# Patient Record
Sex: MALE | Race: WHITE | NOT HISPANIC OR LATINO | Employment: OTHER | URBAN - METROPOLITAN AREA
[De-identification: names, ages, dates, MRNs, and addresses within clinical notes are randomized per-mention and may not be internally consistent; named-entity substitution may affect disease eponyms.]

---

## 2017-01-04 ENCOUNTER — OFFICE VISIT (OUTPATIENT)
Dept: ORTHOPEDIC SURGERY | Facility: CLINIC | Age: 77
End: 2017-01-04

## 2017-01-04 VITALS — HEIGHT: 68 IN | WEIGHT: 169 LBS | BODY MASS INDEX: 25.61 KG/M2 | TEMPERATURE: 98.6 F

## 2017-01-04 DIAGNOSIS — M16.11 PRIMARY OSTEOARTHRITIS OF RIGHT HIP: Primary | ICD-10-CM

## 2017-01-04 PROCEDURE — 99213 OFFICE O/P EST LOW 20 MIN: CPT | Performed by: ORTHOPAEDIC SURGERY

## 2017-01-04 NOTE — PROGRESS NOTES
"Patient:  Louie German is a 76 y.o. male    Chief Complaint/ Reason for Visit:    Chief Complaint   Patient presents with   • Right Hip - Pre-op Exam, Follow-up, Pain       HPI:  The patient presents today for preoperative discussion, history and physical examination.  He has clearly and exhaustively pursued a comprehensive course of nonsurgical management of the end-stage osteoarthritis in his right hip.  He says now, he can't even really put on his own right shoe and sock due to severe pain in his right groin caused by flexing his hip.  As documented in previous visits, he has had physical therapy and the full complement of nonsurgical management, including an intra-articular injection, as well as, in the past, multiple nonsteroidals, though he can't take these anymore due to kidney disease, he says..  His pain is severe, and tremendously limits even the simplest day-to-day activities.  He wants to proceed with hip replacement as scheduled.      He says that his pain in the right hip and groin area has actually worsened even just since his last visit.  He says, \"I don't think I can go on like this much longer.\"          PMH:    Past Medical History   Diagnosis Date   • Coronary artery disease    • DDD (degenerative disc disease), cervical    • Hx of cardiac arrest      Happened after Induction of anesthesia prior  to JOEY   • Hyperlipidemia    • Hypertension    • Kidney stones    • Osteoarthritis    • Rheumatoid arthritis        PSH:    Past Surgical History   Procedure Laterality Date   • Coronary angioplasty with stent placement       17 years ago   • Total hip arthroplasty Left      9-10 years ago    • Posterior cervical fusion  2001   • Cystoscopy w/ litholapaxy / ehl       6-7 years ago       Social Hx:    Social History     Social History   • Marital status:      Spouse name: N/A   • Number of children: N/A   • Years of education: N/A     Occupational History   • Not on file.     Social History " Main Topics   • Smoking status: Never Smoker   • Smokeless tobacco: Former User   • Alcohol use Yes      Comment: 1-2 beers nightly, sometimes an occasional bourbon   • Drug use: No   • Sexual activity: Not on file     Other Topics Concern   • Not on file     Social History Narrative       Family Hx:    Family History   Problem Relation Age of Onset   • Hypertension Mother      Lived to 95 years old - Had numerous medical problems & medications   • Hypertension Father      Lived to 98 years old - Numerous medical problems and Medications   • Cancer Brother      History of Agent Orange   • Heart disease Maternal Grandmother    • Osteoarthritis Paternal Grandfather      possible RA       Meds:    Current Outpatient Prescriptions:   •  aspirin 81 MG tablet, Take 81 mg by mouth daily., Disp: , Rfl:   •  felodipine (PLENDIL) 10 MG 24 hr tablet, Take 1 tablet by mouth daily., Disp: 90 tablet, Rfl: 3  •  simvastatin (ZOCOR) 40 MG tablet, TAKE 1 TABLET DAILY, Disp: 90 tablet, Rfl: 2  •  naproxen (NAPROSYN) 500 MG tablet, Take 500 mg by mouth 2 (Two) Times a Day With Meals., Disp: , Rfl:     Allergies:  No Known Allergies    ROS:  Review of Systems     Constitutional: Negative for chills, diaphoresis, fever and unexpected weight change.   HENT: Negative for hearing loss, nosebleeds, sore throat and tinnitus.   Eyes: Negative for pain and visual disturbance.   Respiratory: Negative for cough, shortness of breath and wheezing.   Cardiovascular: Negative for chest pain and palpitations.   Gastrointestinal: Negative for abdominal pain, diarrhea, nausea and vomiting.   Endocrine: Negative for cold intolerance, heat intolerance and polydipsia.   Genitourinary: Negative for difficulty urinating, dysuria and hematuria.   Musculoskeletal: Positive for arthralgias and gait problem. Negative for joint swelling and myalgias.   Skin: Negative for rash and wound.   Allergic/Immunologic: Negative for environmental allergies.  "  Neurological: Negative for dizziness, syncope and numbness.   Hematological: Does not bruise/bleed easily.   Psychiatric/Behavioral: Negative for dysphoric mood and sleep disturbance. The patient is not nervous/anxious.     Vitals:    01/04/17 1456   Temp: 98.6 °F (37 °C)   Weight: 169 lb (76.7 kg)   Height: 68\" (172.7 cm)       Physical Exam   Constitutional: He is oriented to person, place, and time. He appears well-developed and well-nourished.   HENT:   Head: Normocephalic and atraumatic.   Mouth/Throat: Oropharynx is clear and moist.   Eyes: Conjunctivae and EOM are normal. Pupils are equal, round, and reactive to light. No scleral icterus.   Neck: No JVD present. No tracheal deviation present.   Cardiovascular: Normal rate, regular rhythm, normal heart sounds and intact distal pulses.    Pulses:       Dorsalis pedis pulses are 2+ on the right side, and 2+ on the left side.        Posterior tibial pulses are 2+ on the right side, and 2+ on the left side.   Pulmonary/Chest: Effort normal and breath sounds normal. No respiratory distress.   Abdominal: Soft. Bowel sounds are normal. He exhibits no distension. There is no tenderness.   Musculoskeletal:        Left hip: He exhibits decreased range of motion, decreased strength and crepitus. He exhibits no laceration.   Right hip: The patient's right hip is examined. Skin is unremarkable. He has no trochanteric tenderness. He has a moderately positive Stinchfield test. He has pain on flexion and internal rotation, which are limited.  He exhibits decreased range of motion and crepitus.    Left hip the patient has a smooth stable range of motion throughout a functional range of his left total hip. Leg lengths appear grossly equal, though the left lower extremity may be ever so slightly longer than the right.   Neurological: He is alert and oriented to person, place, and time.   Skin: Skin is warm and dry. No rash noted. No cyanosis or erythema. Nails show no " clubbing.   Psychiatric: He has a normal mood and affect. His speech is normal and behavior is normal. Judgment and thought content normal.   Nursing note and vitals reviewed.      Radiology:  I rereviewed previous images.  New films were not obtained.  The patient clearly has end-stage, bone-on-bone osteoarthritis of the right hip.  Incidental note is made of a well positioned left total hip with no sign of loosening.    Labs: Preop labs are pending at this time.  The patient apparently has an appointment tomorrow for preop labs and studies.      Assessment:  1. Primary osteoarthritis of right hip, which, in every respect, has reached end stage            Plan:  I had a long discussion with the patient's regarding his options. He said the last injection he had did not work. He has been doing physical therapy, and continues to do physical therapy for his right hip arthritis. He is using a cane much more frequently now. He cannot take anti-inflammatories on any regular basis anymore due to his kidney disease. He would like to schedule a right total hip replacement. I think this is a reasonable option for this individual, particularly given his excellent recovery from his left hip replacement.     I have advised him that I did not use the same implants that that he had on the other side. He says he understands. I advised him that, though he had an excellent outcome on the left side, there is no guarantee of an excellent outcome with the right hip replacement. He says he understands. I've advised him that surgery has risks, and that surgery has no guarantees. I reminded him that he is now 10 years older than he was when he had the other hip replaced.     The patient and I have discussed the procedure, the alternatives including nonsurgical management, the pluses and minuses, risks and benefits of the nonsurgical versus surgical options. The patient has voiced understanding and wishes to proceed with surgery as  scheduled. We have reviewed that surgery has risks and that surgery has no guarantees. We have discussed that the risks of surgery include but are not limited to bleeding, infection, injury to nerves, blood vessels, tendons, ligaments or other soft tissue structures, possibly causing permanent pain, numbness, swelling or other problems, as well as iatrogenic fracture. I advised the patient additionally that there could be problems with the hip, including leg length discrepancy, dislocation or instability, malalignment, malrotation, loosening of the components, any of which could cause the need for additional surgical procedures. I explained the possibility of skin wound problems, skin breakdown, blood clots, pulmonary embolism, pneumonia, stroke, heart attack, loss of limb, or even death due to these or other complications. The patient voiced understanding.     We additionally discussed that the alignment and rotation, and possibly the length, of the leg would change. We discussed that in spite of what appeared to be properly performed surgical procedure the patient may experience permanent pain permanent stiffness permanent limping and/or permanent swelling. I also reviewed that if the patient did not do his part to rehabilitate the hip including performing aggressive constant daily work on range of motion that the patient would have a potentially poor outcome. The patient voiced understanding.    We discussed the particulars of infection with respect to total hip replacement, and the tremendous problems that this can present.  He understands that it may require multiple surgical procedures, removal of the entire hip joint, months of IV antibiotics, etc.    He says he understands all of these things, understands that there are no guarantees, and wishes to proceed with surgery as scheduled.    He will have surgery and be admitted postop.  He plans on going home after a day or 2 with home health for his physical  therapy and care.  He will need to follow-up here 2 weeks postop.    The patient also underwent preoperative evaluation and education by nurse Sam while in the office today.

## 2017-01-04 NOTE — MR AVS SNAPSHOT
Louie German   2017 2:15 PM   Office Visit    Dept Phone:  [phone]   Encounter #:  [id number]    Provider:  Gerson Cardoza MD   Department:  [facility]                Your Full Care Plan              Your Updated Medication List          This list is accurate as of: 17  3:58 PM.  Always use your most recent med list.                aspirin 81 MG tablet       felodipine 10 MG 24 hr tablet   Commonly known as:  PLENDIL   Take 1 tablet by mouth daily.       naproxen 500 MG tablet   Commonly known as:  NAPROSYN       simvastatin 40 MG tablet   Commonly known as:  ZOCOR   TAKE 1 TABLET DAILY               Instructions     None    Patient Instructions History      Upcoming Appointments     Visit Type Date Time Department    FOLLOW UP 2017  2:15 PM [facility]    PAT TOTAL JOINT       2017 11:00 AM [facility]READMISSION T    POST-OP 2017 10:15 AM [facility]    SUBSEQUENT MEDICARE WELLNESS 3/10/2017 10:40 AM [facility]      Azuki ([employer]eng[employer])hart Signup     [facility] allows you to send messages to your doctor, view your test results, renew your prescriptions, schedule appointments, and more. To sign up, go to [employer] and click on the Sign Up Now link in the New User? box. Enter your [facility] Activation Code exactly as it appears below along with the last four digits of your Social Security Number and your Date of Birth () to complete the sign-up process. If you do not sign up before the expiration date, you must request a new code.    [facility] Activation Code: [email or web address]  Expires: 2017  3:58 PM    If you have questions, you can email [email or web address] or call [phone] to talk to our [facility] staff. Remember, [facility] is NOT to be used for urgent needs. For medical emergencies, dial 911.               Other Info from Your Visit           Your Appointments     Justice  "05, 2017 11:00 AM EST   Pat Total Joint with [facility] PAT 5   [facility] PREADMISSION T ([place])    [address] [address] [phone]            Jan 31, 2017 10:15 AM EST   Post-Op with Gerson Cardoza MD   [facility] (--)    [address]  [place] [id number] [phone]            Mar 10, 2017 10:40 AM EST   Subsequent Medicare Wellness with Dav Stoner Jr., MD   [facility] (--)    [address]  [facility] [address] [phone]              Allergies     No Known Allergies      Reason for Visit     Right Hip - Pre-op Exam           Vital Signs     Temperature Height Weight Body Mass Index Smoking Status       98.6 °F (37 °C) 68\" (172.7 cm) 169 lb (76.7 kg) 25.7 kg/m2 Never Smoker         "

## 2017-01-04 NOTE — LETTER
"January 4, 2017     Dav Stoner Jr., MD  [address]  [place] [id number]    Patient: Louie German   YOB: 1940   Date of Visit: 1/4/2017     Dear Dr. Georgiana MD:    Thank you for referring Louie eGrman to me for evaluation. Below are the relevant portions of my assessment and plan of care.    If you have questions, please do not hesitate to call me. I look forward to following Louie along with you.         Sincerely,        Gerson Cardoza MD        CC: No Recipients    Progress Notes:  Patient:  Louie German is a 76 y.o. male    Chief Complaint/ Reason for Visit:    Chief Complaint   Patient presents with   • Right Hip - Pre-op Exam, Follow-up, Pain       HPI:  The patient presents today for preoperative discussion, history and physical examination.  He has clearly and exhaustively pursued a comprehensive course of nonsurgical management of the end-stage osteoarthritis in his right hip.  He says now, he can't even really put on his own right shoe and sock due to severe pain in his right groin caused by flexing his hip.  As documented in previous visits, he has had physical therapy and the full complement of nonsurgical management, including an intra-articular injection, as well as, in the past, multiple nonsteroidals, though he can't take these anymore due to kidney disease, he says..  His pain is severe, and tremendously limits even the simplest day-to-day activities.  He wants to proceed with hip replacement as scheduled.      He says that his pain in the right hip and groin area has actually worsened even just since his last visit.  He says, \"I don't think I can go on like this much longer.\"          PMH:    Past Medical History   Diagnosis Date   • Coronary artery disease    • DDD (degenerative disc disease), cervical    • Hx of cardiac arrest      Happened after Induction of anesthesia prior  to JOEY   • Hyperlipidemia    • Hypertension    • Kidney stones    • " Osteoarthritis    • Rheumatoid arthritis        PSH:    Past Surgical History   Procedure Laterality Date   • Coronary angioplasty with stent placement       17 years ago   • Total hip arthroplasty Left      9-10 years ago    • Posterior cervical fusion  2001   • Cystoscopy w/ litholapaxy / ehl       6-7 years ago       Social Hx:    Social History     Social History   • Marital status:      Spouse name: N/A   • Number of children: N/A   • Years of education: N/A     Occupational History   • Not on file.     Social History Main Topics   • Smoking status: Never Smoker   • Smokeless tobacco: Former User   • Alcohol use Yes      Comment: 1-2 beers nightly, sometimes an occasional bourbon   • Drug use: No   • Sexual activity: Not on file     Other Topics Concern   • Not on file     Social History Narrative       Family Hx:    Family History   Problem Relation Age of Onset   • Hypertension Mother      Lived to 95 years old - Had numerous medical problems & medications   • Hypertension Father      Lived to 98 years old - Numerous medical problems and Medications   • Cancer Brother      History of Agent Orange   • Heart disease Maternal Grandmother    • Osteoarthritis Paternal Grandfather      possible RA       Meds:    Current Outpatient Prescriptions:   •  aspirin 81 MG tablet, Take 81 mg by mouth daily., Disp: , Rfl:   •  felodipine (PLENDIL) 10 MG 24 hr tablet, Take 1 tablet by mouth daily., Disp: 90 tablet, Rfl: 3  •  simvastatin (ZOCOR) 40 MG tablet, TAKE 1 TABLET DAILY, Disp: 90 tablet, Rfl: 2  •  naproxen (NAPROSYN) 500 MG tablet, Take 500 mg by mouth 2 (Two) Times a Day With Meals., Disp: , Rfl:     Allergies:  No Known Allergies    ROS:  Review of Systems     Constitutional: Negative for chills, diaphoresis, fever and unexpected weight change.   HENT: Negative for hearing loss, nosebleeds, sore throat and tinnitus.   Eyes: Negative for pain and visual disturbance.   Respiratory: Negative for cough,  "shortness of breath and wheezing.   Cardiovascular: Negative for chest pain and palpitations.   Gastrointestinal: Negative for abdominal pain, diarrhea, nausea and vomiting.   Endocrine: Negative for cold intolerance, heat intolerance and polydipsia.   Genitourinary: Negative for difficulty urinating, dysuria and hematuria.   Musculoskeletal: Positive for arthralgias and gait problem. Negative for joint swelling and myalgias.   Skin: Negative for rash and wound.   Allergic/Immunologic: Negative for environmental allergies.   Neurological: Negative for dizziness, syncope and numbness.   Hematological: Does not bruise/bleed easily.   Psychiatric/Behavioral: Negative for dysphoric mood and sleep disturbance. The patient is not nervous/anxious.     Vitals:    01/04/17 1456   Temp: 98.6 °F (37 °C)   Weight: 169 lb (76.7 kg)   Height: 68\" (172.7 cm)       Physical Exam   Constitutional: He is oriented to person, place, and time. He appears well-developed and well-nourished.   HENT:   Head: Normocephalic and atraumatic.   Mouth/Throat: Oropharynx is clear and moist.   Eyes: Conjunctivae and EOM are normal. Pupils are equal, round, and reactive to light. No scleral icterus.   Neck: No JVD present. No tracheal deviation present.   Cardiovascular: Normal rate, regular rhythm, normal heart sounds and intact distal pulses.    Pulses:       Dorsalis pedis pulses are 2+ on the right side, and 2+ on the left side.        Posterior tibial pulses are 2+ on the right side, and 2+ on the left side.   Pulmonary/Chest: Effort normal and breath sounds normal. No respiratory distress.   Abdominal: Soft. Bowel sounds are normal. He exhibits no distension. There is no tenderness.   Musculoskeletal:        Left hip: He exhibits decreased range of motion, decreased strength and crepitus. He exhibits no laceration.   Right hip: The patient's right hip is examined. Skin is unremarkable. He has no trochanteric tenderness. He has a moderately " positive Stinchfield test. He has pain on flexion and internal rotation, which are limited.  He exhibits decreased range of motion and crepitus.    Left hip the patient has a smooth stable range of motion throughout a functional range of his left total hip. Leg lengths appear grossly equal, though the left lower extremity may be ever so slightly longer than the right.   Neurological: He is alert and oriented to person, place, and time.   Skin: Skin is warm and dry. No rash noted. No cyanosis or erythema. Nails show no clubbing.   Psychiatric: He has a normal mood and affect. His speech is normal and behavior is normal. Judgment and thought content normal.   Nursing note and vitals reviewed.      Radiology:  I rereviewed previous images.  New films were not obtained.  The patient clearly has end-stage, bone-on-bone osteoarthritis of the right hip.  Incidental note is made of a well positioned left total hip with no sign of loosening.    Labs: Preop labs are pending at this time.  The patient apparently has an appointment tomorrow for preop labs and studies.      Assessment:  1. Primary osteoarthritis of right hip, which, in every respect, has reached end stage            Plan:  I had a long discussion with the patient's regarding his options. He said the last injection he had did not work. He has been doing physical therapy, and continues to do physical therapy for his right hip arthritis. He is using a cane much more frequently now. He cannot take anti-inflammatories on any regular basis anymore due to his kidney disease. He would like to schedule a right total hip replacement. I think this is a reasonable option for this individual, particularly given his excellent recovery from his left hip replacement.     I have advised him that I did not use the same implants that that he had on the other side. He says he understands. I advised him that, though he had an excellent outcome on the left side, there is no guarantee  of an excellent outcome with the right hip replacement. He says he understands. I've advised him that surgery has risks, and that surgery has no guarantees. I reminded him that he is now 10 years older than he was when he had the other hip replaced.     The patient and I have discussed the procedure, the alternatives including nonsurgical management, the pluses and minuses, risks and benefits of the nonsurgical versus surgical options. The patient has voiced understanding and wishes to proceed with surgery as scheduled. We have reviewed that surgery has risks and that surgery has no guarantees. We have discussed that the risks of surgery include but are not limited to bleeding, infection, injury to nerves, blood vessels, tendons, ligaments or other soft tissue structures, possibly causing permanent pain, numbness, swelling or other problems, as well as iatrogenic fracture. I advised the patient additionally that there could be problems with the hip, including leg length discrepancy, dislocation or instability, malalignment, malrotation, loosening of the components, any of which could cause the need for additional surgical procedures. I explained the possibility of skin wound problems, skin breakdown, blood clots, pulmonary embolism, pneumonia, stroke, heart attack, loss of limb, or even death due to these or other complications. The patient voiced understanding.     We additionally discussed that the alignment and rotation, and possibly the length, of the leg would change. We discussed that in spite of what appeared to be properly performed surgical procedure the patient may experience permanent pain permanent stiffness permanent limping and/or permanent swelling. I also reviewed that if the patient did not do his part to rehabilitate the hip including performing aggressive constant daily work on range of motion that the patient would have a potentially poor outcome. The patient voiced understanding.    We discussed  the particulars of infection with respect to total hip replacement, and the tremendous problems that this can present.  He understands that it may require multiple surgical procedures, removal of the entire hip joint, months of IV antibiotics, etc.    He says he understands all of these things, understands that there are no guarantees, and wishes to proceed with surgery as scheduled.    He will have surgery and be admitted postop.  He plans on going home after a day or 2 with home health for his physical therapy and care.  He will need to follow-up here 2 weeks postop.    The patient also underwent preoperative evaluation and education by nurse Sam while in the office today.

## 2017-01-05 ENCOUNTER — HOSPITAL ENCOUNTER (OUTPATIENT)
Dept: GENERAL RADIOLOGY | Facility: HOSPITAL | Age: 77
Discharge: HOME | End: 2017-01-05

## 2017-01-05 ENCOUNTER — HOSPITAL ENCOUNTER (OUTPATIENT)
Dept: GENERAL RADIOLOGY | Facility: HOSPITAL | Age: 77
Discharge: HOME | End: 2017-01-05
Admitting: ORTHOPAEDIC SURGERY

## 2017-01-05 ENCOUNTER — APPOINTMENT (OUTPATIENT)
Dept: PREADMISSION TESTING | Facility: HOSPITAL | Age: 77
End: 2017-01-05

## 2017-01-05 VITALS
TEMPERATURE: 96.7 F | HEIGHT: 69 IN | BODY MASS INDEX: 24.93 KG/M2 | HEART RATE: 73 BPM | SYSTOLIC BLOOD PRESSURE: 150 MMHG | DIASTOLIC BLOOD PRESSURE: 75 MMHG | WEIGHT: 168.3 LBS | RESPIRATION RATE: 16 BRPM | OXYGEN SATURATION: 99 %

## 2017-01-05 DIAGNOSIS — M25.551 RIGHT HIP PAIN: Primary | ICD-10-CM

## 2017-01-05 LAB
ANION GAP SERPL CALCULATED.3IONS-SCNC: 13.6 MMOL/L
BILIRUB UR QL STRIP: NEGATIVE
BUN BLD-MCNC: 21 MG/DL (ref 8–23)
BUN/CREAT SERPL: 23.9 (ref 7–25)
CALCIUM SPEC-SCNC: 9.4 MG/DL (ref 8.6–10.5)
CHLORIDE SERPL-SCNC: 100 MMOL/L (ref 98–107)
CLARITY UR: ABNORMAL
CO2 SERPL-SCNC: 26.4 MMOL/L (ref 22–29)
COLOR UR: YELLOW
CREAT BLD-MCNC: 0.88 MG/DL (ref 0.76–1.27)
DEPRECATED RDW RBC AUTO: 44.9 FL (ref 37–54)
ERYTHROCYTE [DISTWIDTH] IN BLOOD BY AUTOMATED COUNT: 13.3 % (ref 11.5–14.5)
GFR SERPL CREATININE-BSD FRML MDRD: 84 ML/MIN/1.73
GLUCOSE BLD-MCNC: 101 MG/DL (ref 65–99)
GLUCOSE UR STRIP-MCNC: NEGATIVE MG/DL
HCT VFR BLD AUTO: 41.9 % (ref 40.4–52.2)
HGB BLD-MCNC: 13.9 G/DL (ref 13.7–17.6)
HGB UR QL STRIP.AUTO: NEGATIVE
KETONES UR QL STRIP: NEGATIVE
LEUKOCYTE ESTERASE UR QL STRIP.AUTO: NEGATIVE
MCH RBC QN AUTO: 30.9 PG (ref 27–32.7)
MCHC RBC AUTO-ENTMCNC: 33.2 G/DL (ref 32.6–36.4)
MCV RBC AUTO: 93.1 FL (ref 79.8–96.2)
NITRITE UR QL STRIP: NEGATIVE
PH UR STRIP.AUTO: 7 [PH] (ref 5–8)
PLATELET # BLD AUTO: 227 10*3/MM3 (ref 140–500)
PMV BLD AUTO: 11.3 FL (ref 6–12)
POTASSIUM BLD-SCNC: 3.8 MMOL/L (ref 3.5–5.2)
PROT UR QL STRIP: NEGATIVE
RBC # BLD AUTO: 4.5 10*6/MM3 (ref 4.6–6)
SODIUM BLD-SCNC: 140 MMOL/L (ref 136–145)
SP GR UR STRIP: 1.02 (ref 1–1.03)
UROBILINOGEN UR QL STRIP: ABNORMAL
WBC NRBC COR # BLD: 9.53 10*3/MM3 (ref 4.5–10.7)

## 2017-01-05 PROCEDURE — 73502 X-RAY EXAM HIP UNI 2-3 VIEWS: CPT

## 2017-01-05 PROCEDURE — G8979 MOBILITY GOAL STATUS: HCPCS

## 2017-01-05 PROCEDURE — 93010 ELECTROCARDIOGRAM REPORT: CPT | Performed by: INTERNAL MEDICINE

## 2017-01-05 PROCEDURE — 63710000001 MUPIROCIN 2 % OINTMENT 0.9 G SYRINGE: Performed by: ORTHOPAEDIC SURGERY

## 2017-01-05 PROCEDURE — 81003 URINALYSIS AUTO W/O SCOPE: CPT | Performed by: ORTHOPAEDIC SURGERY

## 2017-01-05 PROCEDURE — 71020 HC CHEST PA AND LATERAL: CPT

## 2017-01-05 PROCEDURE — G8978 MOBILITY CURRENT STATUS: HCPCS

## 2017-01-05 PROCEDURE — 80048 BASIC METABOLIC PNL TOTAL CA: CPT | Performed by: ORTHOPAEDIC SURGERY

## 2017-01-05 PROCEDURE — G8980 MOBILITY D/C STATUS: HCPCS

## 2017-01-05 PROCEDURE — 85027 COMPLETE CBC AUTOMATED: CPT | Performed by: ORTHOPAEDIC SURGERY

## 2017-01-05 PROCEDURE — 97161 PT EVAL LOW COMPLEX 20 MIN: CPT

## 2017-01-05 PROCEDURE — 93005 ELECTROCARDIOGRAM TRACING: CPT

## 2017-01-05 PROCEDURE — A9270 NON-COVERED ITEM OR SERVICE: HCPCS | Performed by: ORTHOPAEDIC SURGERY

## 2017-01-05 PROCEDURE — 36415 COLL VENOUS BLD VENIPUNCTURE: CPT

## 2017-01-05 PROCEDURE — 72052 X-RAY EXAM NECK SPINE 6/>VWS: CPT

## 2017-01-05 RX ORDER — FELODIPINE 10 MG/1
10 TABLET, EXTENDED RELEASE ORAL EVERY EVENING
COMMUNITY
End: 2017-12-05 | Stop reason: SDUPTHER

## 2017-01-05 RX ORDER — CHLORHEXIDINE GLUCONATE 500 MG/1
CLOTH TOPICAL
COMMUNITY
End: 2017-02-16 | Stop reason: HOSPADM

## 2017-01-05 RX ORDER — SIMVASTATIN 40 MG
40 TABLET ORAL NIGHTLY
COMMUNITY
End: 2017-06-08 | Stop reason: SDUPTHER

## 2017-01-05 NOTE — MR AVS SNAPSHOT
Louie German   1/5/2017 11:00 AM   Appointment    Provider:  SIXTO CASTAÑEDA 5   Department:  [facility] PREADMISSION T   Dept Phone:  [phone]                Your Full Care Plan           To Do List     1/5/2017 10:35 AM     Appointment with NORMA PAT XR 1 at [facility] XRAY ([phone])   Bring along any outside films relating to this procedure. Arrive 15 minutes before your scheduled time.   [address] [facility] [address]       1/5/2017 10:40 AM     Appointment with NORMA PAT XR 1 at [facility] XRAY ([phone])   Bring along any outside films relating to this procedure. Arrive 15 minutes before your scheduled time.   [address] [facility] [address]       1/5/2017 11:00 AM     Appointment with SIXTO GREEN PAT 5 at [facility] PREADMISSION T ([phone])   [address] [facility] [address]       1/31/2017 10:15 AM     Appointment with Gerson Cardoza MD at [facility] ([phone])   [address]  [facility] [address]       3/10/2017 10:40 AM     Appointment with Dav Stoner Jr., MD at [facility] INTERNAL MEDICINE ([phone])   [address]  [facility] [address]            Your Updated Medication List          This list is accurate as of: 1/5/17 10:33 AM.  Always use your most recent med list.                aspirin 81 MG tablet       Chlorhexidine Gluconate Cloth 2 % pads       felodipine 10 MG 24 hr tablet   Commonly known as:  PLENDIL       mupirocin 2 % nasal ointment   Commonly known as:  BACTROBAN       naproxen 500 MG tablet   Commonly known as:  NAPROSYN       simvastatin 40 MG tablet   Commonly known as:  ZOCOR               MyChart Signup     [facility] allows you to send messages to your doctor, view your test results, renew your prescriptions, schedule appointments, and more.  "To sign up, go to [employer] and click on the Sign Up Now link in the New User? box. Enter your [email or web address] Activation Code exactly as it appears below along with the last four digits of your Social Security Number and your Date of Birth () to complete the sign-up process. If you do not sign up before the expiration date, you must request a new code.    [email or web address] Activation Code: [email or web address]  Expires: 2017  3:58 PM    If you have questions, you can email [email or web address] or call [phone] to talk to our [email or web address] staff. Remember, [email or web address] is NOT to be used for urgent needs. For medical emergencies, dial 911.               Other Info from Your Visit           Allergies     No Known Allergies      Vital Signs     Blood Pressure Pulse Temperature Respirations Height Weight    150/75 (BP Location: Right arm, Patient Position: Sitting) 73 96.7 °F (35.9 °C) (Oral) 16 68.5\" (174 cm) 168 lb 4.8 oz (76.3 kg)    Oxygen Saturation Body Mass Index Smoking Status             99% 25.22 kg/m2 Never Smoker           Discharge Instructions       Take the following medications the morning of surgery with a small sip of water.    NONE.    ARRIVE 11:00    General Instructions:  • Do not eat or drink after midnight: includes water, mints, or gum. You may brush your teeth.  • Do not smoke, chew tobacco, or drink alcohol.  • Bring medications in original bottles, any inhalers and if applicable your C-PAP/ BI-PAP machine.  • Bring any papers given to you in the doctor’s office.  • Wear clean comfortable clothes and socks.  • Do not wear contact lenses or make-up.  Bring a case for your glasses if applicable.   • Bring crutches or walker if applicable.  • Leave all other valuables and jewelry at home.    If you were given a blood bank ID arm band remember to bring it with you the day of surgery.    Preventing a Surgical Site Infection:  Shower on the morning of surgery using a fresh bar of anti-bacterial " soap (such as Dial) and clean washcloth.  Dry with a clean towel and dress in clean clothing.  For 2 to 3 days before surgery, avoid shaving with a razor near where you will have surgery because the razor can irritate skin and make it easier to develop an infection  Ask your surgeon if you will be receiving antibiotics prior to surgery  Make sure you, your family, and all healthcare providers clean their hands with soap and water or an alcohol based hand  before caring for you or your wound  If at all possible, quit smoking as many days before surgery as you can.    Day of surgery:  Upon arrival, a Pre-op nurse and Anesthesiologist will review your health history, obtain vital signs, and answer questions you may have.  The only belongings needed at this time will be your home medications and if applicable your C-PAP/BI-PAP machine.  If you are staying overnight your family can leave the rest of your belongings in the car and bring them to your room later.  A Pre-op nurse will start an IV and you may receive medication in preparation for surgery, including something to help you relax.  Your family will be able to see you in the Pre-op area.  While you are in surgery your family should notify the waiting room  if they leave the waiting room area and provide a contact phone number.    Please be aware that surgery does come with discomfort.  We want to make every effort to control your discomfort so please discuss any uncontrolled symptoms with your nurse.   Your doctor will most likely have prescribed pain medications.      If you are going home after surgery you will receive individualized written care instructions before being discharged.  A responsible adult must drive you to and from the hospital on the day of your surgery and stay with you for 24 hours.    If you are staying overnight following surgery, you will be transported to your hospital room following the recovery period.  [facility]  [facility] has all private rooms.    If you have any questions please call Pre-Admission Testing at [phone].  Deductibles and co-payments are collected on the day of service. Please be prepared to pay the required co-pay, deductible or deposit on the day of service as defined by your plan.    2% CHLORAHEXIDINE GLUCONATE* CLOTH  Preparing or “prepping” skin before surgery can reduce the risk of infection at the surgical site. To make the process easier, [facility] has chosen disposable cloths moistened with a rinse-free, 2% Chlorhexidine Gluconate (CHG) antiseptic solution. The steps below outline the prepping process and should be carefully followed.        Use the prep cloth on the area that is circled in the diagram             Directions Night before Surgery  1) Shower using a fresh bar of anti-bacterial soap (such as Dial) and clean washcloth.  Use a clean towel to completely dry your skin.  2) Do not use any lotions, oils or creams on your skin.  3) Open the package and remove 1 cloth, wipe your skin for 30 seconds in a circular motion.  Allow to dry for 3 minutes.  4) Repeat #3 with second cloth.  5) Do not touch your eyes, ears, or mouth with the prep cloth.  6) Allow the wet prep solution to air dry.  7) Discard the prep cloth and wash your hands with soap and water.   8) Dress in clean bed clothes and sleep on fresh clean bed sheets.   9) You may experience some temporary itching after the prep.    Directions Day of Surgery  1) Repeat steps 1,2,3,4,5,6,7, and 9.   2) Dress in clean clothes before coming to the hospital.    BACTROBAN NASAL OINTMENT  There are many germs normally in your nose. Bactroban is an ointment that will help reduce these germs. Please follow these instructions for Bactroban use:    ____Two days before surgery in the evening Date________    ____The day before surgery in the morning  Date________    ____The day before surgery in the evening               Date________    ____The day of surgery in the morning    Date________    **Squirt ½ package of Bactroban Ointment onto a cotton applicator and apply to inside of 1st nostril.  Squirt the remaining Bactroban and apply to the inside of the other nostril.    PERIDEX- ORAL:  Use only if your surgeon has ordered  Use the night before and morning of surgery - Swish, gargle, and spit - do not swallow.       SYMPTOMS OF A STROKE    Call 911 or have someone take you to the Emergency Department if you have any of the following:    · Sudden numbness or weakness of your face, arm or leg especially on one side of the body  · Sudden confusion, diffiiculty speaking or trouble understanding   · Changes in your vision or loss of sight in one eye  · Sudden severe headache with no known cause  · sudden dizziness, trouble walking, loss of balance or coordination    It is important to seek emergency care right away if you have further stroke symptoms. If you get emergency help quickly, the powerful clot-dissolving medicines can reduce the disabilities caused by a stroke.     For more information:    American Stroke Association  7-548-4-STROKE  www.strokeassociation.org           IF YOU SMOKE OR USE TOBACCO PLEASE READ THE FOLLOWING:    Why is smoking bad for me?  Smoking increases the risk of heart disease, lung disease, vascular disease, stroke, and cancer.     If you smoke, STOP!    If you would like more information on quitting smoking, please visit the [place] PREVENTIVE MEDICINE website: [email or web address]   This link will provide additional resources including the QUIT line and the Beat the Pack support groups.     For more information:    American Cancer Society  (448) 442-1771    American Heart Association  1-599.845.9560

## 2017-01-05 NOTE — DISCHARGE INSTRUCTIONS
Take the following medications the morning of surgery with a small sip of water.    NONE.    ARRIVE 11:00    General Instructions:  • Do not eat or drink after midnight: includes water, mints, or gum. You may brush your teeth.  • Do not smoke, chew tobacco, or drink alcohol.  • Bring medications in original bottles, any inhalers and if applicable your C-PAP/ BI-PAP machine.  • Bring any papers given to you in the doctor’s office.  • Wear clean comfortable clothes and socks.  • Do not wear contact lenses or make-up.  Bring a case for your glasses if applicable.   • Bring crutches or walker if applicable.  • Leave all other valuables and jewelry at home.    If you were given a blood bank ID arm band remember to bring it with you the day of surgery.    Preventing a Surgical Site Infection:  Shower on the morning of surgery using a fresh bar of anti-bacterial soap (such as Dial) and clean washcloth.  Dry with a clean towel and dress in clean clothing.  For 2 to 3 days before surgery, avoid shaving with a razor near where you will have surgery because the razor can irritate skin and make it easier to develop an infection  Ask your surgeon if you will be receiving antibiotics prior to surgery  Make sure you, your family, and all healthcare providers clean their hands with soap and water or an alcohol based hand  before caring for you or your wound  If at all possible, quit smoking as many days before surgery as you can.    Day of surgery:  Upon arrival, a Pre-op nurse and Anesthesiologist will review your health history, obtain vital signs, and answer questions you may have.  The only belongings needed at this time will be your home medications and if applicable your C-PAP/BI-PAP machine.  If you are staying overnight your family can leave the rest of your belongings in the car and bring them to your room later.  A Pre-op nurse will start an IV and you may receive medication in preparation for surgery, including  something to help you relax.  Your family will be able to see you in the Pre-op area.  While you are in surgery your family should notify the waiting room  if they leave the waiting room area and provide a contact phone number.    Please be aware that surgery does come with discomfort.  We want to make every effort to control your discomfort so please discuss any uncontrolled symptoms with your nurse.   Your doctor will most likely have prescribed pain medications.      If you are going home after surgery you will receive individualized written care instructions before being discharged.  A responsible adult must drive you to and from the hospital on the day of your surgery and stay with you for 24 hours.    If you are staying overnight following surgery, you will be transported to your hospital room following the recovery period.  [facility] has all private rooms.    If you have any questions please call Pre-Admission Testing at [phone].  Deductibles and co-payments are collected on the day of service. Please be prepared to pay the required co-pay, deductible or deposit on the day of service as defined by your plan.    2% CHLORAHEXIDINE GLUCONATE* CLOTH  Preparing or “prepping” skin before surgery can reduce the risk of infection at the surgical site. To make the process easier, [facility] has chosen disposable cloths moistened with a rinse-free, 2% Chlorhexidine Gluconate (CHG) antiseptic solution. The steps below outline the prepping process and should be carefully followed.        Use the prep cloth on the area that is circled in the diagram             Directions Night before Surgery  1) Shower using a fresh bar of anti-bacterial soap (such as Dial) and clean washcloth.  Use a clean towel to completely dry your skin.  2) Do not use any lotions, oils or creams on your skin.  3) Open the package and remove 1 cloth, wipe your skin for 30 seconds in a circular motion.   Allow to dry for 3 minutes.  4) Repeat #3 with second cloth.  5) Do not touch your eyes, ears, or mouth with the prep cloth.  6) Allow the wet prep solution to air dry.  7) Discard the prep cloth and wash your hands with soap and water.   8) Dress in clean bed clothes and sleep on fresh clean bed sheets.   9) You may experience some temporary itching after the prep.    Directions Day of Surgery  1) Repeat steps 1,2,3,4,5,6,7, and 9.   2) Dress in clean clothes before coming to the hospital.    BACTROBAN NASAL OINTMENT  There are many germs normally in your nose. Bactroban is an ointment that will help reduce these germs. Please follow these instructions for Bactroban use:    ____Two days before surgery in the evening Date________    ____The day before surgery in the morning  Date________    ____The day before surgery in the evening              Date________    ____The day of surgery in the morning    Date________    **Squirt ½ package of Bactroban Ointment onto a cotton applicator and apply to inside of 1st nostril.  Squirt the remaining Bactroban and apply to the inside of the other nostril.    PERIDEX- ORAL:  Use only if your surgeon has ordered  Use the night before and morning of surgery - Swish, gargle, and spit - do not swallow.

## 2017-01-05 NOTE — PROGRESS NOTES
Physical Therapy Outpatient Preoperative Total Joint Evaluation     Patient Name: Louie German  : 1940  MRN: [id number]  Today's Date: 2017         Surgery Date: 01/10/17    Patient Active Problem List   Diagnosis   • Benign essential HTN   • Acid reflux   • Elevated cholesterol   • Calculus of kidney   • Cervical spinal stenosis   • Osteoarthritis of right hip   • Status post total hip replacement, left        Past Medical History   Diagnosis Date   • Coronary artery disease    • DDD (degenerative disc disease), cervical    • Hx of cardiac arrest      Happened after Induction of anesthesia prior  to JOEY   • Hyperlipidemia    • Hypertension    • Kidney stones    • Osteoarthritis    • Rheumatoid arthritis         Past Surgical History   Procedure Laterality Date   • Coronary angioplasty with stent placement       17 years ago   • Total hip arthroplasty Left      9-10 years ago    • Posterior cervical fusion     • Cystoscopy w/ litholapaxy / ehl       6-7 years ago              TOTAL JOINT PREOP EVAL (last 72 hours)      Total Joint Preop Evaluation       17 1104                Preoperative Evaluation    Surgery THR: Right  -TV        Surgery Date 01/10/17  -TV        Previous Total Joint Yes  -TV        What type: l thr 10 years ago  -TV        Attended Class no  -TV        Prior Activity Status    All Household independent  -TV        All Community independent  -TV        Gait independent  -TV        Transfers independent  -TV         Spouse  -TV        DC Plans Home  -TV        Assist at home Spouse  -TV        Home Environment 2 story;basement;lives first floor  -TV        Exterior steps --   2  -TV        Interior steps 1 rail   14  -TV        Pain 0-10    Pain Level 5  -TV        Location r hip  -TV        LE Measurements - ROM    Hip Flexion - Right Strength is grossly > or equal to 3/5   hip motion limited and painful  -TV        Hip Abduction - Right Strength is grossly > or  equal to 3/5  -TV        Knee Flexion - Right AROM is WNL;Strength is grossly > or equal to 3/5  -TV        Knee Extension - Right AROM is WNL;Strength is grossly > or equal to 3/5  -TV        Hip Flexion - Left AROM is WNL;Strength is grossly > or equal to 3/5  -TV        Hip Abduction - Left AROM is WNL;Strength is grossly > or equal to 3/5  -TV        Knee Flexion - Left AROM is WNL;Strength is grossly > or equal to 3/5  -TV        Knee Extension - Left AROM is WNL;Strength is grossly > or equal to 3/5  -TV        UE ROM/Strength    UE ROM/Strength adequate for walker use  -TV        Other Measurements    Sensation/Circulation intact  -TV        Gait Observation no device  -TV        Equipment    Equipment Patient Has Walker;Cane;Bedside commode  -TV        ASSESSMENT    Goal Patient demonstrates good understanding of post-op P.T.;Exercises;Surgical Procedure  -TV        Goal Met? Yes  -TV        Anticipated Progress good  -TV        Patient agrees with Plan of Treatment? Yes  -TV        Plan Will see for post op TJR protocol  -TV          User Key  (r) = Recorded By, (t) = Taken By, (c) = Cosigned By    Initials Name Effective Dates    TV Lorir Faith, PT 01/07/16 -              Time Calculation:          Therapy Charges for Today     Code Description Service Date Service Provider Modifiers Qty    14979133201 HC PT MOBILITY CURRENT 1/5/2017 Lorri Faith, PT GP, CI 1    88995695457 HC PT MOBILITY PROJECTED 1/5/2017 Lorri Faith, PT GP, CI 1    63120784838 HC PT MOBILITY DISCHARGE 1/5/2017 Lorri Faith, PT GP, CI 1    88375827153 HC PAT-TOTAL JOINT CLASS 1/5/2017 Lorri Faith, PT  1    17819436252 HC PT EVAL LOW COMPLEXITY 1 1/5/2017 Lorri Faith, PT GP 1            PT G-Codes  PT Professional Judgement Used?: Yes  Functional Limitation: Mobility: Walking and moving around  Mobility: Walking and Moving Around Current Status (): At least 1 percent but less than 20 percent impaired, limited or restricted  Mobility:  Walking and Moving Around Goal Status (): At least 1 percent but less than 20 percent impaired, limited or restricted  Mobility: Walking and Moving Around Discharge Status (): At least 1 percent but less than 20 percent impaired, limited or restricted      Lorri Faith, PT  1/5/2017

## 2017-01-06 ENCOUNTER — TELEPHONE (OUTPATIENT)
Dept: ORTHOPEDIC SURGERY | Facility: CLINIC | Age: 77
End: 2017-01-06

## 2017-01-06 DIAGNOSIS — R91.1 PULMONARY NODULE, LEFT: ICD-10-CM

## 2017-01-06 DIAGNOSIS — R93.89 ABNORMAL CXR (CHEST X-RAY): Primary | ICD-10-CM

## 2017-01-06 NOTE — TELEPHONE ENCOUNTER
In review of his readmission testing patient was found to have an abnormal preoperative chest x-ray.  Did show some chronic lung changes on the right however there also was a 9 mm left pulmonary nodule that is indeterminate.  I'll just is recommending CT of the chest for follow-up.  Patient is been scheduled first CT of the chest at [facility] [place] on Sunday, January 8 and all this information has been discussed with the patient

## 2017-01-08 ENCOUNTER — HOSPITAL ENCOUNTER (OUTPATIENT)
Dept: CT IMAGING | Facility: HOSPITAL | Age: 77
Discharge: HOME | End: 2017-01-08
Attending: ORTHOPAEDIC SURGERY | Admitting: ORTHOPAEDIC SURGERY

## 2017-01-08 DIAGNOSIS — R93.89 ABNORMAL CXR (CHEST X-RAY): ICD-10-CM

## 2017-01-08 DIAGNOSIS — R91.1 PULMONARY NODULE, LEFT: ICD-10-CM

## 2017-01-08 PROCEDURE — 71250 CT THORAX DX C-: CPT

## 2017-01-09 ENCOUNTER — TELEPHONE (OUTPATIENT)
Dept: ORTHOPEDIC SURGERY | Facility: CLINIC | Age: 77
End: 2017-01-09

## 2017-01-09 ENCOUNTER — TELEPHONE (OUTPATIENT)
Dept: INTERNAL MEDICINE | Facility: CLINIC | Age: 77
End: 2017-01-09

## 2017-01-09 DIAGNOSIS — R59.1 LYMPHADENOPATHY: Primary | ICD-10-CM

## 2017-01-09 NOTE — TELEPHONE ENCOUNTER
Received call from Dr.David Cardoza office: patient was scheduled for elective hip replacement. On preop CXR 9 mm nodule was found. Patient had CT scan that confirmed 10 mm paratracheal lymph nodule - reactive vs malignancy.  had cancelled the surgery and asked patient to see  for f/u.

## 2017-01-09 NOTE — TELEPHONE ENCOUNTER
CT scan of the chest shows a left paratracheal lymph node measuring about 1 cm that could be reactive in nature and potentially evidence of neoplasm.  Radiologist is recommending clinical correlation.  I discussed all this with Dr. Cardoza and he has decided to postpone his surgery until we can get this looked into.  I did contact his primary care physician's office and spoke with Dr. Fox is covering for Dr. Stoner regarding the CT scan results.  We'll have the patient contact the office and set up an appointment to see Dr. Stonerfor further follow-up and evaluation.  Was information has been discussed with the patient she is obviously is appointed because of his increasing hip pain he would prefer to proceed with surgery.  I explained to him that we can reschedule his surgery once he's been cleared to proceed

## 2017-01-10 ENCOUNTER — OFFICE VISIT (OUTPATIENT)
Dept: INTERNAL MEDICINE | Facility: CLINIC | Age: 77
End: 2017-01-10

## 2017-01-10 VITALS
HEIGHT: 69 IN | BODY MASS INDEX: 24.44 KG/M2 | WEIGHT: 165 LBS | OXYGEN SATURATION: 98 % | SYSTOLIC BLOOD PRESSURE: 130 MMHG | HEART RATE: 82 BPM | DIASTOLIC BLOOD PRESSURE: 80 MMHG

## 2017-01-10 DIAGNOSIS — M16.11 PRIMARY OSTEOARTHRITIS OF RIGHT HIP: ICD-10-CM

## 2017-01-10 DIAGNOSIS — R91.1 NODULE OF LEFT LUNG: Primary | ICD-10-CM

## 2017-01-10 PROCEDURE — 99212 OFFICE O/P EST SF 10 MIN: CPT | Performed by: NURSE PRACTITIONER

## 2017-01-10 NOTE — MR AVS SNAPSHOT
Louie German   1/10/2017 11:30 AM   Office Visit    Dept Phone:  [phone]   Encounter #:  [id number]    Provider:  NOLAN Tabares   Department:  [facility] INTERNAL MEDICINE                Your Full Care Plan              Your Updated Medication List          This list is accurate as of: 1/10/17 12:27 PM.  Always use your most recent med list.                aspirin 81 MG tablet       Chlorhexidine Gluconate Cloth 2 % pads       felodipine 10 MG 24 hr tablet   Commonly known as:  PLENDIL       mupirocin 2 % nasal ointment   Commonly known as:  BACTROBAN       naproxen 500 MG tablet   Commonly known as:  NAPROSYN       simvastatin 40 MG tablet   Commonly known as:  ZOCOR               We Performed the Following     Ambulatory Referral to Cardiothoracic Surgery       You Were Diagnosed With        Codes Comments    Nodule of left lung    -  Primary ICD-10-CM: R91.1  ICD-9-CM: 793.11     Primary osteoarthritis of right hip     ICD-10-CM: M16.11  ICD-9-CM: 715.15 due to have surgery once cleared      Instructions     None    Patient Instructions History      Upcoming Appointments     Visit Type Date Time Department    OFFICE VISIT 1/10/2017 11:30 AM [facility]    POST-OP 2017 10:15 AM [facility]    SUBSEQUENT MEDICARE WELLNESS 3/10/2017 10:40 AM [facility]      [employer] Signup     [facility] [employer] allows you to send messages to your doctor, view your test results, renew your prescriptions, schedule appointments, and more. To sign up, go to [employer] and click on the Sign Up Now link in the New User? box. Enter your [employer] Activation Code exactly as it appears below along with the last four digits of your Social Security Number and your Date of Birth () to complete the sign-up process. If you do not sign up before the expiration date, you must request a new code.    [employer] Activation Code: [email or web address]  Expires:  "1/18/2017  3:58 PM    If you have questions, you can email [email or web address] or call [phone] to talk to our MyChart staff. Remember, [employer] is NOT to be used for urgent needs. For medical emergencies, dial 911.               Other Info from Your Visit           Your Appointments     Jan 31, 2017 10:15 AM EST   Post-Op with Gerson Cardoza MD   [facility] (--)    [address]  [place] [id number]   [phone]            Mar 10, 2017 10:40 AM EST   Subsequent Medicare Wellness with Dav Stoner Jr., MD   [facility] INTERNAL MEDICINE (--)    [address]  [facility] [address] [phone]              Allergies     No Known Allergies      Reason for Visit     Lung Nodule           Vital Signs     Blood Pressure Pulse Height Weight Oxygen Saturation Body Mass Index    130/80 (BP Location: Right arm, Patient Position: Sitting, Cuff Size: Adult) 82 69\" (175.3 cm) 165 lb (74.8 kg) 98% 24.37 kg/m2    Smoking Status                   Never Smoker           Problems and Diagnoses Noted     Osteoarthritis of right hip    Nodule of left lung    -  Primary        "

## 2017-01-12 ENCOUNTER — TELEPHONE (OUTPATIENT)
Dept: ORTHOPEDIC SURGERY | Facility: CLINIC | Age: 77
End: 2017-01-12

## 2017-01-18 ENCOUNTER — PREP FOR SURGERY (OUTPATIENT)
Dept: CARDIOLOGY | Facility: CLINIC | Age: 77
End: 2017-01-18

## 2017-01-18 ENCOUNTER — OFFICE VISIT (OUTPATIENT)
Dept: OTHER | Facility: HOSPITAL | Age: 77
End: 2017-01-18
Attending: THORACIC SURGERY (CARDIOTHORACIC VASCULAR SURGERY)

## 2017-01-18 ENCOUNTER — APPOINTMENT (OUTPATIENT)
Dept: OTHER | Facility: HOSPITAL | Age: 77
End: 2017-01-18
Attending: INTERNAL MEDICINE

## 2017-01-18 VITALS
DIASTOLIC BLOOD PRESSURE: 83 MMHG | WEIGHT: 169 LBS | SYSTOLIC BLOOD PRESSURE: 151 MMHG | HEART RATE: 76 BPM | BODY MASS INDEX: 25.61 KG/M2 | HEIGHT: 68 IN | OXYGEN SATURATION: 98 % | TEMPERATURE: 97 F | RESPIRATION RATE: 16 BRPM

## 2017-01-18 DIAGNOSIS — J90 PLEURAL EFFUSION: Primary | ICD-10-CM

## 2017-01-18 DIAGNOSIS — K21.9 GASTROESOPHAGEAL REFLUX DISEASE WITHOUT ESOPHAGITIS: ICD-10-CM

## 2017-01-18 DIAGNOSIS — M16.11 PRIMARY OSTEOARTHRITIS OF RIGHT HIP: ICD-10-CM

## 2017-01-18 NOTE — PROGRESS NOTES
Subjective   Patient ID: Louie German is a 76 y.o. male    History of Present Illness  His patient is seen for evaluation of a right pleural opacity possible lymphadenopathy.  The patient has degenerative disease of his right hip which is highly symptomatic.  He was undergoing a preoperative workup where a chest x-ray revealed a questionable left lung nodule in the lower lobe 8 mm.  This led to CT scan imaging.  The CT scan did not confirm the pulmonary nodule however it did identify a right-sided pleural effusion which is suspicious.  I personally examined the CT scan and reviewed the radiologist's report.  I've also examined the chest x-ray and reviewed the radiologist's report.  The patient has no pulmonary symptoms.  He denies any pleuritic chest pain shortness of breath cough sputum production hemoptysis or hoarseness.  He has no symptoms of malignancy.  No weight loss long bone pain headaches spine pain.  The patient has no prior history of cancer and is in nonsmoker.  He's had a prior cardiac stent placed approximately 18 years ago.  He reports no prior myocardial infarction and has no heart disease that he no soft.  On CT scan it was also noted that he had some distal esophageal thickening.  Of note he had an EGD performed a year and a half ago for gastroesophageal reflux symptoms.  He did not have any food sticking.  And reportedly the EGD was unremarkable.    The following portions of the patient's history were reviewed and updated as appropriate: allergies, current medications, past family history, past medical history, past social history, past surgical history and problem list.  Review of Systems   Musculoskeletal: Positive for joint pain and stiffness.   Gastrointestinal: Positive for heartburn.   All other systems reviewed and are negative.    Patient Active Problem List   Diagnosis   • Benign essential HTN   • Acid reflux   • Elevated cholesterol   • Calculus of kidney   • Cervical spinal  stenosis   • Osteoarthritis of right hip   • Status post total hip replacement, left   • Lymphadenopathy   • Pleural effusion     Past Medical History   Diagnosis Date   • Coronary artery disease    • DDD (degenerative disc disease), cervical    • Hx of cardiac arrest      Happened after Induction of anesthesia prior  to JOEY   • Hyperlipidemia    • Hypertension    • Kidney stones    • Osteoarthritis    • Rheumatoid arthritis      Past Surgical History   Procedure Laterality Date   • Coronary angioplasty with stent placement       17 years ago   • Total hip arthroplasty Left      9-10 years ago    • Posterior cervical fusion  2001   • Cystoscopy w/ litholapaxy / ehl       6-7 years ago     Family History   Problem Relation Age of Onset   • Hypertension Mother      Lived to 95 years old - Had numerous medical problems & medications   • Hypertension Father      Lived to 98 years old - Numerous medical problems and Medications   • Cancer Brother      History of Agent Orange   • Heart disease Maternal Grandmother    • Osteoarthritis Paternal Grandfather      possible RA     Social History     Social History   • Marital status:      Spouse name: N/A   • Number of children: N/A   • Years of education: N/A     Occupational History   • Not on file.     Social History Main Topics   • Smoking status: Never Smoker   • Smokeless tobacco: Former User   • Alcohol use Yes      Comment: 1-2 beers nightly, sometimes an occasional bourbon   • Drug use: No   • Sexual activity: Not on file     Other Topics Concern   • Not on file     Social History Narrative       Current Outpatient Prescriptions:   •  aspirin 81 MG tablet, Take 81 mg by mouth As Needed. PT HOLDING FOR SURGERY, Disp: , Rfl:   •  felodipine (PLENDIL) 10 MG 24 hr tablet, Take 10 mg by mouth Every Evening., Disp: , Rfl:   •  mupirocin (BACTROBAN) 2 % nasal ointment, into each nostril Daily., Disp: , Rfl:   •  simvastatin (ZOCOR) 40 MG tablet, Take 40 mg by mouth  Every Night., Disp: , Rfl:   •  Chlorhexidine Gluconate Cloth 2 % pads, Apply  topically. As directed pre op, Disp: , Rfl:   •  naproxen (NAPROSYN) 500 MG tablet, Take 500 mg by mouth 2 (Two) Times a Day With Meals., Disp: , Rfl:   No current facility-administered medications for this visit.     Facility-Administered Medications Ordered in Other Visits:   •  ropivacaine (NAROPIN) 50 mL, Morphine (PF) 5 mg, ketorolac (TORADOL) 30 mg, EPINEPHrine (ADRENALIN) 0.3 mg in sodium chloride 0.9 % 101.8 mL, , Injection, Once, Gerson Cardoza MD  No Known Allergies     Objective   Vitals:    01/18/17 1056   BP: 151/83   Pulse: 76   Resp: 16   Temp: 97 °F (36.1 °C)   SpO2: 98%     Physical Exam   Constitutional: He is oriented to person, place, and time. He appears well-developed and well-nourished.   HENT:   Head: Normocephalic.   Eyes: Conjunctivae and EOM are normal. Pupils are equal, round, and reactive to light.   Neck: Normal range of motion.   Cardiovascular: Normal rate, regular rhythm, normal heart sounds and intact distal pulses.    Pulmonary/Chest: Effort normal and breath sounds normal.   Dullness to percussion at right base consistent with the small pleural effusion and reduced breath sounds at the right  base otherwise is perfectly clear   Abdominal: Soft. Bowel sounds are normal.   Musculoskeletal: Normal range of motion.   Neurological: He is alert and oriented to person, place, and time.   Skin: Skin is warm and dry.   Psychiatric: He has a normal mood and affect. His behavior is normal. Judgment and thought content normal.   Nursing note and vitals reviewed.      Assessment/Plan   Independent Review of Radiographic Studies:    Performed by me as mentioned above.    This patient presents for hip replacement but his been found to have a pleural effusion on the right which is suspicious because there is some induration along the visceral and parietal pleura which may represent malignant foci.  I don't think that  the lymphadenopathy in the chest is significant.  There is no underlying pulmonary lesion.  The esophagus is noted to be thick on CT scan and an esophageal tumor should be ruled out.  His last endoscopy was 1-1/2 years ago and    I plan to perform a right thoracoscopy and pleural biopsy under monitored anesthesia care and at the same time I will perform an EGD.  The patient doesn't have any history of heart failure or other potential etiology for his right pleural effusion.  This effusion appears to be completely asymptomatic.    I've explained the procedure risks rationale benefits and alternatives to the patient and he is in agreement with proceeding.  Diagnoses and all orders for this visit:    Pleural effusion    Gastroesophageal reflux disease without esophagitis    Primary osteoarthritis of right hip

## 2017-01-18 NOTE — MR AVS SNAPSHOT
Louie German   2017 10:30 AM   Office Visit    Dept Phone:  [phone]   Encounter #:  [id number]    Provider:  Omkar Montalvo MD   Department:  [facility]                Your Full Care Plan              Your Updated Medication List          This list is accurate as of: 17 12:48 PM.  Always use your most recent med list.                aspirin 81 MG tablet       Chlorhexidine Gluconate Cloth 2 % pads       felodipine 10 MG 24 hr tablet   Commonly known as:  PLENDIL       mupirocin 2 % nasal ointment   Commonly known as:  BACTROBAN       naproxen 500 MG tablet   Commonly known as:  NAPROSYN       simvastatin 40 MG tablet   Commonly known as:  ZOCOR               You Were Diagnosed With        Codes Comments    Pleural effusion    -  Primary ICD-10-CM: J90  ICD-9-CM: 511.9     Gastroesophageal reflux disease without esophagitis     ICD-10-CM: K21.9  ICD-9-CM: 530.81     Primary osteoarthritis of right hip     ICD-10-CM: M16.11  ICD-9-CM: 715.15       Instructions     None    Patient Instructions History      Upcoming Appointments     Visit Type Date Time Department    LUNG CARE VISIT 2017 10:30 AM  [facility]    POST-OP 2017 10:15 AM [facility]    SUBSEQUENT MEDICARE WELLNESS 3/10/2017 10:40 AM [facility]      MyChart Signup     [facility] allows you to send messages to your doctor, view your test results, renew your prescriptions, schedule appointments, and more. To sign up, go to [employer] and click on the Sign Up Now link in the New User? box. Enter your Training Advisor Activation Code exactly as it appears below along with the last four digits of your Social Security Number and your Date of Birth () to complete the sign-up process. If you do not sign up before the expiration date, you must request a new code.    Training Advisor Activation Code: [email or web address]  Expires: 2017  3:58  "PM    If you have questions, you can email [email or web address] or call [phone] to talk to our MyChart staff. Remember, [employer] is NOT to be used for urgent needs. For medical emergencies, dial 911.               Other Info from Your Visit           Your Appointments     Jan 31, 2017 10:15 AM EST   Post-Op with Gerson Cardoza MD   [facility] (--)    [address] [facility] [address]  [place] [address]   [phone]            Mar 10, 2017 10:40 AM EST   Subsequent Medicare Wellness with Dav Stoner Jr., MD   [facility] (--)    [address] [facility]  [facility] [address] [phone]              Allergies     No Known Allergies      Reason for Visit     Adenopathy Paratracheal Node      Vital Signs     Blood Pressure Pulse Temperature Respirations Height Weight    151/83 76 97 °F (36.1 °C) (Oral) 16 68\" (172.7 cm) 169 lb (76.7 kg)    Oxygen Saturation Body Mass Index Smoking Status             98% 25.7 kg/m2 Never Smoker         Problems and Diagnoses Noted     Acid reflux disease    Osteoarthritis of right hip    Fluid in pleural cavity        "

## 2017-01-18 NOTE — PROGRESS NOTES
Pt seen by Dr. Montalvo and Dr. Diggs for a right thoracoscopy and pleural bx under MAC and EGD. Pt given contact cards for Dr. Montalvo, Dr. Diggs and nurse navigator. Pt instructed to call [facility] for any scheduling questions.

## 2017-01-30 ENCOUNTER — HOSPITAL ENCOUNTER (OUTPATIENT)
Dept: PREOP | Facility: HOSPITAL | Age: 77
Setting detail: HOSPITAL OUTPATIENT SURGERY
Discharge: HOME | End: 2017-01-30
Attending: THORACIC SURGERY (CARDIOTHORACIC VASCULAR SURGERY) | Admitting: THORACIC SURGERY (CARDIOTHORACIC VASCULAR SURGERY)

## 2017-01-30 LAB
ABO + RH BLD: NORMAL
ARMBAND: NORMAL
BLD COMPONENT TYPE: NORMAL
BLD GP AB SCN SERPL QL: NEGATIVE
CROSSMATCH EXPIRATION: NORMAL

## 2017-02-07 RX ORDER — FELODIPINE 10 MG/1
TABLET, EXTENDED RELEASE ORAL
Qty: 90 TABLET | Refills: 2 | Status: SHIPPED | OUTPATIENT
Start: 2017-02-07 | End: 2017-02-09

## 2017-02-08 ENCOUNTER — OFFICE VISIT (OUTPATIENT)
Dept: ORTHOPEDIC SURGERY | Facility: CLINIC | Age: 77
End: 2017-02-08

## 2017-02-08 VITALS — WEIGHT: 168 LBS | BODY MASS INDEX: 25.46 KG/M2 | HEIGHT: 68 IN | TEMPERATURE: 98 F

## 2017-02-08 DIAGNOSIS — M16.11 PRIMARY OSTEOARTHRITIS OF RIGHT HIP: Primary | ICD-10-CM

## 2017-02-08 PROCEDURE — S0260 H&P FOR SURGERY: HCPCS | Performed by: ORTHOPAEDIC SURGERY

## 2017-02-08 NOTE — PROGRESS NOTES
Patient:  Louie German is a 77 y.o. male    Chief Complaint/ Reason for Visit:    Chief Complaint   Patient presents with   • Right Hip - Pain, Pre-op Exam       HPI:        PMH:    Past Medical History   Diagnosis Date   • Coronary artery disease    • DDD (degenerative disc disease), cervical    • Hx of cardiac arrest      Happened after Induction of anesthesia prior  to JOEY   • Hyperlipidemia    • Hypertension    • Kidney stones    • Osteoarthritis    • Rheumatoid arthritis        PSH:    Past Surgical History   Procedure Laterality Date   • Coronary angioplasty with stent placement       17 years ago   • Total hip arthroplasty Left      9-10 years ago    • Posterior cervical fusion  2001   • Cystoscopy w/ litholapaxy / ehl       6-7 years ago   • Pleural biopsy         Social Hx:    Social History     Social History   • Marital status:      Spouse name: N/A   • Number of children: N/A   • Years of education: N/A     Occupational History   • Not on file.     Social History Main Topics   • Smoking status: Never Smoker   • Smokeless tobacco: Former User   • Alcohol use Yes      Comment: 1-2 beers nightly, sometimes an occasional bourbon   • Drug use: No   • Sexual activity: Not on file     Other Topics Concern   • Not on file     Social History Narrative       Family Hx:    Family History   Problem Relation Age of Onset   • Hypertension Mother      Lived to 95 years old - Had numerous medical problems & medications   • Hypertension Father      Lived to 98 years old - Numerous medical problems and Medications   • Cancer Brother      History of Agent Orange   • Heart disease Maternal Grandmother    • Osteoarthritis Paternal Grandfather      possible RA       Meds:    Current Outpatient Prescriptions:   •  aspirin 81 MG tablet, Take 81 mg by mouth As Needed. PT HOLDING FOR SURGERY, Disp: , Rfl:   •  Chlorhexidine Gluconate Cloth 2 % pads, Apply  topically. As directed pre op, Disp: , Rfl:   •  felodipine  "(PLENDIL) 10 MG 24 hr tablet, Take 10 mg by mouth Every Evening., Disp: , Rfl:   •  mupirocin (BACTROBAN) 2 % nasal ointment, into each nostril Daily., Disp: , Rfl:   •  naproxen (NAPROSYN) 500 MG tablet, Take 500 mg by mouth 2 (Two) Times a Day With Meals., Disp: , Rfl:   •  simvastatin (ZOCOR) 40 MG tablet, Take 40 mg by mouth Every Night., Disp: , Rfl:   •  felodipine (PLENDIL) 10 MG 24 hr tablet, TAKE 1 TABLET DAILY, Disp: 90 tablet, Rfl: 2  No current facility-administered medications for this visit.     Facility-Administered Medications Ordered in Other Visits:   •  ropivacaine (NAROPIN) 50 mL, Morphine (PF) 5 mg, ketorolac (TORADOL) 30 mg, EPINEPHrine (ADRENALIN) 0.3 mg in sodium chloride 0.9 % 101.8 mL, , Injection, Once, Gerson Cardoza MD    Allergies:  No Known Allergies    ROS:  Review of Systems    Vitals:    02/08/17 0912   Temp: 98 °F (36.7 °C)   Weight: 168 lb (76.2 kg)   Height: 68\" (172.7 cm)       Physical Exam    The patient is awake, alert, and oriented ×3.  The patient is in no acute distress.  Breathing is regular and unlabored with a respiratory rate of 12/m.  Extraocular movements and pupillary responses are symmetrically intact. Sclerae are anicteric.   Hearing is within normal limits.  Speech is within normal limits.  There is no jugular venous distention.        Radiology:    Labs:      Assessment:  1. Primary osteoarthritis of right hip  ***          Plan:  No orders of the defined types were placed in this encounter.    "

## 2017-02-08 NOTE — PROGRESS NOTES
"Patient:  Louie German is a 77 y.o. male    Chief Complaint/ Reason for Visit:    Chief Complaint   Patient presents with   • Right Hip - Pain, Pre-op Exam       HPI:  The patient presents today for preoperative discussion, history and physical examination.  He has clearly and exhaustively pursued a comprehensive course of nonsurgical management of the end-stage osteoarthritis in his right hip.  He says now, he can't even really put on his own right shoe and sock due to severe pain in his right groin caused by flexing his hip.  As documented in previous visits, he has had physical therapy and the full complement of nonsurgical management, including an intra-articular injection, as well as, in the past, multiple nonsteroidals, though he can't take these anymore due to kidney disease, he says..  His pain is severe, and tremendously limits even the simplest day-to-day activities.  He wants to proceed with hip replacement as scheduled.      He says that his pain in the right hip and groin area has actually worsened even just since his last visit.  He says, \"I don't think I can go on like this much longer.\"          PMH:    Past Medical History   Diagnosis Date   • Coronary artery disease    • DDD (degenerative disc disease), cervical    • Hx of cardiac arrest      Happened after Induction of anesthesia prior  to JOEY   • Hyperlipidemia    • Hypertension    • Kidney stones    • Osteoarthritis    • Rheumatoid arthritis        PSH:    Past Surgical History   Procedure Laterality Date   • Coronary angioplasty with stent placement       17 years ago   • Total hip arthroplasty Left      9-10 years ago    • Posterior cervical fusion  2001   • Cystoscopy w/ litholapaxy / ehl       6-7 years ago   • Pleural biopsy         Social Hx:    Social History     Social History   • Marital status:      Spouse name: N/A   • Number of children: N/A   • Years of education: N/A     Occupational History   • Not on file.     Social " History Main Topics   • Smoking status: Never Smoker   • Smokeless tobacco: Former User   • Alcohol use Yes      Comment: 1-2 beers nightly, sometimes an occasional bourbon   • Drug use: No   • Sexual activity: Not on file     Other Topics Concern   • Not on file     Social History Narrative       Family Hx:    Family History   Problem Relation Age of Onset   • Hypertension Mother      Lived to 95 years old - Had numerous medical problems & medications   • Hypertension Father      Lived to 98 years old - Numerous medical problems and Medications   • Cancer Brother      History of Agent Orange   • Heart disease Maternal Grandmother    • Osteoarthritis Paternal Grandfather      possible RA       Meds:    Current Outpatient Prescriptions:   •  aspirin 81 MG tablet, Take 81 mg by mouth As Needed. PT HOLDING FOR SURGERY, Disp: , Rfl:   •  Chlorhexidine Gluconate Cloth 2 % pads, Apply  topically. As directed pre op, Disp: , Rfl:   •  felodipine (PLENDIL) 10 MG 24 hr tablet, Take 10 mg by mouth Every Evening., Disp: , Rfl:   •  mupirocin (BACTROBAN) 2 % nasal ointment, into each nostril Daily., Disp: , Rfl:   •  naproxen (NAPROSYN) 500 MG tablet, Take 500 mg by mouth 2 (Two) Times a Day With Meals., Disp: , Rfl:   •  simvastatin (ZOCOR) 40 MG tablet, Take 40 mg by mouth Every Night., Disp: , Rfl:   •  felodipine (PLENDIL) 10 MG 24 hr tablet, TAKE 1 TABLET DAILY, Disp: 90 tablet, Rfl: 2  No current facility-administered medications for this visit.     Facility-Administered Medications Ordered in Other Visits:   •  ropivacaine (NAROPIN) 50 mL, Morphine (PF) 5 mg, ketorolac (TORADOL) 30 mg, EPINEPHrine (ADRENALIN) 0.3 mg in sodium chloride 0.9 % 101.8 mL, , Injection, Once, Gerson Cardoza MD    Allergies:  No Known Allergies    ROS:  Review of Systems   Constitutional: Negative for chills, diaphoresis, fever and unexpected weight change.   HENT: Negative for hearing loss, nosebleeds, sore throat and tinnitus.    Eyes:  "Negative for pain and visual disturbance.   Respiratory: Negative for cough, shortness of breath and wheezing.    Cardiovascular: Negative for chest pain and palpitations.   Gastrointestinal: Negative for abdominal pain, diarrhea, nausea and vomiting.   Endocrine: Negative for cold intolerance, heat intolerance and polydipsia.   Genitourinary: Negative for difficulty urinating, dysuria and hematuria.   Musculoskeletal: Positive for arthralgias, gait problem and myalgias.   Skin: Negative for rash and wound.   Allergic/Immunologic: Negative for environmental allergies.   Neurological: Negative for dizziness, syncope and numbness.   Hematological: Does not bruise/bleed easily.   Psychiatric/Behavioral: Negative for dysphoric mood and sleep disturbance. The patient is not nervous/anxious.         Vitals:    02/08/17 0912   Temp: 98 °F (36.7 °C)   Weight: 168 lb (76.2 kg)   Height: 68\" (172.7 cm)       Physical Exam   Constitutional: He is oriented to person, place, and time. He appears well-developed and well-nourished.   HENT:   Head: Normocephalic and atraumatic.   Mouth/Throat: Oropharynx is clear and moist.   Eyes: Conjunctivae and EOM are normal. Pupils are equal, round, and reactive to light. No scleral icterus.   Neck: No JVD present. No tracheal deviation present.   Cardiovascular: Normal rate, regular rhythm, normal heart sounds and intact distal pulses.    Pulses:       Dorsalis pedis pulses are 2+ on the right side, and 2+ on the left side.        Posterior tibial pulses are 2+ on the right side, and 2+ on the left side.   Pulmonary/Chest: Effort normal and breath sounds normal. No respiratory distress.   Abdominal: Soft. Bowel sounds are normal. He exhibits no distension. There is no tenderness.   Musculoskeletal:        Left hip: He exhibits decreased range of motion, decreased strength and crepitus. He exhibits no laceration.   Right hip: The patient's right hip is examined. Skin is unremarkable. He has " no trochanteric tenderness. He has a moderately positive Stinchfield test. He has pain on flexion and internal rotation, which are limited.  He exhibits decreased range of motion and crepitus.    Left hip the patient has a smooth stable range of motion throughout a functional range of his left total hip. Leg lengths appear grossly equal, though the left lower extremity may be ever so slightly longer than the right.   Neurological: He is alert and oriented to person, place, and time.   Skin: Skin is warm and dry. No rash noted. No cyanosis or erythema. Nails show no clubbing.   Psychiatric: He has a normal mood and affect. His speech is normal and behavior is normal. Judgment and thought content normal.   Nursing note and vitals reviewed.      Radiology:  I rereviewed previous images.  New films were not obtained.  The patient clearly has end-stage, bone-on-bone osteoarthritis of the right hip.  Incidental note is made of a well positioned left total hip with no sign of loosening.    Labs: Preop labs are pending at this time.  The patient apparently has an appointment tomorrow for preop labs and studies.      Assessment:  1. Primary osteoarthritis of right hip, which, in every respect, has reached end stage            Plan:  I had a long discussion with the patient's regarding his options. He said the last injection he had did not work. He has been doing physical therapy, and continues to do physical therapy for his right hip arthritis. He is using a cane much more frequently now. He cannot take anti-inflammatories on any regular basis anymore due to his kidney disease. He would like to proceed with a right total hip replacement. I think this is a reasonable option for this individual, particularly given his excellent recovery from his left hip replacement.     I have advised him that I did not use the same implants that that he had on the other side. He says he understands. I advised him that, though he had an  excellent outcome on the left side, there is no guarantee of an excellent outcome with the right hip replacement. He says he understands. I've advised him that surgery has risks, and that surgery has no guarantees. I reminded him that he is now 10 years older than he was when he had the other hip replaced.     The patient and I have discussed the procedure, the alternatives including nonsurgical management, the pluses and minuses, risks and benefits of the nonsurgical versus surgical options. The patient has voiced understanding and wishes to proceed with surgery as scheduled. We have reviewed that surgery has risks and that surgery has no guarantees. We have discussed that the risks of surgery include but are not limited to bleeding, infection, injury to nerves, blood vessels, tendons, ligaments or other soft tissue structures, possibly causing permanent pain, numbness, swelling or other problems, as well as iatrogenic fracture. I advised the patient additionally that there could be problems with the hip, including leg length discrepancy, dislocation or instability, malalignment, malrotation, loosening of the components, any of which could cause the need for additional surgical procedures. I explained the possibility of skin wound problems, skin breakdown, blood clots, pulmonary embolism, pneumonia, stroke, heart attack, loss of limb, or even death due to these or other complications. The patient voiced understanding.     We additionally discussed that the alignment and rotation, and possibly the length, of the leg would change. We discussed that in spite of what appeared to be properly performed surgical procedure the patient may experience permanent pain permanent stiffness permanent limping and/or permanent swelling. I also reviewed that if the patient did not do his part to rehabilitate the hip including performing aggressive constant daily work on range of motion that the patient would have a potentially poor  outcome. The patient voiced understanding.    We discussed the particulars of infection with respect to total hip replacement, and the tremendous problems that this can present.  He understands that it may require multiple surgical procedures, removal of the entire hip joint, months of IV antibiotics, etc.    He says he understands all of these things, understands that there are no guarantees, and wishes to proceed with surgery as scheduled.    He will have surgery and be admitted postop.  He plans on going home after a day or 2 with home health for his physical therapy and care.  He will need to follow-up here 2 weeks postop.    He has been cleared for surgery by his primary care physician, and also by the thoracic surgeon, who evaluated the concerns that were noted on his recent CT scans.  He did bring me a hand-signed letter of clearance for surgery from Dr. Montalvo.

## 2017-02-09 ENCOUNTER — HOSPITAL ENCOUNTER (OUTPATIENT)
Dept: GENERAL RADIOLOGY | Facility: HOSPITAL | Age: 77
Discharge: HOME | End: 2017-02-09
Admitting: ORTHOPAEDIC SURGERY

## 2017-02-09 ENCOUNTER — APPOINTMENT (OUTPATIENT)
Dept: PREADMISSION TESTING | Facility: HOSPITAL | Age: 77
End: 2017-02-09

## 2017-02-09 VITALS
WEIGHT: 167 LBS | SYSTOLIC BLOOD PRESSURE: 131 MMHG | RESPIRATION RATE: 18 BRPM | TEMPERATURE: 97.7 F | BODY MASS INDEX: 25.31 KG/M2 | HEIGHT: 68 IN | OXYGEN SATURATION: 97 % | HEART RATE: 77 BPM | DIASTOLIC BLOOD PRESSURE: 84 MMHG

## 2017-02-09 LAB
ANION GAP SERPL CALCULATED.3IONS-SCNC: 10.4 MMOL/L
BILIRUB UR QL STRIP: NEGATIVE
BUN BLD-MCNC: 16 MG/DL (ref 8–23)
BUN/CREAT SERPL: 18 (ref 7–25)
CALCIUM SPEC-SCNC: 9.3 MG/DL (ref 8.6–10.5)
CHLORIDE SERPL-SCNC: 101 MMOL/L (ref 98–107)
CLARITY UR: CLEAR
CO2 SERPL-SCNC: 26.6 MMOL/L (ref 22–29)
COLOR UR: YELLOW
CREAT BLD-MCNC: 0.89 MG/DL (ref 0.76–1.27)
DEPRECATED RDW RBC AUTO: 44.1 FL (ref 37–54)
ERYTHROCYTE [DISTWIDTH] IN BLOOD BY AUTOMATED COUNT: 13.2 % (ref 11.5–14.5)
GFR SERPL CREATININE-BSD FRML MDRD: 83 ML/MIN/1.73
GLUCOSE BLD-MCNC: 92 MG/DL (ref 65–99)
GLUCOSE UR STRIP-MCNC: NEGATIVE MG/DL
HCT VFR BLD AUTO: 41.4 % (ref 40.4–52.2)
HGB BLD-MCNC: 13.9 G/DL (ref 13.7–17.6)
HGB UR QL STRIP.AUTO: NEGATIVE
KETONES UR QL STRIP: NEGATIVE
LEUKOCYTE ESTERASE UR QL STRIP.AUTO: NEGATIVE
MCH RBC QN AUTO: 30.7 PG (ref 27–32.7)
MCHC RBC AUTO-ENTMCNC: 33.6 G/DL (ref 32.6–36.4)
MCV RBC AUTO: 91.4 FL (ref 79.8–96.2)
NITRITE UR QL STRIP: NEGATIVE
PH UR STRIP.AUTO: 5.5 [PH] (ref 5–8)
PLATELET # BLD AUTO: 320 10*3/MM3 (ref 140–500)
PMV BLD AUTO: 10.7 FL (ref 6–12)
POTASSIUM BLD-SCNC: 4.4 MMOL/L (ref 3.5–5.2)
PROT UR QL STRIP: NEGATIVE
RBC # BLD AUTO: 4.53 10*6/MM3 (ref 4.6–6)
SODIUM BLD-SCNC: 138 MMOL/L (ref 136–145)
SP GR UR STRIP: 1.02 (ref 1–1.03)
UROBILINOGEN UR QL STRIP: NORMAL
WBC NRBC COR # BLD: 6.63 10*3/MM3 (ref 4.5–10.7)

## 2017-02-09 PROCEDURE — 36415 COLL VENOUS BLD VENIPUNCTURE: CPT

## 2017-02-09 PROCEDURE — 81003 URINALYSIS AUTO W/O SCOPE: CPT | Performed by: ORTHOPAEDIC SURGERY

## 2017-02-09 PROCEDURE — 80048 BASIC METABOLIC PNL TOTAL CA: CPT | Performed by: ORTHOPAEDIC SURGERY

## 2017-02-09 PROCEDURE — 63710000001 MUPIROCIN 2 % OINTMENT 0.9 G SYRINGE: Performed by: ORTHOPAEDIC SURGERY

## 2017-02-09 PROCEDURE — 71020 HC CHEST PA AND LATERAL: CPT

## 2017-02-09 PROCEDURE — 85027 COMPLETE CBC AUTOMATED: CPT | Performed by: ORTHOPAEDIC SURGERY

## 2017-02-09 PROCEDURE — A9270 NON-COVERED ITEM OR SERVICE: HCPCS | Performed by: ORTHOPAEDIC SURGERY

## 2017-02-09 RX ORDER — FELODIPINE 10 MG/1
10 TABLET, EXTENDED RELEASE ORAL EVERY EVENING
COMMUNITY
End: 2017-02-09 | Stop reason: SDUPTHER

## 2017-02-09 RX ORDER — ACETAMINOPHEN 500 MG
500 TABLET ORAL EVERY 6 HOURS PRN
COMMUNITY
End: 2017-02-16 | Stop reason: HOSPADM

## 2017-02-09 NOTE — DISCHARGE INSTRUCTIONS
SURGERY 2-14-17   ARRIVAL TIME  11:00    Take the following medications the morning of surgery with a small sip of water.    NONE      General Instructions:  • Do not eat or drink after midnight: includes water, mints, or gum. You may brush your teeth.  • Do not smoke, chew tobacco, or drink alcohol.  • Bring medications in original bottles, any inhalers and if applicable your C-PAP/ BI-PAP machine.  • Bring any papers given to you in the doctor’s office.  • Wear clean comfortable clothes and socks.  • Do not wear contact lenses or make-up.  Bring a case for your glasses if applicable.   • Bring crutches or walker if applicable.  • Leave all other valuables and jewelry at home.    If you were given a blood bank ID arm band remember to bring it with you the day of surgery.    Preventing a Surgical Site Infection:  Shower on the morning of surgery using a fresh bar of anti-bacterial soap (such as Dial) and clean washcloth.  Dry with a clean towel and dress in clean clothing.  For 2 to 3 days before surgery, avoid shaving with a razor near where you will have surgery because the razor can irritate skin and make it easier to develop an infection  Ask your surgeon if you will be receiving antibiotics prior to surgery  Make sure you, your family, and all healthcare providers clean their hands with soap and water or an alcohol based hand  before caring for you or your wound  If at all possible, quit smoking as many days before surgery as you can.    Day of surgery:  Upon arrival, a Pre-op nurse and Anesthesiologist will review your health history, obtain vital signs, and answer questions you may have.  The only belongings needed at this time will be your home medications and if applicable your C-PAP/BI-PAP machine.  If you are staying overnight your family can leave the rest of your belongings in the car and bring them to your room later.  A Pre-op nurse will start an IV and you may receive medication in  preparation for surgery, including something to help you relax.  Your family will be able to see you in the Pre-op area.  While you are in surgery your family should notify the waiting room  if they leave the waiting room area and provide a contact phone number.    Please be aware that surgery does come with discomfort.  We want to make every effort to control your discomfort so please discuss any uncontrolled symptoms with your nurse.   Your doctor will most likely have prescribed pain medications.      If you are going home after surgery you will receive individualized written care instructions before being discharged.  A responsible adult must drive you to and from the hospital on the day of your surgery and stay with you for 24 hours.    If you are staying overnight following surgery, you will be transported to your hospital room following the recovery period.  [facility] has all private rooms.    If you have any questions please call Pre-Admission Testing at [phone].  Deductibles and co-payments are collected on the day of service. Please be prepared to pay the required co-pay, deductible or deposit on the day of service as defined by your plan.USE AS DIRECTED    2% CHLORAHEXIDINE GLUCONATE* CLOTH  Preparing or “prepping” skin before surgery can reduce the risk of infection at the surgical site. To make the process easier, [facility] has chosen disposable cloths moistened with a rinse-free, 2% Chlorhexidine Gluconate (CHG) antiseptic solution. The steps below outline the prepping process and should be carefully followed.        Use the prep cloth on the area that is circled in the diagram             Directions Night before Surgery  1) Shower using a fresh bar of anti-bacterial soap (such as Dial) and clean washcloth.  Use a clean towel to completely dry your skin.  2) Do not use any lotions, oils or creams on your skin.  3) Open the package and remove 1 cloth, wipe  your skin for 30 seconds in a circular motion.  Allow to dry for 3 minutes.  4) Repeat #3 with second cloth.  5) Do not touch your eyes, ears, or mouth with the prep cloth.  6) Allow the wet prep solution to air dry.  7) Discard the prep cloth and wash your hands with soap and water.   8) Dress in clean bed clothes and sleep on fresh clean bed sheets.   9) You may experience some temporary itching after the prep.    Directions Day of Surgery  1) Repeat steps 1,2,3,4,5,6,7, and 9.   2) Dress in clean clothes before coming to the hospital.    BACTROBAN NASAL OINTMENT  There are many germs normally in your nose. Bactroban is an ointment that will help reduce these germs. Please follow these instructions for Bactroban use:    ____Two days before surgery in the evening Date________    ____The day before surgery in the morning  Date________    ____The day before surgery in the evening              Date________    ____The day of surgery in the morning    Date________    **Squirt ½ package of Bactroban Ointment onto a cotton applicator and apply to inside of 1st nostril.  Squirt the remaining Bactroban and apply to the inside of the other nostril.

## 2017-02-14 ENCOUNTER — ANESTHESIA (OUTPATIENT)
Dept: PERIOP | Facility: HOSPITAL | Age: 77
End: 2017-02-14

## 2017-02-14 ENCOUNTER — APPOINTMENT (OUTPATIENT)
Dept: GENERAL RADIOLOGY | Facility: HOSPITAL | Age: 77
End: 2017-02-14

## 2017-02-14 ENCOUNTER — ANESTHESIA EVENT (OUTPATIENT)
Dept: PERIOP | Facility: HOSPITAL | Age: 77
End: 2017-02-14

## 2017-02-14 PROBLEM — M16.11 PRIMARY OSTEOARTHRITIS OF RIGHT HIP: Status: ACTIVE | Noted: 2017-02-14

## 2017-02-14 PROCEDURE — 25010000002 HYDROMORPHONE PER 4 MG: Performed by: NURSE ANESTHETIST, CERTIFIED REGISTERED

## 2017-02-14 PROCEDURE — 25010000002 PROPOFOL 10 MG/ML EMULSION: Performed by: NURSE ANESTHETIST, CERTIFIED REGISTERED

## 2017-02-14 PROCEDURE — 25010000002 DEXAMETHASONE PER 1 MG: Performed by: NURSE ANESTHETIST, CERTIFIED REGISTERED

## 2017-02-14 PROCEDURE — 73501 X-RAY EXAM HIP UNI 1 VIEW: CPT

## 2017-02-14 PROCEDURE — 25010000003 CEFAZOLIN IN DEXTROSE 2-4 GM/100ML-% SOLUTION: Performed by: ORTHOPAEDIC SURGERY

## 2017-02-14 PROCEDURE — 25010000002 FENTANYL CITRATE (PF) 100 MCG/2ML SOLUTION: Performed by: NURSE ANESTHETIST, CERTIFIED REGISTERED

## 2017-02-14 RX ORDER — DEXAMETHASONE SODIUM PHOSPHATE 10 MG/ML
INJECTION INTRAMUSCULAR; INTRAVENOUS AS NEEDED
Status: DISCONTINUED | OUTPATIENT
Start: 2017-02-14 | End: 2017-02-14 | Stop reason: SURG

## 2017-02-14 RX ORDER — HYDROMORPHONE HCL 110MG/55ML
PATIENT CONTROLLED ANALGESIA SYRINGE INTRAVENOUS AS NEEDED
Status: DISCONTINUED | OUTPATIENT
Start: 2017-02-14 | End: 2017-02-14 | Stop reason: SURG

## 2017-02-14 RX ORDER — FENTANYL CITRATE 50 UG/ML
INJECTION, SOLUTION INTRAMUSCULAR; INTRAVENOUS AS NEEDED
Status: DISCONTINUED | OUTPATIENT
Start: 2017-02-14 | End: 2017-02-14 | Stop reason: SURG

## 2017-02-14 RX ORDER — PROPOFOL 10 MG/ML
VIAL (ML) INTRAVENOUS AS NEEDED
Status: DISCONTINUED | OUTPATIENT
Start: 2017-02-14 | End: 2017-02-14 | Stop reason: SURG

## 2017-02-14 RX ORDER — ROCURONIUM BROMIDE 10 MG/ML
INJECTION, SOLUTION INTRAVENOUS AS NEEDED
Status: DISCONTINUED | OUTPATIENT
Start: 2017-02-14 | End: 2017-02-14 | Stop reason: SURG

## 2017-02-14 RX ORDER — LIDOCAINE HYDROCHLORIDE 40 MG/ML
SOLUTION TOPICAL AS NEEDED
Status: DISCONTINUED | OUTPATIENT
Start: 2017-02-14 | End: 2017-02-14 | Stop reason: SURG

## 2017-02-14 RX ORDER — LIDOCAINE HYDROCHLORIDE 20 MG/ML
INJECTION, SOLUTION INFILTRATION; PERINEURAL AS NEEDED
Status: DISCONTINUED | OUTPATIENT
Start: 2017-02-14 | End: 2017-02-14 | Stop reason: SURG

## 2017-02-14 RX ADMIN — HYDROMORPHONE HYDROCHLORIDE 0.5 MG: 2 INJECTION, SOLUTION INTRAMUSCULAR; INTRAVENOUS; SUBCUTANEOUS at 15:50

## 2017-02-14 RX ADMIN — PROPOFOL 150 MG: 10 INJECTION, EMULSION INTRAVENOUS at 13:06

## 2017-02-14 RX ADMIN — FENTANYL CITRATE 100 MCG: 50 INJECTION, SOLUTION INTRAMUSCULAR; INTRAVENOUS at 13:46

## 2017-02-14 RX ADMIN — FENTANYL CITRATE 50 MCG: 50 INJECTION, SOLUTION INTRAMUSCULAR; INTRAVENOUS at 13:15

## 2017-02-14 RX ADMIN — FENTANYL CITRATE 50 MCG: 50 INJECTION, SOLUTION INTRAMUSCULAR; INTRAVENOUS at 13:06

## 2017-02-14 RX ADMIN — HYDROMORPHONE HYDROCHLORIDE 0.5 MG: 2 INJECTION, SOLUTION INTRAMUSCULAR; INTRAVENOUS; SUBCUTANEOUS at 14:41

## 2017-02-14 RX ADMIN — CEFAZOLIN SODIUM 2 G: 2 INJECTION, SOLUTION INTRAVENOUS at 13:14

## 2017-02-14 RX ADMIN — SODIUM CHLORIDE, POTASSIUM CHLORIDE, SODIUM LACTATE AND CALCIUM CHLORIDE: 600; 310; 30; 20 INJECTION, SOLUTION INTRAVENOUS at 15:11

## 2017-02-14 RX ADMIN — LIDOCAINE HYDROCHLORIDE 1 EACH: 40 SPRAY LARYNGEAL; TRANSTRACHEAL at 13:10

## 2017-02-14 RX ADMIN — SODIUM CHLORIDE, POTASSIUM CHLORIDE, SODIUM LACTATE AND CALCIUM CHLORIDE: 600; 310; 30; 20 INJECTION, SOLUTION INTRAVENOUS at 12:00

## 2017-02-14 RX ADMIN — LIDOCAINE HYDROCHLORIDE 60 MG: 20 INJECTION, SOLUTION INFILTRATION; PERINEURAL at 13:06

## 2017-02-14 RX ADMIN — ROCURONIUM BROMIDE 50 MG: 10 INJECTION INTRAVENOUS at 13:06

## 2017-02-14 RX ADMIN — FENTANYL CITRATE 50 MCG: 50 INJECTION, SOLUTION INTRAMUSCULAR; INTRAVENOUS at 14:39

## 2017-02-14 RX ADMIN — DEXAMETHASONE SODIUM PHOSPHATE 6 MG: 10 INJECTION INTRAMUSCULAR; INTRAVENOUS at 13:30

## 2017-02-14 RX ADMIN — EPHEDRINE SULFATE 10 MG: 50 INJECTION INTRAMUSCULAR; INTRAVENOUS; SUBCUTANEOUS at 15:30

## 2017-02-14 RX ADMIN — EPHEDRINE SULFATE 10 MG: 50 INJECTION INTRAMUSCULAR; INTRAVENOUS; SUBCUTANEOUS at 13:50

## 2017-02-14 RX ADMIN — HYDROMORPHONE HYDROCHLORIDE 0.5 MG: 2 INJECTION, SOLUTION INTRAMUSCULAR; INTRAVENOUS; SUBCUTANEOUS at 14:45

## 2017-02-14 NOTE — ANESTHESIA PROCEDURE NOTES
Airway  Urgency: elective    Date/Time: 2/14/2017 1:10 PM  Airway not difficult    General Information and Staff    Patient location during procedure: OR  Anesthesiologist: MILLI BLAND  CRNA: GEORGE LARA    Indications and Patient Condition  Indications for airway management: airway protection    Preoxygenated: yes  MILS maintained throughout  Mask difficulty assessment: 2 - vent by mask + OA or adjuvant +/- NMBA    Final Airway Details  Final airway type: endotracheal airway      Successful airway: ETT  Cuffed: yes   Successful intubation technique: direct laryngoscopy  Endotracheal tube insertion site: oral  Blade: Joana  Blade size: #4  ETT size: 7.5 mm  Cormack-Lehane Classification: grade IIa - partial view of glottis  Placement verified by: chest auscultation and capnometry   Measured from: teeth  ETT to teeth (cm): 23  Number of attempts at approach: 1

## 2017-02-14 NOTE — ANESTHESIA POSTPROCEDURE EVALUATION
Patient: Louie German    Procedure Summary     Date Anesthesia Start Anesthesia Stop Room / Location    02/14/17 1301 1614  [facility] / [facility]       Procedure Diagnosis Surgeon Provider    TOTAL HIP ARTHROPLASTY (Right Hip) Primary osteoarthritis of right hip  (Primary osteoarthritis of right hip [M16.11]) MD Robert Waters MD          Anesthesia Type: general  Last vitals  /80 (02/14/17 1700)    Temp 36.3 °C (97.4 °F) (02/14/17 1610)    Pulse 85 (02/14/17 1700)   Resp 16 (02/14/17 1700)    SpO2 96 % (02/14/17 1700)      Post Anesthesia Care and Evaluation    Patient location during evaluation: PACU  Patient participation: complete - patient participated  Level of consciousness: awake and alert  Pain management: adequate  Airway patency: patent  Anesthetic complications: No anesthetic complications    Cardiovascular status: acceptable  Respiratory status: acceptable  Hydration status: acceptable

## 2017-02-14 NOTE — ANESTHESIA PREPROCEDURE EVALUATION
Anesthesia Evaluation     Patient summary reviewed   NPO Status: > 8 hours   Airway   Mallampati: II  Neck ROM: full  no difficulty expected  Dental - normal exam     Pulmonary    Cardiovascular     Rhythm: regular    (+) hypertension, CAD (good exercise tolerance),       Neuro/Psych  GI/Hepatic/Renal/Endo      Musculoskeletal     Abdominal    Substance History      OB/GYN          Other                                    Anesthesia Plan    ASA 3     general     intravenous induction   Anesthetic plan and risks discussed with patient.  Use of blood products discussed with patient .

## 2017-02-15 PROBLEM — M16.11 PRIMARY OSTEOARTHRITIS OF RIGHT HIP: Status: RESOLVED | Noted: 2017-02-14 | Resolved: 2017-02-15

## 2017-02-22 ENCOUNTER — TELEPHONE (OUTPATIENT)
Dept: ORTHOPEDIC SURGERY | Facility: CLINIC | Age: 77
End: 2017-02-22

## 2017-02-22 NOTE — TELEPHONE ENCOUNTER
Perhaps he overdid it a little bit.  Probably within normal limits at this early stage.  If he is otherwise doing well, he should just follow-up as scheduled.  Thank you.

## 2017-02-24 RX ORDER — HYDROCODONE BITARTRATE AND ACETAMINOPHEN 7.5; 325 MG/1; MG/1
TABLET ORAL
Qty: 80 TABLET | Refills: 0 | Status: SHIPPED | OUTPATIENT
Start: 2017-02-24 | End: 2017-03-10

## 2017-03-02 ENCOUNTER — OFFICE VISIT (OUTPATIENT)
Dept: ORTHOPEDIC SURGERY | Facility: CLINIC | Age: 77
End: 2017-03-02

## 2017-03-02 VITALS — BODY MASS INDEX: 25.04 KG/M2 | TEMPERATURE: 97.6 F | HEIGHT: 68 IN | WEIGHT: 165.2 LBS

## 2017-03-02 DIAGNOSIS — Z96.641 STATUS POST TOTAL REPLACEMENT OF RIGHT HIP: Primary | ICD-10-CM

## 2017-03-02 PROCEDURE — 73502 X-RAY EXAM HIP UNI 2-3 VIEWS: CPT | Performed by: ORTHOPAEDIC SURGERY

## 2017-03-02 PROCEDURE — 99024 POSTOP FOLLOW-UP VISIT: CPT | Performed by: ORTHOPAEDIC SURGERY

## 2017-03-02 NOTE — PROGRESS NOTES
"Patient:  Louie German is a 77 y.o. male    Chief Complaint/ Reason for Visit:    Chief Complaint   Patient presents with   • Right Hip - Follow-up, Pain       HPI:  The patient is here, accompanied by his wife, for a 2 week postop check on his right total hip.  He says he has been doing fine.  He says that at home, he has already transitioned to using a cane.  He still is using a walker at this point out in the community, though he says, \"I'm not really sure I need it.\"    His pain is within well-controlled, and seems to be improving steadily, on a daily basis.  He has completed his home physical therapy and says that the therapist told him that he was doing very well and that he certainly didn't need any more in-home therapy.  He already feels better now with recurrent car to his gait, posture, and general comfort level than he did before surgery.    He has had no fever, chills, sweats, or shakes.  He has had no calf pain, chest pain, or shortness of breath.      PMH:    Past Medical History   Diagnosis Date   • Coronary artery disease    • DDD (degenerative disc disease), cervical    • Hx of cardiac arrest      Happened after Induction of anesthesia prior  to SURG 10 YRS AGO  \" I FLATLINE BUT W/IN SECONDS HEART WAS OK AFTER TURNED ON BACK...I THINK THEY GAVE ME TO MUCH ANESTHESIA\"   • Hyperlipidemia    • Hypertension    • Kidney stones    • Osteoarthritis    • Pleural effusion      DRAINAGE RT PLEURAL FLUID FOR TESTING WITH EGD ON 1-30-17   NO MALIGNANCY WITH CLEARANCE FROM DR SIMMONS FOR SURGERY   • Rheumatoid arthritis        PSH:    Past Surgical History   Procedure Laterality Date   • Coronary angioplasty with stent placement       17 years ago   • Total hip arthroplasty Left      9-10 years ago    • Posterior cervical fusion  2001   • Cystoscopy w/ litholapaxy / ehl       6-7 years ago   • Pleural biopsy     • Endoscopy       ON 1/30/17 WITH DRAINAGE OF RT PLEURAL FLUID FOR BIOPSY    NO MALIGNANCY  " "CLEARANCE FOR SURG PER DR SIMMONS   • Total hip arthroplasty Right 2/14/2017     Procedure: TOTAL HIP ARTHROPLASTY;  Surgeon: Gerson Cardoza MD;  Location: [facility] MAIN OR;  Service:        Social Hx:    Social History     Social History   • Marital status:      Spouse name: N/A   • Number of children: N/A   • Years of education: N/A     Occupational History   • Not on file.     Social History Main Topics   • Smoking status: Never Smoker   • Smokeless tobacco: Former User   • Alcohol use Yes      Comment: 1-2 beers nightly, sometimes an occasional bourbon   • Drug use: No   • Sexual activity: Not on file     Other Topics Concern   • Not on file     Social History Narrative       Family Hx:    Family History   Problem Relation Age of Onset   • Hypertension Mother      Lived to 95 years old - Had numerous medical problems & medications   • Hypertension Father      Lived to 98 years old - Numerous medical problems and Medications   • Cancer Brother      History of Agent Orange   • Heart disease Maternal Grandmother    • Osteoarthritis Paternal Grandfather      possible RA       Meds:    Current Outpatient Prescriptions:   •  apixaban (ELIQUIS) 2.5 MG tablet tablet, Take 1 tablet by mouth Every 12 (Twelve) Hours., Disp: 30 tablet, Rfl: 0  •  felodipine (PLENDIL) 10 MG 24 hr tablet, Take 10 mg by mouth Every Evening., Disp: , Rfl:   •  HYDROcodone-acetaminophen (NORCO) 7.5-325 MG per tablet, Take 1-2 tabs po q 4 hours prn pain, Disp: 80 tablet, Rfl: 0  •  ondansetron (ZOFRAN) 4 MG tablet, Take 1 tablet by mouth Every 6 (Six) Hours As Needed for nausea or vomiting., Disp: 15 tablet, Rfl: 0  •  simvastatin (ZOCOR) 40 MG tablet, Take 40 mg by mouth Every Night., Disp: , Rfl:     Allergies:  No Known Allergies    ROS:  Review of Systems    Vitals:    03/02/17 1023   Temp: 97.6 °F (36.4 °C)   Weight: 165 lb 3.2 oz (74.9 kg)   Height: 68\" (172.7 cm)       Physical Exam    The patient is awake, alert, and oriented ×3.  " The patient is in no acute distress.  Breathing is regular and unlabored with a respiratory rate of 12/m.  Extraocular movements and pupillary responses are symmetrically intact. Sclerae are anicteric.   Hearing is within normal limits.  Speech is within normal limits.  There is no jugular venous distention.    His right total hip incision is beautifully healed.  There is no erythema no swelling no bruising.  In short, there aren't no concerning findings at all.  The patient has excellent flexion of his right hip and is actually walking very well using his walker.  His leg lengths appear to be grossly equal.  Alignment rotation of the lower extremities appear symmetrical.  His calves are both soft and nontender with no venous cord.  Pulses and sensory exam are intact symmetrically and within normal limits distally.  He has no concerning swelling anywhere in the right lower extremity.      Radiology: AP pelvis, AP and lateral the right total hip were ordered and reviewed today to assess patient's postop status and alignment.  I did compare these to the postop films at [facility], [place], operated by [facility], and also to his preop films.  There has been no change since the postop film, and of course, compare to the preoperative films, there has been interval placement of the right total hip implant.  No concerning findings are noted.  Incidental note is made of a left total hip that was done by another surgeon that has no obvious abnormalities either.        Assessment:  1. Status post total replacement of right hip, currently doing tremendously well and with no problems or complaints    - XR Hip With or Without Pelvis 2 - 3 View Right  - Ambulatory Referral to Physical Therapy Evaluate and treat, Ortho          Plan: I answered all the patient's questions, as he was kind enough to bring a list.  I answered all of his wife's questions as well.  He seems to be doing wonderfully well and we are all very pleased, as such.    We're going to get him  started in outpatient therapy.  I will see him back in about a month.  We will need standard 6 weeks postop x-rays of the right total hip at that time.        Orders Placed This Encounter   Procedures   • XR Hip With or Without Pelvis 2 - 3 View Right     Order Specific Question:   Reason for Exam:     Answer:   po right siri   • Ambulatory Referral to Physical Therapy Evaluate and treat, Ortho     Referral Priority:   Routine     Referral Type:   Therapy     Referral Reason:   Specialty Services Required     Requested Specialty:   Physical Therapy     Number of Visits Requested:   1

## 2017-03-09 ENCOUNTER — HOSPITAL ENCOUNTER (OUTPATIENT)
Dept: PHYSICAL THERAPY | Facility: HOSPITAL | Age: 77
Setting detail: THERAPIES SERIES
Discharge: HOME | End: 2017-03-09

## 2017-03-09 DIAGNOSIS — Z96.641 HX OF TOTAL HIP ARTHROPLASTY, RIGHT: Primary | ICD-10-CM

## 2017-03-09 PROCEDURE — 97161 PT EVAL LOW COMPLEX 20 MIN: CPT

## 2017-03-09 PROCEDURE — G8979 MOBILITY GOAL STATUS: HCPCS

## 2017-03-09 PROCEDURE — 97110 THERAPEUTIC EXERCISES: CPT

## 2017-03-09 PROCEDURE — G8978 MOBILITY CURRENT STATUS: HCPCS

## 2017-03-09 NOTE — PROGRESS NOTES
"    Outpatient Physical Therapy Ortho Initial Evaluation   Kaylen Mcclendon     Patient Name: Louie German  : 1940  MRN: [id number]  Today's Date: 3/9/2017      Visit Date: 2017    Patient Active Problem List   Diagnosis   • Benign essential HTN   • Acid reflux   • Elevated cholesterol   • Calculus of kidney   • Cervical spinal stenosis   • Status post total hip replacement, left   • Lymphadenopathy   • Pleural effusion        Past Medical History   Diagnosis Date   • Coronary artery disease    • DDD (degenerative disc disease), cervical    • Hx of cardiac arrest      Happened after Induction of anesthesia prior  to SURG 10 YRS AGO  \" I FLATLINE BUT W/IN SECONDS HEART WAS OK AFTER TURNED ON BACK...I THINK THEY GAVE ME TO MUCH ANESTHESIA\"   • Hyperlipidemia    • Hypertension    • Kidney stones    • Osteoarthritis    • Pleural effusion      DRAINAGE RT PLEURAL FLUID FOR TESTING WITH EGD ON 17   NO MALIGNANCY WITH CLEARANCE FROM DR SIMMONS FOR SURGERY   • Rheumatoid arthritis         Past Surgical History   Procedure Laterality Date   • Coronary angioplasty with stent placement       17 years ago   • Total hip arthroplasty Left      9-10 years ago    • Posterior cervical fusion     • Cystoscopy w/ litholapaxy / ehl       6-7 years ago   • Pleural biopsy     • Endoscopy       ON 17 WITH DRAINAGE OF RT PLEURAL FLUID FOR BIOPSY    NO MALIGNANCY  CLEARANCE FOR SURG PER DR SIMMONS   • Total hip arthroplasty Right 2017     Procedure: TOTAL HIP ARTHROPLASTY;  Surgeon: Gerson Cardoza MD;  Location: [facility];  Service:        Visit Dx:     ICD-10-CM ICD-9-CM   1. Hx of total hip arthroplasty, right Z96.641 V43.64             Patient History       17 1600          History    Chief Complaint Difficulty Walking;Difficulty with daily activities;Joint stiffness;Joint swelling;Muscle tenderness;Muscle weakness;Pain  -CC      Type of Pain Hip pain  -CC      Date Current Problem(s) Began " 02/14/17  -CC      Brief Description of Current Complaint Pt with end stage OA R hip that did not respond to conservative care. Pt underwent R uncemented R JOEY  2-14-17 . Pt had HH/PT for 1 1/2 weeks and now referred to outpt rehab. 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 --   Dr Gerson Cardoza  -      Pain     Pain Location Hip  -CC      Pain at Present 1  -CC      Pain at Best 0  -CC      Pain at Worst 4  -CC      Pain Frequency Constant/continuous;Intermittent  -CC      Pain Description Aching  -CC      What Performance Factors Make the Current Problem(s) WORSE? trying to sleep supine- walking -standing- sit to stand  -CC      What Performance Factors Make the Current Problem(s) BETTER? elevating RLE- use of ice daily x2  -CC      Tolerance Time- Standing 10-15 min  -CC      Tolerance Time- Sitting 30 min  -CC      Tolerance Time- Walking 15 -20 min  -CC      Is your sleep disturbed? Yes   pt wakes up 3-4x/night and hard to get back to sleep too  -CC      What position do you sleep in? Supine  -CC      Difficulties at work? NA   prefers sidelying R but can not tolerate now  -CC      Difficulties with ADL's? dressing due to hip precautions-not driving yet  -CC      Difficulties with recreational activities? not able to resume at this time in recovery  -CC      Daily Activities    Primary Language English  -CC      How does patient learn best?  Listening  -CC      Teaching needs identified Home Exercise Program;Management of Condition  -CC      Does patient have problems with the following? None  -CC      Barriers to learning None  -CC      Pt Participated in POC and Goals Yes  -CC      Safety    Are you being hurt, hit, or frightened by anyone at home or in your life? No  -CC      Are you being neglected by a caregiver No  -CC        User Key  (r) = Recorded By, (t) = Taken By, (c) = Cosigned By    Initials Name Provider Type    CC Nica Shah, PT Physical Therapist                PT Ortho       03/09/17 1600    Subjective Comments    Subjective Comments Pt states he is doing much better this week. At times will go without cane in home.-at night uses walker for safety  -CC    Precautions and Contraindications    Precautions Post JOEY   -CC    Sensation    Sensation WNL? WNL  -CC    Special Tests/Palpation    Special Tests/Palpation --   R mid calf- c/o mild soreness only  -CC    Hip/Thigh Palpation    Greater Trochanter Right:;Tender;Swollen  -CC    Gluteus Medius Right:;Tender;Swollen  -CC    Quadriceps Right:;Tender;Atrophied  -CC    Hip/Thigh Palpation? Yes  -CC    Knee Palpation    Knee Palpation? Yes  -CC    Foot/Ankle Palpation    Foot/Ankle Palpation? Yes  -CC    ROM (Range of Motion)    General ROM no range of motion deficits identified  -CC    General ROM Detail with exception of R hip due to post sugery precautions.  -CC    Right Hip    Hip Flexion Gross Movement (3/5) fair;(3-/5) fair minus   Pt can only do SLR ~ 20-30 degrees due to pain/weakness  -CC    Hip ABduction Gross Movement (3+/5) fair plus  -CC    Right Knee    Knee Extension Gross Movement (4-/5) good minus  -CC    Knee Flexion Hamstrings (4/5) good  -CC    Right Ankle/Foot    Ankle PF Gross Movement (5/5) normal  -CC    Soleus Ankle Plantarflexion (5/5) normal  -CC    Lower Extremity Flexibility    Hamstrings Right:;Mildly limited  -CC    Gait Assessment/Treatment     Gait, Russell Level independent  -CC    Gait, Assistive Device straight cane   uses a walker if gets up at night for safety  -CC    Gait, Gait Deviations right:;antalgic;aj decreased;decreased heel strike;step length decreased;toe-to-floor clearance decreased;weight-shifting ability decreased  -CC    Gait, Safety Issues weight-shifting ability decreased  -CC    Gait, Impairments muscle tone abnormal;decreased flexibility;ROM decreased;strength decreased;pain  -CC    Gait, Comment Pt can ambulate without cane but increased weight shifting and step length deficits increased  -CC    Stairs Assessment/Treatment    Number of Stairs Lives in 2 strory home but all living is on main level - 3 shallow steps to get in the house with railing both sides  -CC      User Key  (r) = Recorded By, (t) = Taken By, (c) = Cosigned By    Initials Name Provider Type    CC Nica Shah, PT Physical Therapist                            Therapy Education       03/09/17 1652          Therapy Education    Given --   Issued handout for addtional TE not doing HEP from HH/PT- SLR- supine Abd- SAQ- Hamstring stretch. Pt's spouse to help A/A lift with SLR until pt can tolerate and do Independently-also continued education for warning sign for DVT in R calf and  precautions for posterior JOEY -CC      Program Progressed  -CC      How Provided Verbal;Written  -CC      Provided to Patient;Caregiver  -CC      Level of Understanding Demonstrated  -CC        User Key  (r) = Recorded By, (t) = Taken By, (c) = Cosigned By    Initials Name Provider Type    NASEEM Shah, PT Physical Therapist                PT OP Goals       03/09/17 1600       PT Short Term Goals    STG Date to Achieve 04/10/17  -CC     STG 1 Pt follows R posterior JOEY precautions as well compliant with HEP  -CC     STG 2 Pt reports decreased disturbed sleep by 50% plus  -CC     STG 3 Pt able to do R SLR x 20-30 reps with nomimal c/o of discomfort  -CC      STG 4 Pt improves muscle strength R hip and knee muscles to 4/5-4+/5  -CC     STG 5 Pt improved gait mechanics with % weigtht shift R LE without use of cane  -CC       User Key  (r) = Recorded By, (t) = Taken By, (c) = Cosigned By    Initials Name Provider Type    CC Nica Shah, PT Physical Therapist                PT Assessment/Plan       03/09/17 7960       PT Assessment    Functional Limitations Impaired gait;Limitation in home management;Limitations in community activities;Limitations in functional capacity and performance;Performance in self-care ADL;Performance in sport activities;Performance in work activities  -     Impairments Range of motion;Locomotion;Edema;Impaired flexibility;Muscle strength;Gait;Impaired muscle endurance;Pain  -CC     Assessment Comments Pt referred to PT 3 weeks s/p R JOEY. Pt presents with decrease Rom - muscle strength most evident in R  rectus femoris with much difficutly /pain to do SLR > 3-4 reps- residual pain and swelling and impaired gait with pt ambulating with a cane. Pt needed reinforcement for posterior approach hip precautions sitting and bending < 90 degrees.                                                                                                                                                            -CC     Please refer to paper survey for additional self-reported information Yes   LEFS- score 26  -CC     Rehab Potential Good  -CC     Patient/caregiver participated in establishment of treatment plan and goals Yes  -CC     Patient would benefit from skilled therapy intervention Yes  -CC     PT Plan    PT Frequency 2x/week  -CC     Predicted Duration of Therapy Intervention (days/wks) 4 weeks  -CC     Planned CPT's? PT EVAL LOW COMPLEXITY: 78230;PT HOT OR COLD PACK TREAT MCARE;PT GAIT TRAINING EA 15 MIN: 21007;PT THER PROC EA 15 MIN: 19407;PT ELECTRICAL STIM UNATTEND:   -CC     Physical Therapy Interventions (Optional Details)  strengthening;stretching;home exercise program;modalities;ROM (Range of Motion)  -CC     PT Plan Comments See per POC for R JOEY  -CC       User Key  (r) = Recorded By, (t) = Taken By, (c) = Cosigned By    Initials Name Provider Type    NASEEM Shah, PT Physical Therapist                Modalities       03/09/17 1600          Ice    Ice Applied Yes  -CC      Location R posterior hip and R anterior thigh  -CC      Rx Minutes 10 mins  -CC      Ice S/P Rx Yes  -CC        User Key  (r) = Recorded By, (t) = Taken By, (c) = Cosigned By    Initials Name Provider Type    NASEEM Shah, PT Physical Therapist              Exercises       03/09/17 1600          Subjective Comments    Subjective Comments Pt states he is doing much better this week. At times will go without cane in home.-at night uses walker for safety  -CC      Exercise 1    Exercise Name 1 Hams stretch with sheet  -CC      Cueing 1 Verbal;Tactile  -CC      Equipment 1 --   sheet  -CC      Reps 1 4   < 90 degrees hip  -CC      Time (Seconds) 1 15 sec  -CC      Exercise 2    Exercise Name 2 Heelslides  -CC      Cueing 2 Verbal  -CC      Sets 2 2  -CC      Reps 2 10  -CC      Exercise 3    Exercise Name 3 Quad sets  -CC      Cueing 3 Verbal  -CC      Sets 3 2  -CC      Reps 3 10  -CC      Time (Seconds) 3 5 sec  -CC      Exercise 4    Exercise Name 4 SAQ   -CC      Cueing 4 Verbal  -CC      Equipment 4 --   bolster  -CC      Sets 4 2  -CC      Reps 4 10  -CC      Time (Seconds) 4 5 sec  -CC      Exercise 5    Exercise Name 5 Supine abduction  -CC      Cueing 5 Verbal  -CC      Reps 5 10  -CC      Exercise 6    Exercise Name 6 SLR  -CC      Cueing 6 Tactile  -CC      Reps 6 --   pt performed 3-4 reps but increase c/o sx so A/A x 5-6 reps  -CC      Exercise 7    Exercise Name 7 Standing Abduction  -CC      Cueing 7 Verbal  -CC      Sets 7 2  -CC      Reps 7 10  -CC      Exercise 8    Exercise Name 8 Heel raises  -CC      Cueing 8 Verbal   -CC      Sets 8 2  -CC      Reps 8 10  -CC      Exercise 9    Exercise Name 9 Standing marches  -CC      Cueing 9 Verbal  -CC      Sets 9 2  -CC      Reps 9 10  -CC      Exercise 10    Exercise Name 10 Standing hamstring curls  -CC      Cueing 10 Verbal  -CC      Reps 10 10  -CC        User Key  (r) = Recorded By, (t) = Taken By, (c) = Cosigned By    Initials Name Provider Type    CC Nica Shah, PT Physical Therapist                              Time Calculation:   Start Time: 1510  Stop Time: 1610  Time Calculation (min): 60 min     Therapy Charges for Today     Code Description Service Date Service Provider Modifiers Qty    23106418337 HC PT MOBILITY CURRENT 3/9/2017 Nica Shah, PT GP, CK 1    76241505943 HC PT MOBILITY PROJECTED 3/9/2017 Nica Shah, PT GP, CI 1    26822302844 HC PT HOT OR COLD PACK TREAT MCARE 3/9/2017 Nica Shah, PT GP 2    75179554402 HC PT THER PROC EA 15 MIN 3/9/2017 Nica Shah, PT GP 1    05499053754 HC PT EVAL LOW COMPLEXITY 1 3/9/2017 Nica Shah, PT GP 1          PT G-Codes  Functional Limitation: Mobility: Walking and moving around  Mobility: Walking and Moving Around Current Status (): At least 40 percent but less than 60 percent impaired, limited or restricted  Mobility: Walking and Moving Around Goal Status (): At least 1 percent but less than 20 percent impaired, limited or restricted         Nica Shah, PT  3/9/2017

## 2017-03-10 ENCOUNTER — OFFICE VISIT (OUTPATIENT)
Dept: INTERNAL MEDICINE | Facility: CLINIC | Age: 77
End: 2017-03-10

## 2017-03-10 VITALS
HEIGHT: 68 IN | DIASTOLIC BLOOD PRESSURE: 70 MMHG | SYSTOLIC BLOOD PRESSURE: 138 MMHG | WEIGHT: 167 LBS | BODY MASS INDEX: 25.31 KG/M2

## 2017-03-10 DIAGNOSIS — Z23 NEED FOR ZOSTAVAX ADMINISTRATION: ICD-10-CM

## 2017-03-10 DIAGNOSIS — E78.00 ELEVATED CHOLESTEROL: ICD-10-CM

## 2017-03-10 DIAGNOSIS — I10 BENIGN ESSENTIAL HTN: Primary | ICD-10-CM

## 2017-03-10 DIAGNOSIS — Z96.642 STATUS POST TOTAL HIP REPLACEMENT, LEFT: ICD-10-CM

## 2017-03-10 PROBLEM — J90 PLEURAL EFFUSION: Status: RESOLVED | Noted: 2017-01-18 | Resolved: 2017-03-10

## 2017-03-10 PROBLEM — R59.1 LYMPHADENOPATHY: Status: RESOLVED | Noted: 2017-01-09 | Resolved: 2017-03-10

## 2017-03-10 PROCEDURE — 90736 HZV VACCINE LIVE SUBQ: CPT | Performed by: INTERNAL MEDICINE

## 2017-03-10 PROCEDURE — 99214 OFFICE O/P EST MOD 30 MIN: CPT | Performed by: INTERNAL MEDICINE

## 2017-03-10 PROCEDURE — 90471 IMMUNIZATION ADMIN: CPT | Performed by: INTERNAL MEDICINE

## 2017-03-10 NOTE — PROGRESS NOTES
Subjective   Louie German is a 77 y.o. male.     History of Present Illness he is here today for his annual visit with follow-up of hypertension and hypercholesterolemia.  He recently underwent right hip surgery.  He has done well with that procedure having been done a little over 3 weeks ago.  He is using a cane and he is undergoing physical therapy as an outpatient.  He has some edema of his right lower extremity but that has been diminishing.  He was treated with anticoagulative therapy until one week ago.  He denies any dyspnea or chest pain.  Preoperatively he was found to have a right pleural effusion as well as some thickening of the distal esophagus on chest x-ray and then CT scan.  He was seen by chest surgery and underwent thoracoscopy as well as EGD.  In essence the findings were benign.  At present he denies any abdominal pain, dysphagia, change in bowel habit, melanotic stool, or rectal bleeding.  He has not had any cough, chest pain, or dyspnea.  He has never been a smoker.    His last colonoscopy was approximately 8 or 9 years ago and it was normal.  He has received Prevnar 13 but not Zostavax.  His review systems is essentially negative.        Review of Systems   Constitutional: Positive for activity change. Negative for appetite change, fatigue and fever.   HENT: Negative for trouble swallowing.    Respiratory: Negative for cough, chest tightness, shortness of breath and wheezing.    Cardiovascular: Positive for leg swelling. Negative for chest pain and palpitations.   Gastrointestinal: Negative for abdominal pain, anal bleeding, blood in stool, constipation, diarrhea, nausea and vomiting.   Genitourinary: Negative for difficulty urinating, dysuria, flank pain, frequency and hematuria.   Musculoskeletal: Negative for arthralgias, back pain, gait problem and joint swelling.   Neurological: Negative for dizziness, syncope, facial asymmetry, speech difficulty, weakness, numbness and headaches.    Psychiatric/Behavioral: Negative for confusion and dysphoric mood. The patient is not nervous/anxious.        Objective   Physical Exam   Constitutional: He is oriented to person, place, and time. Vital signs are normal. He appears well-developed and well-nourished. He is active. He does not appear ill.   HENT:   Right Ear: Tympanic membrane, external ear and ear canal normal.   Left Ear: Tympanic membrane, external ear and ear canal normal.   Mouth/Throat: No oral lesions.   Eyes: Conjunctivae are normal.   Fundoscopic exam:       The right eye shows no AV nicking, no exudate and no hemorrhage.        The left eye shows no AV nicking, no exudate and no hemorrhage.   Neck: Carotid bruit is not present. No thyroid mass and no thyromegaly present.   Cardiovascular: Normal rate, regular rhythm, S1 normal and S2 normal.  Exam reveals no S3 and no S4.    No murmur heard.  Pulses:       Posterior tibial pulses are 2+ on the right side, and 2+ on the left side.   Pulmonary/Chest: No tachypnea. No respiratory distress. He has no decreased breath sounds. He has no wheezes. He has no rhonchi. He has no rales.   Abdominal: Soft. Normal appearance and bowel sounds are normal. He exhibits no abdominal bruit and no mass. There is no hepatosplenomegaly. There is no tenderness.       Vascular Status -  His exam exhibits right foot edema (1+ malleolar edema). His exam exhibits no left foot edema.  Lymphadenopathy:     He has no cervical adenopathy.     He has no axillary adenopathy.   Neurological: He is alert and oriented to person, place, and time. He has normal strength. He displays no tremor. He displays a negative Romberg sign. Gait abnormal.   Reflex Scores:       Bicep reflexes are 2+ on the right side.  There is a limp favoring the right leg   Psychiatric: He has a normal mood and affect. His speech is normal and behavior is normal. Judgment and thought content normal. Cognition and memory are normal.       Assessment/Plan  " plural effusion cytology was negative.  EGD showed mild distal esophageal stricture.  There were pulled early changes of Guerra's\".    Assessment #1 hypertension-good control #2 hypercholesterolemia-no sign of target organ injury #3 right pleural effusion-benign #4 esophageal narrowing-relatively benign.  His chest surgeon did not feel he needed follow-up at this time.    Plan #1 Zostavax #2 no change in medication.  Routine follow-up with me in 6 months.  Consider repeat EGD in the future.             "

## 2017-03-14 ENCOUNTER — HOSPITAL ENCOUNTER (OUTPATIENT)
Dept: PHYSICAL THERAPY | Facility: HOSPITAL | Age: 77
Setting detail: THERAPIES SERIES
Discharge: HOME | End: 2017-03-14

## 2017-03-14 PROCEDURE — 97110 THERAPEUTIC EXERCISES: CPT

## 2017-03-14 NOTE — PROGRESS NOTES
"    Outpatient Physical Therapy Ortho Treatment Note   Kaylen Mcclendon     Patient Name: Louie German  : 1940  MRN: [id number]  Today's Date: 3/14/2017      Visit Date: 2017    Visit Dx:  No diagnosis found.    Patient Active Problem List   Diagnosis   • Benign essential HTN   • Acid reflux   • Elevated cholesterol   • Calculus of kidney   • Cervical spinal stenosis   • Status post total hip replacement, left        Past Medical History   Diagnosis Date   • Coronary artery disease    • DDD (degenerative disc disease), cervical    • Hx of cardiac arrest      Happened after Induction of anesthesia prior  to SURG 10 YRS AGO  \" I FLATLINE BUT W/IN SECONDS HEART WAS OK AFTER TURNED ON BACK...I THINK THEY GAVE ME TO MUCH ANESTHESIA\"   • Hyperlipidemia    • Hypertension    • Kidney stones    • Osteoarthritis    • Pleural effusion      DRAINAGE RT PLEURAL FLUID FOR TESTING WITH EGD ON 17   NO MALIGNANCY WITH CLEARANCE FROM DR SIMMONS FOR SURGERY   • Rheumatoid arthritis         Past Surgical History   Procedure Laterality Date   • Coronary angioplasty with stent placement       17 years ago   • Total hip arthroplasty Left      9-10 years ago    • Posterior cervical fusion     • Cystoscopy w/ litholapaxy / ehl       6-7 years ago   • Pleural biopsy     • Endoscopy       ON 17 WITH DRAINAGE OF RT PLEURAL FLUID FOR BIOPSY    NO MALIGNANCY  CLEARANCE FOR SURG PER DR SIMMONS   • Total hip arthroplasty Right 2017     Procedure: TOTAL HIP ARTHROPLASTY;  Surgeon: Gerson Cardoza MD;  Location: [facility];  Service:                              PT Assessment/Plan       17 1020       PT Assessment    Assessment Comments Pt making gains with TE -able to increase reps and do step matrix with CGx1 but no LOB . Pt still challenged doing SLR due to weakness and pain-improving able to do 8 indep but trequired A/A last 2 to complete 10 reps. Added LAQ to program for continuing strenghtening of R " quad muscles. Increase WB noted RLE in ambulation with cane.      Continue Rehab -CC       User Key  (r) = Recorded By, (t) = Taken By, (c) = Cosigned By    Initials Name Provider Type    NASEEM Shah PT Physical Therapist                Modalities       03/14/17 0900          Subjective Comments    Subjective Comments Pt states exercises going well  -CC      Subjective Pain    Able to rate subjective pain? yes  -CC      Pre-Treatment Pain Level 2  -CC      Moist Heat    MH Applied Yes  -CC      Location R hip/buttock-supine with knee roll  -CC      Rx Minutes 10 mins  -CC      MH Prior to Rx Yes  -CC      Ice    Location R posterior hip and R anterior thigh-use of knee roll  -CC      Rx Minutes 10 mins  -CC      Ice S/P Rx Yes  -CC        User Key  (r) = Recorded By, (t) = Taken By, (c) = Cosigned By    Initials Name Provider Type    NASEEM Shah PT Physical Therapist                Exercises       03/14/17 0900          Subjective Comments    Subjective Comments Pt states exercises going well  -CC      Subjective Pain    Able to rate subjective pain? yes  -CC      Pre-Treatment Pain Level 2  -CC      Exercise 1    Exercise Name 1 Hams stretch with sheet  -CC      Cueing 1 Verbal;Tactile  -CC      Equipment 1 --   sheet  -CC      Reps 1 4   < 90 degrees hip  -CC      Time (Seconds) 1 15 sec  -CC      Exercise 2    Exercise Name 2 Heelslides  -CC      Cueing 2 Verbal  -CC      Sets 2 3  -CC      Reps 2 10  -CC      Exercise 3    Exercise Name 3 Quad sets  -CC      Cueing 3 Verbal  -CC      Sets 3 2  -CC      Reps 3 10  -CC      Time (Seconds) 3 5 sec  -CC      Exercise 4    Exercise Name 4 SAQ   -CC      Cueing 4 Verbal  -CC      Equipment 4 Cuff Weight   bolster  -CC      Weights/Plates 4 2  -CC      Sets 4 2  -CC      Reps 4 10  -CC      Time (Seconds) 4 5 sec  -CC      Exercise 5    Exercise Name 5 Supine abduction  -CC      Cueing 5 Verbal  -CC      Reps 5 10  -CC      Exercise 6     Exercise Name 6 SLR  -CC      Cueing 6 Tactile  -CC      Reps 6 --   pt performed 3-4 reps but increase c/o sx so A/A x 5-6 reps  -CC      Exercise 7    Exercise Name 7 Standing Abduction  -CC      Cueing 7 Verbal  -CC      Sets 7 2  -CC      Reps 7 10  -CC      Exercise 8    Exercise Name 8 Heel raises  -CC      Cueing 8 Verbal  -CC      Sets 8 2  -CC      Reps 8 10  -CC      Exercise 9    Exercise Name 9 Standing marches  -CC      Cueing 9 Verbal  -CC      Sets 9 2  -CC      Reps 9 10  -CC      Exercise 10    Exercise Name 10 Standing hamstring curls  -CC      Cueing 10 Verbal  -CC      Reps 10 10  -CC      Exercise 11    Exercise Name 11 LAQ  -CC      Cueing 11 Verbal  -CC      Sets 11 2  -CC      Reps 11 10  -CC      Time (Seconds) 11 5 sec  -CC      Exercise 12    Exercise Name 12 Step Matrix  -CC      Cueing 12 Demo  -CC      Reps 12 --   3x each LE- CG x1  -CC        User Key  (r) = Recorded By, (t) = Taken By, (c) = Cosigned By    Initials Name Provider Type    NASEEM Shah PT Physical Therapist                                   Therapy Education       03/14/17 0992          Therapy Education    Given --   Continue HEP and progress to do SAQ with home weights 1-2 #- add LAQ handout issued  -CC        User Key  (r) = Recorded By, (t) = Taken By, (c) = Cosigned By    Initials Name Provider Type    NASEEM Shah PT Physical Therapist                Time Calculation:   Start Time: 0900  Stop Time: 1010  Time Calculation (min): 70 min    Therapy Charges for Today     Code Description Service Date Service Provider Modifiers Qty    00080263811  PT HOT OR COLD PACK TREAT MCARE 3/14/2017 Nica Shah, PT GP 2    [id number] HC PT THER PROC EA 15 MIN 3/14/2017 Nica Shah, PT GP 2                    Nica Shah, PT  3/14/2017

## 2017-03-17 ENCOUNTER — APPOINTMENT (OUTPATIENT)
Dept: PHYSICAL THERAPY | Facility: HOSPITAL | Age: 77
End: 2017-03-17

## 2017-03-21 ENCOUNTER — HOSPITAL ENCOUNTER (OUTPATIENT)
Dept: PHYSICAL THERAPY | Facility: HOSPITAL | Age: 77
Setting detail: THERAPIES SERIES
Discharge: HOME | End: 2017-03-21

## 2017-03-21 PROCEDURE — 97110 THERAPEUTIC EXERCISES: CPT

## 2017-03-21 NOTE — PROGRESS NOTES
"    Outpatient Physical Therapy Ortho Treatment Note   Kaylen Mcclendon     Patient Name: Louie German  : 1940  MRN: [id number]  Today's Date: 3/21/2017      Visit Date: 2017    Visit Dx:  No diagnosis found.    Patient Active Problem List   Diagnosis   • Benign essential HTN   • Acid reflux   • Elevated cholesterol   • Calculus of kidney   • Cervical spinal stenosis   • Status post total hip replacement, left        Past Medical History   Diagnosis Date   • Coronary artery disease    • DDD (degenerative disc disease), cervical    • Hx of cardiac arrest      Happened after Induction of anesthesia prior  to SURG 10 YRS AGO  \" I FLATLINE BUT W/IN SECONDS HEART WAS OK AFTER TURNED ON BACK...I THINK THEY GAVE ME TO MUCH ANESTHESIA\"   • Hyperlipidemia    • Hypertension    • Kidney stones    • Osteoarthritis    • Pleural effusion      DRAINAGE RT PLEURAL FLUID FOR TESTING WITH EGD ON 17   NO MALIGNANCY WITH CLEARANCE FROM DR SIMMONS FOR SURGERY   • Rheumatoid arthritis         Past Surgical History   Procedure Laterality Date   • Coronary angioplasty with stent placement       17 years ago   • Total hip arthroplasty Left      9-10 years ago    • Posterior cervical fusion     • Cystoscopy w/ litholapaxy / ehl       6-7 years ago   • Pleural biopsy     • Endoscopy       ON 17 WITH DRAINAGE OF RT PLEURAL FLUID FOR BIOPSY    NO MALIGNANCY  CLEARANCE FOR SURG PER DR SIMMONS   • Total hip arthroplasty Right 2017     Procedure: TOTAL HIP ARTHROPLASTY;  Surgeon: Gerson Cardoza MD;  Location: [facility];  Service:                              PT Assessment/Plan       17 1705       PT Assessment    Assessment Comments Pt making gains with ambulation with improved weight shifting RLE and not using cane for short distances out of home. Pt stilll challenged with performing active SLR x 10 reps -needs to rest after 5 reps.   -CC     PT Plan    PT Plan Comments Continue per POC R JOEY  -CC     "   User Key  (r) = Recorded By, (t) = Taken By, (c) = Cosigned By    Initials Name Provider Type    CC Nica Shah, PT Physical Therapist                Modalities       03/21/17 1500          Subjective Comments    Subjective Comments Pt reports decreasing sx in R hip .  -CC      Subjective Pain    Able to rate subjective pain? yes  -CC      Pre-Treatment Pain Level 1  -CC      Subjective Pain Comment Pt not using cane much now with ambulation out of home  -CC      Moist Heat    Location R hip/buttock-supine with knee roll  -CC      Rx Minutes 10 mins  -CC      MH Prior to Rx Yes  -CC      Ice    Location R posterior hip and R anterior thigh-use of knee roll  -CC      Rx Minutes 10 mins  -CC      Ice S/P Rx Yes  -CC        User Key  (r) = Recorded By, (t) = Taken By, (c) = Cosigned By    Initials Name Provider Type    NASEEM Shah, PT Physical Therapist                Exercises       03/21/17 1500          Subjective Comments    Subjective Comments Pt reports decreasing sx in R hip .  -CC      Subjective Pain    Able to rate subjective pain? yes  -CC      Pre-Treatment Pain Level 1  -CC      Subjective Pain Comment Pt not using cane much now with ambulation out of home  -CC      Exercise 1    Exercise Name 1 Hams stretch with sheet  -CC      Cueing 1 Verbal;Tactile  -CC      Equipment 1 --   sheet  -CC      Reps 1 4   < 90 degrees hip  -CC      Time (Seconds) 1 15 sec  -CC      Exercise 2    Exercise Name 2 Heelslides  -CC      Cueing 2 Verbal  -CC      Sets 2 3  -CC      Reps 2 10  -CC      Exercise 3    Exercise Name 3 Quad sets  -CC      Cueing 3 Verbal  -CC      Reps 3 25  -CC      Time (Seconds) 3 5 sec  -CC      Exercise 4    Exercise Name 4 SAQ   -CC      Cueing 4 Verbal  -CC      Equipment 4 Cuff Weight   bolster  -CC      Weights/Plates 4 --   2.5#  -CC      Sets 4 2  -CC      Reps 4 10  -CC      Time (Seconds) 4 5 sec  -CC      Exercise 5    Exercise Name 5 Supine abduction  -CC       Cueing 5 Verbal  -CC      Sets 5 2  -CC      Reps 5 10  -CC      Exercise 6    Exercise Name 6 SLR  -CC      Cueing 6 Tactile  -CC      Sets 6 2  -CC      Reps 6 5   active SLR   -CC      Exercise 7    Exercise Name 7 Standing Abduction  -CC      Cueing 7 Verbal  -CC      Equipment 7 Cuff Weight  -CC      Weights/Plates 7 --   1.5#  -CC      Sets 7 2  -CC      Reps 7 10  -CC      Exercise 8    Exercise Name 8 Heel raises  -CC      Cueing 8 Verbal  -CC      Sets 8 2  -CC      Reps 8 10  -CC      Exercise 9    Exercise Name 9 Standing marches  -CC      Cueing 9 Verbal  -CC      Sets 9 2  -CC      Reps 9 10  -CC      Exercise 10    Exercise Name 10 Standing hamstring curls  -CC      Cueing 10 Verbal  -CC      Equipment 10 Cuff Weight  -CC      Weights/Plates 10 --   1.5#  -CC      Sets 10 2  -CC      Reps 10 10  -CC      Exercise 11    Exercise Name 11 LAQ  -CC      Cueing 11 Verbal  -CC      Sets 11 2  -CC      Reps 11 10  -CC      Time (Seconds) 11 5 sec  -CC      Exercise 12    Exercise Name 12 Step Matrix  -CC      Cueing 12 Demo  -CC      Reps 12 --   5x each LE- CG -SBA  -CC      Exercise 13    Exercise Name 13 Airdyne  -CC      Time (Minutes) 13 5 min   seat at 5  -CC        User Key  (r) = Recorded By, (t) = Taken By, (c) = Cosigned By    Initials Name Provider Type    NASEEM Shah PT Physical Therapist                                   Therapy Education       03/21/17 1705          Therapy Education    Given --   Continue HEP -keep working on SLR  -CC        User Key  (r) = Recorded By, (t) = Taken By, (c) = Cosigned By    Initials Name Provider Type     Nica Shah PT Physical Therapist                Time Calculation:   Start Time: 0945  Stop Time: 1040  Time Calculation (min): 55 min    Therapy Charges for Today     Code Description Service Date Service Provider Modifiers Qty    76339684117  PT THER PROC EA 15 MIN 3/21/2017 Nica Shah PT GP 2    [id number]  HC PT HOT OR COLD PACK TREAT MCARE 3/21/2017 Nica Shah, PT GP 2                    Nica Shah, PT  3/21/2017

## 2017-03-23 ENCOUNTER — HOSPITAL ENCOUNTER (OUTPATIENT)
Dept: PHYSICAL THERAPY | Facility: HOSPITAL | Age: 77
Setting detail: THERAPIES SERIES
Discharge: HOME | End: 2017-03-23

## 2017-03-23 PROCEDURE — 97110 THERAPEUTIC EXERCISES: CPT

## 2017-03-28 ENCOUNTER — HOSPITAL ENCOUNTER (OUTPATIENT)
Dept: PHYSICAL THERAPY | Facility: HOSPITAL | Age: 77
Setting detail: THERAPIES SERIES
Discharge: HOME | End: 2017-03-28

## 2017-03-28 DIAGNOSIS — M25.551 RIGHT HIP PAIN: ICD-10-CM

## 2017-03-28 DIAGNOSIS — Z96.641 HX OF TOTAL HIP ARTHROPLASTY, RIGHT: Primary | ICD-10-CM

## 2017-03-28 PROCEDURE — 97110 THERAPEUTIC EXERCISES: CPT

## 2017-03-28 NOTE — PROGRESS NOTES
"    Outpatient Physical Therapy Ortho Treatment Note   Kaylen Mcclendon     Patient Name: Louie German  : 1940  MRN: [id number]  Today's Date: 3/28/2017      Visit Date: 2017    Visit Dx:    ICD-10-CM ICD-9-CM   1. Hx of total hip arthroplasty, right Z96.641 V43.64   2. Right hip pain M25.551 719.45       Patient Active Problem List   Diagnosis   • Benign essential HTN   • Acid reflux   • Elevated cholesterol   • Calculus of kidney   • Cervical spinal stenosis   • Status post total hip replacement, left        Past Medical History:   Diagnosis Date   • Coronary artery disease    • DDD (degenerative disc disease), cervical    • Hx of cardiac arrest     Happened after Induction of anesthesia prior  to SURG 10 YRS AGO  \" I FLATLINE BUT W/IN SECONDS HEART WAS OK AFTER TURNED ON BACK...I THINK THEY GAVE ME TO MUCH ANESTHESIA\"   • Hyperlipidemia    • Hypertension    • Kidney stones    • Osteoarthritis    • Pleural effusion     DRAINAGE RT PLEURAL FLUID FOR TESTING WITH EGD ON 17   NO MALIGNANCY WITH CLEARANCE FROM DR SIMMONS FOR SURGERY   • Rheumatoid arthritis         Past Surgical History:   Procedure Laterality Date   • CORONARY ANGIOPLASTY WITH STENT PLACEMENT      17 years ago   • CYSTOSCOPY W/ LITHOLAPAXY / EHL      6-7 years ago   • ENDOSCOPY      ON 17 WITH DRAINAGE OF RT PLEURAL FLUID FOR BIOPSY    NO MALIGNANCY  CLEARANCE FOR SURG PER DR SIMMONS   • PLEURAL BIOPSY     • POSTERIOR CERVICAL FUSION     • TOTAL HIP ARTHROPLASTY Left     9-10 years ago    • TOTAL HIP ARTHROPLASTY Right 2017    Procedure: TOTAL HIP ARTHROPLASTY;  Surgeon: Gerson Cardoza MD;  Location: [facility];  Service:              PT Ortho       17 0900    Subjective Comments    Subjective Comments Pt states \"I feel great\"; pt went on to say he had put his socks on (I) for the first time; pt says he did not abide by hip precautions stating the MD told him the only way he could really dislocate the hip " "is if \"I do this\" - pt demonstrates IR his (R) hip  -    Subjective Pain    Able to rate subjective pain? yes  -    Pre-Treatment Pain Level 0  -      User Key  (r) = Recorded By, (t) = Taken By, (c) = Cosigned By    Initials Name Provider Type     Mannie Derick Peterson PTA Physical Therapy Assistant                            PT Assessment/Plan       03/28/17 1311       PT Assessment    Assessment Comments Pt progressed well through ex's with increased reps as noted and increased height of step with no complaints; Again cautioned pt of hip precautions and to follow until MD says otherwise  -     PT Plan    PT Plan Comments Pt scheduled for MD follow up 3-30-17; will cont as MD advises  -       User Key  (r) = Recorded By, (t) = Taken By, (c) = Cosigned By    Initials Name Provider Type     Mannie Derick Peterson PTA Physical Therapy Assistant                Modalities       03/28/17 0900          Moist Heat    Location R hip/buttock-supine with knee roll  -      Rx Minutes 10 mins  -      MH Prior to Rx Yes  -      Ice    Location R posterior hip and R anterior thigh-use of knee roll  -      Rx Minutes 10 mins  -      Ice S/P Rx Yes  -        User Key  (r) = Recorded By, (t) = Taken By, (c) = Cosigned By    Initials Name Provider Type     Mannie Derick Peterson PTA Physical Therapy Assistant                Exercises       03/28/17 0900          Subjective Comments    Subjective Comments Pt states \"I feel great\"; pt went on to say he had put his socks on (I) for the first time; pt says he did not abide by hip precautions stating the MD told him the only way he could really dislocate the hip is if \"I do this\" - pt demonstrates IR his (R) hip  -      Subjective Pain    Able to rate subjective pain? yes  -      Pre-Treatment Pain Level 0  -      Exercise 1    Exercise Name 1 Hams stretch with sheet  -      Cueing 1 Verbal;Tactile  -      Equipment 1 --   sheet  -      Reps 1 4   < 90 degrees hip  " "-MH      Time (Seconds) 1 15 sec  -MH      Exercise 2    Exercise Name 2 Heelslides  -MH      Cueing 2 Verbal  -MH      Sets 2 3  -MH      Reps 2 10  -MH      Exercise 3    Exercise Name 3 Quad sets  -MH      Cueing 3 Verbal  -MH      Reps 3 30  -MH      Time (Seconds) 3 5 sec  -MH      Exercise 4    Exercise Name 4 SAQ   -MH      Cueing 4 Verbal  -MH      Equipment 4 Cuff Weight   bolster  -MH      Weights/Plates 4 --   2.5#  -MH      Reps 4 30  -MH      Time (Seconds) 4 5 sec  -MH      Exercise 5    Exercise Name 5 Supine abduction  -MH      Cueing 5 Verbal  -MH      Sets 5 2  -MH      Reps 5 10  -MH      Exercise 6    Exercise Name 6 SLR  -MH      Cueing 6 Tactile  -MH      Sets 6 2  -MH      Reps 6 5  -MH      Exercise 7    Exercise Name 7 Standing Abduction  -MH      Cueing 7 Verbal  -MH      Equipment 7 Cuff Weight  -MH      Weights/Plates 7 2  -MH      Sets 7 3  -MH      Reps 7 10  -MH      Exercise 8    Exercise Name 8 Heel raises  -MH      Cueing 8 Verbal  -MH      Reps 8 30  -MH      Exercise 9    Exercise Name 9 Standing marches  -MH      Cueing 9 Verbal  -MH      Equipment 9 Cuff Weight  -MH      Weights/Plates 9 2  -MH      Sets 9 2  -MH      Reps 9 10  -MH      Exercise 10    Exercise Name 10 Standing hamstring curls  -MH      Cueing 10 Verbal  -MH      Equipment 10 Cuff Weight  -MH      Weights/Plates 10 2   1.5#  -MH      Reps 10 30  -MH      Exercise 12    Exercise Name 12 Step Matrix  -MH      Cueing 12 Verbal  -MH      Reps 12 5   (B) - SBA  -MH      Exercise 13    Exercise Name 13 Airdyne  -MH      Time (Minutes) 13 6   seat at 5  -MH      Exercise 14    Exercise Name 14 6\" Foward step up  -MH      Cueing 14 Verbal  -MH      Reps 14 10   each  -MH      Exercise 15    Exercise Name 15 6\" Lateral step up R  -MH      Cueing 15 Verbal  -MH      Reps 15 10  -MH        User Key  (r) = Recorded By, (t) = Taken By, (c) = Cosigned By    Initials Name Provider Type     Mannie Peterson, GLENIS Physical " Therapy Assistant                                   Therapy Education       03/28/17 1311 03/28/17 1310       Therapy Education    Given  Other (comment)   Review of hip precautions  -MH     Program --   Blue theraband given for home  -MH Reinforced  -MH     How Provided  Verbal  -MH     Provided to  Patient  -MH     Level of Understanding  Verbalized  -MH       User Key  (r) = Recorded By, (t) = Taken By, (c) = Cosigned By    Initials Name Provider Type    MH Mannie Peterson PTA Physical Therapy Assistant                Time Calculation:   Start Time: 0935  Stop Time: 1108  Time Calculation (min): 93 min    Therapy Charges for Today     Code Description Service Date Service Provider Modifiers Qty    08148030432 HC PT THER PROC EA 15 MIN 3/28/2017 Mannie Peterson PTA GP 2    [id number] HC PT HOT OR COLD PACK TREAT MCARE 3/28/2017 Mannie Peterson PTA GP 2                    Mannie Peterson PTA  3/28/2017

## 2017-03-30 ENCOUNTER — OFFICE VISIT (OUTPATIENT)
Dept: ORTHOPEDIC SURGERY | Facility: CLINIC | Age: 77
End: 2017-03-30

## 2017-03-30 VITALS — TEMPERATURE: 97.6 F | WEIGHT: 168.8 LBS | BODY MASS INDEX: 25.58 KG/M2 | HEIGHT: 68 IN

## 2017-03-30 DIAGNOSIS — Z96.641 STATUS POST TOTAL REPLACEMENT OF RIGHT HIP: Primary | ICD-10-CM

## 2017-03-30 PROCEDURE — 73502 X-RAY EXAM HIP UNI 2-3 VIEWS: CPT | Performed by: ORTHOPAEDIC SURGERY

## 2017-03-30 PROCEDURE — 99024 POSTOP FOLLOW-UP VISIT: CPT | Performed by: ORTHOPAEDIC SURGERY

## 2017-03-30 RX ORDER — ASPIRIN 325 MG
325 TABLET ORAL AS NEEDED
COMMUNITY
End: 2018-06-29

## 2017-03-30 NOTE — PROGRESS NOTES
"Patient:  Louie German is a 77 y.o. male    Chief Complaint/ Reason for Visit:    Chief Complaint   Patient presents with   • Right Hip - Follow-up       HPI:  The patient is here for scheduled follow-up, now a little more than 6 weeks status post right total hip.  He says he has, \"no pain and no cane!\"  He says that he's been able to put his own shoes and socks on both feet with no pain or difficulty.  He says that he couldn't even do that before surgery.  He says the physical therapist told him that he was flexing his hip to much to do that, obviously flexing beyond 90°, and I advised him that this was fine.  I explained the reasoning behind total hip precautions, and the posture involved and placing a sock on his foot himself was certainly not going to put him at any significant increased risk for dislocation, especially at this point.    He has had no fever, chills, sweats, or shakes.  He has had no calf pain, chest pain, or shortness of breath.  He has had no difficulty with his incision.  He says he is \"thrilled\" with his outcome from his total hip replacement at this point.      PMH:    Past Medical History:   Diagnosis Date   • Coronary artery disease    • DDD (degenerative disc disease), cervical    • Hx of cardiac arrest     Happened after Induction of anesthesia prior  to SURG 10 YRS AGO  \" I FLATLINE BUT W/IN SECONDS HEART WAS OK AFTER TURNED ON BACK...I THINK THEY GAVE ME TO MUCH ANESTHESIA\"   • Hyperlipidemia    • Hypertension    • Kidney stones    • Osteoarthritis    • Pleural effusion     DRAINAGE RT PLEURAL FLUID FOR TESTING WITH EGD ON 1-30-17   NO MALIGNANCY WITH CLEARANCE FROM DR SIMMONS FOR SURGERY   • Rheumatoid arthritis        PSH:    Past Surgical History:   Procedure Laterality Date   • CORONARY ANGIOPLASTY WITH STENT PLACEMENT      17 years ago   • CYSTOSCOPY W/ LITHOLAPAXY / EHL      6-7 years ago   • ENDOSCOPY      ON 1/30/17 WITH DRAINAGE OF RT PLEURAL FLUID FOR BIOPSY    NO " "MALIGNANCY  CLEARANCE FOR SURG PER DR SIMMONS   • PLEURAL BIOPSY     • POSTERIOR CERVICAL FUSION  2001   • TOTAL HIP ARTHROPLASTY Left     9-10 years ago    • TOTAL HIP ARTHROPLASTY Right 2/14/2017    Procedure: TOTAL HIP ARTHROPLASTY;  Surgeon: Gerson Cardoza MD;  Location: [facility] OR;  Service:        Social Hx:    Social History     Social History   • Marital status:      Spouse name: N/A   • Number of children: N/A   • Years of education: N/A     Occupational History   • Not on file.     Social History Main Topics   • Smoking status: Never Smoker   • Smokeless tobacco: Former User   • Alcohol use Yes      Comment: 1-2 beers nightly, sometimes an occasional bourbon   • Drug use: No   • Sexual activity: Defer     Other Topics Concern   • Not on file     Social History Narrative       Family Hx:    Family History   Problem Relation Age of Onset   • Hypertension Mother      Lived to 95 years old - Had numerous medical problems & medications   • Hypertension Father      Lived to 98 years old - Numerous medical problems and Medications   • Cancer Brother      History of Agent Orange   • Heart disease Maternal Grandmother    • Osteoarthritis Paternal Grandfather      possible RA       Meds:    Current Outpatient Prescriptions:   •  aspirin 325 MG tablet, Take 325 mg by mouth Daily., Disp: , Rfl:   •  felodipine (PLENDIL) 10 MG 24 hr tablet, Take 10 mg by mouth Every Evening., Disp: , Rfl:   •  simvastatin (ZOCOR) 40 MG tablet, Take 40 mg by mouth Every Night., Disp: , Rfl:     Allergies:  No Known Allergies    ROS:  Review of Systems    Vitals:    03/30/17 1043   Temp: 97.6 °F (36.4 °C)   TempSrc: Temporal Artery    Weight: 168 lb 12.8 oz (76.6 kg)   Height: 68\" (172.7 cm)       Physical Exam    The patient is awake, alert, and oriented ×3.  The patient is in no acute distress.  Breathing is regular and unlabored with a respiratory rate of 12/m.  Extraocular movements and pupillary responses are " symmetrically intact. Sclerae are anicteric.   Hearing is within normal limits.  Speech is within normal limits.  There is no jugular venous distention.    He is walking very well with a brisk, smooth, heel-to-toe gait.  He has no limp.  He has no assistive devices.  He has excellent full range of motion of his right total hip.  His leg lengths are equal.  He still has expected mild muscular atrophy of the right lower extremity, primarily stemming from his reduced activities preoperatively due to the severity of his pain and osteoarthritis.  However, he is deathly making progress.    His calves are both soft and nontender with no venous cord.    Homans sign is negative bilaterally.    Pulses and sensory exam are intact symmetrically in both lower extremities and stable.        Radiology: X-rays: AP pelvis, AP and lateral right hip were ordered and reviewed today to assess the patient's post-total hip status at about 6 weeks.  I did review these and compare them to previous series of images available in the office.  Since his preop films, there is obviously been interval placement of the hip replacement.  Compared to his previous images from the last visit, there is no subsidence, no evidence of loosening.  I do think there is bone density changes in the juxta prosthetic regions that suggest normal bony ingrowth is occurring.  dating features are seen.  Incidental note is made of his left total hip that was done by Dr. Shelton and it looks fine.          Assessment:  1. Status post total replacement of right hip, progressing exceptionally well.    - XR hip w or wo pelvis 2-3 view right          Plan:  I discussed everything with the patient and his wife.  I answered all their questions.  We discussed ongoing activity recommendations and precautions.  We discussed dislocation precautions in detail.  We discussed gradual [place] of activities, and discussed specific activities about which the patient had  questions.    I will see him back in about 6 weeks for 3 months postop check.  We will need AP pelvis, AP lateral the right total hip at that time.

## 2017-03-31 ENCOUNTER — TELEPHONE (OUTPATIENT)
Dept: ORTHOPEDIC SURGERY | Facility: CLINIC | Age: 77
End: 2017-03-31

## 2017-05-03 ENCOUNTER — DOCUMENTATION (OUTPATIENT)
Dept: PHYSICAL THERAPY | Facility: HOSPITAL | Age: 77
End: 2017-05-03

## 2017-05-11 ENCOUNTER — OFFICE VISIT (OUTPATIENT)
Dept: ORTHOPEDIC SURGERY | Facility: CLINIC | Age: 77
End: 2017-05-11

## 2017-05-11 VITALS — BODY MASS INDEX: 25.19 KG/M2 | HEIGHT: 68 IN | TEMPERATURE: 97 F | WEIGHT: 166.2 LBS

## 2017-05-11 DIAGNOSIS — Z96.641 STATUS POST TOTAL REPLACEMENT OF RIGHT HIP: Primary | ICD-10-CM

## 2017-05-11 PROCEDURE — 99024 POSTOP FOLLOW-UP VISIT: CPT | Performed by: ORTHOPAEDIC SURGERY

## 2017-05-11 PROCEDURE — 73502 X-RAY EXAM HIP UNI 2-3 VIEWS: CPT | Performed by: ORTHOPAEDIC SURGERY

## 2017-05-18 ENCOUNTER — OFFICE VISIT (OUTPATIENT)
Dept: INTERNAL MEDICINE | Facility: CLINIC | Age: 77
End: 2017-05-18

## 2017-05-18 VITALS
HEIGHT: 68 IN | WEIGHT: 163 LBS | BODY MASS INDEX: 24.71 KG/M2 | DIASTOLIC BLOOD PRESSURE: 74 MMHG | SYSTOLIC BLOOD PRESSURE: 128 MMHG

## 2017-05-18 DIAGNOSIS — H00.012 HORDEOLUM EXTERNUM OF RIGHT LOWER EYELID: Primary | ICD-10-CM

## 2017-05-18 PROCEDURE — 99213 OFFICE O/P EST LOW 20 MIN: CPT | Performed by: NURSE PRACTITIONER

## 2017-05-18 RX ORDER — CIPROFLOXACIN HYDROCHLORIDE 3.5 MG/ML
1 SOLUTION/ DROPS TOPICAL EVERY 4 HOURS
Qty: 2.5 ML | Refills: 0 | Status: SHIPPED | OUTPATIENT
Start: 2017-05-18 | End: 2017-05-25

## 2017-06-08 RX ORDER — SIMVASTATIN 40 MG
TABLET ORAL
Qty: 90 TABLET | Refills: 1 | Status: SHIPPED | OUTPATIENT
Start: 2017-06-08 | End: 2017-10-25 | Stop reason: SDUPTHER

## 2017-06-21 ENCOUNTER — TELEPHONE (OUTPATIENT)
Dept: INTERNAL MEDICINE | Facility: CLINIC | Age: 77
End: 2017-06-21

## 2017-06-21 NOTE — TELEPHONE ENCOUNTER
Mr. German has a letter from Dr. Grier here for surgery to remove the skin cancer.    I called him to schedule an appointment.  He stated he saw you in the [place] three weeks ago and that you knew what was going on.  He wanted to know if you would sign the paper without an appointment.    Please advise

## 2017-07-19 ENCOUNTER — OFFICE VISIT (OUTPATIENT)
Dept: ORTHOPEDIC SURGERY | Facility: CLINIC | Age: 77
End: 2017-07-19

## 2017-07-19 VITALS — TEMPERATURE: 99.3 F | HEIGHT: 68 IN | WEIGHT: 168 LBS | BODY MASS INDEX: 25.46 KG/M2

## 2017-07-19 DIAGNOSIS — M54.16 RIGHT LUMBAR RADICULOPATHY: Primary | ICD-10-CM

## 2017-07-19 DIAGNOSIS — M51.36 DEGENERATIVE DISC DISEASE, LUMBAR: ICD-10-CM

## 2017-07-19 DIAGNOSIS — M41.9 SCOLIOSIS OF LUMBAR SPINE, UNSPECIFIED SCOLIOSIS TYPE: ICD-10-CM

## 2017-07-19 DIAGNOSIS — Z96.641 STATUS POST TOTAL REPLACEMENT OF RIGHT HIP: ICD-10-CM

## 2017-07-19 PROBLEM — M51.369 DEGENERATIVE DISC DISEASE, LUMBAR: Status: ACTIVE | Noted: 2017-07-19

## 2017-07-19 PROCEDURE — 99213 OFFICE O/P EST LOW 20 MIN: CPT | Performed by: ORTHOPAEDIC SURGERY

## 2017-07-19 PROCEDURE — 73502 X-RAY EXAM HIP UNI 2-3 VIEWS: CPT | Performed by: ORTHOPAEDIC SURGERY

## 2017-07-19 NOTE — PROGRESS NOTES
Patient:  Louie German is a 77 y.o. male    Chief Complaint/ Reason for Visit:    Chief Complaint   Patient presents with   • Right Hip - Follow-up, Pain, Post-op       HPI:  The patient presents today for what is actually a new problem involving his right hip.  He is now about 5 months status post right total hip.  He had been doing very well up until about 6 weeks ago.  He says that at that time, he had been on a drive lasting about 45 minutes, and when he got to his destination, he went to get out of his car and felt pain and a sensation of stiffness in the posterior aspect of his right hip and buttock.  As he stood up he felt a sharp pain in this area, but then notes after he walked a little while the pain in the stiffness resolved.  This happened episodically after prolonged sitting and driving.  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  On the drive home, he began to feel some pain in the posterior aspect of his right hip and buttock.  When he got home and got out of his car he had a severe pain in the posterior aspect of his right buttock and said that he could hardly walk into the house.  It causes him to limp.  However, after while standing, the pain eased up.    At no time has he had any trouble with his incision.  He has had no fever, chills, sweats, or shakes.  He has had no groin or anterior right thigh pain.  He is not had a fall or other injury.  He does say his activity level has increased tremendously over the past few months due to the improvement in his comfort and quality life that he is enjoying after his hip replacement.  He did, of course, have severe end-stage osteoarthritis of his right hip.  Initially, he recovered rapidly and beautifully from his right total hip replacement, which was performed in February.      PMH:    Past Medical History:   Diagnosis Date   • Coronary artery disease    • DDD (degenerative disc disease), cervical    •  "Hx of cardiac arrest     Happened after Induction of anesthesia prior  to SURG 10 YRS AGO  \" I FLATLINE BUT W/IN SECONDS HEART WAS OK AFTER TURNED ON BACK...I THINK THEY GAVE ME TO MUCH ANESTHESIA\"   • Hyperlipidemia    • Hypertension    • Kidney stones    • Osteoarthritis    • Pleural effusion     DRAINAGE RT PLEURAL FLUID FOR TESTING WITH EGD ON 1-30-17   NO MALIGNANCY WITH CLEARANCE FROM DR SIMMONS FOR SURGERY   • Rheumatoid arthritis        PSH:    Past Surgical History:   Procedure Laterality Date   • CORONARY ANGIOPLASTY WITH STENT PLACEMENT      17 years ago   • CYSTOSCOPY W/ LITHOLAPAXY / EHL      6-7 years ago   • ENDOSCOPY      ON 1/30/17 WITH DRAINAGE OF RT PLEURAL FLUID FOR BIOPSY    NO MALIGNANCY  CLEARANCE FOR SURG PER DR SIMMONS   • PLEURAL BIOPSY     • POSTERIOR CERVICAL FUSION  2001   • TOTAL HIP ARTHROPLASTY Left     9-10 years ago    • TOTAL HIP ARTHROPLASTY Right 2/14/2017    Procedure: TOTAL HIP ARTHROPLASTY;  Surgeon: Gerson Cardoza MD;  Location: [facility];  Service:        Social Hx:    Social History     Social History   • Marital status:      Spouse name: N/A   • Number of children: N/A   • Years of education: N/A     Occupational History   • Not on file.     Social History Main Topics   • Smoking status: Never Smoker   • Smokeless tobacco: Former User   • Alcohol use Yes      Comment: 1-2 beers nightly, sometimes an occasional bourbon   • Drug use: No   • Sexual activity: Defer     Other Topics Concern   • Not on file     Social History Narrative       Family Hx:    Family History   Problem Relation Age of Onset   • Hypertension Mother      Lived to 95 years old - Had numerous medical problems & medications   • Hypertension Father      Lived to 98 years old - Numerous medical problems and Medications   • Cancer Brother      History of Agent Orange   • Heart disease Maternal Grandmother    • Osteoarthritis Paternal Grandfather      possible RA       Meds:    Current " "Outpatient Prescriptions:   •  felodipine (PLENDIL) 10 MG 24 hr tablet, Take 10 mg by mouth Every Evening., Disp: , Rfl:   •  simvastatin (ZOCOR) 40 MG tablet, TAKE 1 TABLET DAILY, Disp: 90 tablet, Rfl: 1  •  aspirin 325 MG tablet, Take 325 mg by mouth Daily., Disp: , Rfl:     Allergies:  No Known Allergies    ROS:  Review of Systems    Vitals:    07/19/17 1355   Temp: 99.3 °F (37.4 °C)   TempSrc: Temporal Artery    Weight: 168 lb (76.2 kg)   Height: 68\" (172.7 cm)       Physical Exam    The patient is awake, alert, and oriented ×3.  The patient is in no acute distress.  Breathing is regular and unlabored with a respiratory rate of 12/m.  Extraocular movements and pupillary responses are symmetrically intact. Sclerae are anicteric.   Hearing is within normal limits.  Speech is within normal limits.  There is no jugular venous distention.    He arises fairly readily from a seated position and a standard height chair.  He does say he has a little posterior upper right hip and buttock pain when he first stands up.  However, thereafter, he is able walk smoothly about the exam room in the office with no limp.  His leg lengths are equal.  I do not see any obvious atrophy or asymmetry of the musculature of the lower extremities.  His calves are both soft and nontender and motor strength is normal in both lower extremities.  Pedal pulses are intact and regular both feet with a current heart rate of about 66 bpm.  Skin and nails are unremarkable.    The patient has no spasticity, cogwheeling, or clonus in either lower extremity.  Straight leg raising is mildly positive on the right for reproduction of the posterior hip and buttock pain, negative on the left.  The patient has full, smooth, symmetrical ranges of motion of both total hips without any pain on passive or active movement thereof.      Radiology: X-rays: AP pelvis, AP and lateral the right total hip were ordered and reviewed today to assess patient's complaints of " right hip pain.  I did review these images, and I compared them to multiple sets of previous images we had going all way back to his preop films.  I carefully compare today's images to the previous images postop, and there has been no appreciable change.  If anything, it appears as though the patient has enjoyed excellent bony ingrowth into both the femoral and acetabular components of his right total hip.  There is no evidence of stress fracture, loosening, or any other obvious bony problems on today's images.    X-rays: AP and lateral the patient's lumbosacral spine were ordered and reviewed today as I suspect that his pain may be radicular in nature.  These images reveal multilevel degenerative disc disease of a mild to moderate degree.  The patient has a very mild degenerative lumbar scoliosis.  I don't see any obvious acute changes otherwise.      Assessment:  1. Right lumbar radiculopathy    - Ambulatory Referral to Physical Therapy Evaluate and treat, Ortho  - XR Spine Lumbar AP & Lateral    2. Status post total replacement of right hip    - XR Hip With or Without Pelvis 2 - 3 View Right  - Ambulatory Referral to Physical Therapy Evaluate and treat, Ortho    3. Scoliosis of lumbar spine, unspecified scoliosis type    - Ambulatory Referral to Physical Therapy Evaluate and treat, Ortho  - XR Spine Lumbar AP & Lateral    4. Degenerative disc disease, lumbar    - Ambulatory Referral to Physical Therapy Evaluate and treat, Ortho  - XR Spine Lumbar AP & Lateral          Plan:  I discussed everything with the patient at length.  I reviewed my concerns, and also reviewed the x-ray findings with him.  I explained that his total hip looks fine, and that his symptoms were more consistent with a radicular phenomenon as opposed to a problem with the right hip itself.  I recommended physical therapy and he has voiced understanding and agreement.  I've explained that it may take many months to bring this under control, and  he says he understands.      Orders Placed This Encounter   Procedures   • XR Hip With or Without Pelvis 2 - 3 View Right     Order Specific Question:   Reason for Exam:     Answer:   RIGHT HIP   • XR Spine Lumbar AP & Lateral     Order Specific Question:   Reason for Exam:     Answer:   lumbar spine   • Ambulatory Referral to Physical Therapy Evaluate and treat, Ortho     Referral Priority:   Routine     Referral Type:   Therapy     Referral Reason:   Specialty Services Required     Requested Specialty:   Physical Therapy     Number of Visits Requested:   1

## 2017-07-25 ENCOUNTER — HOSPITAL ENCOUNTER (OUTPATIENT)
Dept: PHYSICAL THERAPY | Facility: HOSPITAL | Age: 77
Setting detail: THERAPIES SERIES
Discharge: HOME | End: 2017-07-25

## 2017-07-25 DIAGNOSIS — M54.16 RIGHT LUMBAR RADICULOPATHY: Primary | ICD-10-CM

## 2017-07-25 PROCEDURE — G8981 BODY POS CURRENT STATUS: HCPCS

## 2017-07-25 PROCEDURE — G8978 MOBILITY CURRENT STATUS: HCPCS

## 2017-07-25 PROCEDURE — G8982 BODY POS GOAL STATUS: HCPCS

## 2017-07-25 PROCEDURE — G8979 MOBILITY GOAL STATUS: HCPCS

## 2017-07-25 NOTE — THERAPY EVALUATION
"    Outpatient Physical Therapy Ortho Initial Evaluation   Kaylen [facility]     Patient Name: Louie German  : 1940  MRN: [id number]  Today's Date: 2017      Visit Date: 2017    Patient Active Problem List   Diagnosis   • Benign essential HTN   • Acid reflux   • Elevated cholesterol   • Calculus of kidney   • Cervical spinal stenosis   • Status post total hip replacement, left   • Right lumbar radiculopathy   • Status post total replacement of right hip   • Scoliosis of lumbar spine   • Degenerative disc disease, lumbar        Past Medical History:   Diagnosis Date   • Coronary artery disease    • DDD (degenerative disc disease), cervical    • Hx of cardiac arrest     Happened after Induction of anesthesia prior  to SURG 10 YRS AGO  \" I FLATLINE BUT W/IN SECONDS HEART WAS OK AFTER TURNED ON BACK...I THINK THEY GAVE ME TO MUCH ANESTHESIA\"   • Hyperlipidemia    • Hypertension    • Kidney stones    • Osteoarthritis    • Pleural effusion     DRAINAGE RT PLEURAL FLUID FOR TESTING WITH EGD ON 17   NO MALIGNANCY WITH CLEARANCE FROM DR SIMMONS FOR SURGERY   • Rheumatoid arthritis         Past Surgical History:   Procedure Laterality Date   • CORONARY ANGIOPLASTY WITH STENT PLACEMENT      17 years ago   • CYSTOSCOPY W/ LITHOLAPAXY / EHL      6-7 years ago   • ENDOSCOPY      ON 17 WITH DRAINAGE OF RT PLEURAL FLUID FOR BIOPSY    NO MALIGNANCY  CLEARANCE FOR SURG PER DR SIMMONS   • PLEURAL BIOPSY     • POSTERIOR CERVICAL FUSION     • TOTAL HIP ARTHROPLASTY Left     9-10 years ago    • TOTAL HIP ARTHROPLASTY Right 2017    Procedure: TOTAL HIP ARTHROPLASTY;  Surgeon: Gerson Cardoza MD;  Location: [facility];  Service:        Visit Dx:     ICD-10-CM ICD-9-CM   1. Right lumbar radiculopathy M54.16 724.4             Patient History       17 1300          History    Chief Complaint Pain  -CC      Type of Pain Back pain   R buttock/ side of hip  -CC      Date Current Problem(s) Began -- "   1st episode 6 weeks ago  -CC      Brief Description of Current Complaint Pt know to this facility for  OP rehab R JOEY Feb 2107 and received PT March 2107. Pt making satisfactory progress but 6 weeks ago after driving 45 min when he go out of car had great difficulty walking due to pain R buttock and hip. Pt had another episode so went to MD. 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 --   Dr Cardoza  -      How has patient tried to help current problem? pt started using pillow in car seat and sx decreased  -CC      What clinical tests have you had for this problem? X-ray  -CC      Results of Clinical Tests Negative findings in the postion or healing of R JOEY  -CC      Pain     Pain Location --   R buttock/lateral hip  -CC      Pain at Present 0  -CC      Pain at Best 0  -CC      Pain at Worst 5  -CC      Pain Frequency Intermittent  -CC      What Performance Factors Make the Current Problem(s) WORSE? driving-if sits constantly >2- 3 hours  -CC      What Performance Factors Make the Current Problem(s) BETTER?  use of pillow  -CC      Tolerance Time- Standing 60 min  -CC      Tolerance Time- Sitting 1-2 hours  -CC      Tolerance Time- Walking 45-60 min  -CC      Difficulties at work? NA  -CC      Difficulties with ADL's? driving . 45 min  -CC      Difficulties with recreational activities? has not resumed all yet s/p R JOEY- pt went to [facility] today without difficulty  -CC      Daily Activities    Primary Language English  -CC      How does patient learn best? Listening   -CC      Teaching needs identified Home Exercise Program;Management of Condition  -CC      Patient is concerned about/has problems with Transfers (getting out of a chair, bed);Walking  -CC      Does patient have problems with the following? None  -CC      Barriers to learning None  -CC      Pt Participated in POC and Goals Yes  -CC      Safety    Are you being hurt, hit, or frightened by anyone at home or in your life? No  -CC      Are you being neglected by a caregiver No  -CC        User Key  (r) = Recorded By, (t) = Taken By, (c) = Cosigned By    Initials Name Provider Type    CC Nica Shah, PT Physical Therapist                PT Ortho       07/25/17 1300    Subjective Comments    Subjective Comments Pt relates that since he had MD referral he noticed his knee are higher than his hips so decided to try sitting on a pillow when he drives and sx in R buttock and LE have reduced- has not had x 4 days  -CC    Precautions and Contraindications    Precautions/Limitations --   s/p 5 mo R JOEY  -CC    Posture/Observations    Forward Head Mild;Moderate  -CC    Thoracic Kyphosis Moderate;Increased  -CC    Rounded Shoulders Bilateral:;Moderate;Increased  -CC    Lumbar lordosis Moderate;Decreased  -CC    Iliac crests Right:;Mild;Elevated  -CC    Lumbosacral Palpation    SI Bilateral:   negative  -CC    Lumbosacral Segment Bilateral:   negative  -CC    Spinous Process Bilateral:   negative  -CC    Piriformis Right:   negative  -CC    Erector Spinae (Paraspinals) Bilateral:   negative  -CC    Greater Tuberosity Right:;Tender   slight to mild s/p R JOEY  x5 mos  -CC    Lumbosacral Palpation? Yes  -CC    Lumbar/SI Special Tests    Standing Flexion Test (SI Dysfunction) Left:;Positive  -CC    Slump Test (Neural Tension) Bilateral:;Negative  -CC    SLR (Neural Tension) Right:;Positive  -CC    ROM (Range of Motion)    General ROM Detail Spinal ROM 25% decrease all- hx of DJD  -CC    MMT (Manual Muscle Testing)     General MMT Assessment no strength deficits identified  -CC    Lower Extremity Flexibility    Hamstrings Right:   50 degrees postive for lumbar sx-L negative @ 50 degrees  -CC      User Key  (r) = Recorded By, (t) = Taken By, (c) = Cosigned By    Initials Name Provider Type    CC Nica Shah PT Physical Therapist                            Therapy Education       07/25/17 140          Therapy Education    Education Details Pt to continue daily morning stretches/exercise in supine - arm stretch [id number] and add hamstretch and DILAN. Also recommended pt to continue to sit on pillow in car and to try a lumbar support/towel roll as well  -CC      Given HEP  -CC      Program Reinforced  -CC      How Provided Verbal;Demonstration  -CC      Provided to Patient  -CC      Level of Understanding Demonstrated  -CC        User Key  (r) = Recorded By, (t) = Taken By, (c) = Cosigned By    Initials Name Provider Type    CC Nica Shah PT Physical Therapist                    PT Assessment/Plan       07/25/17 1410       PT Assessment    Functional Limitations Limitations in functional capacity and performance  -CC     Impairments Impaired postural alignment;Impaired flexibility;Pain  -CC     Assessment Comments Pt 5 months s/p R JOEY with recent sx of R hip pain after driving > 45 min. Pt's MD assessed R JOEY and negative findings and DX with lumbar radiculopathy. Most signifcant findings sx with R hamstring stretch at 50 degrees along with asymetry in pelvis and sacrum. Negative strength deficits or tenderness to palpation.. Pt tolerated  MET performed and had improved alignment. Pt may have already solved c/c sx with driving by decreasing flexion in lumbar spine and improving posture by use of sitting on pillow.  -CC     Please refer to paper survey for additional self-reported information Yes   Back Index - score 8  -CC     Rehab Potential Good  -CC     Patient/caregiver participated in  establishment of treatment plan and goals Yes  -CC     Patient would benefit from skilled therapy intervention Yes  -CC     PT Plan    PT Frequency 1x/week;2x/week  -CC     Predicted Duration of Therapy Intervention (days/wks) 2-4 wks  -CC     Planned CPT's? PT EVAL MOD COMPLELITY: 92434;PT MANUAL THERAPY EA 15 MIN: 96005;PT THER PROC EA 15 MIN: 10557  -CC     Physical Therapy Interventions (Optional Details) strengthening;stretching;modalities;taping;home exercise program;patient/family education;manual therapy techniques  -CC     PT Plan Comments To follow per POC. Check response to MET RX today.  -CC       User Key  (r) = Recorded By, (t) = Taken By, (c) = Cosigned By    Initials Name Provider Type    NASEEM Shah, PT Physical Therapist                  Exercises       07/25/17 1300          Subjective Comments    Subjective Comments Pt relates that since he had MD referral he noticed his knee are higher than his hips so decided to try sitting on a pillow when he drives and sx in R buttock and LE have reduced- has not had x 4 days  -CC      Subjective Pain    Able to rate subjective pain? yes  -CC      Pre-Treatment Pain Level 0  -CC      Exercise 1    Exercise Name 1 Hamstretch to L with sheet  -CC      Cueing 1 Verbal;Tactile  -CC      Reps 1 4   use of sheet  -CC      Time (Seconds) 1 --   15 sec  -CC      Exercise 2    Exercise Name 2 DF with osscillations  -CC      Cueing 2 Verbal;Tactile  -CC      Sets 2 --   Pt unable to tolerate hamstretch-c/o lumbar sx  -CC      Reps 2 20  -CC      Exercise 3    Exercise Name 3 DILAN  -CC      Cueing 3 Verbal;Tactile  -CC      Reps 3 10  -CC        User Key  (r) = Recorded By, (t) = Taken By, (c) = Cosigned By    Initials Name Provider Type    NASEEM Shah, PT Physical Therapist           Manual Rx (last 36 hours)      Manual Treatments       07/25/17 1300          Manual Rx 1    Manual Rx 1 Location pelvis- sacrum  -CC      Manual Rx 1 Type MET   -CC      Manual Rx 1 Grade shotgun- post rot L- ant rot R- uni-extended L - ROL  -CC        User Key  (r) = Recorded By, (t) = Taken By, (c) = Cosigned By    Initials Name Provider Type    CC Nica Shah, PT Physical Therapist                            Time Calculation:   Start Time: 1310  Stop Time: 1400  Time Calculation (min): 50 min     Therapy Charges for Today     Code Description Service Date Service Provider Modifiers Qty    64738817915 HC PT MOBILITY CURRENT 7/25/2017 Nica Shah, PT GP, CI 1    63132219921 HC PT CHNG MAIN POS CURRENT 7/25/2017 Nica Shah, PT GP, CI 1    [id number] HC PT NG MAIN POS PROJECTED 7/25/2017 Nica Shah, PT GP, CH 1          PT G-Codes  Mobility: Walking and Moving Around Current Status (): At least 1 percent but less than 20 percent impaired, limited or restricted  Changing and Maintaining Body Position Current Status (): At least 1 percent but less than 20 percent impaired, limited or restricted  Changing and Maintaining Body Position Goal Status (): 0 percent impaired, limited or restricted         Nica Shah, PT  7/25/2017

## 2017-07-27 ENCOUNTER — HOSPITAL ENCOUNTER (OUTPATIENT)
Dept: PHYSICAL THERAPY | Facility: HOSPITAL | Age: 77
Setting detail: THERAPIES SERIES
Discharge: HOME | End: 2017-07-27

## 2017-07-27 PROCEDURE — 97140 MANUAL THERAPY 1/> REGIONS: CPT

## 2017-07-27 PROCEDURE — 97110 THERAPEUTIC EXERCISES: CPT

## 2017-08-02 ENCOUNTER — HOSPITAL ENCOUNTER (OUTPATIENT)
Dept: PHYSICAL THERAPY | Facility: HOSPITAL | Age: 77
Setting detail: THERAPIES SERIES
Discharge: HOME | End: 2017-08-02

## 2017-08-02 PROCEDURE — 97110 THERAPEUTIC EXERCISES: CPT

## 2017-08-02 PROCEDURE — G8982 BODY POS GOAL STATUS: HCPCS

## 2017-08-02 PROCEDURE — G8981 BODY POS CURRENT STATUS: HCPCS

## 2017-08-02 PROCEDURE — G8978 MOBILITY CURRENT STATUS: HCPCS

## 2017-08-02 NOTE — THERAPY DISCHARGE NOTE
"     Outpatient Physical Therapy Ortho Initial Evaluation/Discharge   [place]     Patient Name: Louie German  : 1940  MRN: [id number]  Today's Date: 2017      Visit Date: 2017    Patient Active Problem List   Diagnosis   • Benign essential HTN   • Acid reflux   • Elevated cholesterol   • Calculus of kidney   • Cervical spinal stenosis   • Status post total hip replacement, left   • Right lumbar radiculopathy   • Status post total replacement of right hip   • Scoliosis of lumbar spine   • Degenerative disc disease, lumbar        Past Medical History:   Diagnosis Date   • Coronary artery disease    • DDD (degenerative disc disease), cervical    • Hx of cardiac arrest     Happened after Induction of anesthesia prior  to SURG 10 YRS AGO  \" I FLATLINE BUT W/IN SECONDS HEART WAS OK AFTER TURNED ON BACK...I THINK THEY GAVE ME TO MUCH ANESTHESIA\"   • Hyperlipidemia    • Hypertension    • Kidney stones    • Osteoarthritis    • Pleural effusion     DRAINAGE RT PLEURAL FLUID FOR TESTING WITH EGD ON 17   NO MALIGNANCY WITH CLEARANCE FROM DR SIMMONS FOR SURGERY   • Rheumatoid arthritis         Past Surgical History:   Procedure Laterality Date   • CORONARY ANGIOPLASTY WITH STENT PLACEMENT      17 years ago   • CYSTOSCOPY W/ LITHOLAPAXY / EHL      6-7 years ago   • ENDOSCOPY      ON 17 WITH DRAINAGE OF RT PLEURAL FLUID FOR BIOPSY    NO MALIGNANCY  CLEARANCE FOR SURG PER DR SIMMONS   • PLEURAL BIOPSY     • POSTERIOR CERVICAL FUSION     • TOTAL HIP ARTHROPLASTY Left     9-10 years ago    • TOTAL HIP ARTHROPLASTY Right 2017    Procedure: TOTAL HIP ARTHROPLASTY;  Surgeon: Gerson Cardoza MD;  Location: [facility];  Service:          Visit Dx:   No diagnosis found.                               Therapy Education       17 9209          Therapy Education    Education Details Reinforced pt continue with HEP for stretching and lumbar stabilization on regular basis -at least 5x /wk " from this point forward. Advised pt to pace his physical yard work and projects to avoid back pain flare ups. Pt agreeable to recommendation  -        User Key  (r) = Recorded By, (t) = Taken By, (c) = Cosigned By    Initials Name Provider Type    NASEEM Shah PT Physical Therapist                    PT Assessment/Plan       08/02/17 1312       PT Assessment    Impairments Impaired flexibility;Pain;Posture;Impaired postural alignment;Muscle strength  -CC     Assessment Comments Pt has responded well to PT program for stretching and lumbar stabilization HEP. Pt also with improved symetry for pelvic/sacral alignment Pt also with negative SLR RLE. Chief complaint of sx with sitting when driving and resulting R LE radicular pain has been resovled with use of a cushion and lumbar support in car seat. Pt pleased with progress and has good understanding of HEP.                                                                                                                                                                             -CC     Please refer to paper survey for additional self-reported information Yes   Back Index -score 0  -CC     PT Plan    PT Plan Comments Ready for discharge -reinforced HEP on regular basis from this point forward.   -       User Key  (r) = Recorded By, (t) = Taken By, (c) = Cosigned By    Initials Name Provider Type    NASEEM Shah, PT Physical Therapist                Exercises       08/02/17 1100          Subjective Comments    Subjective Comments Pt relates he is doing very well -no longer has sx with driving car  -      Subjective Pain    Able to rate subjective pain? yes  -      Pre-Treatment Pain Level 0  -      Subjective Pain Comment Pt did report he did some painting of fence and flexed over and forgot to wear lumbar support and had mild backache but resolved. Denies R buttock/radicular sx to hip  -CC      Exercise 1    Exercise Name 1  Hamstretch to bilateral with sheet  -CC      Cueing 1 Verbal;Tactile  -CC      Reps 1 4   use of sheet  -CC      Time (Seconds) 1 --   15 sec  -CC      Exercise 2    Exercise Name 2  -CC      Cueing 2       Sets 2       Reps 2        Exercise 3    Exercise Name 3 DILAN  -CC      Cueing 3 Verbal;Tactile  -CC      Reps 3 5   reduced reps due to cervical strain  -CC      Exercise 4    Exercise Name 4 Alternating PLR  -CC      Reps 4 10   with abd stabilization  -CC      Exercise 5    Exercise Name 5 Lower abs march with arm raise  -CC      Cueing 5 Verbal  -CC      Reps 5 10  -CC        User Key  (r) = Recorded By, (t) = Taken By, (c) = Cosigned By    Initials Name Provider Type    CC Nica Shah, PT Physical Therapist                             Time Calculation:   Start Time: 1115  Stop Time: 1145  Time Calculation (min): 30 min    Therapy Charges for Today     Code Description Service Date Service Provider Modifiers Qty    56368200147 HC PT THER PROC EA 15 MIN 8/2/2017 Nica Shah, PT GP 2    88379700556 HC PT MOBILITY CURRENT 8/2/2017 Nica Shah, PT GP, CH 1    [id number] HC PT CHNG MAIN POS PROJECTED 8/2/2017 Nica Shah, PT GP, CH 1          PT G-Codes  Mobility: Walking and Moving Around Current Status (): 0 percent impaired, limited or restricted  Changing and Maintaining Body Position Goal Status (): 0 percent impaired, limited or restricted     OP PT Discharge Summary  Date of Discharge: 08/02/17  Reason for Discharge: All goals achieved  Outcomes Achieved: Able to achieve all goals within established timeline  Discharge Destination: Home with home program        Nica Shah, PT  8/2/2017

## 2017-09-25 ENCOUNTER — OFFICE VISIT (OUTPATIENT)
Dept: INTERNAL MEDICINE | Facility: CLINIC | Age: 77
End: 2017-09-25

## 2017-09-25 ENCOUNTER — TELEPHONE (OUTPATIENT)
Dept: INTERNAL MEDICINE | Facility: CLINIC | Age: 77
End: 2017-09-25

## 2017-09-25 VITALS
TEMPERATURE: 98.2 F | WEIGHT: 165 LBS | DIASTOLIC BLOOD PRESSURE: 84 MMHG | HEART RATE: 66 BPM | BODY MASS INDEX: 25.09 KG/M2 | SYSTOLIC BLOOD PRESSURE: 124 MMHG | OXYGEN SATURATION: 98 %

## 2017-09-25 DIAGNOSIS — K21.00 GASTROESOPHAGEAL REFLUX DISEASE WITH ESOPHAGITIS: ICD-10-CM

## 2017-09-25 DIAGNOSIS — R10.9 ABDOMINAL CRAMPING: Primary | ICD-10-CM

## 2017-09-25 LAB
ALBUMIN SERPL-MCNC: 4.5 G/DL (ref 3.5–5.2)
ALBUMIN/GLOB SERPL: 2.4 G/DL
ALP SERPL-CCNC: 60 U/L (ref 39–117)
ALT SERPL-CCNC: 9 U/L (ref 1–41)
AST SERPL-CCNC: 17 U/L (ref 1–40)
BASOPHILS # BLD AUTO: 0.03 10*3/MM3 (ref 0–0.2)
BASOPHILS NFR BLD AUTO: 0.5 % (ref 0–1.5)
BILIRUB SERPL-MCNC: 0.3 MG/DL (ref 0.1–1.2)
BUN SERPL-MCNC: 25 MG/DL (ref 8–23)
BUN/CREAT SERPL: 17.9 (ref 7–25)
CALCIUM SERPL-MCNC: 9.7 MG/DL (ref 8.6–10.5)
CHLORIDE SERPL-SCNC: 105 MMOL/L (ref 98–107)
CO2 SERPL-SCNC: 28.7 MMOL/L (ref 22–29)
CREAT SERPL-MCNC: 1.4 MG/DL (ref 0.76–1.27)
EOSINOPHIL # BLD AUTO: 0.11 10*3/MM3 (ref 0–0.7)
EOSINOPHIL # BLD AUTO: 1.7 % (ref 0.3–6.2)
ERYTHROCYTE [DISTWIDTH] IN BLOOD BY AUTOMATED COUNT: 13.4 % (ref 11.5–14.5)
GLOBULIN SER CALC-MCNC: 1.9 GM/DL
GLUCOSE SERPL-MCNC: 99 MG/DL (ref 65–99)
HCT VFR BLD AUTO: 38.4 % (ref 40.4–52.2)
HGB BLD-MCNC: 12.5 G/DL (ref 13.7–17.6)
IMM GRANULOCYTES # BLD: 0 10*3/MM3 (ref 0–0.03)
IMM GRANULOCYTES NFR BLD: 0 % (ref 0–0.5)
LYMPHOCYTES # BLD AUTO: 1.36 10*3/MM3 (ref 0.9–4.8)
LYMPHOCYTES NFR BLD AUTO: 21 % (ref 19.6–45.3)
MCH RBC QN AUTO: 30.8 PG (ref 27–32.7)
MCHC RBC AUTO-ENTMCNC: 32.6 G/DL (ref 32.6–36.4)
MCV RBC AUTO: 94.6 FL (ref 79.8–96.2)
MONOCYTES # BLD AUTO: 0.64 10*3/MM3 (ref 0.2–1.2)
MONOCYTES NFR BLD AUTO: 9.9 % (ref 5–12)
NEUTROPHILS # BLD AUTO: 4.34 10*3/MM3 (ref 1.9–8.1)
NEUTROPHILS NFR BLD AUTO: 66.9 % (ref 42.7–76)
PLATELET # BLD AUTO: 227 10*3/MM3 (ref 140–500)
POTASSIUM SERPL-SCNC: 5.2 MMOL/L (ref 3.5–5.2)
PROT SERPL-MCNC: 6.4 G/DL (ref 6–8.5)
RBC # BLD AUTO: 4.06 10*6/MM3 (ref 4.6–6)
SODIUM SERPL-SCNC: 143 MMOL/L (ref 136–145)
WBC # BLD AUTO: 6.48 10*3/MM3 (ref 4.5–10.7)

## 2017-09-25 PROCEDURE — 99213 OFFICE O/P EST LOW 20 MIN: CPT | Performed by: NURSE PRACTITIONER

## 2017-09-25 RX ORDER — OMEPRAZOLE 20 MG/1
20 CAPSULE, DELAYED RELEASE ORAL DAILY
Qty: 14 CAPSULE | Refills: 0 | Status: SHIPPED | OUTPATIENT
Start: 2017-09-25 | End: 2017-10-09

## 2017-09-25 RX ORDER — AMOXICILLIN 500 MG/1
1 CAPSULE ORAL
COMMUNITY
Start: 2017-09-18 | End: 2017-10-03

## 2017-09-25 NOTE — PATIENT INSTRUCTIONS
Food Choices for Gastroesophageal Reflux Disease, Adult  When you have gastroesophageal reflux disease (GERD), the foods you eat and your eating habits are very important. Choosing the right foods can help ease the discomfort of GERD.  WHAT GENERAL GUIDELINES DO I NEED TO FOLLOW?  · Choose fruits, vegetables, whole grains, low-fat dairy products, and low-fat meat, fish, and poultry.  · Limit fats such as oils, salad dressings, butter, nuts, and avocado.  · Keep a food diary to identify foods that cause symptoms.  · Avoid foods that cause reflux. These may be different for different people.  · Eat frequent small meals instead of three large meals each day.  · Eat your meals slowly, in a relaxed setting.  · Limit fried foods.  · Cook foods using methods other than frying.  · Avoid drinking alcohol.  · Avoid drinking large amounts of liquids with your meals.  · Avoid bending over or lying down until 2-3 hours after eating.  WHAT FOODS ARE NOT RECOMMENDED?  The following are some foods and drinks that may worsen your symptoms:  Vegetables  Tomatoes. Tomato juice. Tomato and spaghetti sauce. Chili peppers. Onion and garlic. Horseradish.  Fruits  Oranges, grapefruit, and lemon (fruit and juice).  Meats  High-fat meats, fish, and poultry. This includes hot dogs, ribs, ham, sausage, salami, and goel.  Dairy  Whole milk and chocolate milk. Sour cream. Cream. Butter. Ice cream. Cream cheese.   Beverages  Coffee and tea, with or without caffeine. Carbonated beverages or energy drinks.  Condiments  Hot sauce. Barbecue sauce.   Sweets/Desserts  Chocolate and cocoa. Donuts. Peppermint and spearmint.  Fats and Oils  High-fat foods, including French fries and potato chips.  Other  Vinegar. Strong spices, such as black pepper, white pepper, red pepper, cayenne, moreno powder, cloves, ginger, and chili powder.  The items listed above may not be a complete list of foods and beverages to avoid. Contact your dietitian for more  information.     This information is not intended to replace advice given to you by your health care provider. Make sure you discuss any questions you have with your health care provider.     Document Released: 12/18/2006 Document Revised: 01/08/2016 Document Reviewed: 10/22/2014  [employer] Interactive Patient Education ©2017 [employer]

## 2017-09-25 NOTE — TELEPHONE ENCOUNTER
Mr. German is here and wanted to be seen in the acute clinic.  He is having stomach cramps with pain.    Please advise

## 2017-09-25 NOTE — PROGRESS NOTES
Subjective   Louie German is a 77 y.o. male.     HPI Comments: He is unsure when his last colonoscopy was performed, however recent colo guard  (6/2017) normal. He had EGD performed 1/30/2017.     Abdominal Pain   This is a new problem. The current episode started more than 1 month ago. The problem occurs intermittently. The most recent episode lasted 3 hours. The problem has been unchanged. The pain is located in the generalized abdominal region. The pain is at a severity of 0/10 (worst 3/10). The patient is experiencing no pain. The quality of the pain is cramping. The abdominal pain does not radiate. Associated symptoms include belching and flatus. Pertinent negatives include no constipation, diarrhea, dysuria, fever, hematochezia, hematuria, melena, myalgias, nausea or vomiting. Associated symptoms comments: Reflux, bowel movement . Nothing aggravates the pain. The pain is relieved by belching and bowel movements. He has tried antacids (asprin ) for the symptoms. The treatment provided moderate relief.        The following portions of the patient's history were reviewed and updated as appropriate: allergies, current medications and problem list.    Review of Systems   Constitutional: Negative for activity change, appetite change, fatigue and fever.   Respiratory: Negative for cough, shortness of breath and wheezing.    Cardiovascular: Negative for chest pain, palpitations and leg swelling.   Gastrointestinal: Positive for abdominal distention, abdominal pain (intermittent, cramping ) and flatus. Negative for anal bleeding, blood in stool, constipation, diarrhea, hematochezia, melena, nausea, rectal pain and vomiting.        Reflux   Genitourinary: Negative for dysuria and hematuria.   Musculoskeletal: Negative for myalgias.       Objective   Physical Exam   Constitutional: He is oriented to person, place, and time. He appears well-developed and well-nourished.   HENT:   Head: Normocephalic.   Nose: Nose  normal.   Cardiovascular: Regular rhythm and normal heart sounds.  Exam reveals no S3 and no S4.    No murmur heard.  Pulmonary/Chest: Effort normal and breath sounds normal. He has no decreased breath sounds. He has no wheezes. He has no rhonchi. He has no rales.   Abdominal: Normal appearance and bowel sounds are normal. There is no hepatosplenomegaly. There is tenderness (mild ). There is no CVA tenderness.   Musculoskeletal: He exhibits no edema.   Neurological: He is alert and oriented to person, place, and time. Gait normal.   Skin: Skin is warm and dry.   Psychiatric: He has a normal mood and affect.       Assessment/Plan   Louie was seen today for abdominal pain.    Diagnoses and all orders for this visit:    Abdominal cramping  Comments:  intermittent; ? r/t gas; if symptoms persist will need to consider imaging.   Orders:  -     Comprehensive Metabolic Panel; Future  -     CBC Auto Differential; Future  -     Comprehensive Metabolic Panel  -     CBC Auto Differential    Gastroesophageal reflux disease with esophagitis  Comments:  small frequent meals; avoid acidic/spicy foods; will trial prilosec for 2 weeks  Orders:  -     omeprazole (PRILOSEC) 20 MG capsule; Take 1 capsule by mouth Daily for 14 days.

## 2017-10-03 ENCOUNTER — OFFICE VISIT (OUTPATIENT)
Dept: INTERNAL MEDICINE | Facility: CLINIC | Age: 77
End: 2017-10-03

## 2017-10-03 VITALS
HEART RATE: 68 BPM | HEIGHT: 68 IN | SYSTOLIC BLOOD PRESSURE: 142 MMHG | BODY MASS INDEX: 26.37 KG/M2 | WEIGHT: 174 LBS | DIASTOLIC BLOOD PRESSURE: 74 MMHG

## 2017-10-03 DIAGNOSIS — E78.00 HIGH CHOLESTEROL: ICD-10-CM

## 2017-10-03 DIAGNOSIS — R10.30 LOWER ABDOMINAL PAIN: Primary | ICD-10-CM

## 2017-10-03 DIAGNOSIS — Z23 FLU VACCINE NEED: ICD-10-CM

## 2017-10-03 PROBLEM — M54.16 RIGHT LUMBAR RADICULOPATHY: Status: RESOLVED | Noted: 2017-07-19 | Resolved: 2017-10-03

## 2017-10-03 PROBLEM — M41.9 SCOLIOSIS OF LUMBAR SPINE: Status: RESOLVED | Noted: 2017-07-19 | Resolved: 2017-10-03

## 2017-10-03 LAB
ALBUMIN SERPL-MCNC: 4.2 G/DL (ref 3.5–5.2)
ALBUMIN/GLOB SERPL: 1.4 G/DL
ALP SERPL-CCNC: 61 U/L (ref 39–117)
ALT SERPL W P-5'-P-CCNC: 8 U/L (ref 1–41)
ANION GAP SERPL CALCULATED.3IONS-SCNC: 12.5 MMOL/L
AST SERPL-CCNC: 17 U/L (ref 1–40)
BASOPHILS # BLD AUTO: 0.02 10*3/MM3 (ref 0–0.2)
BASOPHILS NFR BLD AUTO: 0.3 % (ref 0–2)
BILIRUB SERPL-MCNC: 0.3 MG/DL (ref 0.1–1.2)
BILIRUB UR QL STRIP: NEGATIVE
BUN BLD-MCNC: 23 MG/DL (ref 8–23)
BUN/CREAT SERPL: 16.8 (ref 7–25)
CALCIUM SPEC-SCNC: 9.5 MG/DL (ref 8.6–10.5)
CHLORIDE SERPL-SCNC: 102 MMOL/L (ref 98–107)
CLARITY UR: CLEAR
CO2 SERPL-SCNC: 27.5 MMOL/L (ref 22–29)
COLOR UR: YELLOW
CREAT BLD-MCNC: 1.37 MG/DL (ref 0.76–1.27)
DEPRECATED RDW RBC AUTO: 41.5 FL (ref 37–54)
EOSINOPHIL # BLD AUTO: 0.11 10*3/MM3 (ref 0–0.7)
EOSINOPHIL NFR BLD AUTO: 1.7 % (ref 0–5)
ERYTHROCYTE [DISTWIDTH] IN BLOOD BY AUTOMATED COUNT: 12.8 % (ref 11.5–15)
GFR SERPL CREATININE-BSD FRML MDRD: 50 ML/MIN/1.73
GLOBULIN UR ELPH-MCNC: 2.9 GM/DL
GLUCOSE BLD-MCNC: 84 MG/DL (ref 65–99)
GLUCOSE UR STRIP-MCNC: NEGATIVE MG/DL
HCT VFR BLD AUTO: 34.8 % (ref 40.1–51)
HGB BLD-MCNC: 11.7 G/DL (ref 13.7–17.5)
HGB UR QL STRIP.AUTO: NEGATIVE
KETONES UR QL STRIP: NEGATIVE
LEUKOCYTE ESTERASE UR QL STRIP.AUTO: NEGATIVE
LYMPHOCYTES # BLD AUTO: 0.98 10*3/MM3 (ref 0.8–7)
LYMPHOCYTES NFR BLD AUTO: 15.3 % (ref 10–60)
MCH RBC QN AUTO: 31.1 PG (ref 26–34)
MCHC RBC AUTO-ENTMCNC: 33.6 G/DL (ref 31–37)
MCV RBC AUTO: 92.6 FL (ref 80–100)
MONOCYTES # BLD AUTO: 0.69 10*3/MM3 (ref 0–1)
MONOCYTES NFR BLD AUTO: 10.8 % (ref 0–13)
NEUTROPHILS # BLD AUTO: 4.61 10*3/MM3 (ref 1–11)
NEUTROPHILS NFR BLD AUTO: 71.9 % (ref 30–85)
NITRITE UR QL STRIP: NEGATIVE
PH UR STRIP.AUTO: <=5 [PH] (ref 5–8)
PLATELET # BLD AUTO: 218 10*3/MM3 (ref 150–450)
PMV BLD AUTO: 12.2 FL (ref 6–12)
POTASSIUM BLD-SCNC: 4.1 MMOL/L (ref 3.5–5.2)
PROT SERPL-MCNC: 7.1 G/DL (ref 6–8.5)
PROT UR QL STRIP: NEGATIVE
RBC # BLD AUTO: 3.76 10*6/MM3 (ref 4.63–6.08)
SODIUM BLD-SCNC: 142 MMOL/L (ref 136–145)
SP GR UR STRIP: 1.02 (ref 1–1.03)
UROBILINOGEN UR QL STRIP: NORMAL
WBC NRBC COR # BLD: 6.41 10*3/MM3 (ref 5–10)

## 2017-10-03 PROCEDURE — G0008 ADMIN INFLUENZA VIRUS VAC: HCPCS | Performed by: INTERNAL MEDICINE

## 2017-10-03 PROCEDURE — 99213 OFFICE O/P EST LOW 20 MIN: CPT | Performed by: INTERNAL MEDICINE

## 2017-10-03 PROCEDURE — 85025 COMPLETE CBC W/AUTO DIFF WBC: CPT | Performed by: INTERNAL MEDICINE

## 2017-10-03 PROCEDURE — 36415 COLL VENOUS BLD VENIPUNCTURE: CPT | Performed by: INTERNAL MEDICINE

## 2017-10-03 PROCEDURE — 80053 COMPREHEN METABOLIC PANEL: CPT | Performed by: INTERNAL MEDICINE

## 2017-10-03 PROCEDURE — 90662 IIV NO PRSV INCREASED AG IM: CPT | Performed by: INTERNAL MEDICINE

## 2017-10-03 PROCEDURE — 81003 URINALYSIS AUTO W/O SCOPE: CPT | Performed by: INTERNAL MEDICINE

## 2017-10-03 NOTE — PROGRESS NOTES
Subjective   Louie German is a 77 y.o. male.     History of Present Illness he is here today for evaluation of lower abdominal pain present for about 6 weeks.  It is described as a aching which often occurs after meals but may occur at other times of the day.  It may keep him from getting to sleep initially but it does not awaken him from sleep.  He denies any change in bowel habit, melanotic stool, or rectal bleeding.  He has not had any dysphagia or nausea or vomiting.  He denies any overt hematuria or dysuria.  His appetite has been fair and he denies any weight loss.  The pain is present in the suprapubic and lower quadrants and may radiate upward to the right upper quadrant and left upper quadrant as well as occasionally to the low back.  His last colonoscopy was in 2005.        Review of Systems   Constitutional: Negative for activity change, appetite change, fatigue, fever and unexpected weight change.   HENT: Negative for trouble swallowing.    Respiratory: Negative for cough, chest tightness, shortness of breath and wheezing.    Cardiovascular: Negative for chest pain and leg swelling.   Gastrointestinal: Positive for abdominal pain. Negative for anal bleeding, blood in stool, constipation, diarrhea and vomiting.   Genitourinary: Negative for difficulty urinating, dysuria, flank pain, frequency and hematuria.   Musculoskeletal: Positive for back pain.   Neurological: Negative for dizziness and weakness.       Objective   Physical Exam   Constitutional: He is oriented to person, place, and time. He appears well-developed and well-nourished. He is active. He does not appear ill.   Eyes: Conjunctivae are normal.   Cardiovascular: Normal rate, regular rhythm, S1 normal and S2 normal.  Exam reveals no S3 and no S4.    No murmur heard.  Pulmonary/Chest: No tachypnea. No respiratory distress. He has no decreased breath sounds. He has no wheezes. He has no rhonchi. He has no rales.   Abdominal: Soft. Normal  appearance and bowel sounds are normal. He exhibits no abdominal bruit and no mass. There is no hepatosplenomegaly. There is tenderness in the left upper quadrant and left lower quadrant.       Vascular Status -  His exam exhibits no right foot edema. His exam exhibits no left foot edema.  Neurological: He is alert and oriented to person, place, and time.   Psychiatric: He has a normal mood and affect. His speech is normal and behavior is normal. Judgment and thought content normal. Cognition and memory are normal.       Assessment/Plan September lab showed hematocrit 38.4 hemoglobin 12.5 MCV 94.6.  BUN 25 chronic him 1.4    Assessment #1 lower abdominal pain-persistence is of some concern and this was discussed with the patient.    Plan #1 CBC, CMP, urinalysis #2 CT scan of the abdomen and pelvis without IV contrast.  #3 flu vaccination today.  Routine follow-up with me in 3 weeks.  Consider colonoscopy.

## 2017-10-11 ENCOUNTER — OFFICE VISIT (OUTPATIENT)
Dept: INTERNAL MEDICINE | Facility: CLINIC | Age: 77
End: 2017-10-11

## 2017-10-11 ENCOUNTER — HOSPITAL ENCOUNTER (OUTPATIENT)
Dept: CT IMAGING | Facility: HOSPITAL | Age: 77
Discharge: HOME | End: 2017-10-11
Admitting: INTERNAL MEDICINE

## 2017-10-11 VITALS
BODY MASS INDEX: 26.07 KG/M2 | WEIGHT: 172 LBS | SYSTOLIC BLOOD PRESSURE: 130 MMHG | HEIGHT: 68 IN | DIASTOLIC BLOOD PRESSURE: 70 MMHG

## 2017-10-11 DIAGNOSIS — C79.9 METASTATIC CANCER (HCC): Primary | ICD-10-CM

## 2017-10-11 PROBLEM — N13.30 HYDRONEPHROSIS OF LEFT KIDNEY: Status: ACTIVE | Noted: 2017-10-11

## 2017-10-11 PROBLEM — R10.30 LOWER ABDOMINAL PAIN: Status: RESOLVED | Noted: 2017-10-03 | Resolved: 2017-10-11

## 2017-10-11 PROBLEM — N28.9 ACUTE RENAL INSUFFICIENCY: Status: ACTIVE | Noted: 2017-10-11

## 2017-10-11 PROBLEM — D64.9 ANEMIA: Status: ACTIVE | Noted: 2017-10-11

## 2017-10-11 PROCEDURE — 74176 CT ABD & PELVIS W/O CONTRAST: CPT

## 2017-10-11 PROCEDURE — 99213 OFFICE O/P EST LOW 20 MIN: CPT | Performed by: INTERNAL MEDICINE

## 2017-10-11 PROCEDURE — 0 DIATRIZOATE MEGLUMINE & SODIUM PER 1 ML: Performed by: INTERNAL MEDICINE

## 2017-10-11 RX ADMIN — DIATRIZOATE MEGLUMINE AND DIATRIZOATE SODIUM 30 ML: 600; 100 SOLUTION ORAL; RECTAL at 09:30

## 2017-10-11 NOTE — PROGRESS NOTES
Subjective   Louie German is a 77 y.o. male.     History of Present Illness he is here today for follow-up of left low quadrant pain.  Actually his pain is not as severe as previously and it is no longer awakening for him from sleep.  He denies any anorexia or weight loss.  He has not had any nausea or vomiting.  There has been no dysphagia.  He denies any change in bowel habit, melanotic stool, or rectal bleeding.  There has been no dysuria or hematuria.        Review of Systems   Constitutional: Negative for activity change, appetite change, fatigue and unexpected weight change.   Respiratory: Negative for shortness of breath.    Cardiovascular: Negative for chest pain and leg swelling.   Gastrointestinal: Positive for abdominal pain. Negative for anal bleeding, blood in stool, constipation, diarrhea, nausea and vomiting.   Genitourinary: Negative for dysuria, flank pain and hematuria.   Musculoskeletal: Negative for arthralgias and back pain.   Neurological: Negative for weakness.       Objective   Physical Exam   Constitutional: He is oriented to person, place, and time. Vital signs are normal. He appears well-developed and well-nourished. He is active. He does not appear ill.   Eyes: Conjunctivae are normal.   Cardiovascular: Normal rate, regular rhythm, S1 normal and S2 normal.  Exam reveals no S3 and no S4.    No murmur heard.  Pulmonary/Chest: No tachypnea. No respiratory distress. He has no decreased breath sounds. He has no wheezes. He has no rhonchi. He has no rales.   Abdominal: Soft. Normal appearance and bowel sounds are normal. He exhibits no abdominal bruit and no mass. There is no hepatosplenomegaly. There is no tenderness.       Vascular Status -  His exam exhibits no right foot edema. His exam exhibits no left foot edema.  Neurological: He is alert and oriented to person, place, and time. Gait normal.   Psychiatric: He has a normal mood and affect. His speech is normal and behavior is  normal. Judgment and thought content normal. Cognition and memory are normal.       Assessment/Plan CT scan of the abdomen and pelvis done today shows a 4.8 x 3.3 cm retroperitoneal infiltrative tumor involving the third part of the duodenum, transverse colon wall 3.1 cm tumor near the splenic flexure, 1.7 cm tumor nodule near the descending colon, a right mid abdominal metastatic tumor nodule measuring 2.6 cm, a left lower quadrant anterior peritoneal tumor nodule measuring 2 cm, a left pelvic mid abdominal wall 5.3 x 1.7 cm tumor mass using moderate hydronephrosis, and left peritoneal wall surface nodular tumor implants.    Assessment #1 anemia #2 acute renal insufficiency-both related to extensive left-sided abdominal tumor with hydronephrosis.  Of note he had a normal EGD in January of this year.  All of this was discussed with the patient and his wife today.    Plan #1 CT-guided biopsy of tumor mass within the next few days #2 general surgery consult and oncology consult within the next week.

## 2017-10-13 ENCOUNTER — OFFICE VISIT (OUTPATIENT)
Dept: SURGERY | Facility: CLINIC | Age: 77
End: 2017-10-13

## 2017-10-13 VITALS — HEART RATE: 84 BPM | OXYGEN SATURATION: 99 % | WEIGHT: 171.8 LBS | BODY MASS INDEX: 26.04 KG/M2 | HEIGHT: 68 IN

## 2017-10-13 DIAGNOSIS — R93.5 ABNORMAL CT OF THE ABDOMEN: Primary | ICD-10-CM

## 2017-10-13 PROCEDURE — 99213 OFFICE O/P EST LOW 20 MIN: CPT | Performed by: SURGERY

## 2017-10-13 NOTE — PROGRESS NOTES
"CHIEF COMPLAINT:    Intra-abdominal metastatic tumor    HISTORY OF PRESENT ILLNESS:    Louie German is a 77 y.o. male who underwent recent CT imaging ordered by Dr. Stoner for evaluation of left lower quadrant abdominal pain.  It shows multiple intra-abdominal mass lesions that are concerning for a malignant process.  There is some thickening of sigmoid colon which is concerning, and there is a large mass at the ligament of Treitz that may be the primary tumor arising from small bowel.  He is scheduled to undergo and biopsy this week.    Past Medical History:   Diagnosis Date   • Cancer 2017    Basal Cell on Nose and Under Right Eye   • Coronary artery disease    • DDD (degenerative disc disease), cervical    • Hx of cardiac arrest     Happened after Induction of anesthesia prior  to SURG 10 YRS AGO  \" I FLATLINE BUT W/IN SECONDS HEART WAS OK AFTER TURNED ON BACK...I THINK THEY GAVE ME TO MUCH ANESTHESIA\"   • Hyperlipidemia    • Hypertension    • Kidney stones    • Osteoarthritis    • Pleural effusion     DRAINAGE RT PLEURAL FLUID FOR TESTING WITH EGD ON 1-30-17   NO MALIGNANCY WITH CLEARANCE FROM DR SIMMONS FOR SURGERY   • Rheumatoid arthritis        Past Surgical History:   Procedure Laterality Date   • CORONARY ANGIOPLASTY WITH STENT PLACEMENT      17 years ago   • CYSTOSCOPY W/ LITHOLAPAXY / EHL      6-7 years ago   • ENDOSCOPY      ON 1/30/17 WITH DRAINAGE OF RT PLEURAL FLUID FOR BIOPSY    NO MALIGNANCY  CLEARANCE FOR SURG PER DR SIMMONS   • PLEURAL BIOPSY     • POSTERIOR CERVICAL FUSION  2001   • SKIN CANCER EXCISION N/A 2017    Basal Cell Nose & under right eye   • TOTAL HIP ARTHROPLASTY Left     9-10 years ago    • TOTAL HIP ARTHROPLASTY Right 2/14/2017    Procedure: TOTAL HIP ARTHROPLASTY;  Surgeon: Greson Cardoza MD;  Location: [facility];  Service:          Current Outpatient Prescriptions:   •  felodipine (PLENDIL) 10 MG 24 hr tablet, Take 10 mg by mouth Every Evening., Disp: , Rfl:   •  " "simvastatin (ZOCOR) 40 MG tablet, TAKE 1 TABLET DAILY, Disp: 90 tablet, Rfl: 1  •  aspirin 325 MG tablet, Take 325 mg by mouth Daily. Takes once or twice a week, Disp: , Rfl:     No Known Allergies    Family History   Problem Relation Age of Onset   • Hypertension Mother      Lived to 95 years old - Had numerous medical problems & medications   • Hypertension Father      Lived to 98 years old - Numerous medical problems and Medications   • Cancer Brother      History of Agent Orange   • Heart disease Maternal Grandmother    • Osteoarthritis Paternal Grandfather      possible RA       Social History     Social History   • Marital status:      Spouse name: N/A   • Number of children: N/A   • Years of education: N/A     Occupational History   • Not on file.     Social History Main Topics   • Smoking status: Never Smoker   • Smokeless tobacco: Former User   • Alcohol use Yes      Comment: 1-2 beers nightly, sometimes an occasional bourbon   • Drug use: No   • Sexual activity: Defer     Other Topics Concern   • Not on file     Social History Narrative       Review of Systems   Gastrointestinal: Positive for abdominal pain.   Genitourinary: Positive for frequency.   All other systems reviewed and are negative.      Objective     Pulse 84  Ht 68\" (172.7 cm)  Wt 171 lb 12.8 oz (77.9 kg)  SpO2 99%  BMI 26.12 kg/m2    Physical Exam   Constitutional: He is oriented to person, place, and time. He appears well-developed and well-nourished.   HENT:   Head: Normocephalic and atraumatic.   Eyes: Conjunctivae are normal. No scleral icterus.   Neck: No JVD present.   Cardiovascular: Normal rate, regular rhythm, normal heart sounds and intact distal pulses.    No murmur heard.  Pulmonary/Chest: Effort normal and breath sounds normal. No respiratory distress. He has no wheezes. He has no rales.   Abdominal: Soft. Bowel sounds are normal. He exhibits no mass. There is no tenderness. There is no rebound and no guarding. "   Musculoskeletal: He exhibits no edema or deformity.   Neurological: He is alert and oriented to person, place, and time.   Skin: Skin is warm and dry.   Psychiatric: He has a normal mood and affect.   Vitals reviewed.      DIAGNOSTIC DATA:    I reviewed the images and the report of CT scan of the abdomen and pelvis performed on 10/11/2017.  It shows an infiltrative tumor involving the distal duodenum/proximal jejunum.  It measures about 5 cm across.  There are additional ill-defined lesions involving the anterior abdominal wall on the left.    ASSESSMENT:    Abnormal CT imaging which is concerning for a metastatic malignant process    PLAN:    He is going to undergo biopsy.    Once we have the results, he may need EGD and colonoscopy.  He may require placement of PowerPort.  He has an appointment to see Dr. Roberto in the [facility] office toward the end of this month.

## 2017-10-18 ENCOUNTER — HOSPITAL ENCOUNTER (OUTPATIENT)
Dept: CT IMAGING | Facility: HOSPITAL | Age: 77
Discharge: HOME | End: 2017-10-18
Attending: INTERNAL MEDICINE | Admitting: INTERNAL MEDICINE

## 2017-10-18 VITALS
HEART RATE: 72 BPM | HEIGHT: 68 IN | TEMPERATURE: 97 F | OXYGEN SATURATION: 98 % | BODY MASS INDEX: 25.16 KG/M2 | DIASTOLIC BLOOD PRESSURE: 74 MMHG | WEIGHT: 166 LBS | SYSTOLIC BLOOD PRESSURE: 124 MMHG | RESPIRATION RATE: 18 BRPM

## 2017-10-18 DIAGNOSIS — C79.9 METASTATIC CANCER (HCC): ICD-10-CM

## 2017-10-18 LAB
INR PPP: 1.2 (ref 0.8–1.2)
PROTHROMBIN TIME: 13.7 SECONDS (ref 12.8–15.2)

## 2017-10-18 PROCEDURE — 77012 CT SCAN FOR NEEDLE BIOPSY: CPT

## 2017-10-18 PROCEDURE — 63710000001 ALPRAZOLAM 0.5 MG TABLET: Performed by: RADIOLOGY

## 2017-10-18 PROCEDURE — 88305 TISSUE EXAM BY PATHOLOGIST: CPT | Performed by: INTERNAL MEDICINE

## 2017-10-18 PROCEDURE — A9270 NON-COVERED ITEM OR SERVICE: HCPCS | Performed by: RADIOLOGY

## 2017-10-18 PROCEDURE — 25010000002 GLUCAGON (HUMAN RECOMBINANT) 1 MG RECONSTITUTED SOLUTION: Performed by: INTERNAL MEDICINE

## 2017-10-18 RX ORDER — LIDOCAINE HYDROCHLORIDE 10 MG/ML
20 INJECTION, SOLUTION INFILTRATION; PERINEURAL ONCE
Status: COMPLETED | OUTPATIENT
Start: 2017-10-18 | End: 2017-10-18

## 2017-10-18 RX ORDER — SODIUM CHLORIDE 0.9 % (FLUSH) 0.9 %
1-10 SYRINGE (ML) INJECTION AS NEEDED
Status: DISCONTINUED | OUTPATIENT
Start: 2017-10-18 | End: 2017-10-19 | Stop reason: HOSPADM

## 2017-10-18 RX ORDER — ALPRAZOLAM 0.5 MG/1
0.5 TABLET ORAL ONCE
Status: COMPLETED | OUTPATIENT
Start: 2017-10-18 | End: 2017-10-18

## 2017-10-18 RX ADMIN — LIDOCAINE HYDROCHLORIDE 15 ML: 10 INJECTION, SOLUTION INFILTRATION; PERINEURAL at 10:00

## 2017-10-18 RX ADMIN — GLUCAGON HYDROCHLORIDE 1 MG: KIT at 09:05

## 2017-10-18 RX ADMIN — ALPRAZOLAM 0.5 MG: 0.5 TABLET ORAL at 07:05

## 2017-10-18 NOTE — DISCHARGE INSTRUCTIONS
EDUCATION /DISCHARGE INSTRUCTIONS  CT/US guided biopsy:  A biopsy is a procedure done to remove tissue for further analysis.  Before images are taken to locate the target area.  Images can be obtained using ultrasound, CT or MRI.  A physician will clean your skin with antiseptic soap, place a sterile towel around the site and administer a local anesthetic to numb the area.  The physician will then insert a special needle.  Sometimes images are taken of the needle after it is inserted to ensure the needle is in the correct area to be biopsied.   A sample is obtained and sent to the laboratory for study.  Occasionally the laboratory is unable to make a diagnosis from the sample and the procedure may need to be repeated.  Within a week the radiologist will send a report to your physician.  A pathologist will also examine the tissue and send a report.      Risks of the procedure include but are not limited to:   *  Bleeding    *  Infection   *  Puncture of surrounding organs *  Death     *  Lung collapse if the biopsy is near the chest which may require insertion of a       tube to re-inflate the lung if severe.      Benefits of the procedure:  Using x-ray helps to locate the area that requires a biopsy. The procedure is less invasive than a surgical procedure, there are no large incisions and it does not require anesthesia.      Alternatives to the procedure:  A biopsy can be performed surgically.  Risks of a surgical biopsy include exposure to anesthesia, infection, excessive bleeding and injury to abdominal organs.  A benefit of surgical biopsy is the ability to see the area to be biopsied and remove of a larger piece of tissue.    THIS EDUCATION INFORMATION WAS REVIEWED PRIOR TO PROCEDURE AND CONSENT. Patient initials__________________Time___________________    Post Procedure:    *  Expect the biopsy site may be tender up to one week.    *  Rest today (no pushing pulling or straining).   *  Slowly increase activity  tomorrow.    *  If you received sedation do not drive for 24 hours.   *  Keep dressing clean and dry.   *  Leave dressing on puncture site for 24 hours.    *  You may shower when dressing removed.    Call your doctor if experiencing:   *  Signs of infection such as redness, swelling, excessive pain and / or foul        smelling drainage from the puncture site.   *  Chills or fever over 101 degrees (by mouth).   *  Unrelieved pain.   *  Any new or severe symptoms.   *  If experiencing sudden / severe shortness of breath or chest pain go to the       nearest emergency room.     Following the procedure:     Follow-up with the ordering physician as directed.    Continue to take other medications as directed by your physician unless    otherwise instructed.   If applicable, resume taking your blood thinners or Aspirin on _Resume 10/19/17 after 12 noon.__________.    If you have any concerns please call the Radiology Nurses Desk at [phone].  You are the most important factor in your recovery.  Follow the above instructions carefully.

## 2017-10-18 NOTE — NURSING NOTE
Patient returned from biopsy. Patient has gauze and bandaid to left side of abdomen that is dry and intact. Patient denies pain. Given food and beverage.

## 2017-10-18 NOTE — NURSING NOTE
Patient is experiencing some diarrhea after his oral contrast.  He says he thinks it is getting less at this time.

## 2017-10-18 NOTE — NURSING NOTE
Verbal and written D/C instructions given and patient and wife voice understanding and are able to teach back D/C instructions.

## 2017-10-19 ENCOUNTER — TELEPHONE (OUTPATIENT)
Dept: INTERVENTIONAL RADIOLOGY/VASCULAR | Facility: HOSPITAL | Age: 77
End: 2017-10-19

## 2017-10-19 LAB
CYTO UR: NORMAL
LAB AP CASE REPORT: NORMAL
LAB AP CLINICAL INFORMATION: NORMAL
LAB AP INTRADEPARTMENTAL CONSULT: NORMAL
Lab: NORMAL
PATH REPORT.FINAL DX SPEC: NORMAL
PATH REPORT.GROSS SPEC: NORMAL

## 2017-10-20 ENCOUNTER — PREP FOR SURGERY (OUTPATIENT)
Dept: OTHER | Facility: HOSPITAL | Age: 77
End: 2017-10-20

## 2017-10-20 ENCOUNTER — CONSULT (OUTPATIENT)
Dept: ONCOLOGY | Facility: CLINIC | Age: 77
End: 2017-10-20

## 2017-10-20 ENCOUNTER — LAB (OUTPATIENT)
Dept: OTHER | Facility: HOSPITAL | Age: 77
End: 2017-10-20

## 2017-10-20 VITALS
OXYGEN SATURATION: 99 % | WEIGHT: 169.9 LBS | TEMPERATURE: 97.7 F | DIASTOLIC BLOOD PRESSURE: 80 MMHG | HEART RATE: 74 BPM | BODY MASS INDEX: 25.17 KG/M2 | SYSTOLIC BLOOD PRESSURE: 142 MMHG | RESPIRATION RATE: 16 BRPM | HEIGHT: 69 IN

## 2017-10-20 DIAGNOSIS — R93.5 ABNORMAL CT OF THE ABDOMEN: ICD-10-CM

## 2017-10-20 DIAGNOSIS — C17.9 MALIGNANT NEOPLASM OF SMALL INTESTINE (HCC): ICD-10-CM

## 2017-10-20 DIAGNOSIS — C79.9 METASTATIC CANCER (HCC): Primary | ICD-10-CM

## 2017-10-20 DIAGNOSIS — R93.5 ABNORMAL CT OF THE ABDOMEN: Primary | ICD-10-CM

## 2017-10-20 DIAGNOSIS — N20.0 CALCULUS OF KIDNEY: ICD-10-CM

## 2017-10-20 DIAGNOSIS — C78.80 SECONDARY MALIGNANT NEOPLASM OF DIGESTIVE ORGAN (HCC): ICD-10-CM

## 2017-10-20 LAB
BASOPHILS # BLD AUTO: 0.05 10*3/MM3 (ref 0–0.2)
BASOPHILS NFR BLD AUTO: 0.6 % (ref 0–1.5)
DEPRECATED RDW RBC AUTO: 42.2 FL (ref 37–54)
EOSINOPHIL # BLD AUTO: 0.11 10*3/MM3 (ref 0–0.7)
EOSINOPHIL NFR BLD AUTO: 1.4 % (ref 0.3–6.2)
ERYTHROCYTE [DISTWIDTH] IN BLOOD BY AUTOMATED COUNT: 13 % (ref 11.5–14.5)
HCT VFR BLD AUTO: 31.4 % (ref 40.4–52.2)
HGB BLD-MCNC: 10.8 G/DL (ref 13.7–17.6)
IMM GRANULOCYTES # BLD: 0.05 10*3/MM3 (ref 0–0.03)
IMM GRANULOCYTES NFR BLD: 0.6 % (ref 0–0.5)
LYMPHOCYTES # BLD AUTO: 1.19 10*3/MM3 (ref 0.9–4.8)
LYMPHOCYTES NFR BLD AUTO: 15.3 % (ref 19.6–45.3)
MCH RBC QN AUTO: 30.8 PG (ref 27–32.7)
MCHC RBC AUTO-ENTMCNC: 34.4 G/DL (ref 32.6–36.4)
MCV RBC AUTO: 89.5 FL (ref 79.8–96.2)
MONOCYTES # BLD AUTO: 0.71 10*3/MM3 (ref 0.2–1.2)
MONOCYTES NFR BLD AUTO: 9.1 % (ref 5–12)
NEUTROPHILS # BLD AUTO: 5.67 10*3/MM3 (ref 1.9–8.1)
NEUTROPHILS NFR BLD AUTO: 73 % (ref 42.7–76)
NRBC BLD MANUAL-RTO: 0 /100 WBC (ref 0–0)
PLATELET # BLD AUTO: 240 10*3/MM3 (ref 140–500)
PMV BLD AUTO: 11.3 FL (ref 6–12)
RBC # BLD AUTO: 3.51 10*6/MM3 (ref 4.6–6)
WBC NRBC COR # BLD: 7.78 10*3/MM3 (ref 4.5–10.7)

## 2017-10-20 PROCEDURE — 85025 COMPLETE CBC W/AUTO DIFF WBC: CPT | Performed by: INTERNAL MEDICINE

## 2017-10-20 PROCEDURE — 36415 COLL VENOUS BLD VENIPUNCTURE: CPT

## 2017-10-20 PROCEDURE — 99204 OFFICE O/P NEW MOD 45 MIN: CPT | Performed by: INTERNAL MEDICINE

## 2017-10-20 RX ORDER — CEFAZOLIN SODIUM 2 G/100ML
2 INJECTION, SOLUTION INTRAVENOUS ONCE
Status: CANCELLED | OUTPATIENT
Start: 2017-10-26 | End: 2017-10-20

## 2017-10-20 NOTE — PROGRESS NOTES
Subjective     REASON FOR CONSULTATION:  Probable GI malignancy  Provide an opinion on any further workup or treatment                             REQUESTING PHYSICIAN:  Dav Stoner MD    RECORDS OBTAINED:  Records of the patients history including those obtained from the referring provider were reviewed and summarized in detail.    HISTORY OF PRESENT ILLNESS:  The patient is a 77 y.o. year old male who is here for an opinion about the above issue.  In recently undergone CT scan of the abdomen on 10/11/2017 to evaluate some left lower quadrant abdominal pain.  CT scan findings were worrisome for metastatic malignancy with multiple tumor implants noted on the scan.  The largest mass appeared to be possibly arising from the duodenum or jejunum in the upper abdomen.  There was no evidence of metastases to liver.    The patient was referred for a CT-guided needle biopsy of the most accessible mass in the left abdomen.  CT-guided biopsy was performed on 10/18/2017 but unfortunately pathology was nondiagnostic showing only benign skeletal muscle and fibroadipose tissue.    The patient is accompanied to the office today by his wife.  They report that he has had some intermittent discomfort in the left abdomen but he actually looks quite comfortable in the office today.  He has not been losing weight unexpectedly or having any fevers night sweats or chills.  He has had no nausea or vomiting no diarrhea and no melena or hematochezia.    We discussed the situation today by phone with his surgeon Dr. Castañeda and felt that rather than proceeding to another CT-guided needle biopsy that pursuing a laparoscopic evaluation may be preferable.    History of Present Illness     Past Medical History:   Diagnosis Date   • Cancer 2017    Basal Cell on Nose and Under Right Eye   • Coronary artery disease    • DDD (degenerative disc disease), cervical    • GERD (gastroesophageal reflux disease)    • Hx of cardiac arrest     Happened  "after Induction of anesthesia prior  to SURG 10 YRS AGO  \" I FLATLINE BUT W/IN SECONDS HEART WAS OK AFTER TURNED ON BACK...I THINK THEY GAVE ME TO MUCH ANESTHESIA\"   • Hyperlipidemia    • Hypertension    • Kidney stones    • Osteoarthritis    • Pleural effusion     DRAINAGE RT PLEURAL FLUID FOR TESTING WITH EGD ON 1-30-17   NO MALIGNANCY WITH CLEARANCE FROM DR SIMMONS FOR SURGERY   • Rheumatoid arthritis         Past Surgical History:   Procedure Laterality Date   • CORONARY ANGIOPLASTY WITH STENT PLACEMENT      17 years ago   • CYSTOSCOPY W/ LITHOLAPAXY / EHL      6-7 years ago   • ENDOSCOPY      ON 1/30/17 WITH DRAINAGE OF RT PLEURAL FLUID FOR BIOPSY    NO MALIGNANCY  CLEARANCE FOR SURG PER DR SIMMONS   • EYE SURGERY Right 2017    Moh's; reconstruction right lower lid   • PLEURAL BIOPSY     • POSTERIOR CERVICAL FUSION  2001   • SKIN CANCER EXCISION N/A 2017    Basal Cell Nose & under right eye   • TOTAL HIP ARTHROPLASTY Left     9-10 years ago    • TOTAL HIP ARTHROPLASTY Right 2/14/2017    Procedure: TOTAL HIP ARTHROPLASTY;  Surgeon: Gerson Cardoza MD;  Location: [facility];  Service:         Current Outpatient Prescriptions on File Prior to Visit   Medication Sig Dispense Refill   • aspirin 325 MG tablet Take 325 mg by mouth As Needed. Takes once or twice a week     • felodipine (PLENDIL) 10 MG 24 hr tablet Take 10 mg by mouth Every Evening.     • Multiple Vitamin (MULTI VITAMIN PO) Take 1 tablet by mouth Daily.     • simvastatin (ZOCOR) 40 MG tablet TAKE 1 TABLET DAILY (Patient taking differently: TAKE 1 TABLET EVENING) 90 tablet 1     No current facility-administered medications on file prior to visit.         ALLERGIES:  No Known Allergies     Social History     Social History   • Marital status:      Spouse name: Farrah   • Number of children: N/A   • Years of education: N/A     Occupational History   •  Retired     Social History Main Topics   • Smoking status: Never Smoker   • Smokeless " "tobacco: Former User   • Alcohol use Yes      Comment: 1-2 beers nightly, sometimes an occasional bourbon   • Drug use: No   • Sexual activity: Defer     Other Topics Concern   • None     Social History Narrative        Family History   Problem Relation Age of Onset   • Hypertension Mother      Lived to 95 years old - Had numerous medical problems & medications   • Hypertension Father      Lived to 98 years old - Numerous medical problems and Medications   • Cancer Brother      History of Agent Orange   • Heart disease Maternal Grandmother    • Osteoarthritis Paternal Grandfather      possible RA        Review of Systems   Constitutional: Negative for activity change, appetite change, fatigue, fever and unexpected weight change.   HENT: Negative for hearing loss, nosebleeds, trouble swallowing and voice change.    Eyes: Negative for visual disturbance.   Respiratory: Negative for cough, shortness of breath and wheezing.    Cardiovascular: Negative for chest pain and palpitations.   Gastrointestinal: Positive for abdominal pain. Negative for diarrhea, nausea and vomiting.   Genitourinary: Negative for difficulty urinating, frequency, hematuria and urgency.   Musculoskeletal: Negative for back pain and neck pain.   Skin: Negative for rash.   Neurological: Negative for dizziness, seizures, syncope and headaches.   Hematological: Negative for adenopathy. Does not bruise/bleed easily.   Psychiatric/Behavioral: Negative for behavioral problems. The patient is not nervous/anxious.         Objective     Vitals:    10/20/17 1314   BP: 142/80   Pulse: 74   Resp: 16   Temp: 97.7 °F (36.5 °C)   TempSrc: Oral   SpO2: 99%   Weight: 169 lb 14.4 oz (77.1 kg)   Height: 68.5\" (174 cm)  Comment: new    PainSc: 0-No pain     Current Status 10/20/2017   ECOG score 0       Physical Exam   Constitutional: He is oriented to person, place, and time. He appears well-developed and well-nourished. No distress.   HENT:   Head: Normocephalic. "   Eyes: Conjunctivae and EOM are normal. Pupils are equal, round, and reactive to light. No scleral icterus.   Neck: Normal range of motion. Neck supple. No JVD present. No thyromegaly present.   Cardiovascular: Normal rate and regular rhythm.  Exam reveals no gallop and no friction rub.    No murmur heard.  Pulmonary/Chest: Effort normal and breath sounds normal. He has no wheezes. He has no rales.   Abdominal: Soft. He exhibits no distension and no mass. There is no tenderness.   Musculoskeletal: Normal range of motion. He exhibits no edema or deformity.   Lymphadenopathy:     He has no cervical adenopathy.   Neurological: He is alert and oriented to person, place, and time. He has normal reflexes. No cranial nerve deficit.   Skin: Skin is warm and dry. No rash noted. No erythema.   Psychiatric: He has a normal mood and affect. His behavior is normal. Judgment normal.         RECENT LABS:  Hematology WBC   Date Value Ref Range Status   10/20/2017 7.78 4.50 - 10.70 10*3/mm3 Final     RBC   Date Value Ref Range Status   10/20/2017 3.51 (L) 4.60 - 6.00 10*6/mm3 Final     Hemoglobin   Date Value Ref Range Status   10/20/2017 10.8 (L) 13.7 - 17.6 g/dL Final     Hematocrit   Date Value Ref Range Status   10/20/2017 31.4 (L) 40.4 - 52.2 % Final     Platelets   Date Value Ref Range Status   10/20/2017 240 140 - 500 10*3/mm3 Final      CT ABDOMEN AND PELVIS, NONCONTRAST, 10/11/2017:  IMPRESSION:  1. CT findings compatible with extensive multifocal malignant tumor  within the abdomen and pelvis as detailed above. Largest mass involves  the third portion of the duodenum, but there are additional tumor  implants elsewhere within the abdomen, including a smaller lesion  involving the splenic flexure of colon. Left pelvic sidewall mass  obstructs the left distal ureter causing moderate left hydronephrosis.  2. High-grade metastatic malignancy, likely of GI tract origin, is  suspected. Endoscopic ultrasound-guided biopsy of the  "duodenal mass may  be considered.  3. No significant malignant ascites. Trace free fluid in the pelvis.  Tiny right pleural effusion.  4. Stat final copy of this report was sent to the ordering physician's  office immediately following this dictation with telephone notification  and documentation.    Final Diagnosis   \"PERITONEAL BIOPSY\", NEEDLE BIOPSY:          BENIGN SKELETAL MUSCLE AND FIBROADIPOSE TISSUE - NO NEOPLASM.            Assessment/Plan     1.CT scan findings worrisome for metastatic malignancy possibly from GI origin.  As noted above, he had undergone a CT-guided needle biopsy on 10/18/2017 but unfortunately the pathology was nondiagnostic.  We have discussed the case with his surgeon and feel that at this point it may be wise to pursue a laparoscopic procedure for diagnosis and to determine if there is widespread intra-abdominal malignancy (carcinomatosis).  2.  Patient seems to be remarkably fit for his age and once we have a diagnosis we can discuss treatment options but certainly he would appear to be in good shape to pursue any type of chemotherapy treatment that may be necessary.    Plan  1.  The patient will be scheduled for laparoscopic procedure along with EGD and colonoscopy by Dr. Castañeda's office as soon as possible.  2.  We will schedule follow-up in our office in 2 weeks.  Hopefully by that time pathology will be available from the laparoscopic biopsy.  At that point we can discuss the diagnosis, stage, prognosis, and treatment options.    Thanks for allowing us to see this very nice gentleman in consultation.             "

## 2017-10-23 ENCOUNTER — DOCUMENTATION (OUTPATIENT)
Dept: ONCOLOGY | Facility: CLINIC | Age: 77
End: 2017-10-23

## 2017-10-23 NOTE — PROGRESS NOTES
"The pt came to [facility] on 10/20/17 for an initial medical oncology appointment with Dr. Roberto for metastatic cancer. He completed the Distress Questionnaire and scored 6/10, marking practical concerns, dealing with partner, fears and two physical concerns. OSW completed a brief assessment via telephone.    The pt presented as alert and friendly. He reported that he hopes that the cancer diagnosis is a \"mistake\"; however, he stated, \"If it in cancer, I'll deal with it.\" The pt will be having a biopsy on 10/26/17 and will return to see Dr. Roberto at the [facility] location on 11/3/17. The pt and his wife reside in [place]. 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 The pt reported that his wife may need to speak with me because \"if something happens to me, she will loose her health insurance.\"    The pt stated that he and his wife have no children. They used to raise horses and now have two dogs. The pt and his wife, along with local friends, plan to leave for a trip to [facility] on 11/15/17. OSW provided active listening and support. OSW provided her contact information and remains available as needs arise.    Anna Merritt, [facility]  Oncology Social Worker   "

## 2017-10-25 ENCOUNTER — APPOINTMENT (OUTPATIENT)
Dept: PREADMISSION TESTING | Facility: HOSPITAL | Age: 77
End: 2017-10-25

## 2017-10-25 VITALS
OXYGEN SATURATION: 99 % | RESPIRATION RATE: 16 BRPM | DIASTOLIC BLOOD PRESSURE: 76 MMHG | WEIGHT: 171.1 LBS | BODY MASS INDEX: 25.34 KG/M2 | TEMPERATURE: 97 F | SYSTOLIC BLOOD PRESSURE: 145 MMHG | HEIGHT: 69 IN | HEART RATE: 77 BPM

## 2017-10-25 LAB
ANION GAP SERPL CALCULATED.3IONS-SCNC: 11.4 MMOL/L
BUN BLD-MCNC: 23 MG/DL (ref 8–23)
BUN/CREAT SERPL: 16.8 (ref 7–25)
CALCIUM SPEC-SCNC: 9 MG/DL (ref 8.6–10.5)
CHLORIDE SERPL-SCNC: 104 MMOL/L (ref 98–107)
CO2 SERPL-SCNC: 25.6 MMOL/L (ref 22–29)
CREAT BLD-MCNC: 1.37 MG/DL (ref 0.76–1.27)
DEPRECATED RDW RBC AUTO: 45.4 FL (ref 37–54)
ERYTHROCYTE [DISTWIDTH] IN BLOOD BY AUTOMATED COUNT: 13.3 % (ref 11.5–14.5)
GFR SERPL CREATININE-BSD FRML MDRD: 50 ML/MIN/1.73
GLUCOSE BLD-MCNC: 99 MG/DL (ref 65–99)
HCT VFR BLD AUTO: 32.8 % (ref 40.4–52.2)
HGB BLD-MCNC: 10.6 G/DL (ref 13.7–17.6)
MCH RBC QN AUTO: 30.5 PG (ref 27–32.7)
MCHC RBC AUTO-ENTMCNC: 32.3 G/DL (ref 32.6–36.4)
MCV RBC AUTO: 94.3 FL (ref 79.8–96.2)
PLATELET # BLD AUTO: 277 10*3/MM3 (ref 140–500)
PMV BLD AUTO: 11.4 FL (ref 6–12)
POTASSIUM BLD-SCNC: 4.8 MMOL/L (ref 3.5–5.2)
RBC # BLD AUTO: 3.48 10*6/MM3 (ref 4.6–6)
SODIUM BLD-SCNC: 141 MMOL/L (ref 136–145)
WBC NRBC COR # BLD: 7.27 10*3/MM3 (ref 4.5–10.7)

## 2017-10-25 PROCEDURE — 85027 COMPLETE CBC AUTOMATED: CPT | Performed by: SURGERY

## 2017-10-25 PROCEDURE — 93005 ELECTROCARDIOGRAM TRACING: CPT

## 2017-10-25 PROCEDURE — 80048 BASIC METABOLIC PNL TOTAL CA: CPT | Performed by: SURGERY

## 2017-10-25 PROCEDURE — 36415 COLL VENOUS BLD VENIPUNCTURE: CPT

## 2017-10-25 PROCEDURE — 93010 ELECTROCARDIOGRAM REPORT: CPT | Performed by: INTERNAL MEDICINE

## 2017-10-25 RX ORDER — SIMVASTATIN 40 MG
40 TABLET ORAL NIGHTLY
COMMUNITY
End: 2017-12-05 | Stop reason: SDUPTHER

## 2017-10-25 NOTE — DISCHARGE INSTRUCTIONS
Take the following medications the morning of surgery with a small sip of water    General Instructions:  • Do not eat or drink anything after midnight the night before surgery.  • Infants may have breast milk up to four hours before surgery.  • Infants drinking formula may drink formula up to six hours before surgery.   • Patients who avoid smoking, chewing tobacco and alcohol for 4 weeks prior to surgery have a reduced risk of post-operative complications.  Quit smoking as many days before surgery as you can.  • Do not smoke, use chewing tobacco or drink alcohol the day of surgery.   • If applicable bring your C-PAP/ BI-PAP machine.  • Bring any papers given to you in the doctor’s office.  • Wear clean comfortable clothes and socks.  • Do not wear contact lenses or make-up.  Bring a case for your glasses.   • Bring crutches or walker if applicable.  • Remove all piercings.  Leave jewelry and any other valuables at home.  • The Pre-Admission Testing nurse will instruct you to bring medications if unable to obtain an accurate list in Pre-Admission Testing.        If you were given a blood bank ID arm band remember to bring it with you the day of surgery.    Preventing a Surgical Site Infection:  • For 2 to 3 days before surgery, avoid shaving with a razor because the razor can irritate skin and make it easier to develop an infection.  • The night prior to surgery sleep in a clean bed with clean clothing.  Do not allow pets to sleep with you.  • Shower on the morning of surgery using a fresh bar of anti-bacterial soap (such as Dial) and clean washcloth.  Dry with a clean towel and dress in clean clothing.  • Ask your surgeon if you will be receiving antibiotics prior to surgery.  • Make sure you, your family, and all healthcare providers clean their hands with soap and water or an alcohol based hand  before caring for you or your wound.    Day of surgery:  Upon arrival, a Pre-op nurse and Anesthesiologist  will review your health history, obtain vital signs, and answer questions you may have.  The only belongings needed at this time will be your home medications and if applicable your C-PAP/BI-PAP machine.  If you are staying overnight your family can leave the rest of your belongings in the car and bring them to your room later.  A Pre-op nurse will start an IV and you may receive medication in preparation for surgery, including something to help you relax.  Your family will be able to see you in the Pre-op area.  While you are in surgery your family should notify the waiting room  if they leave the waiting room area and provide a contact phone number.    Please be aware that surgery does come with discomfort.  We want to make every effort to control your discomfort so please discuss any uncontrolled symptoms with your nurse.   Your doctor will most likely have prescribed pain medications.      If you are going home after surgery you will receive individualized written care instructions before being discharged.  A responsible adult must drive you to and from the hospital on the day of your surgery and stay with you for 24 hours.    If you are staying overnight following surgery, you will be transported to your hospital room following the recovery period.  [facility] has all private rooms.    If you have any questions please call Pre-Admission Testing at [phone].  Deductibles and co-payments are collected on the day of service. Please be prepared to pay the required co-pay, deductible or deposit on the day of service as defined by your plan.

## 2017-10-26 ENCOUNTER — ANESTHESIA (OUTPATIENT)
Dept: PERIOP | Facility: HOSPITAL | Age: 77
End: 2017-10-26

## 2017-10-26 ENCOUNTER — HOSPITAL ENCOUNTER (OUTPATIENT)
Facility: HOSPITAL | Age: 77
Setting detail: HOSPITAL OUTPATIENT SURGERY
Discharge: HOME | End: 2017-10-26
Attending: SURGERY | Admitting: SURGERY

## 2017-10-26 ENCOUNTER — ANESTHESIA EVENT (OUTPATIENT)
Dept: PERIOP | Facility: HOSPITAL | Age: 77
End: 2017-10-26

## 2017-10-26 VITALS
TEMPERATURE: 97.6 F | HEART RATE: 71 BPM | WEIGHT: 168.25 LBS | OXYGEN SATURATION: 96 % | HEIGHT: 69 IN | RESPIRATION RATE: 20 BRPM | DIASTOLIC BLOOD PRESSURE: 85 MMHG | SYSTOLIC BLOOD PRESSURE: 151 MMHG | BODY MASS INDEX: 24.92 KG/M2

## 2017-10-26 DIAGNOSIS — R93.5 ABNORMAL CT OF THE ABDOMEN: ICD-10-CM

## 2017-10-26 PROCEDURE — 49321 LAPAROSCOPY BIOPSY: CPT | Performed by: PHYSICIAN ASSISTANT

## 2017-10-26 PROCEDURE — 45378 DIAGNOSTIC COLONOSCOPY: CPT | Performed by: SURGERY

## 2017-10-26 PROCEDURE — 88305 TISSUE EXAM BY PATHOLOGIST: CPT | Performed by: SURGERY

## 2017-10-26 PROCEDURE — 25010000002 PROPOFOL 10 MG/ML EMULSION: Performed by: NURSE ANESTHETIST, CERTIFIED REGISTERED

## 2017-10-26 PROCEDURE — 25010000002 ONDANSETRON PER 1 MG: Performed by: NURSE ANESTHETIST, CERTIFIED REGISTERED

## 2017-10-26 PROCEDURE — 25010000002 NEOSTIGMINE PER 0.5 MG: Performed by: NURSE ANESTHETIST, CERTIFIED REGISTERED

## 2017-10-26 PROCEDURE — 25010000002 MIDAZOLAM PER 1 MG: Performed by: ANESTHESIOLOGY

## 2017-10-26 PROCEDURE — 88342 IMHCHEM/IMCYTCHM 1ST ANTB: CPT | Performed by: SURGERY

## 2017-10-26 PROCEDURE — 49321 LAPAROSCOPY BIOPSY: CPT | Performed by: SURGERY

## 2017-10-26 PROCEDURE — 25010000002 FENTANYL CITRATE (PF) 100 MCG/2ML SOLUTION: Performed by: NURSE ANESTHETIST, CERTIFIED REGISTERED

## 2017-10-26 PROCEDURE — 43235 EGD DIAGNOSTIC BRUSH WASH: CPT | Performed by: SURGERY

## 2017-10-26 PROCEDURE — 88331 PATH CONSLTJ SURG 1 BLK 1SPC: CPT | Performed by: SURGERY

## 2017-10-26 PROCEDURE — 25010000002 FENTANYL CITRATE (PF) 100 MCG/2ML SOLUTION: Performed by: ANESTHESIOLOGY

## 2017-10-26 PROCEDURE — 25010000003 CEFAZOLIN IN DEXTROSE 2-4 GM/100ML-% SOLUTION: Performed by: SURGERY

## 2017-10-26 PROCEDURE — 88341 IMHCHEM/IMCYTCHM EA ADD ANTB: CPT | Performed by: SURGERY

## 2017-10-26 PROCEDURE — 25010000002 DEXAMETHASONE PER 1 MG: Performed by: NURSE ANESTHETIST, CERTIFIED REGISTERED

## 2017-10-26 RX ORDER — LABETALOL HYDROCHLORIDE 5 MG/ML
5 INJECTION, SOLUTION INTRAVENOUS
Status: DISCONTINUED | OUTPATIENT
Start: 2017-10-26 | End: 2017-10-26 | Stop reason: HOSPADM

## 2017-10-26 RX ORDER — FENTANYL CITRATE 50 UG/ML
50 INJECTION, SOLUTION INTRAMUSCULAR; INTRAVENOUS
Status: DISCONTINUED | OUTPATIENT
Start: 2017-10-26 | End: 2017-10-26 | Stop reason: HOSPADM

## 2017-10-26 RX ORDER — LIDOCAINE HYDROCHLORIDE 20 MG/ML
INJECTION, SOLUTION INFILTRATION; PERINEURAL AS NEEDED
Status: DISCONTINUED | OUTPATIENT
Start: 2017-10-26 | End: 2017-10-26 | Stop reason: SURG

## 2017-10-26 RX ORDER — PROMETHAZINE HYDROCHLORIDE 25 MG/ML
12.5 INJECTION, SOLUTION INTRAMUSCULAR; INTRAVENOUS ONCE AS NEEDED
Status: DISCONTINUED | OUTPATIENT
Start: 2017-10-26 | End: 2017-10-26 | Stop reason: HOSPADM

## 2017-10-26 RX ORDER — HYDROCODONE BITARTRATE AND ACETAMINOPHEN 5; 325 MG/1; MG/1
1 TABLET ORAL EVERY 6 HOURS PRN
Qty: 15 TABLET | Refills: 0 | Status: SHIPPED | OUTPATIENT
Start: 2017-10-26 | End: 2017-11-08

## 2017-10-26 RX ORDER — SODIUM CHLORIDE 0.9 % (FLUSH) 0.9 %
1-10 SYRINGE (ML) INJECTION AS NEEDED
Status: DISCONTINUED | OUTPATIENT
Start: 2017-10-26 | End: 2017-10-26 | Stop reason: HOSPADM

## 2017-10-26 RX ORDER — ONDANSETRON 2 MG/ML
4 INJECTION INTRAMUSCULAR; INTRAVENOUS ONCE AS NEEDED
Status: COMPLETED | OUTPATIENT
Start: 2017-10-26 | End: 2017-10-26

## 2017-10-26 RX ORDER — HYDROCODONE BITARTRATE AND ACETAMINOPHEN 7.5; 325 MG/1; MG/1
1 TABLET ORAL ONCE AS NEEDED
Status: COMPLETED | OUTPATIENT
Start: 2017-10-26 | End: 2017-10-26

## 2017-10-26 RX ORDER — MAGNESIUM HYDROXIDE 1200 MG/15ML
LIQUID ORAL AS NEEDED
Status: DISCONTINUED | OUTPATIENT
Start: 2017-10-26 | End: 2017-10-26 | Stop reason: HOSPADM

## 2017-10-26 RX ORDER — OXYCODONE AND ACETAMINOPHEN 7.5; 325 MG/1; MG/1
1 TABLET ORAL ONCE AS NEEDED
Status: DISCONTINUED | OUTPATIENT
Start: 2017-10-26 | End: 2017-10-26 | Stop reason: HOSPADM

## 2017-10-26 RX ORDER — NALOXONE HCL 0.4 MG/ML
0.2 VIAL (ML) INJECTION AS NEEDED
Status: DISCONTINUED | OUTPATIENT
Start: 2017-10-26 | End: 2017-10-26 | Stop reason: HOSPADM

## 2017-10-26 RX ORDER — MIDAZOLAM HYDROCHLORIDE 1 MG/ML
2 INJECTION INTRAMUSCULAR; INTRAVENOUS
Status: DISCONTINUED | OUTPATIENT
Start: 2017-10-26 | End: 2017-10-26 | Stop reason: HOSPADM

## 2017-10-26 RX ORDER — PROMETHAZINE HYDROCHLORIDE 25 MG/1
12.5 TABLET ORAL ONCE AS NEEDED
Status: DISCONTINUED | OUTPATIENT
Start: 2017-10-26 | End: 2017-10-26 | Stop reason: HOSPADM

## 2017-10-26 RX ORDER — SODIUM CHLORIDE 9 MG/ML
INJECTION, SOLUTION INTRAVENOUS AS NEEDED
Status: DISCONTINUED | OUTPATIENT
Start: 2017-10-26 | End: 2017-10-26 | Stop reason: HOSPADM

## 2017-10-26 RX ORDER — ROCURONIUM BROMIDE 10 MG/ML
INJECTION, SOLUTION INTRAVENOUS AS NEEDED
Status: DISCONTINUED | OUTPATIENT
Start: 2017-10-26 | End: 2017-10-26 | Stop reason: SURG

## 2017-10-26 RX ORDER — HYDRALAZINE HYDROCHLORIDE 20 MG/ML
5 INJECTION INTRAMUSCULAR; INTRAVENOUS
Status: DISCONTINUED | OUTPATIENT
Start: 2017-10-26 | End: 2017-10-26 | Stop reason: HOSPADM

## 2017-10-26 RX ORDER — SODIUM CHLORIDE, SODIUM LACTATE, POTASSIUM CHLORIDE, CALCIUM CHLORIDE 600; 310; 30; 20 MG/100ML; MG/100ML; MG/100ML; MG/100ML
9 INJECTION, SOLUTION INTRAVENOUS CONTINUOUS
Status: DISCONTINUED | OUTPATIENT
Start: 2017-10-26 | End: 2017-10-26 | Stop reason: HOSPADM

## 2017-10-26 RX ORDER — MIDAZOLAM HYDROCHLORIDE 1 MG/ML
1 INJECTION INTRAMUSCULAR; INTRAVENOUS
Status: DISCONTINUED | OUTPATIENT
Start: 2017-10-26 | End: 2017-10-26 | Stop reason: HOSPADM

## 2017-10-26 RX ORDER — FLUMAZENIL 0.1 MG/ML
0.2 INJECTION INTRAVENOUS AS NEEDED
Status: DISCONTINUED | OUTPATIENT
Start: 2017-10-26 | End: 2017-10-26 | Stop reason: HOSPADM

## 2017-10-26 RX ORDER — FAMOTIDINE 10 MG/ML
20 INJECTION, SOLUTION INTRAVENOUS ONCE
Status: COMPLETED | OUTPATIENT
Start: 2017-10-26 | End: 2017-10-26

## 2017-10-26 RX ORDER — PROPOFOL 10 MG/ML
VIAL (ML) INTRAVENOUS AS NEEDED
Status: DISCONTINUED | OUTPATIENT
Start: 2017-10-26 | End: 2017-10-26 | Stop reason: SURG

## 2017-10-26 RX ORDER — PROMETHAZINE HYDROCHLORIDE 25 MG/1
25 SUPPOSITORY RECTAL ONCE AS NEEDED
Status: DISCONTINUED | OUTPATIENT
Start: 2017-10-26 | End: 2017-10-26 | Stop reason: HOSPADM

## 2017-10-26 RX ORDER — NAPROXEN SODIUM 220 MG
220 TABLET ORAL AS NEEDED
COMMUNITY
End: 2018-06-29

## 2017-10-26 RX ORDER — PROMETHAZINE HYDROCHLORIDE 25 MG/1
25 TABLET ORAL ONCE AS NEEDED
Status: DISCONTINUED | OUTPATIENT
Start: 2017-10-26 | End: 2017-10-26 | Stop reason: HOSPADM

## 2017-10-26 RX ORDER — HYDROMORPHONE HYDROCHLORIDE 1 MG/ML
0.5 INJECTION, SOLUTION INTRAMUSCULAR; INTRAVENOUS; SUBCUTANEOUS
Status: DISCONTINUED | OUTPATIENT
Start: 2017-10-26 | End: 2017-10-26 | Stop reason: HOSPADM

## 2017-10-26 RX ORDER — EPHEDRINE SULFATE 50 MG/ML
5 INJECTION, SOLUTION INTRAVENOUS ONCE AS NEEDED
Status: DISCONTINUED | OUTPATIENT
Start: 2017-10-26 | End: 2017-10-26 | Stop reason: HOSPADM

## 2017-10-26 RX ORDER — CEFAZOLIN SODIUM 2 G/100ML
2 INJECTION, SOLUTION INTRAVENOUS ONCE
Status: COMPLETED | OUTPATIENT
Start: 2017-10-26 | End: 2017-10-26

## 2017-10-26 RX ORDER — DEXAMETHASONE SODIUM PHOSPHATE 10 MG/ML
INJECTION INTRAMUSCULAR; INTRAVENOUS AS NEEDED
Status: DISCONTINUED | OUTPATIENT
Start: 2017-10-26 | End: 2017-10-26 | Stop reason: SURG

## 2017-10-26 RX ORDER — BUPIVACAINE HYDROCHLORIDE AND EPINEPHRINE 5; 5 MG/ML; UG/ML
INJECTION, SOLUTION PERINEURAL AS NEEDED
Status: DISCONTINUED | OUTPATIENT
Start: 2017-10-26 | End: 2017-10-26 | Stop reason: HOSPADM

## 2017-10-26 RX ORDER — WOUND DRESSING ADHESIVE - LIQUID
LIQUID MISCELLANEOUS AS NEEDED
Status: DISCONTINUED | OUTPATIENT
Start: 2017-10-26 | End: 2017-10-26 | Stop reason: HOSPADM

## 2017-10-26 RX ORDER — DIPHENHYDRAMINE HYDROCHLORIDE 50 MG/ML
12.5 INJECTION INTRAMUSCULAR; INTRAVENOUS
Status: DISCONTINUED | OUTPATIENT
Start: 2017-10-26 | End: 2017-10-26 | Stop reason: HOSPADM

## 2017-10-26 RX ORDER — GLYCOPYRROLATE 0.2 MG/ML
INJECTION INTRAMUSCULAR; INTRAVENOUS AS NEEDED
Status: DISCONTINUED | OUTPATIENT
Start: 2017-10-26 | End: 2017-10-26 | Stop reason: SURG

## 2017-10-26 RX ADMIN — MIDAZOLAM 1 MG: 1 INJECTION INTRAMUSCULAR; INTRAVENOUS at 10:06

## 2017-10-26 RX ADMIN — LIDOCAINE HYDROCHLORIDE 50 MG: 20 INJECTION, SOLUTION INFILTRATION; PERINEURAL at 11:39

## 2017-10-26 RX ADMIN — SODIUM CHLORIDE, POTASSIUM CHLORIDE, SODIUM LACTATE AND CALCIUM CHLORIDE 9 ML/HR: 600; 310; 30; 20 INJECTION, SOLUTION INTRAVENOUS at 09:11

## 2017-10-26 RX ADMIN — FENTANYL CITRATE 50 MCG: 50 INJECTION INTRAMUSCULAR; INTRAVENOUS at 11:59

## 2017-10-26 RX ADMIN — GLYCOPYRROLATE 0.6 MG: 0.2 INJECTION INTRAMUSCULAR; INTRAVENOUS at 12:35

## 2017-10-26 RX ADMIN — DEXAMETHASONE SODIUM PHOSPHATE 8 MG: 10 INJECTION INTRAMUSCULAR; INTRAVENOUS at 11:54

## 2017-10-26 RX ADMIN — SODIUM CHLORIDE, POTASSIUM CHLORIDE, SODIUM LACTATE AND CALCIUM CHLORIDE: 600; 310; 30; 20 INJECTION, SOLUTION INTRAVENOUS at 12:38

## 2017-10-26 RX ADMIN — HYDROCODONE BITARTRATE AND ACETAMINOPHEN 1 TABLET: 7.5; 325 TABLET ORAL at 13:48

## 2017-10-26 RX ADMIN — CEFAZOLIN SODIUM 2 G: 2 INJECTION, SOLUTION INTRAVENOUS at 11:44

## 2017-10-26 RX ADMIN — FENTANYL CITRATE 50 MCG: 50 INJECTION INTRAMUSCULAR; INTRAVENOUS at 13:47

## 2017-10-26 RX ADMIN — PROPOFOL 150 MG: 10 INJECTION, EMULSION INTRAVENOUS at 11:39

## 2017-10-26 RX ADMIN — ONDANSETRON HYDROCHLORIDE 4 MG: 2 SOLUTION INTRAMUSCULAR; INTRAVENOUS at 12:37

## 2017-10-26 RX ADMIN — NEOSTIGMINE METHYLSULFATE 3 MG: 1 INJECTION INTRAMUSCULAR; INTRAVENOUS; SUBCUTANEOUS at 12:35

## 2017-10-26 RX ADMIN — FAMOTIDINE 20 MG: 10 INJECTION, SOLUTION INTRAVENOUS at 10:06

## 2017-10-26 RX ADMIN — FENTANYL CITRATE 50 MCG: 50 INJECTION INTRAMUSCULAR; INTRAVENOUS at 11:39

## 2017-10-26 RX ADMIN — ROCURONIUM BROMIDE 35 MG: 10 INJECTION INTRAVENOUS at 11:39

## 2017-10-26 NOTE — ANESTHESIA PROCEDURE NOTES
Airway  Urgency: elective    Date/Time: 10/26/2017 11:41 AM  Airway not difficult    General Information and Staff    Patient location during procedure: OR  Anesthesiologist: BRADLEY LLAMAS  CRNA: SUE ARTEAGA    Indications and Patient Condition  Indications for airway management: airway protection    Preoxygenated: yes  Mask difficulty assessment: 1 - vent by mask    Final Airway Details  Final airway type: endotracheal airway      Successful airway: ETT  Cuffed: yes   Successful intubation technique: direct laryngoscopy  Facilitating devices/methods: intubating stylet  Endotracheal tube insertion site: oral  Blade: Pelaez  Blade size: #2  ETT size: 8.0 mm  Cormack-Lehane Classification: grade I - full view of glottis  Placement verified by: chest auscultation and capnometry   Cuff volume (mL): 7  Measured from: teeth  ETT to teeth (cm): 21  Number of attempts at approach: 1

## 2017-10-26 NOTE — H&P (VIEW-ONLY)
"CHIEF COMPLAINT:    Intra-abdominal metastatic tumor    HISTORY OF PRESENT ILLNESS:    Louie German is a 77 y.o. male who underwent recent CT imaging ordered by Dr. Stoner for evaluation of left lower quadrant abdominal pain.  It shows multiple intra-abdominal mass lesions that are concerning for a malignant process.  There is some thickening of sigmoid colon which is concerning, and there is a large mass at the ligament of Treitz that may be the primary tumor arising from small bowel.  He is scheduled to undergo and biopsy this week.    Past Medical History:   Diagnosis Date   • Cancer 2017    Basal Cell on Nose and Under Right Eye   • Coronary artery disease    • DDD (degenerative disc disease), cervical    • Hx of cardiac arrest     Happened after Induction of anesthesia prior  to SURG 10 YRS AGO  \" I FLATLINE BUT W/IN SECONDS HEART WAS OK AFTER TURNED ON BACK...I THINK THEY GAVE ME TO MUCH ANESTHESIA\"   • Hyperlipidemia    • Hypertension    • Kidney stones    • Osteoarthritis    • Pleural effusion     DRAINAGE RT PLEURAL FLUID FOR TESTING WITH EGD ON 1-30-17   NO MALIGNANCY WITH CLEARANCE FROM DR SIMMONS FOR SURGERY   • Rheumatoid arthritis        Past Surgical History:   Procedure Laterality Date   • CORONARY ANGIOPLASTY WITH STENT PLACEMENT      17 years ago   • CYSTOSCOPY W/ LITHOLAPAXY / EHL      6-7 years ago   • ENDOSCOPY      ON 1/30/17 WITH DRAINAGE OF RT PLEURAL FLUID FOR BIOPSY    NO MALIGNANCY  CLEARANCE FOR SURG PER DR SIMMONS   • PLEURAL BIOPSY     • POSTERIOR CERVICAL FUSION  2001   • SKIN CANCER EXCISION N/A 2017    Basal Cell Nose & under right eye   • TOTAL HIP ARTHROPLASTY Left     9-10 years ago    • TOTAL HIP ARTHROPLASTY Right 2/14/2017    Procedure: TOTAL HIP ARTHROPLASTY;  Surgeon: Gerson Cardoza MD;  [facility];  Service:          Current Outpatient Prescriptions:   •  felodipine (PLENDIL) 10 MG 24 hr tablet, Take 10 mg by mouth Every Evening., Disp: , Rfl:   •  " "simvastatin (ZOCOR) 40 MG tablet, TAKE 1 TABLET DAILY, Disp: 90 tablet, Rfl: 1  •  aspirin 325 MG tablet, Take 325 mg by mouth Daily. Takes once or twice a week, Disp: , Rfl:     No Known Allergies    Family History   Problem Relation Age of Onset   • Hypertension Mother      Lived to 95 years old - Had numerous medical problems & medications   • Hypertension Father      Lived to 98 years old - Numerous medical problems and Medications   • Cancer Brother      History of Agent Orange   • Heart disease Maternal Grandmother    • Osteoarthritis Paternal Grandfather      possible RA       Social History     Social History   • Marital status:      Spouse name: N/A   • Number of children: N/A   • Years of education: N/A     Occupational History   • Not on file.     Social History Main Topics   • Smoking status: Never Smoker   • Smokeless tobacco: Former User   • Alcohol use Yes      Comment: 1-2 beers nightly, sometimes an occasional bourbon   • Drug use: No   • Sexual activity: Defer     Other Topics Concern   • Not on file     Social History Narrative       Review of Systems   Gastrointestinal: Positive for abdominal pain.   Genitourinary: Positive for frequency.   All other systems reviewed and are negative.      Objective     Pulse 84  Ht 68\" (172.7 cm)  Wt 171 lb 12.8 oz (77.9 kg)  SpO2 99%  BMI 26.12 kg/m2    Physical Exam   Constitutional: He is oriented to person, place, and time. He appears well-developed and well-nourished.   HENT:   Head: Normocephalic and atraumatic.   Eyes: Conjunctivae are normal. No scleral icterus.   Neck: No JVD present.   Cardiovascular: Normal rate, regular rhythm, normal heart sounds and intact distal pulses.    No murmur heard.  Pulmonary/Chest: Effort normal and breath sounds normal. No respiratory distress. He has no wheezes. He has no rales.   Abdominal: Soft. Bowel sounds are normal. He exhibits no mass. There is no tenderness. There is no rebound and no guarding. "   Musculoskeletal: He exhibits no edema or deformity.   Neurological: He is alert and oriented to person, place, and time.   Skin: Skin is warm and dry.   Psychiatric: He has a normal mood and affect.   Vitals reviewed.      DIAGNOSTIC DATA:    I reviewed the images and the report of CT scan of the abdomen and pelvis performed on 10/11/2017.  It shows an infiltrative tumor involving the distal duodenum/proximal jejunum.  It measures about 5 cm across.  There are additional ill-defined lesions involving the anterior abdominal wall on the left.    ASSESSMENT:    Abnormal CT imaging which is concerning for a metastatic malignant process    PLAN:    He is going to undergo biopsy.    Once we have the results, he may need EGD and colonoscopy.  He may require placement of PowerPort.  He has an appointment to see Dr. Roberto in the [facility] office toward the end of this month.

## 2017-10-26 NOTE — INTERVAL H&P NOTE
H&P updated. The patient was examined and the following changes are noted:        Biopsy was performed, and only benign tissue was identified.  Discussed by phone, and have arranged laparoscopy for biopsy, EGD, and colonoscopy.

## 2017-10-26 NOTE — PLAN OF CARE
Problem: Patient Care Overview (Adult)  Goal: Plan of Care Review  Outcome: Ongoing (interventions implemented as appropriate)    10/26/17 0921   Coping/Psychosocial Response Interventions   Plan Of Care Reviewed With patient   Patient Care Overview   Progress no change       Goal: Adult Individualization and Mutuality  Outcome: Ongoing (interventions implemented as appropriate)    10/26/17 0921   Individualization   Patient Specific Preferences GOES BY APOLLO       Goal: Discharge Needs Assessment  Outcome: Ongoing (interventions implemented as appropriate)    10/26/17 0921   Discharge Needs Assessment   Concerns To Be Addressed denies needs/concerns at this time         Problem: Perioperative Period (Adult)  Goal: Signs and Symptoms of Listed Potential Problems Will be Absent or Manageable (Perioperative Period)  Outcome: Ongoing (interventions implemented as appropriate)    10/26/17 0921   Perioperative Period   Problems Assessed (Perioperative Period) pain;hypoxia/hypoxemia   Problems Present (Perioperative Period) pain

## 2017-10-26 NOTE — OP NOTE
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  ASSISTANT:  Tyler Nowak PA-C  ANESTHESIA:  General.   BLOOD LOSS: 25 mL  COUNTS:  Needle and sponge count correct.  COLONOSCOPY WITHDRAWAL TIME: 6:20.  SPECIMENS:      Order Name Source Comment Collection Info Order Time   TISSUE PATHOLOGY EXAM Peritoneum  Collected By: Louie Castañeda MD 10/26/2017 11:57 AM   FLOW CYTOMETRY Peritoneum   Fresh for flow cytometry Collected By: Louie Castañeda MD 10/26/2017 12:00 PM       INDICATIONS FOR OPERATION:  Louie German is a 77 y.o. man who presented to Dr. Stoner's office with left lower quadrant abdominal pain.  CT scan of the abdomen and pelvis was obtained.  It was abnormal.  There are multiple intra-abdominal mass lesions that are concerning for a malignant process.  An image guided percutaneous biopsy was performed.  Pathologist reports benign skeletal muscle and fibroadipose tissue.  The. He is taken to the operating room today for diagnostic laparoscopy biopsies.  I have also arranged for upper GI endoscopy and colonoscopy.       OPERATION:  The patient was brought to the operating room in stable condition.  Perioperative antibiotics were administered. Sequential compression devices were in place. The patient was positioned supine on the operating room table.  General anesthesia was induced without difficulty. The patient's abdomen was prepped and draped in the usual sterile fashion.      The initial laparoscopic trocar was a 5 millimeter Optiview port placed in the right subcostal position under laparoscopic vision.  A 30° laparoscope was inserted to verify correct positioning of the initial trocar.  No  injury from its insertion was identified.  Additional trocars were then placed under laparoscopic vision. These are located in the central lower abdomen and right lower quadrant.  They were both 5 mm trochars.    Anterior surface of the liver and spleen appeared normal.  Visualized the anterior surface of the stomach was normal.  The omentum was normal.  The transverse colon was normal.  It was elevated over the anterior surface of the stomach exposing the small bowel. I used noncrushing laparoscopic graspers to run the bowel from ligament of Treitz to ileocecal valve.  There was a large retroperitoneal mass in the upper abdomen.  There were multiple tumor implants on the small bowel mesentery at several locations.  There were 2 implants involving the colonic mesentery.  These are located in the mesentery of the proximal descending colon and in the mesentery of the sigmoid colon.  None of the lesions appeared to arise from bowel.  They all appeared to be based in the mesentery. None of the lesions appeared to be encroaching on the lumen of the bowel.      The retroperitoneal mass in the left upper abdomen had a kind of exophytic morphology, and seemed to be easiest to biopsy.  Several pieces were removed using laparoscopic scissors.  Hemostasis was achieved with pinpoint application of electrocautery.  Order to remove the biopsy specimens, one of the 5 mm trochars in the right lower quadrant was replaced with a 12 mm port.  Portions of tissue were submitted for frozen sectioning, flow cytometry, and permanent routine histology.  Frozen section analysis was consistent with a lymphoma or perhaps neuroendocrine malignancy.     I was pleased with hemostasis throughout the operative field.  The trocars  were removed under direct vision as the carbon dioxide insufflation was being evacuated.  The 12 mm port site was closed fascial level with 0 Vicryl interrupted stitches.  The skin was closed with 4-0 Vicryl and  streri-strips.     The flexible endoscope was inserted into the mouth through a bite-block. The esophagus was examined upon advancement of the scope. It was normal. There was no hiatal hernia. The GE junction occurred at 40 cm from the incisors. It was easily traversed.     The stomach was entered. A retroflexed view confirmed that there was no hiatal hernia. The stomach was normal.   The pylorus was traversed. The bulb, second and third portions were normal. The endoscope was withdrawn into the stomach.  The insufflated air was evacuated. The scope was completely withdrawn.    A digital rectal exam was performed. It was normal. The flexible colonoscope was inserted into the anus and advanced to the level of the cecum without difficulty. The ileocecal valve was identified. The appendiceal orifice was identified and photographed.  It appeared normal.  The intraluminal surface of the colon was examined upon withdrawal scope.    The bowel prep was good. There was clear liquid within all segments of the colon that was easily evacuated with the suction channel the scope.  There were some small chunks of solid stool that were easy to navigate beyond.     There were no multiple small and large mouth diverticuli confined to the the sigmoid region. There were no polyps. There were no vascular malformations. There were no hemorrhoids.    He tolerated the procedure very well, and is returned to the recovery area in stable condition.

## 2017-10-26 NOTE — ANESTHESIA PREPROCEDURE EVALUATION
Anesthesia Evaluation     no history of anesthetic complications:  NPO Solid Status: > 8 hours       Airway   Mallampati: II  no difficulty expected  Dental      Pulmonary - normal exam   (-) COPD, sleep apnea, not a smoker  Cardiovascular - normal exam  Exercise tolerance: good (4-7 METS)    (+) hypertension, CAD, cardiac stents hyperlipidemia  (-) angina, GARCIA, CABG      Neuro/Psych  GI/Hepatic/Renal/Endo    (+)  GERD,     Musculoskeletal     Abdominal    Substance History      OB/GYN          Other                                        Anesthesia Plan    ASA 3     general     Anesthetic plan and risks discussed with patient.

## 2017-10-26 NOTE — ANESTHESIA POSTPROCEDURE EVALUATION
Patient: Louie German    Procedure Summary     Date Anesthesia Start Anesthesia Stop Room / Location    10/26/17 1126 1253  [facility] /  [facility]       Procedure Diagnosis Surgeon Provider    DIAGNOSTIC LAPAROSCOPY WITH RETROPERITONEAL BIOPSIES (N/A Abdomen); ESOPHAGOGASTRODUODENOSCOPY (N/A Esophagus); COLONOSCOPY (N/A ) Abnormal CT of the abdomen  (Abnormal CT of the abdomen [R93.5]) MD Shiv Alfonso MD          Anesthesia Type: general  Last vitals  BP   152/87 (10/26/17 1430)   Temp   36.4 °C (97.6 °F) (10/26/17 1252)   Pulse   70 (10/26/17 1430)   Resp   18 (10/26/17 1430)     SpO2   100 % (10/26/17 1430)     Post Anesthesia Care and Evaluation    Patient location during evaluation: PACU  Patient participation: complete - patient participated  Level of consciousness: awake and alert  Pain management: adequate  Airway patency: patent  Anesthetic complications: No anesthetic complications    Cardiovascular status: acceptable  Respiratory status: acceptable  Hydration status: acceptable    Comments: --------------------            10/26/17               1430     --------------------   BP:       152/87     Pulse:      70       Resp:       18       Temp:                SpO2:      100%     --------------------

## 2017-10-26 NOTE — PLAN OF CARE
Problem: Patient Care Overview (Adult)  Goal: Plan of Care Review  Outcome: Outcome(s) achieved Date Met:  10/26/17    10/26/17 1524   Coping/Psychosocial Response Interventions   Plan Of Care Reviewed With patient;spouse   Patient Care Overview   Progress improving   Outcome Evaluation   Outcome Summary/Follow up Plan ready for discharge       Goal: Adult Individualization and Mutuality  Outcome: Outcome(s) achieved Date Met:  10/26/17  Goal: Discharge Needs Assessment  Outcome: Outcome(s) achieved Date Met:  10/26/17    Problem: Perioperative Period (Adult)  Goal: Signs and Symptoms of Listed Potential Problems Will be Absent or Manageable (Perioperative Period)  Outcome: Outcome(s) achieved Date Met:  10/26/17

## 2017-10-27 LAB — REF LAB TEST METHOD: NORMAL

## 2017-10-31 ENCOUNTER — TELEPHONE (OUTPATIENT)
Dept: ONCOLOGY | Facility: CLINIC | Age: 77
End: 2017-10-31

## 2017-10-31 DIAGNOSIS — C85.13 B-CELL LYMPHOMA OF INTRA-ABDOMINAL LYMPH NODES, UNSPECIFIED B-CELL LYMPHOMA TYPE (HCC): Primary | ICD-10-CM

## 2017-10-31 NOTE — TELEPHONE ENCOUNTER
----- Message from Danny Roberto MD sent at 10/31/2017 11:16 AM EDT -----  Regarding: PET scan ASAP  Please schedule a PET scan for Mr German prior to his appt on Friday 11/3 if possible.    Thanks!!    I have spoken with him by phone so he should not be surprised by your call

## 2017-11-01 ENCOUNTER — HOSPITAL ENCOUNTER (OUTPATIENT)
Dept: PET IMAGING | Facility: HOSPITAL | Age: 77
Discharge: HOME | End: 2017-11-01
Attending: INTERNAL MEDICINE | Admitting: INTERNAL MEDICINE

## 2017-11-01 ENCOUNTER — OFFICE VISIT (OUTPATIENT)
Dept: INTERNAL MEDICINE | Facility: CLINIC | Age: 77
End: 2017-11-01

## 2017-11-01 ENCOUNTER — HOSPITAL ENCOUNTER (OUTPATIENT)
Dept: PET IMAGING | Facility: HOSPITAL | Age: 77
Discharge: HOME | End: 2017-11-01
Attending: INTERNAL MEDICINE

## 2017-11-01 VITALS
SYSTOLIC BLOOD PRESSURE: 130 MMHG | WEIGHT: 168.8 LBS | BODY MASS INDEX: 25 KG/M2 | HEART RATE: 78 BPM | DIASTOLIC BLOOD PRESSURE: 60 MMHG | HEIGHT: 69 IN

## 2017-11-01 DIAGNOSIS — I10 BENIGN ESSENTIAL HTN: ICD-10-CM

## 2017-11-01 DIAGNOSIS — N13.30 HYDRONEPHROSIS OF LEFT KIDNEY: ICD-10-CM

## 2017-11-01 DIAGNOSIS — D64.89 ANEMIA DUE TO OTHER CAUSE, NOT CLASSIFIED: ICD-10-CM

## 2017-11-01 DIAGNOSIS — N28.9 ACUTE RENAL INSUFFICIENCY: Primary | ICD-10-CM

## 2017-11-01 DIAGNOSIS — C85.13 B-CELL LYMPHOMA OF INTRA-ABDOMINAL LYMPH NODES, UNSPECIFIED B-CELL LYMPHOMA TYPE (HCC): ICD-10-CM

## 2017-11-01 PROBLEM — C79.9 METASTATIC CANCER: Status: RESOLVED | Noted: 2017-10-11 | Resolved: 2017-11-01

## 2017-11-01 PROBLEM — R93.5 ABNORMAL CT OF THE ABDOMEN: Status: RESOLVED | Noted: 2017-10-13 | Resolved: 2017-11-01

## 2017-11-01 PROCEDURE — 99213 OFFICE O/P EST LOW 20 MIN: CPT | Performed by: INTERNAL MEDICINE

## 2017-11-01 PROCEDURE — A9552 F18 FDG: HCPCS | Performed by: INTERNAL MEDICINE

## 2017-11-01 PROCEDURE — 82962 GLUCOSE BLOOD TEST: CPT

## 2017-11-01 PROCEDURE — 78815 PET IMAGE W/CT SKULL-THIGH: CPT

## 2017-11-01 PROCEDURE — 0 FLUDEOXYGLUCOSE F18 SOLUTION: Performed by: INTERNAL MEDICINE

## 2017-11-01 RX ADMIN — FLUDEOXYGLUCOSE F18 1 DOSE: 300 INJECTION INTRAVENOUS at 07:23

## 2017-11-01 NOTE — PROGRESS NOTES
Subjective   Louie German is a 77 y.o. male.     History of Present Illness he is here today for follow-up of recently diagnosed lymphoma as well as hypertension and acute renal insufficiency.  He underwent laparoscopic biopsy last week.  He has follow-up with oncology as well as surgery within the next week.  He continues to feel well with only occasional mild left mid and left lower quadrant discomfort.  There is no nausea or vomiting and his appetite and energy level remains good.  HEENT his wife had several questions about the future as far as potential therapies are concerned.        Review of Systems   Constitutional: Negative for activity change, appetite change, fatigue and unexpected weight change.   Respiratory: Negative for cough, shortness of breath and wheezing.    Cardiovascular: Negative for chest pain and leg swelling.   Gastrointestinal: Positive for abdominal pain. Negative for diarrhea, nausea and vomiting.   Genitourinary: Negative for flank pain and hematuria.   Neurological: Negative for dizziness, weakness and headaches.   Psychiatric/Behavioral: Negative for dysphoric mood. The patient is not nervous/anxious.        Objective   Physical Exam   Constitutional: He is oriented to person, place, and time. Vital signs are normal. He appears well-developed and well-nourished. He is active. He does not appear ill.   Eyes: Conjunctivae are normal.   Cardiovascular: Normal rate, regular rhythm, S1 normal and S2 normal.  Exam reveals no S3 and no S4.    No murmur heard.  Pulmonary/Chest: No tachypnea. No respiratory distress. He has no decreased breath sounds. He has no wheezes. He has no rhonchi. He has no rales.   Abdominal: Soft. Normal appearance and bowel sounds are normal. He exhibits no abdominal bruit and no mass. There is no hepatosplenomegaly. There is tenderness.           Vascular Status -  His exam exhibits no right foot edema. His exam exhibits no left foot edema.  Neurological: He  is alert and oriented to person, place, and time. Gait normal.   Psychiatric: He has a normal mood and affect. His speech is normal and behavior is normal. Judgment and thought content normal. Cognition and memory are normal.       Assessment/Plan EGD and colonoscopy were essentially normal.  Biopsy showed lymphoma.  Hematocrit 32.8 hemoglobin 10.6 MCV 94.3.  He went 23 creatinine 1.37    Assessment #1 hypertension-good control #2 anemia-secondary to lymphoma #3 acute renal insufficiency-likely related to lymphoma also.  We had a nice discussion about potential therapies and side effects as well as port placement.  All of this of course will be discussed in great detail by his oncologist.  The patient and his wife will be traveling to [place] for 10 days and returning in late November.    Plan #1 no change medication.  Routine follow-up with me in 4 months.

## 2017-11-02 LAB — GLUCOSE BLDC GLUCOMTR-MCNC: 85 MG/DL (ref 70–130)

## 2017-11-03 ENCOUNTER — TELEPHONE (OUTPATIENT)
Dept: ONCOLOGY | Facility: HOSPITAL | Age: 77
End: 2017-11-03

## 2017-11-03 ENCOUNTER — OFFICE VISIT (OUTPATIENT)
Dept: ONCOLOGY | Facility: CLINIC | Age: 77
End: 2017-11-03

## 2017-11-03 ENCOUNTER — LAB (OUTPATIENT)
Dept: LAB | Facility: HOSPITAL | Age: 77
End: 2017-11-03

## 2017-11-03 VITALS
WEIGHT: 169.6 LBS | SYSTOLIC BLOOD PRESSURE: 122 MMHG | TEMPERATURE: 98.2 F | DIASTOLIC BLOOD PRESSURE: 70 MMHG | RESPIRATION RATE: 16 BRPM | HEIGHT: 69 IN | HEART RATE: 74 BPM | BODY MASS INDEX: 25.12 KG/M2

## 2017-11-03 DIAGNOSIS — C79.9 METASTATIC CANCER (HCC): ICD-10-CM

## 2017-11-03 DIAGNOSIS — C85.13 B-CELL LYMPHOMA OF INTRA-ABDOMINAL LYMPH NODES, UNSPECIFIED B-CELL LYMPHOMA TYPE (HCC): Primary | ICD-10-CM

## 2017-11-03 DIAGNOSIS — N20.0 CALCULUS OF KIDNEY: ICD-10-CM

## 2017-11-03 DIAGNOSIS — C17.9 MALIGNANT NEOPLASM OF SMALL INTESTINE (HCC): ICD-10-CM

## 2017-11-03 DIAGNOSIS — N13.30 HYDRONEPHROSIS OF LEFT KIDNEY: ICD-10-CM

## 2017-11-03 DIAGNOSIS — C78.80 SECONDARY MALIGNANT NEOPLASM OF DIGESTIVE ORGAN (HCC): ICD-10-CM

## 2017-11-03 DIAGNOSIS — D64.89 ANEMIA DUE TO OTHER CAUSE, NOT CLASSIFIED: ICD-10-CM

## 2017-11-03 DIAGNOSIS — R93.5 ABNORMAL CT OF THE ABDOMEN: ICD-10-CM

## 2017-11-03 LAB
ALBUMIN SERPL-MCNC: 3.9 G/DL (ref 3.5–5.2)
ALBUMIN/GLOB SERPL: 1.3 G/DL (ref 1.1–2.4)
ALP SERPL-CCNC: 70 U/L (ref 38–116)
ALT SERPL W P-5'-P-CCNC: 8 U/L (ref 0–41)
ANION GAP SERPL CALCULATED.3IONS-SCNC: 12.2 MMOL/L
AST SERPL-CCNC: 18 U/L (ref 0–40)
BASOPHILS # BLD AUTO: 0.03 10*3/MM3 (ref 0–0.1)
BASOPHILS NFR BLD AUTO: 0.4 % (ref 0–1.1)
BILIRUB SERPL-MCNC: 0.2 MG/DL (ref 0.1–1.2)
BUN BLD-MCNC: 26 MG/DL (ref 6–20)
BUN/CREAT SERPL: 18.1 (ref 7.3–30)
CALCIUM SPEC-SCNC: 9.4 MG/DL (ref 8.5–10.2)
CHLORIDE SERPL-SCNC: 105 MMOL/L (ref 98–107)
CO2 SERPL-SCNC: 25.8 MMOL/L (ref 22–29)
CREAT BLD-MCNC: 1.44 MG/DL (ref 0.7–1.3)
DEPRECATED RDW RBC AUTO: 44.1 FL (ref 37–49)
EOSINOPHIL # BLD AUTO: 0.27 10*3/MM3 (ref 0–0.36)
EOSINOPHIL NFR BLD AUTO: 3.7 % (ref 1–5)
ERYTHROCYTE [DISTWIDTH] IN BLOOD BY AUTOMATED COUNT: 12.8 % (ref 11.7–14.5)
FERRITIN SERPL-MCNC: 20.8 NG/ML (ref 30–400)
FOLATE SERPL-MCNC: >20 NG/ML (ref 4.78–24.2)
GFR SERPL CREATININE-BSD FRML MDRD: 47 ML/MIN/1.73
GLOBULIN UR ELPH-MCNC: 2.9 GM/DL (ref 1.8–3.5)
GLUCOSE BLD-MCNC: 82 MG/DL (ref 74–124)
HCT VFR BLD AUTO: 31.3 % (ref 40–49)
HGB BLD-MCNC: 10.2 G/DL (ref 13.5–16.5)
IMM GRANULOCYTES # BLD: 0.02 10*3/MM3 (ref 0–0.03)
IMM GRANULOCYTES NFR BLD: 0.3 % (ref 0–0.5)
IRON 24H UR-MRATE: 34 MCG/DL (ref 59–158)
IRON SATN MFR SERPL: 9 % (ref 14–48)
LDH SERPL-CCNC: 168 U/L (ref 99–259)
LYMPHOCYTES # BLD AUTO: 0.94 10*3/MM3 (ref 1–3.5)
LYMPHOCYTES NFR BLD AUTO: 13 % (ref 20–49)
MCH RBC QN AUTO: 30.6 PG (ref 27–33)
MCHC RBC AUTO-ENTMCNC: 32.6 G/DL (ref 32–35)
MCV RBC AUTO: 94 FL (ref 83–97)
MONOCYTES # BLD AUTO: 0.81 10*3/MM3 (ref 0.25–0.8)
MONOCYTES NFR BLD AUTO: 11.2 % (ref 4–12)
NEUTROPHILS # BLD AUTO: 5.16 10*3/MM3 (ref 1.5–7)
NEUTROPHILS NFR BLD AUTO: 71.4 % (ref 39–75)
NRBC BLD MANUAL-RTO: 0 /100 WBC (ref 0–0)
PLATELET # BLD AUTO: 263 10*3/MM3 (ref 150–375)
PMV BLD AUTO: 10.3 FL (ref 8.9–12.1)
POTASSIUM BLD-SCNC: 4.3 MMOL/L (ref 3.5–4.7)
PROT SERPL-MCNC: 6.8 G/DL (ref 6.3–8)
RBC # BLD AUTO: 3.33 10*6/MM3 (ref 4.3–5.5)
SODIUM BLD-SCNC: 143 MMOL/L (ref 134–145)
TIBC SERPL-MCNC: 360 MCG/DL (ref 249–505)
TRANSFERRIN SERPL-MCNC: 257 MG/DL (ref 200–360)
URATE SERPL-MCNC: 6.1 MG/DL (ref 2.8–7.4)
VIT B12 BLD-MCNC: 337 PG/ML (ref 211–946)
WBC NRBC COR # BLD: 7.23 10*3/MM3 (ref 4–10)

## 2017-11-03 PROCEDURE — 85025 COMPLETE CBC W/AUTO DIFF WBC: CPT

## 2017-11-03 PROCEDURE — 80053 COMPREHEN METABOLIC PANEL: CPT | Performed by: INTERNAL MEDICINE

## 2017-11-03 PROCEDURE — 36415 COLL VENOUS BLD VENIPUNCTURE: CPT | Performed by: INTERNAL MEDICINE

## 2017-11-03 PROCEDURE — 84550 ASSAY OF BLOOD/URIC ACID: CPT | Performed by: INTERNAL MEDICINE

## 2017-11-03 PROCEDURE — 83540 ASSAY OF IRON: CPT | Performed by: INTERNAL MEDICINE

## 2017-11-03 PROCEDURE — 99214 OFFICE O/P EST MOD 30 MIN: CPT | Performed by: INTERNAL MEDICINE

## 2017-11-03 PROCEDURE — 82607 VITAMIN B-12: CPT | Performed by: INTERNAL MEDICINE

## 2017-11-03 PROCEDURE — 83615 LACTATE (LD) (LDH) ENZYME: CPT | Performed by: INTERNAL MEDICINE

## 2017-11-03 PROCEDURE — 82746 ASSAY OF FOLIC ACID SERUM: CPT | Performed by: INTERNAL MEDICINE

## 2017-11-03 PROCEDURE — 84466 ASSAY OF TRANSFERRIN: CPT | Performed by: INTERNAL MEDICINE

## 2017-11-03 PROCEDURE — 82728 ASSAY OF FERRITIN: CPT | Performed by: INTERNAL MEDICINE

## 2017-11-03 RX ORDER — FERROUS SULFATE 325(65) MG
325 TABLET ORAL
Qty: 60 TABLET | Refills: 5 | Status: SHIPPED | OUTPATIENT
Start: 2017-11-03 | End: 2018-03-06

## 2017-11-03 NOTE — TELEPHONE ENCOUNTER
Patient calling back. She said he missed a call about lab results. Reviewed Dr Roberto's note. Patient's labs showed iron deficiency and Dr Roberto escribed Ferrous sulfate to patient's pharmacy. Pt v/u

## 2017-11-03 NOTE — TELEPHONE ENCOUNTER
Notified patient and he v/u     ----- Message from Danny Roberto MD sent at 11/3/2017 10:48 AM EDT -----  Regarding: iron supplement  Please contact the patient and give him instructions to start on iron replacement therapy with ferrous sulfate 325 mg twice daily.  He may also need to  some stool softeners once he starts on the iron to prevent constipation.  I will E scribe a prescription for the iron tablets to the patient's pharmacy      Thanks!  ----- Message -----     From: Lab, Background User     Sent: 11/3/2017   9:21 AM       To: Danny Roberto MD

## 2017-11-03 NOTE — PROGRESS NOTES
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 year old male who is here for an opinion about the above issue.  In recently undergone CT scan of the abdomen on 10/11/2017 to evaluate some left lower quadrant abdominal pain.  CT scan findings were worrisome for metastatic malignancy with multiple tumor implants noted on the scan.  The largest mass appeared to be possibly arising from the duodenum or jejunum in the upper abdomen.  There was no evidence of metastases to liver.    CT-guided biopsy was performed on 10/18/2017 but unfortunately pathology was nondiagnostic showing only benign skeletal muscle and fibroadipose tissue.    The patient was seen in consultation on 10/20/2017 and at that time we recommended laparoscopic lymph node sampling and upper and lower GI endoscopy.  He underwent these procedures with Dr. Castañeda on 10/26/2017.  There was no evidence of malignancy from his endoscopic procedures at the laparoscopic specimen returned as follicular center cell B-cell non-Hodgkin's lymphoma.    The patient returns today for his first visit back since the biopsy to discuss the results and treatment options.  He also had undergone a PET scan which was performed on 11/1/2017 but has not yet been officially read.  Based on our review of the images he does appear to have scattered lymphadenopathy which is hypermetabolic throughout the abdomen and chest therefore this appears to be at least stage III disease.    He remains remarkably asymptomatic and he and his wife are planning a trip to [place] this month.  I think he would be fine to take his trip to [place] and when he returns we may plan to initiate treatment with single agent Rituxan weekly ×4 followed by maintenance Rituxan if he has good tolerance and response.    History of Present Illness     Past Medical History:   Diagnosis Date   • Cancer 2017    Basal  "Cell on Nose and Under Right Eye   • Coronary artery disease    • DDD (degenerative disc disease), cervical    • Gastric mass    • GERD (gastroesophageal reflux disease)    • Hx of cardiac arrest     Happened after Induction of anesthesia prior  to SURG 10 YRS AGO  \" I FLATLINE BUT W/IN SECONDS HEART WAS OK AFTER TURNED ON BACK...I THINK THEY GAVE ME TO MUCH ANESTHESIA\"   • Hyperlipidemia    • Hypertension    • Kidney stones    • Osteoarthritis    • Pleural effusion     DRAINAGE RT PLEURAL FLUID FOR TESTING WITH EGD ON 1-30-17   NO MALIGNANCY WITH CLEARANCE FROM DR SIMMONS FOR SURGERY   • Rheumatoid arthritis         Past Surgical History:   Procedure Laterality Date   • COLONOSCOPY N/A 10/26/2017    Procedure: COLONOSCOPY;  Surgeon: Louie Castañeda MD;  Location: [facility] OR;  Service:    • CORONARY ANGIOPLASTY WITH STENT PLACEMENT      17 years ago   • CYSTOSCOPY W/ LITHOLAPAXY / EHL      6-7 years ago   • DIAGNOSTIC LAPAROSCOPY N/A 10/26/2017    Procedure: DIAGNOSTIC LAPAROSCOPY WITH RETROPERITONEAL BIOPSIES;  Surgeon: Louie Castañeda MD;  Location: [facility] OR;  Service:    • ENDOSCOPY      ON 1/30/17 WITH DRAINAGE OF RT PLEURAL FLUID FOR BIOPSY    NO MALIGNANCY  CLEARANCE FOR SURG PER DR SIMMONS   • ENDOSCOPY N/A 10/26/2017    Procedure: ESOPHAGOGASTRODUODENOSCOPY;  Surgeon: Louie Castañeda MD;  Location: [facility] OR;  Service:    • EYE SURGERY Right 2017    Moh's; reconstruction right lower lid   • PLEURAL BIOPSY     • POSTERIOR CERVICAL FUSION  2001   • SKIN CANCER EXCISION N/A 2017    Basal Cell Nose & under right eye   • TOTAL HIP ARTHROPLASTY Left     9-10 years ago    • TOTAL HIP ARTHROPLASTY Right 2/14/2017    Procedure: TOTAL HIP ARTHROPLASTY;  Surgeon: Gerson Cardoza MD;  Location: [facility] OR;  Service:         Current Outpatient Prescriptions on File Prior to Visit   Medication Sig Dispense Refill   • aspirin 325 MG tablet Take 325 mg by mouth As Needed for Mild Pain . " Takes once or twice a week     • felodipine (PLENDIL) 10 MG 24 hr tablet Take 10 mg by mouth Every Evening.     • HYDROcodone-acetaminophen (NORCO) 5-325 MG per tablet Take 1 tablet by mouth Every 6 (Six) Hours As Needed for Moderate Pain . 15 tablet 0   • Multiple Vitamin (MULTI VITAMIN PO) Take 1 tablet by mouth Daily.     • naproxen sodium (ALEVE) 220 MG tablet Take 220 mg by mouth 2 (Two) Times a Day As Needed for Mild Pain .     • simvastatin (ZOCOR) 40 MG tablet Take 40 mg by mouth Every Night.       No current facility-administered medications on file prior to visit.         ALLERGIES:  No Known Allergies     Social History     Social History   • Marital status:      Spouse name: Farrah   • Number of children: N/A   • Years of education: N/A     Occupational History   •  Retired     Social History Main Topics   • Smoking status: Never Smoker   • Smokeless tobacco: Former User   • Alcohol use Yes      Comment: 1-2 beers nightly, sometimes an occasional bourbon   • Drug use: No   • Sexual activity: Defer     Other Topics Concern   • None     Social History Narrative        Family History   Problem Relation Age of Onset   • Hypertension Mother      Lived to 95 years old - Had numerous medical problems & medications   • Hypertension Father      Lived to 98 years old - Numerous medical problems and Medications   • Cancer Brother      History of Agent Orange   • Heart disease Maternal Grandmother    • Osteoarthritis Paternal Grandfather      possible RA   • Malig Hyperthermia Neg Hx         Review of Systems   Constitutional: Negative for activity change, appetite change, fatigue, fever and unexpected weight change.   HENT: Negative for hearing loss, nosebleeds, trouble swallowing and voice change.    Eyes: Negative for visual disturbance.   Respiratory: Negative for cough, shortness of breath and wheezing.    Cardiovascular: Negative for chest pain and palpitations.   Gastrointestinal: Positive for abdominal  "pain. Negative for diarrhea, nausea and vomiting.   Genitourinary: Negative for difficulty urinating, frequency, hematuria and urgency.   Musculoskeletal: Negative for back pain and neck pain.   Skin: Negative for rash.   Neurological: Negative for dizziness, seizures, syncope and headaches.   Hematological: Negative for adenopathy. Does not bruise/bleed easily.   Psychiatric/Behavioral: Negative for behavioral problems. The patient is not nervous/anxious.         Objective     Vitals:    11/03/17 0821   BP: 122/70   Pulse: 74   Resp: 16   Temp: 98.2 °F (36.8 °C)   Weight: 169 lb 9.6 oz (76.9 kg)   Height: 68.5\" (174 cm)   PainSc: 0-No pain     Current Status 11/3/2017   ECOG score 0       Physical Exam   Constitutional: He is oriented to person, place, and time. He appears well-developed and well-nourished. No distress.   HENT:   Head: Normocephalic.   Eyes: Conjunctivae and EOM are normal. Pupils are equal, round, and reactive to light. No scleral icterus.   Neck: Normal range of motion. Neck supple. No JVD present. No thyromegaly present.   Cardiovascular: Normal rate and regular rhythm.  Exam reveals no gallop and no friction rub.    No murmur heard.  Pulmonary/Chest: Effort normal and breath sounds normal. He has no wheezes. He has no rales.   Abdominal: Soft. He exhibits no distension and no mass. There is no tenderness.   Musculoskeletal: Normal range of motion. He exhibits no edema or deformity.   Lymphadenopathy:     He has no cervical adenopathy.   Neurological: He is alert and oriented to person, place, and time. He has normal reflexes. No cranial nerve deficit.   Skin: Skin is warm and dry. No rash noted. No erythema.   Psychiatric: He has a normal mood and affect. His behavior is normal. Judgment normal.         RECENT LABS:  Hematology WBC   Date Value Ref Range Status   11/03/2017 7.23 4.00 - 10.00 10*3/mm3 Final     RBC   Date Value Ref Range Status   11/03/2017 3.33 (L) 4.30 - 5.50 10*6/mm3 Final " "    Hemoglobin   Date Value Ref Range Status   11/03/2017 10.2 (L) 13.5 - 16.5 g/dL Final     Hematocrit   Date Value Ref Range Status   11/03/2017 31.3 (L) 40.0 - 49.0 % Final     Platelets   Date Value Ref Range Status   11/03/2017 263 150 - 375 10*3/mm3 Final      CT ABDOMEN AND PELVIS, NONCONTRAST, 10/11/2017:  IMPRESSION:  1. CT findings compatible with extensive multifocal malignant tumor  within the abdomen and pelvis as detailed above. Largest mass involves  the third portion of the duodenum, but there are additional tumor  implants elsewhere within the abdomen, including a smaller lesion  involving the splenic flexure of colon. Left pelvic sidewall mass  obstructs the left distal ureter causing moderate left hydronephrosis.  2. High-grade metastatic malignancy, likely of GI tract origin, is  suspected. Endoscopic ultrasound-guided biopsy of the duodenal mass may  be considered.  3. No significant malignant ascites. Trace free fluid in the pelvis.  Tiny right pleural effusion.  4. Stat final copy of this report was sent to the ordering physician's  office immediately following this dictation with telephone notification  and documentation.    Final Diagnosis   \"PERITONEAL BIOPSY\", NEEDLE BIOPSY:          BENIGN SKELETAL MUSCLE AND FIBROADIPOSE TISSUE - NO NEOPLASM.        F-18 FDG PET FROM SKULL BASE TO MID THIGH WITH PET/CT FUSION 11/1/2017      HISTORY: 77-year-old male with non-Hodgkin's lymphoma. Staging.      TECHNIQUE: Radiation dose reduction techniques were utilized, including  automated exposure control and exposure modulation based on body size.   Blood glucose level at time of injection was 85 mg/dL.  6.7 mCi of F-18  FDG were injected and PET was performed from skull base to mid thigh. CT  was obtained for localization and attenuation correction. Time at  injection 7:23 AM. PET start time 8:48 AM. Compared with outside  facility CT of the abdomen and pelvis from 10/11/2017 and chest CT " from  01/08/2017.      FINDINGS: In addition to the intensely hypermetabolic infiltrative  lymphadenopathy within the mesentery and retroperitoneum, there are  multiple intensely hypermetabolic bowel lesions. The infiltrative  lymphadenopathy in the left paraaortic region and mesentery measures  approximately 6 x 5 cm and has a maximal SUV of 9.8. The infiltrative  lymphadenopathy at the left aspect of the pelvis obstructing the distal  left ureter has a maximal SUV of 17.3. There is moderately large left  hydroureteronephrosis to the level of the distal ureter. There is  significantly decreased metabolic activity within the relatively  atrophied left kidney and there is an absent left pyelogram. There are  also multiple hypermetabolic mesenteric lesions. An approximately 2.7 x  2.0 cm left lower abdominal mesenteric lesion has a maximal SUV of 15.4.  The small bowel involvement at the left midabdomen where there is  circumferential thickening of a segment of small bowel has a maximal SUV  of 34.0. The small bowel involvement at the distal ileum has a maximal  SUV of 30.8. There is no abnormal hypermetabolic activity involving the  spleen or liver. Within the chest, there is hypermetabolic mediastinal  lymphadenopathy with one of the mediastinal nodes measuring  approximately 1.8 x 1.4 cm at the anterior mediastinum with a maximal  SUV of 6.7. There is also hypermetabolic retrodiaphragmatic  lymphadenopathy. There is prominent brown fat activity throughout the  thorax and neck. There are hypermetabolic rib lesions. There is  hypermetabolic activity at the posterior right 4th rib, posterior right  10th rib, and the posterior left 12th rib. Most intense activity is at  the posterior right 4th rib with a maximal SUV of 7.5.      IMPRESSION:  In addition to hypermetabolic lymphadenopathy within the chest, abdomen,  and pelvis, there are intensely hypermetabolic bowel lesions and there  are also hypermetabolic rib  "lesions.    LAPAROSCOPIC SPECIMEN 10/26/2017   1.  \"PERITONEAL MASS\". EXCISION:                         ATYPICAL LYMPHOID INFILTRATE WITH SCLEROSIS, WORRISOME FOR MALIGNANT LYMPHOMA (SEE                                COMMENT).      2.  \"PERITONEAL MASS\". EXCISION:                         ATYPICAL LYMPHOID INFILTRATE WITH SCLEROSIS, WORRISOME FOR MALIGNANT LYMPHOMA (SEE                                COMMENT).      3.  \"RETROPERITONEAL MASS\", BIOPSY:                         ATYPICAL LYMPHOID INFILTRATE WITH SCLEROSIS, WORRISOME FOR MALIGNANT LYMPHOMA (SEE                                COMMENT).      COMMENT:  A limited panel of immunohistochemical stains were performed.  See Microscopic Description for immunohistochemical staining details.  Representative paraffin blocks will be forwarded to [facility] for additional immunophenotyping studies to better characterize this atypical lymphoid infiltrate.  This case will be finalized following completion of these studies.     Assessment/Plan     1.  B-cell non-Hodgkin's lymphoma.  This appears to be low-grade follicular center cell type with diffuse lymphadenopathy and bowel involvement.  Patient is remarkably asymptomatic and does not have bulky disease or for think he may be a good candidate for single agent Rituxan treatment as his initial therapy.  2.  Anemia.  This may be related to his lymphoma but we will check additional anemia labs to see if he has any deficiency state that could be corrected with supplementation.    Plan  1.  We had a lengthy discussion with the patient and his wife today regarding the diagnosis stage of disease and treatment options.  We also provided them with printed information regarding non-Hodgkin's lymphoma and printed information regarding Rituxan therapy.  2.  We told the patient that it's okay from our standpoint for him to travel to [place] later this month and we will plan to see him back in our office the first week of " December when he returns to discuss single agent Rituxan treatment with plans to deliver weekly therapy ×4 followed by possible maintenance Rituxan if he has good tolerance and response to initial treatment.  3.  We will also schedule formal chemotherapy education with our nurse practitioners to provide more information about Rituxan therapy.  This chemotherapy education appointment can take place next week prior to his leaving the country on vacation.  4.  We will order additional labs and including hepatitis B serologies prior to Rituxan treatment and LDH and uric acid levels.  We will review these lab results and his anemia lab results become available and contact him if we have any new recommendations based on those results.      Addendum  Patient's iron studies were consistent with iron deficiency.  We will contact the patient and initiate iron therapy with ferrous sulfate 325 mg by mouth twice daily.

## 2017-11-06 LAB
CYTO UR: NORMAL
DX PRELIMINARY: NORMAL
LAB AP CASE REPORT: NORMAL
Lab: NORMAL
Lab: NORMAL
PATH REPORT.FINAL DX SPEC: NORMAL
PATH REPORT.GROSS SPEC: NORMAL

## 2017-11-07 ENCOUNTER — OFFICE VISIT (OUTPATIENT)
Dept: ONCOLOGY | Facility: CLINIC | Age: 77
End: 2017-11-07

## 2017-11-07 ENCOUNTER — APPOINTMENT (OUTPATIENT)
Dept: LAB | Facility: HOSPITAL | Age: 77
End: 2017-11-07

## 2017-11-07 VITALS — BODY MASS INDEX: 25.35 KG/M2 | WEIGHT: 169.2 LBS

## 2017-11-07 DIAGNOSIS — C85.13 B-CELL LYMPHOMA OF INTRA-ABDOMINAL LYMPH NODES, UNSPECIFIED B-CELL LYMPHOMA TYPE (HCC): Primary | ICD-10-CM

## 2017-11-07 PROCEDURE — G0463 HOSPITAL OUTPT CLINIC VISIT: HCPCS | Performed by: NURSE PRACTITIONER

## 2017-11-07 PROCEDURE — 99215 OFFICE O/P EST HI 40 MIN: CPT | Performed by: NURSE PRACTITIONER

## 2017-11-07 RX ORDER — ONDANSETRON HYDROCHLORIDE 8 MG/1
8 TABLET, FILM COATED ORAL EVERY 8 HOURS PRN
Qty: 30 TABLET | Refills: 2 | Status: SHIPPED | OUTPATIENT
Start: 2017-11-07 | End: 2017-11-08

## 2017-11-07 NOTE — PROGRESS NOTES
Subjective     PATIENT NAME:  Louie German  YOB: 1940  PATIENTS AGE:  77 y.o.  PATIENTS SEX:  male  DATE OF SERVICE:  11/07/2017  PROVIDER:  NOLAN Pak      ____________________PATIENT EDUCATION____________________    PATIENT EDUCATION:  Today I met with the patient to discuss the chemotherapy regimen recommended for treatment of his disease.  The patient was given explanation of treatment premed side effects including office policy that prohibits patients to drive if sedating medications are administered, MD explanation given regarding benefits, side effects, toxicities and goals of treatment.  The patient received a Chemotherapy/Biotherapy Plan Summary including diagnosis and specific treatment plan.    SIDE EFFECTS:  Common side effects were discussed with the patient and his wife.  Discussion anemia/fatigue, infection/chills/fever, appetite, bleeding risk/precautions, constipation, diarrhea, muscle aches/weakness,  weight gain/loss, kidney damage, DVT/PE risk, fluid retention, pleural/pericardial effusion, somnolence, electrolyte/LFT imbalance, vein exercises and/or the possible need for vascular access/port placement.      Discussion also included side effects specific to drugs in the treatment plan, Rituxan specifically.  .    A total of 40 minutes were spent with the patient, with 100% of time spent in education and counseling.

## 2017-11-08 ENCOUNTER — OFFICE VISIT (OUTPATIENT)
Dept: SURGERY | Facility: CLINIC | Age: 77
End: 2017-11-08

## 2017-11-08 DIAGNOSIS — C85.13 B-CELL LYMPHOMA OF INTRA-ABDOMINAL LYMPH NODES, UNSPECIFIED B-CELL LYMPHOMA TYPE (HCC): Primary | ICD-10-CM

## 2017-11-08 PROCEDURE — 99024 POSTOP FOLLOW-UP VISIT: CPT | Performed by: SURGERY

## 2017-11-08 NOTE — PROGRESS NOTES
Doing well following surgery. Tolerating diet.  Continues to have intermittent vague abdominal pains.    Incisions healing.  No abdominal distention.  Bowel sounds present.  Minimal tenderness.    Status post diagnostic laparoscopy and biopsy of intraperitoneal and retroperitoneal masses that have been shown to be B-cell lymphoma.    Noted Dr. Roberto's assessment.  Will not need a PowerPort at this time.     I will see as needed.

## 2017-12-04 ENCOUNTER — TELEPHONE (OUTPATIENT)
Dept: ONCOLOGY | Facility: HOSPITAL | Age: 77
End: 2017-12-04

## 2017-12-04 NOTE — TELEPHONE ENCOUNTER
Patient calling to see if he needs to fast for labs tomorrow. Scheduled for CBC. Instructed patient he does not need to fast. Verbalized understanding.

## 2017-12-05 ENCOUNTER — OFFICE VISIT (OUTPATIENT)
Dept: ONCOLOGY | Facility: CLINIC | Age: 77
End: 2017-12-05

## 2017-12-05 ENCOUNTER — TELEPHONE (OUTPATIENT)
Dept: ONCOLOGY | Facility: HOSPITAL | Age: 77
End: 2017-12-05

## 2017-12-05 ENCOUNTER — LAB (OUTPATIENT)
Dept: LAB | Facility: HOSPITAL | Age: 77
End: 2017-12-05

## 2017-12-05 VITALS
SYSTOLIC BLOOD PRESSURE: 146 MMHG | WEIGHT: 165.8 LBS | HEART RATE: 84 BPM | RESPIRATION RATE: 16 BRPM | OXYGEN SATURATION: 100 % | TEMPERATURE: 97.5 F | DIASTOLIC BLOOD PRESSURE: 74 MMHG | HEIGHT: 69 IN | BODY MASS INDEX: 24.56 KG/M2

## 2017-12-05 DIAGNOSIS — C83.03 SMALL B-CELL LYMPHOMA OF INTRA-ABDOMINAL LYMPH NODES (HCC): Primary | ICD-10-CM

## 2017-12-05 DIAGNOSIS — N20.0 CALCULUS OF KIDNEY: ICD-10-CM

## 2017-12-05 DIAGNOSIS — N13.30 HYDRONEPHROSIS OF LEFT KIDNEY: ICD-10-CM

## 2017-12-05 DIAGNOSIS — C85.13 B-CELL LYMPHOMA OF INTRA-ABDOMINAL LYMPH NODES, UNSPECIFIED B-CELL LYMPHOMA TYPE (HCC): ICD-10-CM

## 2017-12-05 DIAGNOSIS — D64.89 ANEMIA DUE TO OTHER CAUSE, NOT CLASSIFIED: ICD-10-CM

## 2017-12-05 PROBLEM — D50.0 IRON DEFICIENCY ANEMIA DUE TO CHRONIC BLOOD LOSS: Status: ACTIVE | Noted: 2017-12-05

## 2017-12-05 LAB
ALBUMIN SERPL-MCNC: 3.9 G/DL (ref 3.5–5.2)
ALBUMIN/GLOB SERPL: 1.3 G/DL (ref 1.1–2.4)
ALP SERPL-CCNC: 70 U/L (ref 38–116)
ALT SERPL W P-5'-P-CCNC: 6 U/L (ref 0–41)
ANION GAP SERPL CALCULATED.3IONS-SCNC: 10.3 MMOL/L
AST SERPL-CCNC: 17 U/L (ref 0–40)
BASOPHILS # BLD AUTO: 0.04 10*3/MM3 (ref 0–0.1)
BASOPHILS NFR BLD AUTO: 0.5 % (ref 0–1.1)
BILIRUB SERPL-MCNC: 0.3 MG/DL (ref 0.1–1.2)
BUN BLD-MCNC: 26 MG/DL (ref 6–20)
BUN/CREAT SERPL: 16.9 (ref 7.3–30)
CALCIUM SPEC-SCNC: 9.5 MG/DL (ref 8.5–10.2)
CHLORIDE SERPL-SCNC: 104 MMOL/L (ref 98–107)
CO2 SERPL-SCNC: 26.7 MMOL/L (ref 22–29)
CREAT BLD-MCNC: 1.54 MG/DL (ref 0.7–1.3)
DEPRECATED RDW RBC AUTO: 42.3 FL (ref 37–49)
EOSINOPHIL # BLD AUTO: 0.17 10*3/MM3 (ref 0–0.36)
EOSINOPHIL NFR BLD AUTO: 2.3 % (ref 1–5)
ERYTHROCYTE [DISTWIDTH] IN BLOOD BY AUTOMATED COUNT: 12.8 % (ref 11.7–14.5)
FERRITIN SERPL-MCNC: 15.9 NG/ML (ref 30–400)
GFR SERPL CREATININE-BSD FRML MDRD: 44 ML/MIN/1.73
GLOBULIN UR ELPH-MCNC: 2.9 GM/DL (ref 1.8–3.5)
GLUCOSE BLD-MCNC: 98 MG/DL (ref 74–124)
HBV SURFACE AB SER RIA-ACNC: NORMAL
HBV SURFACE AG SERPL QL IA: NORMAL
HCT VFR BLD AUTO: 31.5 % (ref 40–49)
HGB BLD-MCNC: 10 G/DL (ref 13.5–16.5)
IMM GRANULOCYTES # BLD: 0.02 10*3/MM3 (ref 0–0.03)
IMM GRANULOCYTES NFR BLD: 0.3 % (ref 0–0.5)
IRON 24H UR-MRATE: 28 MCG/DL (ref 59–158)
IRON SATN MFR SERPL: 8 % (ref 14–48)
LYMPHOCYTES # BLD AUTO: 0.84 10*3/MM3 (ref 1–3.5)
LYMPHOCYTES NFR BLD AUTO: 11.3 % (ref 20–49)
MCH RBC QN AUTO: 28.7 PG (ref 27–33)
MCHC RBC AUTO-ENTMCNC: 31.7 G/DL (ref 32–35)
MCV RBC AUTO: 90.5 FL (ref 83–97)
MONOCYTES # BLD AUTO: 0.55 10*3/MM3 (ref 0.25–0.8)
MONOCYTES NFR BLD AUTO: 7.4 % (ref 4–12)
NEUTROPHILS # BLD AUTO: 5.83 10*3/MM3 (ref 1.5–7)
NEUTROPHILS NFR BLD AUTO: 78.2 % (ref 39–75)
NRBC BLD MANUAL-RTO: 0 /100 WBC (ref 0–0)
PLATELET # BLD AUTO: 323 10*3/MM3 (ref 150–375)
PMV BLD AUTO: 9.9 FL (ref 8.9–12.1)
POTASSIUM BLD-SCNC: 4.6 MMOL/L (ref 3.5–4.7)
PROT SERPL-MCNC: 6.8 G/DL (ref 6.3–8)
RBC # BLD AUTO: 3.48 10*6/MM3 (ref 4.3–5.5)
SODIUM BLD-SCNC: 141 MMOL/L (ref 134–145)
TIBC SERPL-MCNC: 364 MCG/DL (ref 249–505)
TRANSFERRIN SERPL-MCNC: 260 MG/DL (ref 200–360)
URATE SERPL-MCNC: 6.9 MG/DL (ref 2.8–7.4)
WBC NRBC COR # BLD: 7.45 10*3/MM3 (ref 4–10)

## 2017-12-05 PROCEDURE — 36415 COLL VENOUS BLD VENIPUNCTURE: CPT | Performed by: INTERNAL MEDICINE

## 2017-12-05 PROCEDURE — 86706 HEP B SURFACE ANTIBODY: CPT | Performed by: INTERNAL MEDICINE

## 2017-12-05 PROCEDURE — 87340 HEPATITIS B SURFACE AG IA: CPT | Performed by: INTERNAL MEDICINE

## 2017-12-05 PROCEDURE — 82728 ASSAY OF FERRITIN: CPT | Performed by: INTERNAL MEDICINE

## 2017-12-05 PROCEDURE — 80053 COMPREHEN METABOLIC PANEL: CPT

## 2017-12-05 PROCEDURE — 84466 ASSAY OF TRANSFERRIN: CPT | Performed by: INTERNAL MEDICINE

## 2017-12-05 PROCEDURE — 83540 ASSAY OF IRON: CPT | Performed by: INTERNAL MEDICINE

## 2017-12-05 PROCEDURE — 84550 ASSAY OF BLOOD/URIC ACID: CPT | Performed by: INTERNAL MEDICINE

## 2017-12-05 PROCEDURE — 99214 OFFICE O/P EST MOD 30 MIN: CPT | Performed by: INTERNAL MEDICINE

## 2017-12-05 PROCEDURE — 85025 COMPLETE CBC W/AUTO DIFF WBC: CPT

## 2017-12-05 RX ORDER — FAMOTIDINE 10 MG/ML
20 INJECTION, SOLUTION INTRAVENOUS AS NEEDED
Status: CANCELLED | OUTPATIENT
Start: 2017-12-08

## 2017-12-05 RX ORDER — MEPERIDINE HYDROCHLORIDE 50 MG/ML
25 INJECTION INTRAMUSCULAR; INTRAVENOUS; SUBCUTANEOUS
Status: CANCELLED | OUTPATIENT
Start: 2017-12-15

## 2017-12-05 RX ORDER — DIPHENHYDRAMINE HYDROCHLORIDE 50 MG/ML
50 INJECTION INTRAMUSCULAR; INTRAVENOUS AS NEEDED
Status: CANCELLED | OUTPATIENT
Start: 2017-12-08

## 2017-12-05 RX ORDER — FAMOTIDINE 10 MG/ML
20 INJECTION, SOLUTION INTRAVENOUS AS NEEDED
Status: CANCELLED | OUTPATIENT
Start: 2017-12-15

## 2017-12-05 RX ORDER — ACETAMINOPHEN 325 MG/1
650 TABLET ORAL ONCE
Status: CANCELLED | OUTPATIENT
Start: 2017-12-22

## 2017-12-05 RX ORDER — ACETAMINOPHEN 325 MG/1
650 TABLET ORAL ONCE
Status: CANCELLED | OUTPATIENT
Start: 2017-12-29

## 2017-12-05 RX ORDER — FELODIPINE 10 MG/1
TABLET, EXTENDED RELEASE ORAL
Qty: 90 TABLET | Refills: 2 | Status: SHIPPED | OUTPATIENT
Start: 2017-12-05 | End: 2018-06-04

## 2017-12-05 RX ORDER — FAMOTIDINE 10 MG/ML
20 INJECTION, SOLUTION INTRAVENOUS ONCE
Status: CANCELLED | OUTPATIENT
Start: 2017-12-15

## 2017-12-05 RX ORDER — SODIUM CHLORIDE 9 MG/ML
250 INJECTION, SOLUTION INTRAVENOUS ONCE
Status: CANCELLED | OUTPATIENT
Start: 2017-12-15

## 2017-12-05 RX ORDER — DIPHENHYDRAMINE HYDROCHLORIDE 50 MG/ML
50 INJECTION INTRAMUSCULAR; INTRAVENOUS AS NEEDED
Status: CANCELLED | OUTPATIENT
Start: 2017-12-29

## 2017-12-05 RX ORDER — MEPERIDINE HYDROCHLORIDE 50 MG/ML
25 INJECTION INTRAMUSCULAR; INTRAVENOUS; SUBCUTANEOUS
Status: CANCELLED | OUTPATIENT
Start: 2017-12-08

## 2017-12-05 RX ORDER — DIPHENHYDRAMINE HCL 25 MG
25 CAPSULE ORAL ONCE
Status: CANCELLED | OUTPATIENT
Start: 2017-12-15

## 2017-12-05 RX ORDER — FAMOTIDINE 10 MG/ML
20 INJECTION, SOLUTION INTRAVENOUS AS NEEDED
Status: CANCELLED | OUTPATIENT
Start: 2017-12-22

## 2017-12-05 RX ORDER — FAMOTIDINE 10 MG/ML
20 INJECTION, SOLUTION INTRAVENOUS ONCE
Status: CANCELLED | OUTPATIENT
Start: 2017-12-08

## 2017-12-05 RX ORDER — FAMOTIDINE 10 MG/ML
20 INJECTION, SOLUTION INTRAVENOUS AS NEEDED
Status: CANCELLED | OUTPATIENT
Start: 2017-12-29

## 2017-12-05 RX ORDER — ALLOPURINOL 300 MG/1
300 TABLET ORAL DAILY
Qty: 30 TABLET | Refills: 3 | Status: SHIPPED | OUTPATIENT
Start: 2017-12-05 | End: 2018-05-04

## 2017-12-05 RX ORDER — MEPERIDINE HYDROCHLORIDE 50 MG/ML
25 INJECTION INTRAMUSCULAR; INTRAVENOUS; SUBCUTANEOUS
Status: CANCELLED | OUTPATIENT
Start: 2017-12-22

## 2017-12-05 RX ORDER — DIPHENHYDRAMINE HYDROCHLORIDE 50 MG/ML
50 INJECTION INTRAMUSCULAR; INTRAVENOUS AS NEEDED
Status: CANCELLED | OUTPATIENT
Start: 2017-12-22

## 2017-12-05 RX ORDER — FAMOTIDINE 10 MG/ML
20 INJECTION, SOLUTION INTRAVENOUS ONCE
Status: CANCELLED | OUTPATIENT
Start: 2017-12-29

## 2017-12-05 RX ORDER — DIPHENHYDRAMINE HYDROCHLORIDE 50 MG/ML
50 INJECTION INTRAMUSCULAR; INTRAVENOUS AS NEEDED
Status: CANCELLED | OUTPATIENT
Start: 2017-12-15

## 2017-12-05 RX ORDER — SIMVASTATIN 40 MG
TABLET ORAL
Qty: 90 TABLET | Refills: 2 | Status: SHIPPED | OUTPATIENT
Start: 2017-12-05 | End: 2018-06-04

## 2017-12-05 RX ORDER — ACETAMINOPHEN 325 MG/1
650 TABLET ORAL ONCE
Status: CANCELLED | OUTPATIENT
Start: 2017-12-08

## 2017-12-05 RX ORDER — FAMOTIDINE 10 MG/ML
20 INJECTION, SOLUTION INTRAVENOUS ONCE
Status: CANCELLED | OUTPATIENT
Start: 2017-12-22

## 2017-12-05 RX ORDER — MEPERIDINE HYDROCHLORIDE 50 MG/ML
25 INJECTION INTRAMUSCULAR; INTRAVENOUS; SUBCUTANEOUS
Status: CANCELLED | OUTPATIENT
Start: 2017-12-29

## 2017-12-05 RX ORDER — ACETAMINOPHEN 325 MG/1
650 TABLET ORAL ONCE
Status: CANCELLED | OUTPATIENT
Start: 2017-12-15

## 2017-12-05 NOTE — PROGRESS NOTES
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 year old male who had recently undergone CT scan of the abdomen on 10/11/2017 to evaluate some left lower quadrant abdominal pain.  CT scan findings were worrisome for metastatic malignancy with multiple tumor implants noted on the scan.  The largest mass appeared to be possibly arising from the duodenum or jejunum in the upper abdomen.  There was no evidence of metastases to liver.    CT-guided biopsy was performed on 10/18/2017 but unfortunately pathology was nondiagnostic showing only benign skeletal muscle and fibroadipose tissue.    The patient was seen in consultation on 10/20/2017 and at that time we recommended laparoscopic lymph node sampling and upper and lower GI endoscopy.  He underwent these procedures with Dr. Castañeda on 10/26/2017.  There was no evidence of malignancy from his endoscopic procedures at the laparoscopic specimen returned as follicular center cell B-cell non-Hodgkin's lymphoma.     He also had undergone a PET scan which was performed on 11/1/2017 with scattered lymphadenopathy which is hypermetabolic throughout the abdomen and chest therefore this appears to be at least stage III disease.    He remains remarkably asymptomatic and he and his wife just returned from a trip to [facility].  He did not have any new symptoms while traveling.  He has already received formal chemotherapy education regarding single agent Rituxan treatment and is anxious to get started on Rituxan therapy.  We'll plan to initiate his first cycle of Rituxan this Friday on 12/8/2017 with plans to deliver 4 weekly doses.  If he has good response and tolerance to single agent Rituxan we most likely would then recommend maintenance Rituxan treatment for 2 years.    His hepatitis labs were drawn today and we also have repeated his iron studies.  We sent in a prescription  "for allopurinol to his [facility] pharmacy in [place].  History of Present Illness     Past Medical History:   Diagnosis Date   • Cancer 2017    Basal Cell on Nose and Under Right Eye   • Coronary artery disease    • DDD (degenerative disc disease), cervical    • Gastric mass    • GERD (gastroesophageal reflux disease)    • Hx of cardiac arrest     Happened after Induction of anesthesia prior  to SURG 10 YRS AGO  \" I FLATLINE BUT W/IN SECONDS HEART WAS OK AFTER TURNED ON BACK...I THINK THEY GAVE ME TO MUCH ANESTHESIA\"   • Hyperlipidemia    • Hypertension    • Kidney stones    • Osteoarthritis    • Pleural effusion     DRAINAGE RT PLEURAL FLUID FOR TESTING WITH EGD ON 1-30-17   NO MALIGNANCY WITH CLEARANCE FROM DR SIMMONS FOR SURGERY   • Rheumatoid arthritis         Past Surgical History:   Procedure Laterality Date   • COLONOSCOPY N/A 10/26/2017    Procedure: COLONOSCOPY;  Surgeon: Louie Castañeda MD;  Location: [facility];  Service:    • CORONARY ANGIOPLASTY WITH STENT PLACEMENT  1999    17 years ago   • CYSTOSCOPY W/ LITHOLAPAXY / EHL      6-7 years ago   • DIAGNOSTIC LAPAROSCOPY N/A 10/26/2017    Procedure: DIAGNOSTIC LAPAROSCOPY WITH RETROPERITONEAL BIOPSIES;  Surgeon: Louie Castañeda MD;  Location: [facility];  Service:    • ENDOSCOPY      ON 1/30/17 WITH DRAINAGE OF RT PLEURAL FLUID FOR BIOPSY    NO MALIGNANCY  CLEARANCE FOR SURG PER DR SIMMONS   • ENDOSCOPY N/A 10/26/2017    Procedure: ESOPHAGOGASTRODUODENOSCOPY;  Surgeon: Louie Castañeda MD;  Location: [facility];  Service:    • EYE SURGERY Right 2017    Moh's; reconstruction right lower lid   • KIDNEY STONE SURGERY     • PLEURAL BIOPSY     • POSTERIOR CERVICAL FUSION  2001   • SKIN CANCER EXCISION N/A 2017    Basal Cell Nose & under right eye   • TOTAL HIP ARTHROPLASTY Left 04/2010    9-10 years ago    • TOTAL HIP ARTHROPLASTY Right 2/14/2017    Procedure: TOTAL HIP ARTHROPLASTY;  Surgeon: Gerson Cardoza MD;  Location: [facility]" "OR;  Service:          ALLERGIES:  No Known Allergies     Review of Systems   Constitutional: Negative for activity change, appetite change, fatigue, fever and unexpected weight change.   HENT: Negative for hearing loss, nosebleeds, trouble swallowing and voice change.    Eyes: Negative for visual disturbance.   Respiratory: Negative for cough, shortness of breath and wheezing.    Cardiovascular: Negative for chest pain and palpitations.   Gastrointestinal: Positive for abdominal pain. Negative for diarrhea, nausea and vomiting.   Genitourinary: Negative for difficulty urinating, frequency, hematuria and urgency.   Musculoskeletal: Negative for back pain and neck pain.   Skin: Negative for rash.   Neurological: Negative for dizziness, seizures, syncope and headaches.   Hematological: Negative for adenopathy. Does not bruise/bleed easily.   Psychiatric/Behavioral: Negative for behavioral problems. The patient is not nervous/anxious.         Objective     Vitals:    12/05/17 1012   BP: 146/74   Pulse: 84   Resp: 16   Temp: 97.5 °F (36.4 °C)   TempSrc: Oral   SpO2: 100%   Weight: 75.2 kg (165 lb 12.8 oz)   Height: 174 cm (68.5\")   PainSc:   3   PainLoc: Abdomen     Current Status 12/5/2017   ECOG score 0       Physical Exam   Constitutional: He is oriented to person, place, and time. He appears well-developed and well-nourished. No distress.   HENT:   Head: Normocephalic.   Eyes: Conjunctivae and EOM are normal. Pupils are equal, round, and reactive to light. No scleral icterus.   Neck: Normal range of motion. Neck supple. No JVD present. No thyromegaly present.   Cardiovascular: Normal rate and regular rhythm.  Exam reveals no gallop and no friction rub.    No murmur heard.  Pulmonary/Chest: Effort normal and breath sounds normal. He has no wheezes. He has no rales.   Abdominal: Soft. He exhibits no distension and no mass. There is no tenderness.   Musculoskeletal: Normal range of motion. He exhibits no edema or " deformity.   Lymphadenopathy:     He has no cervical adenopathy.   Neurological: He is alert and oriented to person, place, and time. He has normal reflexes. No cranial nerve deficit.   Skin: Skin is warm and dry. No rash noted. No erythema.   Psychiatric: He has a normal mood and affect. His behavior is normal. Judgment normal.         RECENT LABS:  Hematology WBC   Date Value Ref Range Status   12/05/2017 7.45 4.00 - 10.00 10*3/mm3 Final     RBC   Date Value Ref Range Status   12/05/2017 3.48 (L) 4.30 - 5.50 10*6/mm3 Final     Hemoglobin   Date Value Ref Range Status   12/05/2017 10.0 (L) 13.5 - 16.5 g/dL Final     Hematocrit   Date Value Ref Range Status   12/05/2017 31.5 (L) 40.0 - 49.0 % Final     Platelets   Date Value Ref Range Status   12/05/2017 323 150 - 375 10*3/mm3 Final        Lab Results   Component Value Date    IRON 34 (L) 11/03/2017    TIBC 360 11/03/2017    FERRITIN 20.80 (L) 11/03/2017     Lab Results   Component Value Date    GLUCOSE 82 11/03/2017    BUN 26 (H) 11/03/2017    CREATININE 1.44 (H) 11/03/2017    EGFRIFNONA 47 (L) 11/03/2017    EGFRIFAFRI 59 (L) 09/25/2017    BCR 18.1 11/03/2017    K 4.3 11/03/2017    CO2 25.8 11/03/2017    CALCIUM 9.4 11/03/2017    PROTENTOTREF 6.4 09/25/2017    ALBUMIN 3.90 11/03/2017    LABIL2 1.3 11/03/2017    AST 18 11/03/2017    ALT 8 11/03/2017         CT ABDOMEN AND PELVIS, NONCONTRAST, 10/11/2017:  IMPRESSION:  1. CT findings compatible with extensive multifocal malignant tumor  within the abdomen and pelvis as detailed above. Largest mass involves  the third portion of the duodenum, but there are additional tumor  implants elsewhere within the abdomen, including a smaller lesion  involving the splenic flexure of colon. Left pelvic sidewall mass  obstructs the left distal ureter causing moderate left hydronephrosis.  2. High-grade metastatic malignancy, likely of GI tract origin, is  suspected. Endoscopic ultrasound-guided biopsy of the duodenal mass may  be  "considered.  3. No significant malignant ascites. Trace free fluid in the pelvis.  Tiny right pleural effusion.  4. Stat final copy of this report was sent to the ordering physician's  office immediately following this dictation with telephone notification  and documentation.    Final Diagnosis   \"PERITONEAL BIOPSY\", NEEDLE BIOPSY:          BENIGN SKELETAL MUSCLE AND FIBROADIPOSE TISSUE - NO NEOPLASM.        F-18 FDG PET FROM SKULL BASE TO MID THIGH WITH PET/CT FUSION 11/1/2017      HISTORY: 77-year-old male with non-Hodgkin's lymphoma. Staging.      TECHNIQUE: Radiation dose reduction techniques were utilized, including  automated exposure control and exposure modulation based on body size.   Blood glucose level at time of injection was 85 mg/dL.  6.7 mCi of F-18  FDG were injected and PET was performed from skull base to mid thigh. CT  was obtained for localization and attenuation correction. Time at  injection 7:23 AM. PET start time 8:48 AM. Compared with outside  facility CT of the abdomen and pelvis from 10/11/2017 and chest CT from  01/08/2017.      FINDINGS: In addition to the intensely hypermetabolic infiltrative  lymphadenopathy within the mesentery and retroperitoneum, there are  multiple intensely hypermetabolic bowel lesions. The infiltrative  lymphadenopathy in the left paraaortic region and mesentery measures  approximately 6 x 5 cm and has a maximal SUV of 9.8. The infiltrative  lymphadenopathy at the left aspect of the pelvis obstructing the distal  left ureter has a maximal SUV of 17.3. There is moderately large left  hydroureteronephrosis to the level of the distal ureter. There is  significantly decreased metabolic activity within the relatively  atrophied left kidney and there is an absent left pyelogram. There are  also multiple hypermetabolic mesenteric lesions. An approximately 2.7 x  2.0 cm left lower abdominal mesenteric lesion has a maximal SUV of 15.4.  The small bowel involvement at the " "left midabdomen where there is  circumferential thickening of a segment of small bowel has a maximal SUV  of 34.0. The small bowel involvement at the distal ileum has a maximal  SUV of 30.8. There is no abnormal hypermetabolic activity involving the  spleen or liver. Within the chest, there is hypermetabolic mediastinal  lymphadenopathy with one of the mediastinal nodes measuring  approximately 1.8 x 1.4 cm at the anterior mediastinum with a maximal  SUV of 6.7. There is also hypermetabolic retrodiaphragmatic  lymphadenopathy. There is prominent brown fat activity throughout the  thorax and neck. There are hypermetabolic rib lesions. There is  hypermetabolic activity at the posterior right 4th rib, posterior right  10th rib, and the posterior left 12th rib. Most intense activity is at  the posterior right 4th rib with a maximal SUV of 7.5.      IMPRESSION:  In addition to hypermetabolic lymphadenopathy within the chest, abdomen,  and pelvis, there are intensely hypermetabolic bowel lesions and there  are also hypermetabolic rib lesions.    LAPAROSCOPIC SPECIMEN 10/26/2017   1.  \"PERITONEAL MASS\". EXCISION:                         ATYPICAL LYMPHOID INFILTRATE WITH SCLEROSIS, WORRISOME FOR MALIGNANT LYMPHOMA (SEE                                COMMENT).      2.  \"PERITONEAL MASS\". EXCISION:                         ATYPICAL LYMPHOID INFILTRATE WITH SCLEROSIS, WORRISOME FOR MALIGNANT LYMPHOMA (SEE                                COMMENT).      3.  \"RETROPERITONEAL MASS\", BIOPSY:                         ATYPICAL LYMPHOID INFILTRATE WITH SCLEROSIS, WORRISOME FOR MALIGNANT LYMPHOMA (SEE                                COMMENT).      COMMENT:  A limited panel of immunohistochemical stains were performed.  See Microscopic Description for immunohistochemical staining details.  Representative paraffin blocks will be forwarded to [facility] for additional immunophenotyping studies to better characterize this atypical " lymphoid infiltrate.  This case will be finalized following completion of these studies.     Assessment/Plan     1.  B-cell non-Hodgkin's lymphoma.  This appears to be low-grade follicular center cell type with diffuse lymphadenopathy and bowel involvement.  Patient is remarkably asymptomatic and does not have bulky disease or for think he may be a good candidate for single agent Rituxan treatment as his initial therapy.  2.  Anemia.  Labs demonstrated iron deficiency and he is now taking oral ferrous sulfate twice daily.    Plan  1.  We will schedule single agent Rituxan therapy weekly ×4 weeks with first dose to be delivered on Friday, 12/8/2017.  2.  We will initiate allopurinol 300 mg daily and [place] scribed a prescription to the patient's [facility] pharmacy.  3.  He has already received formal chemotherapy education regarding Rituxan treatment and has a prescription for antinausea medications as needed.  4.  He will have additional labs performed in the office today including hepatitis B serologies, repeat iron studies, and repeat chemistries with uric acid.   5.  We discussed with the patient repeating his CT scans a few weeks after he completes Rituxan therapy.  If he has good response we would then arrange for maintenance Rituxan treatment over the next 2 years.  6.  If he fails to respond to Rituxan or has significant tolerance issues with Rituxan we may discuss potential Imbruvica therapy or Treanda.

## 2017-12-05 NOTE — TELEPHONE ENCOUNTER
Patient called asking if ok to have half a glass of wine every once in a while. TOld him to keep drinking in moderation and that should be fine. Pt v/u.

## 2017-12-06 LAB — HBV CORE AB SER DONR QL IA: NEGATIVE

## 2017-12-08 ENCOUNTER — INFUSION (OUTPATIENT)
Dept: ONCOLOGY | Facility: HOSPITAL | Age: 77
End: 2017-12-08

## 2017-12-08 ENCOUNTER — LAB (OUTPATIENT)
Dept: LAB | Facility: HOSPITAL | Age: 77
End: 2017-12-08

## 2017-12-08 VITALS
HEART RATE: 94 BPM | DIASTOLIC BLOOD PRESSURE: 60 MMHG | SYSTOLIC BLOOD PRESSURE: 136 MMHG | TEMPERATURE: 98.2 F | BODY MASS INDEX: 24.87 KG/M2 | WEIGHT: 166 LBS

## 2017-12-08 DIAGNOSIS — C85.13 B-CELL LYMPHOMA OF INTRA-ABDOMINAL LYMPH NODES, UNSPECIFIED B-CELL LYMPHOMA TYPE (HCC): Primary | ICD-10-CM

## 2017-12-08 DIAGNOSIS — C85.13 B-CELL LYMPHOMA OF INTRA-ABDOMINAL LYMPH NODES, UNSPECIFIED B-CELL LYMPHOMA TYPE (HCC): ICD-10-CM

## 2017-12-08 LAB
BASOPHILS # BLD AUTO: 0.07 10*3/MM3 (ref 0–0.1)
BASOPHILS NFR BLD AUTO: 0.8 % (ref 0–1.1)
DEPRECATED RDW RBC AUTO: 41.1 FL (ref 37–49)
EOSINOPHIL # BLD AUTO: 0.19 10*3/MM3 (ref 0–0.36)
EOSINOPHIL NFR BLD AUTO: 2.1 % (ref 1–5)
ERYTHROCYTE [DISTWIDTH] IN BLOOD BY AUTOMATED COUNT: 12.9 % (ref 11.7–14.5)
HCT VFR BLD AUTO: 29 % (ref 40–49)
HGB BLD-MCNC: 9.5 G/DL (ref 13.5–16.5)
IMM GRANULOCYTES # BLD: 0.04 10*3/MM3 (ref 0–0.03)
IMM GRANULOCYTES NFR BLD: 0.4 % (ref 0–0.5)
LYMPHOCYTES # BLD AUTO: 0.88 10*3/MM3 (ref 1–3.5)
LYMPHOCYTES NFR BLD AUTO: 9.5 % (ref 20–49)
MCH RBC QN AUTO: 28.9 PG (ref 27–33)
MCHC RBC AUTO-ENTMCNC: 32.8 G/DL (ref 32–35)
MCV RBC AUTO: 88.1 FL (ref 83–97)
MONOCYTES # BLD AUTO: 0.97 10*3/MM3 (ref 0.25–0.8)
MONOCYTES NFR BLD AUTO: 10.5 % (ref 4–12)
NEUTROPHILS # BLD AUTO: 7.07 10*3/MM3 (ref 1.5–7)
NEUTROPHILS NFR BLD AUTO: 76.7 % (ref 39–75)
NRBC BLD MANUAL-RTO: 0 /100 WBC (ref 0–0)
PLATELET # BLD AUTO: 275 10*3/MM3 (ref 150–375)
PMV BLD AUTO: 11 FL (ref 8.9–12.1)
RBC # BLD AUTO: 3.29 10*6/MM3 (ref 4.3–5.5)
WBC NRBC COR # BLD: 9.22 10*3/MM3 (ref 4–10)

## 2017-12-08 PROCEDURE — 96413 CHEMO IV INFUSION 1 HR: CPT | Performed by: INTERNAL MEDICINE

## 2017-12-08 PROCEDURE — 96375 TX/PRO/DX INJ NEW DRUG ADDON: CPT | Performed by: INTERNAL MEDICINE

## 2017-12-08 PROCEDURE — 25010000002 LORAZEPAM PER 2 MG: Performed by: INTERNAL MEDICINE

## 2017-12-08 PROCEDURE — 36416 COLLJ CAPILLARY BLOOD SPEC: CPT | Performed by: INTERNAL MEDICINE

## 2017-12-08 PROCEDURE — 96415 CHEMO IV INFUSION ADDL HR: CPT | Performed by: INTERNAL MEDICINE

## 2017-12-08 PROCEDURE — 25010000002 RITUXIMAB 10 MG/ML SOLUTION 10 ML VIAL: Performed by: INTERNAL MEDICINE

## 2017-12-08 PROCEDURE — 25010000002 DIPHENHYDRAMINE PER 50 MG: Performed by: INTERNAL MEDICINE

## 2017-12-08 PROCEDURE — 85025 COMPLETE CBC W/AUTO DIFF WBC: CPT | Performed by: INTERNAL MEDICINE

## 2017-12-08 PROCEDURE — 25010000002 RITUXIMAB 10 MG/ML SOLUTION 50 ML VIAL: Performed by: INTERNAL MEDICINE

## 2017-12-08 RX ORDER — FAMOTIDINE 10 MG/ML
20 INJECTION, SOLUTION INTRAVENOUS ONCE
Status: COMPLETED | OUTPATIENT
Start: 2017-12-08 | End: 2017-12-08

## 2017-12-08 RX ORDER — ACETAMINOPHEN 325 MG/1
650 TABLET ORAL ONCE
Status: COMPLETED | OUTPATIENT
Start: 2017-12-08 | End: 2017-12-08

## 2017-12-08 RX ORDER — LORAZEPAM 2 MG/ML
1 INJECTION INTRAMUSCULAR ONCE
Status: COMPLETED | OUTPATIENT
Start: 2017-12-08 | End: 2017-12-08

## 2017-12-08 RX ADMIN — SODIUM CHLORIDE 250 ML: 900 INJECTION, SOLUTION INTRAVENOUS at 09:22

## 2017-12-08 RX ADMIN — RITUXIMAB 720 MG: 10 INJECTION, SOLUTION INTRAVENOUS at 10:19

## 2017-12-08 RX ADMIN — FAMOTIDINE 20 MG: 10 INJECTION, SOLUTION INTRAVENOUS at 09:23

## 2017-12-08 RX ADMIN — LORAZEPAM 1 MG: 2 INJECTION INTRAMUSCULAR; INTRAVENOUS at 10:58

## 2017-12-08 RX ADMIN — DIPHENHYDRAMINE HYDROCHLORIDE 50 MG: 50 INJECTION, SOLUTION INTRAMUSCULAR; INTRAVENOUS at 09:26

## 2017-12-08 RX ADMIN — ACETAMINOPHEN 650 MG: 325 TABLET ORAL at 09:23

## 2017-12-08 NOTE — PROGRESS NOTES
1030: pt c/o restless legs (from IV Benadryl)  Spoke with Dr. Roberto and he ordered IV Ativan 1 mg for restless legs.    1058: Rituxan stopped - to give Ativan.   1058- IV Ativan 1 mg given.   1100: Rituxan restarted- was time for a rate increse, so Rituxan increased to 44 cc/hr per protocol.

## 2017-12-08 NOTE — PROGRESS NOTES
Pt here today for first time Rituxan.  His HGB has dropped since Tuesday 12/5 from 10.0 to 9.5.  Pt denies any shortness of breath or fatigue.  Explained to patient s/s of low HGB and when to call our office, he v/u.     Lab Results   Component Value Date    WBC 9.22 12/08/2017    HGB 9.5 (L) 12/08/2017    HCT 29.0 (L) 12/08/2017    MCV 88.1 12/08/2017     12/08/2017

## 2017-12-15 ENCOUNTER — LAB (OUTPATIENT)
Dept: LAB | Facility: HOSPITAL | Age: 77
End: 2017-12-15

## 2017-12-15 ENCOUNTER — INFUSION (OUTPATIENT)
Dept: ONCOLOGY | Facility: HOSPITAL | Age: 77
End: 2017-12-15

## 2017-12-15 VITALS
TEMPERATURE: 98 F | BODY MASS INDEX: 24.66 KG/M2 | DIASTOLIC BLOOD PRESSURE: 74 MMHG | SYSTOLIC BLOOD PRESSURE: 142 MMHG | WEIGHT: 164.6 LBS | HEART RATE: 73 BPM

## 2017-12-15 DIAGNOSIS — N20.0 CALCULUS OF KIDNEY: ICD-10-CM

## 2017-12-15 DIAGNOSIS — N20.0 CALCULUS OF KIDNEY: Primary | ICD-10-CM

## 2017-12-15 DIAGNOSIS — D50.0 IRON DEFICIENCY ANEMIA DUE TO CHRONIC BLOOD LOSS: ICD-10-CM

## 2017-12-15 DIAGNOSIS — C85.13 B-CELL LYMPHOMA OF INTRA-ABDOMINAL LYMPH NODES, UNSPECIFIED B-CELL LYMPHOMA TYPE (HCC): ICD-10-CM

## 2017-12-15 DIAGNOSIS — C85.13 B-CELL LYMPHOMA OF INTRA-ABDOMINAL LYMPH NODES, UNSPECIFIED B-CELL LYMPHOMA TYPE (HCC): Primary | ICD-10-CM

## 2017-12-15 LAB
ALBUMIN SERPL-MCNC: 3.9 G/DL (ref 3.5–5.2)
ALBUMIN/GLOB SERPL: 1.2 G/DL (ref 1.1–2.4)
ALP SERPL-CCNC: 72 U/L (ref 38–116)
ALT SERPL W P-5'-P-CCNC: 9 U/L (ref 0–41)
ANION GAP SERPL CALCULATED.3IONS-SCNC: 10.3 MMOL/L
AST SERPL-CCNC: 18 U/L (ref 0–40)
BASOPHILS # BLD AUTO: 0.07 10*3/MM3 (ref 0–0.1)
BASOPHILS NFR BLD AUTO: 0.9 % (ref 0–1.1)
BILIRUB SERPL-MCNC: 0.2 MG/DL (ref 0.1–1.2)
BUN BLD-MCNC: 23 MG/DL (ref 6–20)
BUN/CREAT SERPL: 16.2 (ref 7.3–30)
CALCIUM SPEC-SCNC: 9.3 MG/DL (ref 8.5–10.2)
CHLORIDE SERPL-SCNC: 103 MMOL/L (ref 98–107)
CO2 SERPL-SCNC: 27.7 MMOL/L (ref 22–29)
CREAT BLD-MCNC: 1.42 MG/DL (ref 0.7–1.3)
DEPRECATED RDW RBC AUTO: 41 FL (ref 37–49)
EOSINOPHIL # BLD AUTO: 0.28 10*3/MM3 (ref 0–0.36)
EOSINOPHIL NFR BLD AUTO: 3.8 % (ref 1–5)
ERYTHROCYTE [DISTWIDTH] IN BLOOD BY AUTOMATED COUNT: 13.1 % (ref 11.7–14.5)
GFR SERPL CREATININE-BSD FRML MDRD: 48 ML/MIN/1.73
GLOBULIN UR ELPH-MCNC: 3.2 GM/DL (ref 1.8–3.5)
GLUCOSE BLD-MCNC: 94 MG/DL (ref 74–124)
HCT VFR BLD AUTO: 28.9 % (ref 40–49)
HGB BLD-MCNC: 9.4 G/DL (ref 13.5–16.5)
HOLD SPECIMEN: NORMAL
IMM GRANULOCYTES # BLD: 0.04 10*3/MM3 (ref 0–0.03)
IMM GRANULOCYTES NFR BLD: 0.5 % (ref 0–0.5)
LYMPHOCYTES # BLD AUTO: 0.97 10*3/MM3 (ref 1–3.5)
LYMPHOCYTES NFR BLD AUTO: 13.2 % (ref 20–49)
MCH RBC QN AUTO: 28.1 PG (ref 27–33)
MCHC RBC AUTO-ENTMCNC: 32.5 G/DL (ref 32–35)
MCV RBC AUTO: 86.5 FL (ref 83–97)
MONOCYTES # BLD AUTO: 0.78 10*3/MM3 (ref 0.25–0.8)
MONOCYTES NFR BLD AUTO: 10.6 % (ref 4–12)
NEUTROPHILS # BLD AUTO: 5.23 10*3/MM3 (ref 1.5–7)
NEUTROPHILS NFR BLD AUTO: 71 % (ref 39–75)
NRBC BLD MANUAL-RTO: 0 /100 WBC (ref 0–0)
PLATELET # BLD AUTO: 358 10*3/MM3 (ref 150–375)
PMV BLD AUTO: 10.5 FL (ref 8.9–12.1)
POTASSIUM BLD-SCNC: 4.3 MMOL/L (ref 3.5–4.7)
PROT SERPL-MCNC: 7.1 G/DL (ref 6.3–8)
RBC # BLD AUTO: 3.34 10*6/MM3 (ref 4.3–5.5)
SODIUM BLD-SCNC: 141 MMOL/L (ref 134–145)
WBC NRBC COR # BLD: 7.37 10*3/MM3 (ref 4–10)

## 2017-12-15 PROCEDURE — 25010000002 FERUMOXYTOL 510 MG/17ML SOLUTION 510 MG VIAL: Performed by: INTERNAL MEDICINE

## 2017-12-15 PROCEDURE — 96375 TX/PRO/DX INJ NEW DRUG ADDON: CPT

## 2017-12-15 PROCEDURE — 96413 CHEMO IV INFUSION 1 HR: CPT

## 2017-12-15 PROCEDURE — 36415 COLL VENOUS BLD VENIPUNCTURE: CPT

## 2017-12-15 PROCEDURE — 80053 COMPREHEN METABOLIC PANEL: CPT

## 2017-12-15 PROCEDURE — 85025 COMPLETE CBC W/AUTO DIFF WBC: CPT | Performed by: INTERNAL MEDICINE

## 2017-12-15 PROCEDURE — 25010000002 RITUXIMAB 10 MG/ML SOLUTION 10 ML VIAL: Performed by: INTERNAL MEDICINE

## 2017-12-15 PROCEDURE — 96415 CHEMO IV INFUSION ADDL HR: CPT

## 2017-12-15 PROCEDURE — 25010000002 DIPHENHYDRAMINE PER 50 MG: Performed by: INTERNAL MEDICINE

## 2017-12-15 PROCEDURE — 25010000002 RITUXIMAB 10 MG/ML SOLUTION 50 ML VIAL: Performed by: INTERNAL MEDICINE

## 2017-12-15 PROCEDURE — 36416 COLLJ CAPILLARY BLOOD SPEC: CPT | Performed by: INTERNAL MEDICINE

## 2017-12-15 RX ORDER — ACETAMINOPHEN 325 MG/1
650 TABLET ORAL ONCE
Status: COMPLETED | OUTPATIENT
Start: 2017-12-15 | End: 2017-12-15

## 2017-12-15 RX ORDER — SODIUM CHLORIDE 9 MG/ML
250 INJECTION, SOLUTION INTRAVENOUS ONCE
Status: CANCELLED | OUTPATIENT
Start: 2017-12-15

## 2017-12-15 RX ORDER — SODIUM CHLORIDE 9 MG/ML
250 INJECTION, SOLUTION INTRAVENOUS ONCE
Status: COMPLETED | OUTPATIENT
Start: 2017-12-15 | End: 2017-12-15

## 2017-12-15 RX ORDER — FAMOTIDINE 10 MG/ML
20 INJECTION, SOLUTION INTRAVENOUS ONCE
Status: COMPLETED | OUTPATIENT
Start: 2017-12-15 | End: 2017-12-15

## 2017-12-15 RX ADMIN — DIPHENHYDRAMINE HYDROCHLORIDE 25 MG: 50 INJECTION, SOLUTION INTRAMUSCULAR; INTRAVENOUS at 10:10

## 2017-12-15 RX ADMIN — FAMOTIDINE 20 MG: 10 INJECTION, SOLUTION INTRAVENOUS at 10:09

## 2017-12-15 RX ADMIN — SODIUM CHLORIDE 250 ML: 900 INJECTION, SOLUTION INTRAVENOUS at 09:56

## 2017-12-15 RX ADMIN — RITUXIMAB 720 MG: 10 INJECTION, SOLUTION INTRAVENOUS at 11:27

## 2017-12-15 RX ADMIN — ACETAMINOPHEN 650 MG: 325 TABLET ORAL at 10:09

## 2017-12-15 RX ADMIN — FERUMOXYTOL 510 MG: 510 INJECTION INTRAVENOUS at 10:50

## 2017-12-15 RX ADMIN — SODIUM CHLORIDE 250 ML: 900 INJECTION, SOLUTION INTRAVENOUS at 09:55

## 2017-12-22 ENCOUNTER — INFUSION (OUTPATIENT)
Dept: ONCOLOGY | Facility: HOSPITAL | Age: 77
End: 2017-12-22

## 2017-12-22 ENCOUNTER — OFFICE VISIT (OUTPATIENT)
Dept: ONCOLOGY | Facility: CLINIC | Age: 77
End: 2017-12-22

## 2017-12-22 ENCOUNTER — LAB (OUTPATIENT)
Dept: OTHER | Facility: HOSPITAL | Age: 77
End: 2017-12-22

## 2017-12-22 VITALS
WEIGHT: 166 LBS | DIASTOLIC BLOOD PRESSURE: 65 MMHG | RESPIRATION RATE: 16 BRPM | HEIGHT: 69 IN | BODY MASS INDEX: 24.59 KG/M2 | TEMPERATURE: 98.5 F | SYSTOLIC BLOOD PRESSURE: 133 MMHG | HEART RATE: 79 BPM | OXYGEN SATURATION: 100 %

## 2017-12-22 VITALS — HEART RATE: 74 BPM | DIASTOLIC BLOOD PRESSURE: 72 MMHG | SYSTOLIC BLOOD PRESSURE: 123 MMHG

## 2017-12-22 DIAGNOSIS — C85.13 B-CELL LYMPHOMA OF INTRA-ABDOMINAL LYMPH NODES, UNSPECIFIED B-CELL LYMPHOMA TYPE (HCC): Primary | ICD-10-CM

## 2017-12-22 DIAGNOSIS — C83.03 SMALL B-CELL LYMPHOMA OF INTRA-ABDOMINAL LYMPH NODES (HCC): Primary | ICD-10-CM

## 2017-12-22 DIAGNOSIS — D50.0 IRON DEFICIENCY ANEMIA DUE TO CHRONIC BLOOD LOSS: ICD-10-CM

## 2017-12-22 DIAGNOSIS — N13.30 HYDRONEPHROSIS OF LEFT KIDNEY: ICD-10-CM

## 2017-12-22 LAB
BASOPHILS # BLD AUTO: 0.1 10*3/MM3 (ref 0–0.2)
BASOPHILS NFR BLD AUTO: 1.5 % (ref 0–1.5)
DEPRECATED RDW RBC AUTO: 43.4 FL (ref 37–54)
EOSINOPHIL # BLD AUTO: 0.31 10*3/MM3 (ref 0–0.7)
EOSINOPHIL NFR BLD AUTO: 4.6 % (ref 0.3–6.2)
ERYTHROCYTE [DISTWIDTH] IN BLOOD BY AUTOMATED COUNT: 14.6 % (ref 11.5–14.5)
HCT VFR BLD AUTO: 28.5 % (ref 40.4–52.2)
HGB BLD-MCNC: 9.4 G/DL (ref 13.7–17.6)
IMM GRANULOCYTES # BLD: 0.02 10*3/MM3 (ref 0–0.03)
IMM GRANULOCYTES NFR BLD: 0.3 % (ref 0–0.5)
LYMPHOCYTES # BLD AUTO: 0.75 10*3/MM3 (ref 0.9–4.8)
LYMPHOCYTES NFR BLD AUTO: 11.2 % (ref 19.6–45.3)
MCH RBC QN AUTO: 28.7 PG (ref 27–32.7)
MCHC RBC AUTO-ENTMCNC: 33 G/DL (ref 32.6–36.4)
MCV RBC AUTO: 87.2 FL (ref 79.8–96.2)
MONOCYTES # BLD AUTO: 0.72 10*3/MM3 (ref 0.2–1.2)
MONOCYTES NFR BLD AUTO: 10.7 % (ref 5–12)
NEUTROPHILS # BLD AUTO: 4.8 10*3/MM3 (ref 1.9–8.1)
NEUTROPHILS NFR BLD AUTO: 71.7 % (ref 42.7–76)
NRBC BLD MANUAL-RTO: 0 /100 WBC (ref 0–0)
PLATELET # BLD AUTO: 305 10*3/MM3 (ref 140–500)
PMV BLD AUTO: 10.9 FL (ref 6–12)
RBC # BLD AUTO: 3.27 10*6/MM3 (ref 4.6–6)
WBC NRBC COR # BLD: 6.7 10*3/MM3 (ref 4.5–10.7)

## 2017-12-22 PROCEDURE — 25010000002 FERUMOXYTOL 510 MG/17ML SOLUTION 510 MG VIAL: Performed by: INTERNAL MEDICINE

## 2017-12-22 PROCEDURE — 25010000002 DIPHENHYDRAMINE PER 50 MG: Performed by: INTERNAL MEDICINE

## 2017-12-22 PROCEDURE — 96413 CHEMO IV INFUSION 1 HR: CPT

## 2017-12-22 PROCEDURE — 25010000002 RITUXIMAB 10 MG/ML SOLUTION 10 ML VIAL: Performed by: INTERNAL MEDICINE

## 2017-12-22 PROCEDURE — 96375 TX/PRO/DX INJ NEW DRUG ADDON: CPT

## 2017-12-22 PROCEDURE — 25010000002 RITUXIMAB 10 MG/ML SOLUTION 50 ML VIAL: Performed by: INTERNAL MEDICINE

## 2017-12-22 PROCEDURE — 36415 COLL VENOUS BLD VENIPUNCTURE: CPT

## 2017-12-22 PROCEDURE — 96415 CHEMO IV INFUSION ADDL HR: CPT

## 2017-12-22 PROCEDURE — 85025 COMPLETE CBC W/AUTO DIFF WBC: CPT | Performed by: INTERNAL MEDICINE

## 2017-12-22 PROCEDURE — 99214 OFFICE O/P EST MOD 30 MIN: CPT | Performed by: INTERNAL MEDICINE

## 2017-12-22 RX ORDER — ACETAMINOPHEN 325 MG/1
650 TABLET ORAL ONCE
Status: COMPLETED | OUTPATIENT
Start: 2017-12-22 | End: 2017-12-22

## 2017-12-22 RX ORDER — SODIUM CHLORIDE 9 MG/ML
250 INJECTION, SOLUTION INTRAVENOUS ONCE
Status: DISCONTINUED | OUTPATIENT
Start: 2017-12-22 | End: 2017-12-22 | Stop reason: HOSPADM

## 2017-12-22 RX ORDER — SODIUM CHLORIDE 9 MG/ML
250 INJECTION, SOLUTION INTRAVENOUS ONCE
Status: CANCELLED | OUTPATIENT
Start: 2017-12-22

## 2017-12-22 RX ORDER — FAMOTIDINE 10 MG/ML
20 INJECTION, SOLUTION INTRAVENOUS ONCE
Status: COMPLETED | OUTPATIENT
Start: 2017-12-22 | End: 2017-12-22

## 2017-12-22 RX ADMIN — FAMOTIDINE 20 MG: 10 INJECTION, SOLUTION INTRAVENOUS at 09:13

## 2017-12-22 RX ADMIN — DIPHENHYDRAMINE HYDROCHLORIDE 25 MG: 50 INJECTION, SOLUTION INTRAMUSCULAR; INTRAVENOUS at 09:17

## 2017-12-22 RX ADMIN — ACETAMINOPHEN 650 MG: 325 TABLET ORAL at 09:11

## 2017-12-22 RX ADMIN — FERUMOXYTOL 510 MG: 510 INJECTION INTRAVENOUS at 13:00

## 2017-12-22 RX ADMIN — SODIUM CHLORIDE 250 ML: 900 INJECTION, SOLUTION INTRAVENOUS at 09:05

## 2017-12-22 RX ADMIN — RITUXIMAB 720 MG: 10 INJECTION, SOLUTION INTRAVENOUS at 10:07

## 2017-12-22 NOTE — PROGRESS NOTES
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    HISTORY OF PRESENT ILLNESS:  The patient is a 77 y.o. year old male who had recently undergone CT scan of the abdomen on 10/11/2017 to evaluate some left lower quadrant abdominal pain.  CT scan findings were worrisome for metastatic malignancy with multiple tumor implants noted on the scan.  The largest mass appeared to be possibly arising from the duodenum or jejunum in the upper abdomen.  There was no evidence of metastases to liver.    CT-guided biopsy was performed on 10/18/2017 but unfortunately pathology was nondiagnostic showing only benign skeletal muscle and fibroadipose tissue.    The patient was seen in consultation on 10/20/2017 and at that time we recommended laparoscopic lymph node sampling and upper and lower GI endoscopy.  He underwent these procedures with Dr. Castañeda on 10/26/2017.  There was no evidence of malignancy from his endoscopic procedures at the laparoscopic specimen returned as follicular center cell B-cell non-Hodgkin's lymphoma.     He also had undergone a PET scan which was performed on 11/1/2017 with scattered lymphadenopathy which is hypermetabolic throughout the abdomen and chest therefore this appears to be at least stage III disease.    He started single agent Rituxan treatment on 12/8/2017 and is here today for his third week of treatment with 4 weeks total planned.  He seems to be feeling great.  He reports his abdominal pain has improved.  He has not had any major issues with the Rituxan therapy.    He also is iron deficient and is receiving IV Feraheme.  He received his first dose of 510 mg last week and will receive his second dose today.  He also is taking some oral iron therapy.    History of Present Illness     Past Medical History:   Diagnosis Date   • Cancer 2017    Basal Cell on Nose  "and Under Right Eye   • Coronary artery disease    • DDD (degenerative disc disease), cervical    • Gastric mass    • GERD (gastroesophageal reflux disease)    • Hx of cardiac arrest     Happened after Induction of anesthesia prior  to SURG 10 YRS AGO  \" I FLATLINE BUT W/IN SECONDS HEART WAS OK AFTER TURNED ON BACK...I THINK THEY GAVE ME TO MUCH ANESTHESIA\"   • Hyperlipidemia    • Hypertension    • Kidney stones    • Osteoarthritis    • Pleural effusion     DRAINAGE RT PLEURAL FLUID FOR TESTING WITH EGD ON 1-30-17   NO MALIGNANCY WITH CLEARANCE FROM DR SIMMONS FOR SURGERY   • Rheumatoid arthritis         Past Surgical History:   Procedure Laterality Date   • COLONOSCOPY N/A 10/26/2017    Procedure: COLONOSCOPY;  Surgeon: Louie Castañeda MD;  Location: [facility] OR;  Service:    • CORONARY ANGIOPLASTY WITH STENT PLACEMENT  1999    17 years ago   • CYSTOSCOPY W/ LITHOLAPAXY / EHL      6-7 years ago   • DIAGNOSTIC LAPAROSCOPY N/A 10/26/2017    Procedure: DIAGNOSTIC LAPAROSCOPY WITH RETROPERITONEAL BIOPSIES;  Surgeon: Louie Castañeda MD;  Location: [facility] OR;  Service:    • ENDOSCOPY      ON 1/30/17 WITH DRAINAGE OF RT PLEURAL FLUID FOR BIOPSY    NO MALIGNANCY  CLEARANCE FOR SURG PER DR SIMMONS   • ENDOSCOPY N/A 10/26/2017    Procedure: ESOPHAGOGASTRODUODENOSCOPY;  Surgeon: Louie Castañeda MD;  Location: [facility] OR;  Service:    • EYE SURGERY Right 2017    Moh's; reconstruction right lower lid   • KIDNEY STONE SURGERY     • PLEURAL BIOPSY     • POSTERIOR CERVICAL FUSION  2001   • SKIN CANCER EXCISION N/A 2017    Basal Cell Nose & under right eye   • TOTAL HIP ARTHROPLASTY Left 04/2010    9-10 years ago    • TOTAL HIP ARTHROPLASTY Right 2/14/2017    Procedure: TOTAL HIP ARTHROPLASTY;  Surgeon: Gerson Cardoza MD;  Location: [facility] OR;  Service:          ALLERGIES:  No Known Allergies     Review of Systems   Constitutional: Negative for activity change, appetite change, fatigue, fever and " "unexpected weight change.   HENT: Negative for hearing loss, nosebleeds, trouble swallowing and voice change.    Eyes: Negative for visual disturbance.   Respiratory: Negative for cough, shortness of breath and wheezing.    Cardiovascular: Negative for chest pain and palpitations.   Gastrointestinal: Positive for abdominal pain. Negative for diarrhea, nausea and vomiting.   Genitourinary: Negative for difficulty urinating, frequency, hematuria and urgency.   Musculoskeletal: Negative for back pain and neck pain.   Skin: Negative for rash.   Neurological: Negative for dizziness, seizures, syncope and headaches.   Hematological: Negative for adenopathy. Does not bruise/bleed easily.   Psychiatric/Behavioral: Negative for behavioral problems. The patient is not nervous/anxious.         Objective     Vitals:    12/22/17 0805   BP: 133/65   Pulse: 79   Resp: 16   Temp: 98.5 °F (36.9 °C)   TempSrc: Oral   SpO2: 100%   Weight: 75.3 kg (166 lb)   Height: 174 cm (68.5\")   PainSc: 0-No pain  Comment: ABDOMEN pain off and on   PainLoc: Abdomen     Current Status 12/22/2017   ECOG score 0       Physical Exam   Constitutional: He is oriented to person, place, and time. He appears well-developed and well-nourished. No distress.   HENT:   Head: Normocephalic.   Eyes: Conjunctivae and EOM are normal. Pupils are equal, round, and reactive to light. No scleral icterus.   Neck: Normal range of motion. Neck supple. No JVD present. No thyromegaly present.   Cardiovascular: Normal rate and regular rhythm.  Exam reveals no gallop and no friction rub.    No murmur heard.  Pulmonary/Chest: Effort normal and breath sounds normal. He has no wheezes. He has no rales.   Abdominal: Soft. He exhibits no distension and no mass. There is no tenderness.   Musculoskeletal: Normal range of motion. He exhibits no edema or deformity.   Lymphadenopathy:     He has no cervical adenopathy.   Neurological: He is alert and oriented to person, place, and " time. He has normal reflexes. No cranial nerve deficit.   Skin: Skin is warm and dry. No rash noted. No erythema.   Psychiatric: He has a normal mood and affect. His behavior is normal. Judgment normal.         RECENT LABS:  Hematology WBC   Date Value Ref Range Status   12/22/2017 6.70 4.50 - 10.70 10*3/mm3 Final     RBC   Date Value Ref Range Status   12/22/2017 3.27 (L) 4.60 - 6.00 10*6/mm3 Final     Hemoglobin   Date Value Ref Range Status   12/22/2017 9.4 (L) 13.7 - 17.6 g/dL Final     Hematocrit   Date Value Ref Range Status   12/22/2017 28.5 (L) 40.4 - 52.2 % Final     Platelets   Date Value Ref Range Status   12/22/2017 305 140 - 500 10*3/mm3 Final        Lab Results   Component Value Date    IRON 28 (L) 12/05/2017    TIBC 364 12/05/2017    FERRITIN 15.90 (L) 12/05/2017     Lab Results   Component Value Date    GLUCOSE 94 12/15/2017    BUN 23 (H) 12/15/2017    CREATININE 1.42 (H) 12/15/2017    EGFRIFNONA 48 (L) 12/15/2017    EGFRIFAFRI 59 (L) 09/25/2017    BCR 16.2 12/15/2017    K 4.3 12/15/2017    CO2 27.7 12/15/2017    CALCIUM 9.3 12/15/2017    PROTENTOTREF 6.4 09/25/2017    ALBUMIN 3.90 12/15/2017    LABIL2 1.2 12/15/2017    AST 18 12/15/2017    ALT 9 12/15/2017         CT ABDOMEN AND PELVIS, NONCONTRAST, 10/11/2017:  IMPRESSION:  1. CT findings compatible with extensive multifocal malignant tumor  within the abdomen and pelvis as detailed above. Largest mass involves  the third portion of the duodenum, but there are additional tumor  implants elsewhere within the abdomen, including a smaller lesion  involving the splenic flexure of colon. Left pelvic sidewall mass  obstructs the left distal ureter causing moderate left hydronephrosis.  2. High-grade metastatic malignancy, likely of GI tract origin, is  suspected. Endoscopic ultrasound-guided biopsy of the duodenal mass may  be considered.  3. No significant malignant ascites. Trace free fluid in the pelvis.  Tiny right pleural effusion.  4. Stat final  "copy of this report was sent to the ordering physician's  office immediately following this dictation with telephone notification  and documentation.    Final Diagnosis   \"PERITONEAL BIOPSY\", NEEDLE BIOPSY:          BENIGN SKELETAL MUSCLE AND FIBROADIPOSE TISSUE - NO NEOPLASM.        F-18 FDG PET FROM SKULL BASE TO MID THIGH WITH PET/CT FUSION 11/1/2017      HISTORY: 77-year-old male with non-Hodgkin's lymphoma. Staging.      TECHNIQUE: Radiation dose reduction techniques were utilized, including  automated exposure control and exposure modulation based on body size.   Blood glucose level at time of injection was 85 mg/dL.  6.7 mCi of F-18  FDG were injected and PET was performed from skull base to mid thigh. CT  was obtained for localization and attenuation correction. Time at  injection 7:23 AM. PET start time 8:48 AM. Compared with outside  facility CT of the abdomen and pelvis from 10/11/2017 and chest CT from  01/08/2017.      FINDINGS: In addition to the intensely hypermetabolic infiltrative  lymphadenopathy within the mesentery and retroperitoneum, there are  multiple intensely hypermetabolic bowel lesions. The infiltrative  lymphadenopathy in the left paraaortic region and mesentery measures  approximately 6 x 5 cm and has a maximal SUV of 9.8. The infiltrative  lymphadenopathy at the left aspect of the pelvis obstructing the distal  left ureter has a maximal SUV of 17.3. There is moderately large left  hydroureteronephrosis to the level of the distal ureter. There is  significantly decreased metabolic activity within the relatively  atrophied left kidney and there is an absent left pyelogram. There are  also multiple hypermetabolic mesenteric lesions. An approximately 2.7 x  2.0 cm left lower abdominal mesenteric lesion has a maximal SUV of 15.4.  The small bowel involvement at the left midabdomen where there is  circumferential thickening of a segment of small bowel has a maximal SUV  of 34.0. The small " "bowel involvement at the distal ileum has a maximal  SUV of 30.8. There is no abnormal hypermetabolic activity involving the  spleen or liver. Within the chest, there is hypermetabolic mediastinal  lymphadenopathy with one of the mediastinal nodes measuring  approximately 1.8 x 1.4 cm at the anterior mediastinum with a maximal  SUV of 6.7. There is also hypermetabolic retrodiaphragmatic  lymphadenopathy. There is prominent brown fat activity throughout the  thorax and neck. There are hypermetabolic rib lesions. There is  hypermetabolic activity at the posterior right 4th rib, posterior right  10th rib, and the posterior left 12th rib. Most intense activity is at  the posterior right 4th rib with a maximal SUV of 7.5.      IMPRESSION:  In addition to hypermetabolic lymphadenopathy within the chest, abdomen,  and pelvis, there are intensely hypermetabolic bowel lesions and there  are also hypermetabolic rib lesions.    LAPAROSCOPIC SPECIMEN 10/26/2017   1.  \"PERITONEAL MASS\". EXCISION:                         ATYPICAL LYMPHOID INFILTRATE WITH SCLEROSIS, WORRISOME FOR MALIGNANT LYMPHOMA (SEE                                COMMENT).      2.  \"PERITONEAL MASS\". EXCISION:                         ATYPICAL LYMPHOID INFILTRATE WITH SCLEROSIS, WORRISOME FOR MALIGNANT LYMPHOMA (SEE                                COMMENT).      3.  \"RETROPERITONEAL MASS\", BIOPSY:                         ATYPICAL LYMPHOID INFILTRATE WITH SCLEROSIS, WORRISOME FOR MALIGNANT LYMPHOMA (SEE                                COMMENT).      COMMENT:  A limited panel of immunohistochemical stains were performed.  See Microscopic Description for immunohistochemical staining details.  Representative paraffin blocks will be forwarded to [facility] for additional immunophenotyping studies to better characterize this atypical lymphoid infiltrate.  This case will be finalized following completion of these studies.     Assessment/Plan     1.  B-cell " non-Hodgkin's lymphoma.  This appears to be low-grade follicular center cell type with diffuse lymphadenopathy and bowel involvement.  Patient is remarkably asymptomatic and does not have bulky disease.  He was started on single agent Rituxan and is tolerating it well.  He'll proceed with his third dose of Rituxan in the office today.   2.  Anemia.  Labs demonstrated iron deficiency and he is now taking oral ferrous sulfate twice daily.  In spite of this is a hemoglobin was not improving and we are currently giving him IV Feraheme weekly for 2 doses.  He'll receive his second dose of IV Feraheme in the office today.    Plan  1.  Proceed with week #3 of Rituxan treatment today at the same dose.  2.  Second week of IV Feraheme 510 mg IV will be delivered today.  3.  He will also continue on oral iron therapy twice daily.  4.  Return next week for his fourth and final weekly dose of Rituxan.  5.  He will be scheduled for follow-up CT scans of the chest abdomen and pelvis 5 weeks from now and also at that time will undergo additional lab studies including a CBC, serum chemistries, iron profile, and ferritin.  6.  M.D. follow-up in 6 weeks to review the results of the scans and labs.  If he has had good response to Rituxan therapy we will arrange for further maintenance Rituxan with 1 dose of Rituxan every 3 months for 2 years (8 doses total) .

## 2017-12-29 ENCOUNTER — LAB (OUTPATIENT)
Dept: LAB | Facility: HOSPITAL | Age: 77
End: 2017-12-29

## 2017-12-29 ENCOUNTER — INFUSION (OUTPATIENT)
Dept: ONCOLOGY | Facility: HOSPITAL | Age: 77
End: 2017-12-29

## 2017-12-29 VITALS
WEIGHT: 164 LBS | TEMPERATURE: 98.4 F | BODY MASS INDEX: 24.57 KG/M2 | SYSTOLIC BLOOD PRESSURE: 124 MMHG | DIASTOLIC BLOOD PRESSURE: 72 MMHG | HEART RATE: 67 BPM

## 2017-12-29 DIAGNOSIS — C85.13 B-CELL LYMPHOMA OF INTRA-ABDOMINAL LYMPH NODES, UNSPECIFIED B-CELL LYMPHOMA TYPE (HCC): Primary | ICD-10-CM

## 2017-12-29 LAB
BASOPHILS # BLD AUTO: 0.09 10*3/MM3 (ref 0–0.1)
BASOPHILS NFR BLD AUTO: 1.3 % (ref 0–1.1)
DEPRECATED RDW RBC AUTO: 53.5 FL (ref 37–49)
EOSINOPHIL # BLD AUTO: 0.18 10*3/MM3 (ref 0–0.36)
EOSINOPHIL NFR BLD AUTO: 2.5 % (ref 1–5)
ERYTHROCYTE [DISTWIDTH] IN BLOOD BY AUTOMATED COUNT: 16.7 % (ref 11.7–14.5)
HCT VFR BLD AUTO: 33.2 % (ref 40–49)
HGB BLD-MCNC: 10.8 G/DL (ref 13.5–16.5)
IMM GRANULOCYTES # BLD: 0.04 10*3/MM3 (ref 0–0.03)
IMM GRANULOCYTES NFR BLD: 0.6 % (ref 0–0.5)
LYMPHOCYTES # BLD AUTO: 0.93 10*3/MM3 (ref 1–3.5)
LYMPHOCYTES NFR BLD AUTO: 13 % (ref 20–49)
MCH RBC QN AUTO: 29 PG (ref 27–33)
MCHC RBC AUTO-ENTMCNC: 32.5 G/DL (ref 32–35)
MCV RBC AUTO: 89.2 FL (ref 83–97)
MONOCYTES # BLD AUTO: 0.73 10*3/MM3 (ref 0.25–0.8)
MONOCYTES NFR BLD AUTO: 10.2 % (ref 4–12)
NEUTROPHILS # BLD AUTO: 5.16 10*3/MM3 (ref 1.5–7)
NEUTROPHILS NFR BLD AUTO: 72.4 % (ref 39–75)
NRBC BLD MANUAL-RTO: 0 /100 WBC (ref 0–0)
PLATELET # BLD AUTO: 251 10*3/MM3 (ref 150–375)
PMV BLD AUTO: 11.1 FL (ref 8.9–12.1)
RBC # BLD AUTO: 3.72 10*6/MM3 (ref 4.3–5.5)
WBC NRBC COR # BLD: 7.13 10*3/MM3 (ref 4–10)

## 2017-12-29 PROCEDURE — 36415 COLL VENOUS BLD VENIPUNCTURE: CPT | Performed by: INTERNAL MEDICINE

## 2017-12-29 PROCEDURE — 25010000002 RITUXIMAB 10 MG/ML SOLUTION 10 ML VIAL: Performed by: INTERNAL MEDICINE

## 2017-12-29 PROCEDURE — 25010000002 RITUXIMAB 10 MG/ML SOLUTION 50 ML VIAL: Performed by: INTERNAL MEDICINE

## 2017-12-29 PROCEDURE — 96375 TX/PRO/DX INJ NEW DRUG ADDON: CPT | Performed by: INTERNAL MEDICINE

## 2017-12-29 PROCEDURE — 25010000002 DIPHENHYDRAMINE PER 50 MG: Performed by: INTERNAL MEDICINE

## 2017-12-29 PROCEDURE — 96415 CHEMO IV INFUSION ADDL HR: CPT | Performed by: INTERNAL MEDICINE

## 2017-12-29 PROCEDURE — 96413 CHEMO IV INFUSION 1 HR: CPT | Performed by: INTERNAL MEDICINE

## 2017-12-29 PROCEDURE — 85025 COMPLETE CBC W/AUTO DIFF WBC: CPT | Performed by: INTERNAL MEDICINE

## 2017-12-29 RX ORDER — FAMOTIDINE 10 MG/ML
20 INJECTION, SOLUTION INTRAVENOUS ONCE
Status: COMPLETED | OUTPATIENT
Start: 2017-12-29 | End: 2017-12-29

## 2017-12-29 RX ORDER — ACETAMINOPHEN 325 MG/1
650 TABLET ORAL ONCE
Status: COMPLETED | OUTPATIENT
Start: 2017-12-29 | End: 2017-12-29

## 2017-12-29 RX ADMIN — RITUXIMAB 720 MG: 10 INJECTION, SOLUTION INTRAVENOUS at 09:40

## 2017-12-29 RX ADMIN — FAMOTIDINE 20 MG: 10 INJECTION, SOLUTION INTRAVENOUS at 08:51

## 2017-12-29 RX ADMIN — DIPHENHYDRAMINE HYDROCHLORIDE 25 MG: 50 INJECTION, SOLUTION INTRAMUSCULAR; INTRAVENOUS at 08:53

## 2017-12-29 RX ADMIN — SODIUM CHLORIDE 250 ML: 900 INJECTION, SOLUTION INTRAVENOUS at 08:45

## 2017-12-29 RX ADMIN — ACETAMINOPHEN 650 MG: 325 TABLET ORAL at 08:51

## 2018-01-12 ENCOUNTER — CLINICAL SUPPORT (OUTPATIENT)
Dept: ONCOLOGY | Facility: HOSPITAL | Age: 78
End: 2018-01-12

## 2018-01-12 ENCOUNTER — LAB (OUTPATIENT)
Dept: LAB | Facility: HOSPITAL | Age: 78
End: 2018-01-12

## 2018-01-12 VITALS — TEMPERATURE: 97.5 F | DIASTOLIC BLOOD PRESSURE: 83 MMHG | HEART RATE: 76 BPM | SYSTOLIC BLOOD PRESSURE: 147 MMHG

## 2018-01-12 DIAGNOSIS — D50.0 IRON DEFICIENCY ANEMIA DUE TO CHRONIC BLOOD LOSS: ICD-10-CM

## 2018-01-12 DIAGNOSIS — N13.30 HYDRONEPHROSIS OF LEFT KIDNEY: ICD-10-CM

## 2018-01-12 DIAGNOSIS — C83.03 SMALL B-CELL LYMPHOMA OF INTRA-ABDOMINAL LYMPH NODES (HCC): ICD-10-CM

## 2018-01-12 LAB
BASOPHILS # BLD AUTO: 0.05 10*3/MM3 (ref 0–0.1)
BASOPHILS NFR BLD AUTO: 0.8 % (ref 0–1.1)
DEPRECATED RDW RBC AUTO: 55.8 FL (ref 37–49)
EOSINOPHIL # BLD AUTO: 0.17 10*3/MM3 (ref 0–0.36)
EOSINOPHIL NFR BLD AUTO: 2.6 % (ref 1–5)
ERYTHROCYTE [DISTWIDTH] IN BLOOD BY AUTOMATED COUNT: 17.2 % (ref 11.7–14.5)
HCT VFR BLD AUTO: 34.8 % (ref 40–49)
HGB BLD-MCNC: 11.6 G/DL (ref 13.5–16.5)
IMM GRANULOCYTES # BLD: 0.01 10*3/MM3 (ref 0–0.03)
IMM GRANULOCYTES NFR BLD: 0.2 % (ref 0–0.5)
LYMPHOCYTES # BLD AUTO: 1.03 10*3/MM3 (ref 1–3.5)
LYMPHOCYTES NFR BLD AUTO: 15.8 % (ref 20–49)
MCH RBC QN AUTO: 29.7 PG (ref 27–33)
MCHC RBC AUTO-ENTMCNC: 33.3 G/DL (ref 32–35)
MCV RBC AUTO: 89 FL (ref 83–97)
MONOCYTES # BLD AUTO: 0.65 10*3/MM3 (ref 0.25–0.8)
MONOCYTES NFR BLD AUTO: 10 % (ref 4–12)
NEUTROPHILS # BLD AUTO: 4.6 10*3/MM3 (ref 1.5–7)
NEUTROPHILS NFR BLD AUTO: 70.6 % (ref 39–75)
NRBC BLD MANUAL-RTO: 0 /100 WBC (ref 0–0)
PLATELET # BLD AUTO: 204 10*3/MM3 (ref 150–375)
PMV BLD AUTO: 11.4 FL (ref 8.9–12.1)
RBC # BLD AUTO: 3.91 10*6/MM3 (ref 4.3–5.5)
WBC NRBC COR # BLD: 6.51 10*3/MM3 (ref 4–10)

## 2018-01-12 PROCEDURE — 36416 COLLJ CAPILLARY BLOOD SPEC: CPT | Performed by: INTERNAL MEDICINE

## 2018-01-12 PROCEDURE — 85025 COMPLETE CBC W/AUTO DIFF WBC: CPT | Performed by: INTERNAL MEDICINE

## 2018-01-12 NOTE — PROGRESS NOTES
CBC reviewed with pt. Counts stable. VSS. Pt states he is feeling well, voicing no complaints. Copy of labs given to pt. He will return next week for scans and in 3 weeks for MD visit.     Lab Results   Component Value Date    WBC 6.51 01/12/2018    HGB 11.6 (L) 01/12/2018    HCT 34.8 (L) 01/12/2018    MCV 89.0 01/12/2018     01/12/2018

## 2018-01-19 ENCOUNTER — HOSPITAL ENCOUNTER (OUTPATIENT)
Dept: CT IMAGING | Facility: HOSPITAL | Age: 78
Discharge: HOME | End: 2018-01-19
Attending: INTERNAL MEDICINE | Admitting: INTERNAL MEDICINE

## 2018-01-19 DIAGNOSIS — N13.30 HYDRONEPHROSIS OF LEFT KIDNEY: ICD-10-CM

## 2018-01-19 DIAGNOSIS — D50.0 IRON DEFICIENCY ANEMIA DUE TO CHRONIC BLOOD LOSS: ICD-10-CM

## 2018-01-19 DIAGNOSIS — C83.03 SMALL B-CELL LYMPHOMA OF INTRA-ABDOMINAL LYMPH NODES (HCC): ICD-10-CM

## 2018-01-19 PROCEDURE — 0 DIATRIZOATE MEGLUMINE & SODIUM PER 1 ML: Performed by: INTERNAL MEDICINE

## 2018-01-19 PROCEDURE — 71260 CT THORAX DX C+: CPT

## 2018-01-19 PROCEDURE — 74177 CT ABD & PELVIS W/CONTRAST: CPT

## 2018-01-19 PROCEDURE — 0 IOPAMIDOL PER 1 ML: Performed by: INTERNAL MEDICINE

## 2018-01-19 RX ADMIN — DIATRIZOATE MEGLUMINE AND DIATRIZOATE SODIUM 30 ML: 600; 100 SOLUTION ORAL; RECTAL at 10:15

## 2018-01-19 RX ADMIN — IOPAMIDOL 100 ML: 755 INJECTION, SOLUTION INTRAVENOUS at 10:15

## 2018-01-22 ENCOUNTER — TELEPHONE (OUTPATIENT)
Dept: ONCOLOGY | Facility: HOSPITAL | Age: 78
End: 2018-01-22

## 2018-01-22 NOTE — TELEPHONE ENCOUNTER
Pt calling about his CT scan results. Message sent to Dr. Roberto asking him to review. Called pt and informed him that once Dr. Roberto reviews results, we will call him and let him know. He v/u.

## 2018-02-02 ENCOUNTER — OFFICE VISIT (OUTPATIENT)
Dept: ONCOLOGY | Facility: CLINIC | Age: 78
End: 2018-02-02

## 2018-02-02 ENCOUNTER — LAB (OUTPATIENT)
Dept: LAB | Facility: HOSPITAL | Age: 78
End: 2018-02-02

## 2018-02-02 VITALS
HEART RATE: 73 BPM | OXYGEN SATURATION: 98 % | HEIGHT: 69 IN | RESPIRATION RATE: 16 BRPM | DIASTOLIC BLOOD PRESSURE: 82 MMHG | TEMPERATURE: 97.7 F | WEIGHT: 168.6 LBS | SYSTOLIC BLOOD PRESSURE: 148 MMHG | BODY MASS INDEX: 24.97 KG/M2

## 2018-02-02 DIAGNOSIS — N13.30 HYDRONEPHROSIS OF LEFT KIDNEY: ICD-10-CM

## 2018-02-02 DIAGNOSIS — N28.9 ACUTE RENAL INSUFFICIENCY: ICD-10-CM

## 2018-02-02 DIAGNOSIS — C83.03 SMALL B-CELL LYMPHOMA OF INTRA-ABDOMINAL LYMPH NODES (HCC): ICD-10-CM

## 2018-02-02 DIAGNOSIS — C83.03 SMALL B-CELL LYMPHOMA OF INTRA-ABDOMINAL LYMPH NODES (HCC): Primary | ICD-10-CM

## 2018-02-02 DIAGNOSIS — D50.0 IRON DEFICIENCY ANEMIA DUE TO CHRONIC BLOOD LOSS: ICD-10-CM

## 2018-02-02 LAB
ALBUMIN SERPL-MCNC: 4.1 G/DL (ref 3.5–5.2)
ALBUMIN/GLOB SERPL: 1.4 G/DL (ref 1.1–2.4)
ALP SERPL-CCNC: 66 U/L (ref 38–116)
ALT SERPL W P-5'-P-CCNC: 15 U/L (ref 0–41)
ANION GAP SERPL CALCULATED.3IONS-SCNC: 8.3 MMOL/L
AST SERPL-CCNC: 21 U/L (ref 0–40)
BASOPHILS # BLD AUTO: 0.04 10*3/MM3 (ref 0–0.1)
BASOPHILS NFR BLD AUTO: 0.6 % (ref 0–1.1)
BILIRUB SERPL-MCNC: 0.2 MG/DL (ref 0.1–1.2)
BUN BLD-MCNC: 19 MG/DL (ref 6–20)
BUN/CREAT SERPL: 14 (ref 7.3–30)
CALCIUM SPEC-SCNC: 9.2 MG/DL (ref 8.5–10.2)
CHLORIDE SERPL-SCNC: 106 MMOL/L (ref 98–107)
CO2 SERPL-SCNC: 28.7 MMOL/L (ref 22–29)
CREAT BLD-MCNC: 1.36 MG/DL (ref 0.7–1.3)
DEPRECATED RDW RBC AUTO: 60.4 FL (ref 37–49)
EOSINOPHIL # BLD AUTO: 0.05 10*3/MM3 (ref 0–0.36)
EOSINOPHIL NFR BLD AUTO: 0.7 % (ref 1–5)
ERYTHROCYTE [DISTWIDTH] IN BLOOD BY AUTOMATED COUNT: 17.7 % (ref 11.7–14.5)
FERRITIN SERPL-MCNC: 108.7 NG/ML (ref 30–400)
GFR SERPL CREATININE-BSD FRML MDRD: 51 ML/MIN/1.73
GLOBULIN UR ELPH-MCNC: 2.9 GM/DL (ref 1.8–3.5)
GLUCOSE BLD-MCNC: 99 MG/DL (ref 74–124)
HCT VFR BLD AUTO: 38.7 % (ref 40–49)
HGB BLD-MCNC: 12.6 G/DL (ref 13.5–16.5)
IMM GRANULOCYTES # BLD: 0.03 10*3/MM3 (ref 0–0.03)
IMM GRANULOCYTES NFR BLD: 0.4 % (ref 0–0.5)
IRON 24H UR-MRATE: 88 MCG/DL (ref 59–158)
IRON SATN MFR SERPL: 29 % (ref 14–48)
LYMPHOCYTES # BLD AUTO: 1.07 10*3/MM3 (ref 1–3.5)
LYMPHOCYTES NFR BLD AUTO: 15.7 % (ref 20–49)
MCH RBC QN AUTO: 29.9 PG (ref 27–33)
MCHC RBC AUTO-ENTMCNC: 32.6 G/DL (ref 32–35)
MCV RBC AUTO: 91.9 FL (ref 83–97)
MONOCYTES # BLD AUTO: 0.55 10*3/MM3 (ref 0.25–0.8)
MONOCYTES NFR BLD AUTO: 8.1 % (ref 4–12)
NEUTROPHILS # BLD AUTO: 5.06 10*3/MM3 (ref 1.5–7)
NEUTROPHILS NFR BLD AUTO: 74.5 % (ref 39–75)
NRBC BLD MANUAL-RTO: 0 /100 WBC (ref 0–0)
PLATELET # BLD AUTO: 213 10*3/MM3 (ref 150–375)
PMV BLD AUTO: 11 FL (ref 8.9–12.1)
POTASSIUM BLD-SCNC: 4.5 MMOL/L (ref 3.5–4.7)
PROT SERPL-MCNC: 7 G/DL (ref 6.3–8)
RBC # BLD AUTO: 4.21 10*6/MM3 (ref 4.3–5.5)
SODIUM BLD-SCNC: 143 MMOL/L (ref 134–145)
TIBC SERPL-MCNC: 307 MCG/DL (ref 249–505)
TRANSFERRIN SERPL-MCNC: 219 MG/DL (ref 200–360)
WBC NRBC COR # BLD: 6.8 10*3/MM3 (ref 4–10)

## 2018-02-02 PROCEDURE — 82728 ASSAY OF FERRITIN: CPT | Performed by: INTERNAL MEDICINE

## 2018-02-02 PROCEDURE — 85025 COMPLETE CBC W/AUTO DIFF WBC: CPT | Performed by: INTERNAL MEDICINE

## 2018-02-02 PROCEDURE — 36415 COLL VENOUS BLD VENIPUNCTURE: CPT | Performed by: INTERNAL MEDICINE

## 2018-02-02 PROCEDURE — 80053 COMPREHEN METABOLIC PANEL: CPT | Performed by: INTERNAL MEDICINE

## 2018-02-02 PROCEDURE — 84466 ASSAY OF TRANSFERRIN: CPT | Performed by: INTERNAL MEDICINE

## 2018-02-02 PROCEDURE — 99214 OFFICE O/P EST MOD 30 MIN: CPT | Performed by: INTERNAL MEDICINE

## 2018-02-02 PROCEDURE — 83540 ASSAY OF IRON: CPT | Performed by: INTERNAL MEDICINE

## 2018-02-02 RX ORDER — ACETAMINOPHEN 325 MG/1
650 TABLET ORAL ONCE
Status: CANCELLED | OUTPATIENT
Start: 2018-03-30

## 2018-02-02 RX ORDER — DIPHENHYDRAMINE HYDROCHLORIDE 50 MG/ML
50 INJECTION INTRAMUSCULAR; INTRAVENOUS AS NEEDED
Status: CANCELLED | OUTPATIENT
Start: 2018-03-30

## 2018-02-02 RX ORDER — MEPERIDINE HYDROCHLORIDE 50 MG/ML
25 INJECTION INTRAMUSCULAR; INTRAVENOUS; SUBCUTANEOUS
Status: CANCELLED | OUTPATIENT
Start: 2018-03-30

## 2018-02-02 RX ORDER — FAMOTIDINE 10 MG/ML
20 INJECTION, SOLUTION INTRAVENOUS AS NEEDED
Status: CANCELLED | OUTPATIENT
Start: 2018-03-30

## 2018-02-02 RX ORDER — SODIUM CHLORIDE 9 MG/ML
250 INJECTION, SOLUTION INTRAVENOUS ONCE
Status: CANCELLED | OUTPATIENT
Start: 2018-03-30

## 2018-02-02 NOTE — PROGRESS NOTES
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    HISTORY OF PRESENT ILLNESS:  The patient is a 78 y.o. year old male who had recently undergone CT scan of the abdomen on 10/11/2017 to evaluate some left lower quadrant abdominal pain.  CT scan findings were worrisome for metastatic malignancy with multiple tumor implants noted on the scan.  The largest mass appeared to be possibly arising from the duodenum or jejunum in the upper abdomen.  There was no evidence of metastases to liver.    CT-guided biopsy was performed on 10/18/2017 but unfortunately pathology was nondiagnostic showing only benign skeletal muscle and fibroadipose tissue.    The patient was seen in consultation on 10/20/2017 and at that time we recommended laparoscopic lymph node sampling and upper and lower GI endoscopy.  He underwent these procedures with Dr. Castañeda on 10/26/2017.  There was no evidence of malignancy from his endoscopic procedures at the laparoscopic specimen returned as follicular center cell B-cell non-Hodgkin's lymphoma.     He also had undergone a PET scan which was performed on 11/1/2017 with scattered lymphadenopathy which is hypermetabolic throughout the abdomen and chest therefore this appears to be at least stage III disease.    He started single agent Rituxan treatment on 12/8/2017 and completed the fourth week of Rituxan treatment on 12/29/2017.  He returns today with follow-up CT scans to review after completion of therapy.  The CT scans of the chest abdomen and pelvis performed on 1/19/2018 showed significant improvement in the previously noted areas of lymphadenopathy and no new sites of disease.    He also had received 2 doses of IV Feraheme for iron deficiency and his hemoglobin has improved to 12.6 g/dL current.  Repeat iron studies are pending.  History of Present Illness  "    Past Medical History:   Diagnosis Date   • Cancer 2017    Basal Cell on Nose and Under Right Eye   • Coronary artery disease    • DDD (degenerative disc disease), cervical    • Gastric mass    • GERD (gastroesophageal reflux disease)    • Hx of cardiac arrest     Happened after Induction of anesthesia prior  to SURG 10 YRS AGO  \" I FLATLINE BUT W/IN SECONDS HEART WAS OK AFTER TURNED ON BACK...I THINK THEY GAVE ME TO MUCH ANESTHESIA\"   • Hyperlipidemia    • Hypertension    • Kidney stones    • Osteoarthritis    • Pleural effusion     DRAINAGE RT PLEURAL FLUID FOR TESTING WITH EGD ON 1-30-17   NO MALIGNANCY WITH CLEARANCE FROM DR SIMMONS FOR SURGERY   • Rheumatoid arthritis         Past Surgical History:   Procedure Laterality Date   • COLONOSCOPY N/A 10/26/2017    Procedure: COLONOSCOPY;  Surgeon: Louie Castañeda MD;  Location: [facility] OR;  Service:    • CORONARY ANGIOPLASTY WITH STENT PLACEMENT  1999    17 years ago   • CYSTOSCOPY W/ LITHOLAPAXY / EHL      6-7 years ago   • DIAGNOSTIC LAPAROSCOPY N/A 10/26/2017    Procedure: DIAGNOSTIC LAPAROSCOPY WITH RETROPERITONEAL BIOPSIES;  Surgeon: Louie Castañeda MD;  Location: [facility] OR;  Service:    • ENDOSCOPY      ON 1/30/17 WITH DRAINAGE OF RT PLEURAL FLUID FOR BIOPSY    NO MALIGNANCY  CLEARANCE FOR SURG PER DR SIMMONS   • ENDOSCOPY N/A 10/26/2017    Procedure: ESOPHAGOGASTRODUODENOSCOPY;  Surgeon: Louie Castañeda MD;  Location: [facility] OR;  Service:    • EYE SURGERY Right 2017    Moh's; reconstruction right lower lid   • KIDNEY STONE SURGERY     • PLEURAL BIOPSY     • POSTERIOR CERVICAL FUSION  2001   • SKIN CANCER EXCISION N/A 2017    Basal Cell Nose & under right eye   • TOTAL HIP ARTHROPLASTY Left 04/2010    9-10 years ago    • TOTAL HIP ARTHROPLASTY Right 2/14/2017    Procedure: TOTAL HIP ARTHROPLASTY;  Surgeon: Gerson Cardoza MD;  Location: [facility] OR;  Service:          ALLERGIES:  No Known Allergies     Review of Systems " "  Constitutional: Negative for activity change, appetite change, fatigue, fever and unexpected weight change.   HENT: Negative for hearing loss, nosebleeds, trouble swallowing and voice change.    Eyes: Negative for visual disturbance.   Respiratory: Negative for cough, shortness of breath and wheezing.    Cardiovascular: Negative for chest pain and palpitations.   Gastrointestinal: Positive for abdominal pain. Negative for diarrhea, nausea and vomiting.   Genitourinary: Negative for difficulty urinating, frequency, hematuria and urgency.   Musculoskeletal: Negative for back pain and neck pain.   Skin: Negative for rash.   Neurological: Negative for dizziness, seizures, syncope and headaches.   Hematological: Negative for adenopathy. Does not bruise/bleed easily.   Psychiatric/Behavioral: Negative for behavioral problems. The patient is not nervous/anxious.         Objective     Vitals:    02/02/18 1321   BP: 148/82   Pulse: 73   Resp: 16   Temp: 97.7 °F (36.5 °C)   TempSrc: Oral   SpO2: 98%   Weight: 76.5 kg (168 lb 9.6 oz)   Height: 174 cm (68.5\")   PainSc: 0-No pain     Current Status 2/2/2018   ECOG score 0       Physical Exam   Constitutional: He is oriented to person, place, and time. He appears well-developed and well-nourished. No distress.   HENT:   Head: Normocephalic.   Eyes: Conjunctivae and EOM are normal. Pupils are equal, round, and reactive to light. No scleral icterus.   Neck: Normal range of motion. Neck supple. No JVD present. No thyromegaly present.   Cardiovascular: Normal rate and regular rhythm.  Exam reveals no gallop and no friction rub.    No murmur heard.  Pulmonary/Chest: Effort normal and breath sounds normal. He has no wheezes. He has no rales.   Abdominal: Soft. He exhibits no distension and no mass. There is no tenderness.   Musculoskeletal: Normal range of motion. He exhibits no edema or deformity.   Lymphadenopathy:     He has no cervical adenopathy.   Neurological: He is alert and " oriented to person, place, and time. He has normal reflexes. No cranial nerve deficit.   Skin: Skin is warm and dry. No rash noted. No erythema.   Psychiatric: He has a normal mood and affect. His behavior is normal. Judgment normal.         RECENT LABS:  Hematology WBC   Date Value Ref Range Status   02/02/2018 6.80 4.00 - 10.00 10*3/mm3 Final     RBC   Date Value Ref Range Status   02/02/2018 4.21 (L) 4.30 - 5.50 10*6/mm3 Final     Hemoglobin   Date Value Ref Range Status   02/02/2018 12.6 (L) 13.5 - 16.5 g/dL Final     Hematocrit   Date Value Ref Range Status   02/02/2018 38.7 (L) 40.0 - 49.0 % Final     Platelets   Date Value Ref Range Status   02/02/2018 213 150 - 375 10*3/mm3 Final              CT CHEST 1/19/2018  IMPRESSION:  1. Response of thoracic adenopathy to interval therapy.  2. No new or enlarging thoracic adenopathy.    CT AP 1/19/2018  IMPRESSION:  1. Significant response to interval therapy. Bowel wall lesions within  proximal jejunum and terminal ileum have decreased. Mesenteric masses  have decreased in size or resolved. Left pelvic sidewall mass has  decreased. Infiltrative left central mesenteric mass is unchanged.  2. No evidence of new or progressive malignancy within the abdomen or  pelvis.     Assessment/Plan     1.  B-cell non-Hodgkin's lymphoma.  This appears to be low-grade follicular center cell type with diffuse lymphadenopathy and bowel involvement.   He was started on single agent Rituxan and is after completing 4 weekly doses of Rituxan his follow-up CT scans show an excellent response.    2.  Anemia.  Patient was iron deficient and received 2 doses of IV Feraheme in December.  His repeat iron studies are pending today but his hemoglobin is certainly responded nicely and is feeling much better.    Plan  1.  We discussed the results of the CT scans with the patient explaining that he is had an excellent response to single agent Rituxan treatment and we feel he would be a good  candidate for further maintenance Rituxan therapy over the next 2 years.  2.  David will return in 2 months for M.D. follow-up and will receive his first dose of maintenance Rituxan on that visit with plans to continue maintenance every 3 months over the next 2 years (8 doses total).  3.  We will plan on repeating CT scans every 6 months and will also continue to monitor his hemoglobin and iron studies closely.

## 2018-03-02 ENCOUNTER — TELEPHONE (OUTPATIENT)
Dept: ONCOLOGY | Facility: HOSPITAL | Age: 78
End: 2018-03-02

## 2018-03-02 NOTE — TELEPHONE ENCOUNTER
Patient called to report that for a few days this week he was having abdominal and back pain. Mostly at night. Felt it could be related to exerting himself cutting firewood. Explained he just recently had scans and they were good, wouldn't be concerned it was related to cancer. He is seeing his PCP Tuesday, asked him to discuss with him at that time if still bothering him. Pt v/u.

## 2018-03-06 ENCOUNTER — OFFICE VISIT (OUTPATIENT)
Dept: INTERNAL MEDICINE | Facility: CLINIC | Age: 78
End: 2018-03-06

## 2018-03-06 VITALS
SYSTOLIC BLOOD PRESSURE: 142 MMHG | BODY MASS INDEX: 25.33 KG/M2 | RESPIRATION RATE: 14 BRPM | WEIGHT: 171 LBS | DIASTOLIC BLOOD PRESSURE: 80 MMHG | HEIGHT: 69 IN

## 2018-03-06 DIAGNOSIS — C83.03 SMALL B-CELL LYMPHOMA OF INTRA-ABDOMINAL LYMPH NODES (HCC): ICD-10-CM

## 2018-03-06 DIAGNOSIS — D64.89 ANEMIA DUE TO OTHER CAUSE, NOT CLASSIFIED: Primary | ICD-10-CM

## 2018-03-06 DIAGNOSIS — N18.2 CHRONIC RENAL IMPAIRMENT, STAGE 2 (MILD): ICD-10-CM

## 2018-03-06 DIAGNOSIS — D50.0 IRON DEFICIENCY ANEMIA DUE TO CHRONIC BLOOD LOSS: ICD-10-CM

## 2018-03-06 DIAGNOSIS — E78.00 ELEVATED CHOLESTEROL: ICD-10-CM

## 2018-03-06 DIAGNOSIS — I10 BENIGN ESSENTIAL HTN: ICD-10-CM

## 2018-03-06 PROBLEM — D64.9 ANEMIA: Status: RESOLVED | Noted: 2017-10-11 | Resolved: 2018-03-06

## 2018-03-06 PROBLEM — N28.9 ACUTE RENAL INSUFFICIENCY: Status: RESOLVED | Noted: 2017-10-11 | Resolved: 2018-03-06

## 2018-03-06 PROCEDURE — 99214 OFFICE O/P EST MOD 30 MIN: CPT | Performed by: INTERNAL MEDICINE

## 2018-03-06 NOTE — PROGRESS NOTES
Subjective   Louie German is a 78 y.o. male.     History of Present Illness he is here today for his annual visit which includes follow-up of hypertension, B-cell lymphoma, hypercholesterolemia, and iron deficiency anemia.  He was diagnosed with B-cell lymphoma in late 2017.  He has responded very well to Rituxin and maintenance therapy in the near future.  His intra-abdominal lymphoma has shown remarkable improvement.  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  He required intravenous iron correct iron deficiency anemia .  He denies any dizziness or focal neurologic episodes.  He denies any GARCIA, chest pain, PND, or swelling in the ankles.  He has not had any significant abdominal pain, change in bowel habit, melanotic stool, rectal bleeding.  His appetite is good.  He has 1-2 per night nocturia which is chronic and stable.  He denies any dysuria, incomplete bladder emptying, or urinary hesitancy.  The remainder of his review systems is negative.        Review of Systems   Constitutional: Positive for activity change and appetite change. Negative for fatigue and unexpected weight change.   HENT: Negative for trouble swallowing.    Respiratory: Negative for cough, chest tightness, shortness of breath and wheezing.    Cardiovascular: Negative for chest pain, palpitations and leg swelling.   Gastrointestinal: Negative for abdominal pain, anal bleeding, blood in stool, constipation, diarrhea, nausea and vomiting.   Genitourinary: Negative for difficulty urinating, dysuria, flank pain, frequency and hematuria.   Neurological: Negative for dizziness, syncope, facial asymmetry, speech difficulty, weakness, numbness and headaches.   Psychiatric/Behavioral: Negative for dysphoric mood. The patient is not nervous/anxious.        Objective   Physical Exam   Constitutional: He is oriented to person, place, and time. He appears well-developed and well-nourished. He is  active. He does not appear ill.   Eyes: Conjunctivae are normal.   Fundoscopic exam:       The right eye shows no AV nicking, no exudate and no hemorrhage.        The left eye shows no AV nicking, no exudate and no hemorrhage.   Neck: Carotid bruit is not present. No thyroid mass and no thyromegaly present.   Cardiovascular: Normal rate, regular rhythm, S1 normal and S2 normal.  Exam reveals no S3 and no S4.    No murmur heard.  Pulses:       Posterior tibial pulses are 2+ on the right side, and 2+ on the left side.   Pulmonary/Chest: No tachypnea. No respiratory distress. He has no decreased breath sounds. He has no wheezes. He has no rhonchi. He has no rales.   Abdominal: Soft. Normal appearance and bowel sounds are normal. He exhibits no abdominal bruit and no mass. There is no hepatosplenomegaly. There is no tenderness.       Vascular Status -  His exam exhibits no right foot edema. His exam exhibits no left foot edema.  Neurological: He is alert and oriented to person, place, and time. Gait normal.   Reflex Scores:       Bicep reflexes are 2+ on the right side.  Psychiatric: He has a normal mood and affect. His speech is normal and behavior is normal. Judgment and thought content normal. Cognition and memory are normal.       Assessment/Plan assessment #1 hypertension-mild systolic elevation today #2 hypercholesterolemia-no sign of target organ injury #3 chronic renal insufficiency-mild and stable #4 B-cell lymphoma-very nice response to therapy    Plan #1 no change medication #2 CMP, lipids.  Routine follow-up with me in 6 months.

## 2018-03-16 ENCOUNTER — LAB (OUTPATIENT)
Dept: INTERNAL MEDICINE | Facility: CLINIC | Age: 78
End: 2018-03-16

## 2018-03-16 DIAGNOSIS — I10 BENIGN ESSENTIAL HTN: ICD-10-CM

## 2018-03-16 DIAGNOSIS — E78.00 ELEVATED CHOLESTEROL: ICD-10-CM

## 2018-03-16 LAB
ALBUMIN SERPL-MCNC: 4.1 G/DL (ref 3.5–5.2)
ALBUMIN/GLOB SERPL: 1.3 G/DL
ALP SERPL-CCNC: 100 U/L (ref 39–117)
ALT SERPL-CCNC: 11 U/L (ref 1–41)
AST SERPL-CCNC: 20 U/L (ref 1–40)
BILIRUB SERPL-MCNC: 0.5 MG/DL (ref 0.1–1.2)
BUN SERPL-MCNC: 21 MG/DL (ref 8–23)
BUN/CREAT SERPL: 15.4 (ref 7–25)
CALCIUM SERPL-MCNC: 9.8 MG/DL (ref 8.6–10.5)
CHLORIDE SERPL-SCNC: 104 MMOL/L (ref 98–107)
CHOLEST SERPL-MCNC: 160 MG/DL (ref 0–200)
CO2 SERPL-SCNC: 26.8 MMOL/L (ref 22–29)
CREAT SERPL-MCNC: 1.36 MG/DL (ref 0.76–1.27)
GLOBULIN SER CALC-MCNC: 3.1 GM/DL
GLUCOSE SERPL-MCNC: 104 MG/DL (ref 65–99)
HDLC SERPL-MCNC: 75 MG/DL (ref 40–60)
LDLC SERPL CALC-MCNC: 63 MG/DL (ref 0–100)
POTASSIUM SERPL-SCNC: 5.4 MMOL/L (ref 3.5–5.2)
PROT SERPL-MCNC: 7.2 G/DL (ref 6–8.5)
SODIUM SERPL-SCNC: 145 MMOL/L (ref 136–145)
TRIGL SERPL-MCNC: 112 MG/DL (ref 0–150)
VLDLC SERPL-MCNC: 22.4 MG/DL (ref 5–40)

## 2018-03-16 PROCEDURE — 36415 COLL VENOUS BLD VENIPUNCTURE: CPT | Performed by: INTERNAL MEDICINE

## 2018-03-29 PROBLEM — C82.23 FOLLICULAR LYMPHOMA GRADE III OF INTRA-ABDOMINAL LYMPH NODES (HCC): Status: ACTIVE | Noted: 2017-10-31

## 2018-03-30 ENCOUNTER — OFFICE VISIT (OUTPATIENT)
Dept: ONCOLOGY | Facility: CLINIC | Age: 78
End: 2018-03-30

## 2018-03-30 ENCOUNTER — INFUSION (OUTPATIENT)
Dept: ONCOLOGY | Facility: HOSPITAL | Age: 78
End: 2018-03-30

## 2018-03-30 VITALS
RESPIRATION RATE: 16 BRPM | HEIGHT: 69 IN | OXYGEN SATURATION: 99 % | BODY MASS INDEX: 25.33 KG/M2 | DIASTOLIC BLOOD PRESSURE: 75 MMHG | TEMPERATURE: 97.6 F | WEIGHT: 171 LBS | HEART RATE: 77 BPM | SYSTOLIC BLOOD PRESSURE: 158 MMHG

## 2018-03-30 VITALS — DIASTOLIC BLOOD PRESSURE: 75 MMHG | SYSTOLIC BLOOD PRESSURE: 143 MMHG | HEART RATE: 67 BPM

## 2018-03-30 DIAGNOSIS — N28.9 ACUTE RENAL INSUFFICIENCY: ICD-10-CM

## 2018-03-30 DIAGNOSIS — C82.23 FOLLICULAR LYMPHOMA GRADE III OF INTRA-ABDOMINAL LYMPH NODES (HCC): ICD-10-CM

## 2018-03-30 DIAGNOSIS — C82.23 FOLLICULAR LYMPHOMA GRADE III OF INTRA-ABDOMINAL LYMPH NODES (HCC): Primary | ICD-10-CM

## 2018-03-30 DIAGNOSIS — N18.2 CHRONIC RENAL IMPAIRMENT, STAGE 2 (MILD): ICD-10-CM

## 2018-03-30 DIAGNOSIS — C83.03 SMALL B-CELL LYMPHOMA OF INTRA-ABDOMINAL LYMPH NODES (HCC): Primary | ICD-10-CM

## 2018-03-30 DIAGNOSIS — D50.0 IRON DEFICIENCY ANEMIA DUE TO CHRONIC BLOOD LOSS: ICD-10-CM

## 2018-03-30 LAB
ALBUMIN SERPL-MCNC: 4.3 G/DL (ref 3.5–5.2)
ALBUMIN/GLOB SERPL: 1.4 G/DL
ALP SERPL-CCNC: 95 U/L (ref 39–117)
ALT SERPL W P-5'-P-CCNC: 10 U/L (ref 1–41)
ANION GAP SERPL CALCULATED.3IONS-SCNC: 11.3 MMOL/L
AST SERPL-CCNC: 20 U/L (ref 1–40)
BASOPHILS # BLD AUTO: 0.04 10*3/MM3 (ref 0–0.2)
BASOPHILS NFR BLD AUTO: 0.5 % (ref 0–1.5)
BILIRUB SERPL-MCNC: 0.4 MG/DL (ref 0.1–1.2)
BUN BLD-MCNC: 20 MG/DL (ref 8–23)
BUN/CREAT SERPL: 14.1 (ref 7–25)
CALCIUM SPEC-SCNC: 9.5 MG/DL (ref 8.6–10.5)
CHLORIDE SERPL-SCNC: 105 MMOL/L (ref 98–107)
CO2 SERPL-SCNC: 26.7 MMOL/L (ref 22–29)
CREAT BLD-MCNC: 1.42 MG/DL (ref 0.76–1.27)
DEPRECATED RDW RBC AUTO: 48.5 FL (ref 37–54)
EOSINOPHIL # BLD AUTO: 0.09 10*3/MM3 (ref 0–0.7)
EOSINOPHIL NFR BLD AUTO: 1.2 % (ref 0.3–6.2)
ERYTHROCYTE [DISTWIDTH] IN BLOOD BY AUTOMATED COUNT: 14.7 % (ref 11.5–14.5)
FERRITIN SERPL-MCNC: 76.3 NG/ML (ref 30–400)
GFR SERPL CREATININE-BSD FRML MDRD: 48 ML/MIN/1.73
GLOBULIN UR ELPH-MCNC: 3 GM/DL
GLUCOSE BLD-MCNC: 110 MG/DL (ref 65–99)
HCT VFR BLD AUTO: 39.4 % (ref 40.4–52.2)
HGB BLD-MCNC: 12.9 G/DL (ref 13.7–17.6)
IMM GRANULOCYTES # BLD: 0.05 10*3/MM3 (ref 0–0.03)
IMM GRANULOCYTES NFR BLD: 0.7 % (ref 0–0.5)
IRON 24H UR-MRATE: 68 MCG/DL (ref 59–158)
IRON SATN MFR SERPL: 18 % (ref 20–50)
LYMPHOCYTES # BLD AUTO: 1.02 10*3/MM3 (ref 0.9–4.8)
LYMPHOCYTES NFR BLD AUTO: 13.5 % (ref 19.6–45.3)
MCH RBC QN AUTO: 30 PG (ref 27–32.7)
MCHC RBC AUTO-ENTMCNC: 32.7 G/DL (ref 32.6–36.4)
MCV RBC AUTO: 91.6 FL (ref 79.8–96.2)
MONOCYTES # BLD AUTO: 0.68 10*3/MM3 (ref 0.2–1.2)
MONOCYTES NFR BLD AUTO: 9 % (ref 5–12)
NEUTROPHILS # BLD AUTO: 5.65 10*3/MM3 (ref 1.9–8.1)
NEUTROPHILS NFR BLD AUTO: 75.1 % (ref 42.7–76)
NRBC BLD MANUAL-RTO: 0 /100 WBC (ref 0–0)
PLATELET # BLD AUTO: 218 10*3/MM3 (ref 140–500)
PMV BLD AUTO: 11.1 FL (ref 6–12)
POTASSIUM BLD-SCNC: 4.1 MMOL/L (ref 3.5–5.2)
PROT SERPL-MCNC: 7.3 G/DL (ref 6–8.5)
RBC # BLD AUTO: 4.3 10*6/MM3 (ref 4.6–6)
SODIUM BLD-SCNC: 143 MMOL/L (ref 136–145)
TIBC SERPL-MCNC: 373 MCG/DL (ref 298–536)
TRANSFERRIN SERPL-MCNC: 250 MG/DL (ref 200–360)
WBC NRBC COR # BLD: 7.53 10*3/MM3 (ref 4.5–10.7)

## 2018-03-30 PROCEDURE — 84466 ASSAY OF TRANSFERRIN: CPT | Performed by: INTERNAL MEDICINE

## 2018-03-30 PROCEDURE — 99214 OFFICE O/P EST MOD 30 MIN: CPT | Performed by: INTERNAL MEDICINE

## 2018-03-30 PROCEDURE — 96413 CHEMO IV INFUSION 1 HR: CPT | Performed by: INTERNAL MEDICINE

## 2018-03-30 PROCEDURE — 83540 ASSAY OF IRON: CPT | Performed by: INTERNAL MEDICINE

## 2018-03-30 PROCEDURE — 96375 TX/PRO/DX INJ NEW DRUG ADDON: CPT | Performed by: INTERNAL MEDICINE

## 2018-03-30 PROCEDURE — 80053 COMPREHEN METABOLIC PANEL: CPT | Performed by: INTERNAL MEDICINE

## 2018-03-30 PROCEDURE — 25010000002 RITUXIMAB 10 MG/ML SOLUTION 50 ML VIAL: Performed by: INTERNAL MEDICINE

## 2018-03-30 PROCEDURE — 25010000002 DIPHENHYDRAMINE PER 50 MG: Performed by: INTERNAL MEDICINE

## 2018-03-30 PROCEDURE — 85025 COMPLETE CBC W/AUTO DIFF WBC: CPT | Performed by: INTERNAL MEDICINE

## 2018-03-30 PROCEDURE — 25010000002 RITUXIMAB 10 MG/ML SOLUTION 10 ML VIAL: Performed by: INTERNAL MEDICINE

## 2018-03-30 PROCEDURE — 96415 CHEMO IV INFUSION ADDL HR: CPT | Performed by: INTERNAL MEDICINE

## 2018-03-30 PROCEDURE — 82728 ASSAY OF FERRITIN: CPT | Performed by: INTERNAL MEDICINE

## 2018-03-30 PROCEDURE — 36415 COLL VENOUS BLD VENIPUNCTURE: CPT | Performed by: INTERNAL MEDICINE

## 2018-03-30 RX ORDER — SODIUM CHLORIDE 9 MG/ML
250 INJECTION, SOLUTION INTRAVENOUS ONCE
Status: CANCELLED | OUTPATIENT
Start: 2018-06-22

## 2018-03-30 RX ORDER — MEPERIDINE HYDROCHLORIDE 50 MG/ML
25 INJECTION INTRAMUSCULAR; INTRAVENOUS; SUBCUTANEOUS
Status: CANCELLED | OUTPATIENT
Start: 2018-06-22

## 2018-03-30 RX ORDER — FAMOTIDINE 10 MG/ML
20 INJECTION, SOLUTION INTRAVENOUS ONCE
Status: CANCELLED | OUTPATIENT
Start: 2018-06-22

## 2018-03-30 RX ORDER — ACETAMINOPHEN 325 MG/1
650 TABLET ORAL ONCE
Status: COMPLETED | OUTPATIENT
Start: 2018-03-30 | End: 2018-03-30

## 2018-03-30 RX ORDER — DIPHENHYDRAMINE HYDROCHLORIDE 50 MG/ML
50 INJECTION INTRAMUSCULAR; INTRAVENOUS AS NEEDED
Status: CANCELLED | OUTPATIENT
Start: 2018-06-22

## 2018-03-30 RX ORDER — FAMOTIDINE 10 MG/ML
20 INJECTION, SOLUTION INTRAVENOUS ONCE
Status: COMPLETED | OUTPATIENT
Start: 2018-03-30 | End: 2018-03-30

## 2018-03-30 RX ORDER — FAMOTIDINE 10 MG/ML
20 INJECTION, SOLUTION INTRAVENOUS AS NEEDED
Status: CANCELLED | OUTPATIENT
Start: 2018-06-22

## 2018-03-30 RX ORDER — ACETAMINOPHEN 325 MG/1
650 TABLET ORAL ONCE
Status: CANCELLED | OUTPATIENT
Start: 2018-06-22

## 2018-03-30 RX ORDER — SODIUM CHLORIDE 9 MG/ML
250 INJECTION, SOLUTION INTRAVENOUS ONCE
Status: COMPLETED | OUTPATIENT
Start: 2018-03-30 | End: 2018-03-30

## 2018-03-30 RX ORDER — FAMOTIDINE 10 MG/ML
20 INJECTION, SOLUTION INTRAVENOUS ONCE
Status: CANCELLED | OUTPATIENT
Start: 2018-03-30

## 2018-03-30 RX ADMIN — FAMOTIDINE 20 MG: 10 INJECTION INTRAVENOUS at 09:23

## 2018-03-30 RX ADMIN — SODIUM CHLORIDE 250 ML: 9 INJECTION, SOLUTION INTRAVENOUS at 09:23

## 2018-03-30 RX ADMIN — DIPHENHYDRAMINE HYDROCHLORIDE 25 MG: 50 INJECTION, SOLUTION INTRAMUSCULAR; INTRAVENOUS at 09:24

## 2018-03-30 RX ADMIN — ACETAMINOPHEN 650 MG: 325 TABLET, FILM COATED ORAL at 09:23

## 2018-03-30 RX ADMIN — RITUXIMAB 720 MG: 10 INJECTION, SOLUTION INTRAVENOUS at 10:12

## 2018-03-30 NOTE — PROGRESS NOTES
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    HISTORY OF PRESENT ILLNESS:  The patient is a 78 y.o. year old male who had recently undergone CT scan of the abdomen on 10/11/2017 to evaluate some left lower quadrant abdominal pain.  CT scan findings were worrisome for metastatic malignancy with multiple tumor implants noted on the scan.  The largest mass appeared to be possibly arising from the duodenum or jejunum in the upper abdomen.  There was no evidence of metastases to liver.    CT-guided biopsy was performed on 10/18/2017 but unfortunately pathology was nondiagnostic showing only benign skeletal muscle and fibroadipose tissue.    The patient was seen in consultation on 10/20/2017 and at that time we recommended laparoscopic lymph node sampling and upper and lower GI endoscopy.  He underwent these procedures with Dr. Castañeda on 10/26/2017.  There was no evidence of malignancy from his endoscopic procedures at the laparoscopic specimen returned as follicular center cell B-cell non-Hodgkin's lymphoma.     He also had undergone a PET scan which was performed on 11/1/2017 with scattered lymphadenopathy which is hypermetabolic throughout the abdomen and chest therefore this appears to be at least stage III disease.    He started single agent Rituxan treatment on 12/8/2017 and completed the fourth week of Rituxan treatment on 12/29/2017. CT scans of the chest abdomen and pelvis performed on 1/19/2018 showed significant improvement in the previously noted areas of lymphadenopathy and no new sites of disease.    He also had received 2 doses of IV Feraheme for iron deficiency and his hemoglobin has improved to 12.9 g/dL currently.  His repeat iron studies from 2/2/2018  were also within normal limits.     On the last visit, we discussed role for maintenance Rituxan treatment and he  "returns today to receive his first dose of maintenance Rituxan with plans to deliver treatment every 3 months for 2 years ( 8 cycles total).       Pain   Associated symptoms include abdominal pain. Pertinent negatives include no chest pain, coughing, fatigue, fever, headaches, nausea, neck pain, rash or vomiting.        Past Medical History:   Diagnosis Date   • Cancer 2017    Basal Cell on Nose and Under Right Eye   • Coronary artery disease    • DDD (degenerative disc disease), cervical    • Gastric mass    • GERD (gastroesophageal reflux disease)    • Hx of cardiac arrest     Happened after Induction of anesthesia prior  to SURG 10 YRS AGO  \" I FLATLINE BUT W/IN SECONDS HEART WAS OK AFTER TURNED ON BACK...I THINK THEY GAVE ME TO MUCH ANESTHESIA\"   • Hyperlipidemia    • Hypertension    • Kidney stones    • Osteoarthritis    • Pleural effusion     DRAINAGE RT PLEURAL FLUID FOR TESTING WITH EGD ON 1-30-17   NO MALIGNANCY WITH CLEARANCE FROM DR SIMMONS FOR SURGERY   • Rheumatoid arthritis         Past Surgical History:   Procedure Laterality Date   • COLONOSCOPY N/A 10/26/2017    Procedure: COLONOSCOPY;  Surgeon: Louie Castañeda MD;  Location: [facility];  Service:    • CORONARY ANGIOPLASTY WITH STENT PLACEMENT  1999    17 years ago   • CYSTOSCOPY W/ LITHOLAPAXY / EHL      6-7 years ago   • DIAGNOSTIC LAPAROSCOPY N/A 10/26/2017    Procedure: DIAGNOSTIC LAPAROSCOPY WITH RETROPERITONEAL BIOPSIES;  Surgeon: Louie Castañeda MD;  Location: [facility];  Service:    • ENDOSCOPY      ON 1/30/17 WITH DRAINAGE OF RT PLEURAL FLUID FOR BIOPSY    NO MALIGNANCY  CLEARANCE FOR SURG PER DR SIMMONS   • ENDOSCOPY N/A 10/26/2017    Procedure: ESOPHAGOGASTRODUODENOSCOPY;  Surgeon: Louie Castañeda MD;  Location: [facility];  Service:    • EYE SURGERY Right 2017    Moh's; reconstruction right lower lid   • KIDNEY STONE SURGERY     • PLEURAL BIOPSY     • POSTERIOR CERVICAL FUSION  2001   • SKIN CANCER EXCISION N/A " "2017    Basal Cell Nose & under right eye   • TOTAL HIP ARTHROPLASTY Left 04/2010    9-10 years ago    • TOTAL HIP ARTHROPLASTY Right 2/14/2017    Procedure: TOTAL HIP ARTHROPLASTY;  Surgeon: Gerson Cardoza MD;  Location: [facility] OR;  Service:          ALLERGIES:  No Known Allergies     Review of Systems   Constitutional: Negative for activity change, appetite change, fatigue, fever and unexpected weight change.   HENT: Negative for hearing loss, nosebleeds, trouble swallowing and voice change.    Eyes: Negative for visual disturbance.   Respiratory: Negative for cough, shortness of breath and wheezing.    Cardiovascular: Negative for chest pain and palpitations.   Gastrointestinal: Positive for abdominal pain. Negative for diarrhea, nausea and vomiting.   Genitourinary: Negative for difficulty urinating, frequency, hematuria and urgency.   Musculoskeletal: Negative for back pain and neck pain.   Skin: Negative for rash.   Neurological: Negative for dizziness, seizures, syncope and headaches.   Hematological: Negative for adenopathy. Does not bruise/bleed easily.   Psychiatric/Behavioral: Negative for behavioral problems. The patient is not nervous/anxious.         Objective     Vitals:    03/30/18 0835   BP: 158/75   Pulse: 77   Resp: 16   Temp: 97.6 °F (36.4 °C)   TempSrc: Oral   SpO2: 99%   Weight: 77.6 kg (171 lb)   Height: 174 cm (68.5\")   PainSc: 0-No pain     Current Status 3/30/2018   ECOG score 0       Physical Exam   Constitutional: He is oriented to person, place, and time. He appears well-developed and well-nourished. No distress.   HENT:   Head: Normocephalic.   Eyes: Conjunctivae and EOM are normal. Pupils are equal, round, and reactive to light. No scleral icterus.   Neck: Normal range of motion. Neck supple. No JVD present. No thyromegaly present.   Cardiovascular: Normal rate and regular rhythm.  Exam reveals no gallop and no friction rub.    No murmur heard.  Pulmonary/Chest: Effort normal and " breath sounds normal. He has no wheezes. He has no rales.   Abdominal: Soft. He exhibits no distension and no mass. There is no tenderness.   Musculoskeletal: Normal range of motion. He exhibits no edema or deformity.   Lymphadenopathy:     He has no cervical adenopathy.   Neurological: He is alert and oriented to person, place, and time. He has normal reflexes. No cranial nerve deficit.   Skin: Skin is warm and dry. No rash noted. No erythema.   Scarring and eschar formation on the scalp from recent skin cancer removal   Psychiatric: He has a normal mood and affect. His behavior is normal. Judgment normal.         RECENT LABS:  Hematology WBC   Date Value Ref Range Status   03/30/2018 7.53 4.50 - 10.70 10*3/mm3 Final     RBC   Date Value Ref Range Status   03/30/2018 4.30 (L) 4.60 - 6.00 10*6/mm3 Final     Hemoglobin   Date Value Ref Range Status   03/30/2018 12.9 (L) 13.7 - 17.6 g/dL Final     Hematocrit   Date Value Ref Range Status   03/30/2018 39.4 (L) 40.4 - 52.2 % Final     Platelets   Date Value Ref Range Status   03/30/2018 218 140 - 500 10*3/mm3 Final        Lab Results   Component Value Date    IRON 88 02/02/2018    TIBC 307 02/02/2018    FERRITIN 108.70 02/02/2018       Assessment/Plan     1.  B-cell non-Hodgkin's lymphoma.  This appears to be low-grade follicular center cell type with diffuse lymphadenopathy and bowel involvement.   He was started on single agent Rituxan and after completing 4 weekly doses of Rituxan his follow-up CT scans showed an excellent response.  We plan to initiate maintenance Rituxan treatment every 3 months for 2 years and he returns today to receive his first dose of maintenance Rituxan.  2.  Anemia.  Patient was iron deficient and received 2 doses of IV Feraheme in December.  Iron deficiency has resolved and his hemoglobin has improved significantly.      Plan  1.  He will receive his first dose of maintenance Rituxan at a total dose of 720 mg administered over 1-2  hours.  2.  He will return in 3 months for M.D. follow-up, labs, and a second cycle of maintenance Rituxan.  3.  We will plan on repeating CT scans every 6 months and will also continue to monitor his hemoglobin and iron studies closely.

## 2018-04-09 ENCOUNTER — TELEPHONE (OUTPATIENT)
Dept: ONCOLOGY | Facility: HOSPITAL | Age: 78
End: 2018-04-09

## 2018-04-09 NOTE — TELEPHONE ENCOUNTER
Pt called and stated that he began having lower back pain on 4/3 and was not sure if that was related to the Rituxan tx he received on 3/30.   Pt has been taking Tylenol which does help the lower back pain and pt stated that it felt muscular.   Reviewed with Dr Roberto and MD did not feel that this was related to Rituxan.  Called and pt reviewed with pt and instructed pt to contact primary MD and pt v/u.

## 2018-04-25 ENCOUNTER — OFFICE VISIT (OUTPATIENT)
Dept: INTERNAL MEDICINE | Facility: CLINIC | Age: 78
End: 2018-04-25

## 2018-04-25 VITALS
DIASTOLIC BLOOD PRESSURE: 62 MMHG | BODY MASS INDEX: 25.48 KG/M2 | SYSTOLIC BLOOD PRESSURE: 134 MMHG | HEIGHT: 69 IN | WEIGHT: 172 LBS

## 2018-04-25 DIAGNOSIS — R35.0 FREQUENCY OF URINATION: Primary | ICD-10-CM

## 2018-04-25 DIAGNOSIS — I10 BENIGN ESSENTIAL HTN: ICD-10-CM

## 2018-04-25 DIAGNOSIS — N13.30 HYDRONEPHROSIS OF LEFT KIDNEY: ICD-10-CM

## 2018-04-25 DIAGNOSIS — N18.2 CHRONIC RENAL IMPAIRMENT, STAGE 2 (MILD): ICD-10-CM

## 2018-04-25 DIAGNOSIS — M51.36 DEGENERATIVE DISC DISEASE, LUMBAR: ICD-10-CM

## 2018-04-25 DIAGNOSIS — E78.00 ELEVATED CHOLESTEROL: ICD-10-CM

## 2018-04-25 LAB
BILIRUB UR QL STRIP: NEGATIVE
CLARITY UR: CLEAR
COLOR UR: YELLOW
GLUCOSE UR STRIP-MCNC: NEGATIVE MG/DL
HGB UR QL STRIP.AUTO: NEGATIVE
KETONES UR QL STRIP: NEGATIVE
LEUKOCYTE ESTERASE UR QL STRIP.AUTO: NEGATIVE
NITRITE UR QL STRIP: NEGATIVE
PH UR STRIP.AUTO: 5.5 [PH] (ref 5–8)
PROT UR QL STRIP: NEGATIVE
SP GR UR STRIP: 1.02 (ref 1–1.03)
UROBILINOGEN UR QL STRIP: NORMAL

## 2018-04-25 PROCEDURE — 81003 URINALYSIS AUTO W/O SCOPE: CPT | Performed by: INTERNAL MEDICINE

## 2018-04-25 PROCEDURE — 99214 OFFICE O/P EST MOD 30 MIN: CPT | Performed by: INTERNAL MEDICINE

## 2018-04-25 NOTE — PROGRESS NOTES
Subjective   Louie German is a 78 y.o. male.     History of Present Illness he is here today for his annual visit which includes follow-up of hypertension, hypercholesterolemia, lymphoma, and low back pain with right inguinal area pain.  He has had a very good remission from chemotherapy for his lymphoma.  He has a prior history of osteoarthritis involving both hips as well as lumbar spine as well as degenerative disc disease.  After chopping wood began noticing some low back pain 3 weeks ago.  2 weeks ago he began noticing pain in the right inguinal area when climbing up or down stairs or when getting into or out of a automobile.  The pain does not awaken him from sleep.  It is relieved by 2 Tylenol, 2 Aleve, or 2 aspirin.  He denies any radiation of the pain down the legs and there has been no numbness.  He has one per night nocturia which is chronic and stable.  He denies any dysuria, urinary frequency, hematuria, difficulty initiating urination, or sense of incomplete bladder emptying.  He denies any abdominal pain, melanotic stool, rectal bleeding, dysphagia, or change in bowel habit.  He has not had any dyspnea on exertion, chest pain, PND, or swelling in the ankles.  He denies any dizziness, headache, syncope, or focal neurologic episodes.         Review of Systems   Constitutional: Negative for activity change, appetite change, fatigue and unexpected weight loss.   HENT: Negative for trouble swallowing.    Respiratory: Negative for cough, chest tightness, shortness of breath and wheezing.    Cardiovascular: Negative for chest pain, palpitations and leg swelling.   Gastrointestinal: Negative for abdominal pain, anal bleeding, blood in stool, diarrhea, nausea and vomiting.   Genitourinary: Negative for dysuria, flank pain, frequency, hematuria and nocturia.   Musculoskeletal: Positive for back pain. Negative for arthralgias and gait problem.   Neurological: Negative for dizziness, syncope, facial  asymmetry, speech difficulty, weakness, numbness and headaches.   Psychiatric/Behavioral: Negative for depressed mood. The patient is not nervous/anxious.        Objective   Physical Exam   Constitutional: He is oriented to person, place, and time. Vital signs are normal. He appears well-developed and well-nourished. He is active. He does not appear ill.   Eyes: Conjunctivae are normal.   Fundoscopic exam:       The right eye shows no AV nicking, no exudate and no hemorrhage.        The left eye shows no AV nicking, no exudate and no hemorrhage.   Neck: Carotid bruit is not present. No thyroid mass and no thyromegaly present.   Cardiovascular: Normal rate, regular rhythm, S1 normal and S2 normal.  Exam reveals no S3 and no S4.    No murmur heard.  Pulses:       Posterior tibial pulses are 2+ on the right side, and 2+ on the left side.   Pulmonary/Chest: No tachypnea. No respiratory distress. He has no decreased breath sounds. He has no wheezes. He has no rhonchi. He has no rales.   Abdominal: Soft. Normal appearance and bowel sounds are normal. He exhibits no abdominal bruit and no mass. There is no hepatosplenomegaly. There is no tenderness.   Musculoskeletal:        Arms:       Legs:    Vascular Status -  His right foot exhibits no edema. His left foot exhibits no edema.  Lymphadenopathy:        Right: No inguinal adenopathy present.        Left: No inguinal adenopathy present.   Neurological: He is alert and oriented to person, place, and time. Gait normal.   Negative straight leg raising bilaterally.   Psychiatric: He has a normal mood and affect. His speech is normal and behavior is normal. Judgment and thought content normal. Cognition and memory are normal.         Assessment/Plan assessment #1 hypertension-good control #2 hypercholesterolemia-good control and no sign of target organ injury #3 chronic renal insufficiency-mild to moderate and stable #4 history of iron deficiency anemia-resolved #5 low back and  right inguinal pain-likely mechanical but with his history of lymphoma further evaluation is indicated.  This all was discussed with him.    March lab showed BUN 20 creatinine 1.42. Iron 68 and iron-binding capacity 373.  Hematocrit 39.4 hemoglobin 12.9    Plan #1 no change medication #2 MRI of the lumbar spine without IV contrast.  #3 continue over-the-counter medication for pain relief.  Consider physical therapy evaluation and treatment.

## 2018-05-01 ENCOUNTER — HOSPITAL ENCOUNTER (OUTPATIENT)
Dept: MRI IMAGING | Facility: HOSPITAL | Age: 78
Discharge: HOME | End: 2018-05-01
Admitting: INTERNAL MEDICINE

## 2018-05-01 PROCEDURE — 72148 MRI LUMBAR SPINE W/O DYE: CPT

## 2018-05-03 ENCOUNTER — TELEPHONE (OUTPATIENT)
Dept: INTERNAL MEDICINE | Facility: CLINIC | Age: 78
End: 2018-05-03

## 2018-05-03 NOTE — TELEPHONE ENCOUNTER
----- Message from Hoa Paul sent at 5/3/2018  2:08 PM EDT -----  Contact: pt - Dr Stoner's pt - RE: MRI resultsrie  Pt calling and would like a return call w/ results of MRI of the Lumbar spine. Could you please call pt w/ results? Results are in pt's are in pt's chart.      Pt # [phone]

## 2018-05-03 NOTE — TELEPHONE ENCOUNTER
Dr Stoner do you want us to read the patient the impression of MRI or is there more we need to inform him, please advise

## 2018-05-04 ENCOUNTER — OFFICE VISIT (OUTPATIENT)
Dept: INTERNAL MEDICINE | Facility: CLINIC | Age: 78
End: 2018-05-04

## 2018-05-04 VITALS
HEART RATE: 73 BPM | OXYGEN SATURATION: 97 % | DIASTOLIC BLOOD PRESSURE: 76 MMHG | HEIGHT: 69 IN | BODY MASS INDEX: 25.48 KG/M2 | SYSTOLIC BLOOD PRESSURE: 130 MMHG | WEIGHT: 172 LBS

## 2018-05-04 DIAGNOSIS — C82.23 FOLLICULAR LYMPHOMA GRADE III OF INTRA-ABDOMINAL LYMPH NODES (HCC): Primary | ICD-10-CM

## 2018-05-04 DIAGNOSIS — IMO0001 METASTATIC TUMOR OF BONE: ICD-10-CM

## 2018-05-04 PROCEDURE — 99213 OFFICE O/P EST LOW 20 MIN: CPT | Performed by: INTERNAL MEDICINE

## 2018-05-04 NOTE — PROGRESS NOTES
Subjective   Louie German is a 78 y.o. male.     History of Present Illness he is here today for follow-up of back pain.  It is somewhat more intense than a week and a half ago when first seen.  He is using Tylenol extra strength with some relief.  There is no radiation of the pain into the buttocks or down the legs.  There is no numbness in the legs or feet.  He denies any bowel or bladder incontinence.        Review of Systems   Constitutional: Negative for activity change, appetite change and fatigue.   Respiratory: Negative for cough and shortness of breath.    Cardiovascular: Negative for chest pain and leg swelling.   Gastrointestinal: Negative for abdominal pain, constipation, diarrhea, nausea and vomiting.   Genitourinary: Negative for flank pain and hematuria.   Musculoskeletal: Positive for back pain.   Neurological: Negative for dizziness and weakness.       Objective   Physical Exam   Constitutional: He is oriented to person, place, and time. Vital signs are normal. He appears well-developed and well-nourished. He is active. He does not appear ill.   Cardiovascular: Normal rate, regular rhythm, S1 normal and S2 normal.  Exam reveals no S3 and no S4.    Pulmonary/Chest: No tachypnea. No respiratory distress. He has no decreased breath sounds. He has no wheezes. He has no rhonchi. He has no rales.   Abdominal: Soft. Normal appearance and bowel sounds are normal. He exhibits no abdominal bruit and no mass. There is no hepatosplenomegaly.   Musculoskeletal:        Arms:    Vascular Status -  His right foot exhibits no edema. His left foot exhibits no edema.  Neurological: He is alert and oriented to person, place, and time. He has normal strength.   Negative straight leg raising bilaterally.   Psychiatric: He has a normal mood and affect. His speech is normal and behavior is normal. Judgment and thought content normal. Cognition and memory are normal.         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  This was discussed with the patient and his wife.    Plan #1 begin hydrocodone 7.5 mg by mouth 3 times a day when necessary #2 follow-up with his oncologist within the next week #3 radiation oncology consult.

## 2018-05-14 ENCOUNTER — APPOINTMENT (OUTPATIENT)
Dept: RADIATION ONCOLOGY | Facility: HOSPITAL | Age: 78
End: 2018-05-14

## 2018-05-14 ENCOUNTER — CONSULT (OUTPATIENT)
Dept: RADIATION ONCOLOGY | Facility: CLINIC | Age: 78
End: 2018-05-14

## 2018-05-14 VITALS
HEART RATE: 73 BPM | DIASTOLIC BLOOD PRESSURE: 81 MMHG | TEMPERATURE: 97.2 F | SYSTOLIC BLOOD PRESSURE: 136 MMHG | WEIGHT: 171.96 LBS | BODY MASS INDEX: 25.47 KG/M2 | HEIGHT: 69 IN | OXYGEN SATURATION: 98 %

## 2018-05-14 DIAGNOSIS — C82.23 FOLLICULAR LYMPHOMA GRADE III OF INTRA-ABDOMINAL LYMPH NODES (HCC): Primary | ICD-10-CM

## 2018-05-14 PROCEDURE — 99204 OFFICE O/P NEW MOD 45 MIN: CPT | Performed by: RADIOLOGY

## 2018-05-14 PROCEDURE — G0463 HOSPITAL OUTPT CLINIC VISIT: HCPCS | Performed by: RADIOLOGY

## 2018-05-14 NOTE — PROGRESS NOTES
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  He has been treated with Rituxan since that time.  His presentation included a CAT scan which showed multiple abdominal masses.  He underwent a laparoscopic lymph node sampling on October 26, 2017 which showed a follicular center B-cell non-Hodgkin's lymphoma.  PET scan on November 1, 2017 showed scattered lymphadenopathy throughout the abdomen and chest, consistent with stage III disease.    He started Rituxan treatment on December 8, 2017 and scans in January showed significant improvement in the lymphadenopathy.  He has continued on maintenance Rituxan.    He presented in late April with complaints of low back pain and right inguinal pain.  This prompted an MRI of the lumbar spine on May 1, 2017 which showed diffuse abnormal marrow signal involving the L3 vertebral body strongly suspicious for metastatic disease.  Additionally there is a was a defect suggesting a pathologic fracture but no fragments extending into the spinal canal.  He was started on hydrocodone 7.5 mg and returns to see Dr. Roberto tomorrow.  I was asked to see the patient at the request of the referring provider noted above for advice and recommendations  regarding this diagnosis.     Clinically, he is doing fairly well still managing to work out in his garden and give 2 worse at [employer] over the weekend.  He took one pain pill and did not like the somnolence associated with it and has taken none since.  He is trying to manage his pain with Tylenol and Excedrin at this point.  He describes the pain as being in the central low back but mainly radiating around to the right.  He has no other neurologic symptoms, states he continues to sleep well and states the pain is mainly with sitting or standing or driving for long periods of time.    Past Medical History: he  has a past medical history of Cancer (2017); Coronary artery disease; DDD (degenerative disc disease), cervical; Gastric mass; GERD (gastroesophageal reflux disease); cardiac arrest; Hyperlipidemia; Hypertension; Kidney stones; Osteoarthritis; Pleural effusion; and Rheumatoid arthritis.    Past Surgical History:  he has a past surgical history that includes Coronary angioplasty with stent (1999); Total hip arthroplasty (Left, 04/2010); Posterior Cervical Fusion (2001); Cystoscopy w/ litholapaxy / EHL; Pleura biopsy; Esophagogastroduodenoscopy; Total hip arthroplasty (Right, 2/14/2017); Skin cancer excision (N/A, 2017); Eye surgery (Right, 2017); Laparoscopy (N/A, 10/26/2017); Esophagogastroduodenoscopy (N/A, 10/26/2017); Colonoscopy (N/A, 10/26/2017); and Kidney stone surgery.    Meds:    Current Outpatient Prescriptions:   •  aspirin 325 MG tablet, Take 325 mg by mouth As Needed for Mild Pain ., Disp: , Rfl:   •  felodipine (PLENDIL) 10 MG 24 hr tablet, TAKE 1 TABLET DAILY, Disp: 90 tablet, Rfl: 2  •  Multiple Vitamin (MULTI VITAMIN PO), Take 1 tablet by mouth Daily., Disp: , Rfl:   •  naproxen sodium (ALEVE) 220 MG tablet, Take 220 mg by mouth As Needed for Mild Pain ., Disp: , Rfl:   •  simvastatin (ZOCOR) 40 MG tablet, TAKE 1 TABLET DAILY, Disp: 90 tablet, Rfl: 2    Allergies:  No Known  Allergies    Family History:  his family history includes Cancer in his brother; Heart disease in his maternal grandmother; Hypertension in his father and mother; Osteoarthritis in his paternal grandfather.    Social History:  he  reports that he has never smoked. He quit smokeless tobacco use about 20 years ago. His smokeless tobacco use included Chew. He reports that he drinks alcohol. He reports that he does not use drugs.    Pertinent Findings on   Review of Systems   Constitutional: Negative for appetite change, chills, diaphoresis, fatigue, fever and unexpected weight change.   HENT:   Negative for hearing loss, lump/mass, mouth sores, nosebleeds, sore throat, tinnitus, trouble swallowing and voice change.    Eyes: Negative for eye problems.   Respiratory: Negative for chest tightness, cough, hemoptysis, shortness of breath and wheezing.    Cardiovascular: Negative for chest pain, leg swelling and palpitations.   Gastrointestinal: Negative for abdominal distention, abdominal pain, blood in stool, constipation, diarrhea, nausea, rectal pain and vomiting.   Endocrine: Negative for hot flashes.   Genitourinary: Negative for bladder incontinence, difficulty urinating, dysuria, frequency, hematuria, nocturia and pelvic pain.    Musculoskeletal: Positive for back pain. Negative for arthralgias, flank pain, gait problem, myalgias, neck pain and neck stiffness.   Skin: Negative for itching and rash.   Neurological: Negative for dizziness, extremity weakness, gait problem, headaches, light-headedness, numbness, seizures and speech difficulty.   Hematological: Negative for adenopathy. Does not bruise/bleed easily.   Psychiatric/Behavioral: Negative for confusion, decreased concentration, depression, sleep disturbance and suicidal ideas. The patient is not nervous/anxious.    :  Vitals:    05/14/18 0852   BP: 136/81   Pulse: 73   Temp: 97.2 °F (36.2 °C)   TempSrc: Oral   SpO2: 98%   Weight: 78 kg (171 lb 15.3 oz)  "  Height: 174 cm (68.5\")   PainSc: 8  Comment: right side of lower back   PainLoc: Back       Performance Status: (1) Restricted in physically strenuous activity, ambulatory and able to do work of light nature    Pertinent Findings on:  Physical Exam   Constitutional: He is oriented to person, place, and time. He appears well-developed and well-nourished.   HENT:   Head: Normocephalic and atraumatic.   Eyes: EOM are normal.   Neck: Normal range of motion.   Pulmonary/Chest: Effort normal.   Abdominal: Soft.   Musculoskeletal: Normal range of motion.   Neurological: He is alert and oriented to person, place, and time.   Pain in mid lumbar spine radiating to the right   Skin: Skin is warm and dry.   Psychiatric: He has a normal mood and affect. His behavior is normal. Judgment and thought content normal.   Vitals reviewed.      Assessment:   1. 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     Abnormality at L3 on MRI of the Lumbar spine; symptomatic  This assessment comes from my review of the imaging, pathology, physician notes and other pertinent information as mentioned.    Plan:   We spent a good deal of time today reviewing the findings on the MRI scan and discussing how to better control his pain.  I recommended that he take the hydrocodone at night and consider taking half a hydrocodone when the pain got severe during the day.  In the interim he does appear to have a significant amount of muscle tension and contracture around the low back as he is aided from a heating pad.  I recommended she consider an Aleve in the morning and at night with Tylenol as needed for breakthrough in between if he continues to hold off on taking the narcotic and he was agreeable to the above.  I also suggested he had a stool softener day if he does wind up taking the narcotic once daily.    We discussed the probable role of radiation therapy in treating this L3 metastasis.  He has not had restaging studies " since January, 2018 and I did suggest the Dr. Roberto may wish to reimage him to ensure there is not progression of disease or new disease elsewhere.  He sees Dr. Roberto tomorrow and we should have his recommendations then.    I would like to treat this solitary spine lesion stereotactically if possible.  That would certainly be the shortest course of treatment for him coming from 30-40 minutes away and would also limit the interference with any systemic treatment planned.  He was agreeable to that and will plan on stopping by my Department at [facility] after his appointment with Dr. Roberto tomorrow to let me know what his plans are from there.  We can certainly begin the treatment planning process and get his treatments underway fairly quickly thereafter.    We discussed that course of radiation therapy aimed at the L3 vertebral body, hopefully delivering a total dose of 1600 cGy in 1 treatment.  We reviewed the logistics and goals of the course of treatment and specifically the difference between fractionated and stereotactic treatment in terms of logistics, rationale and dose delivered.  We then discussed acute side effects which are expected be minimal but might include slight erythema of the skin, mild irritability of the bowel and  possible fatigue. We also discussed the long-term effect of the radiation therapy on the bone, spinal cord, cauda equina and nerve roots and questions were answered in that regard.    As mentioned, he was stopped by my office tomorrow once he is seen Dr. Roberto and we will proceed as indicated from there.    I spent greater than 45 minutes in face-to-face time with the patient and 30 minutes of that time was spent in counseling and coordination of care, including review of imaging and pathology; prognosis and differential diagnosis; indications, goals, logistics, benefits and risks of treatment as well as alternatives and surveillance options.

## 2018-05-15 ENCOUNTER — LAB (OUTPATIENT)
Dept: LAB | Facility: HOSPITAL | Age: 78
End: 2018-05-15

## 2018-05-15 ENCOUNTER — OFFICE VISIT (OUTPATIENT)
Dept: ONCOLOGY | Facility: CLINIC | Age: 78
End: 2018-05-15

## 2018-05-15 VITALS
HEIGHT: 69 IN | WEIGHT: 173.4 LBS | SYSTOLIC BLOOD PRESSURE: 132 MMHG | HEART RATE: 68 BPM | DIASTOLIC BLOOD PRESSURE: 80 MMHG | OXYGEN SATURATION: 100 % | TEMPERATURE: 98.7 F | BODY MASS INDEX: 25.68 KG/M2 | RESPIRATION RATE: 16 BRPM

## 2018-05-15 DIAGNOSIS — N28.9 ACUTE RENAL INSUFFICIENCY: ICD-10-CM

## 2018-05-15 DIAGNOSIS — C83.03 SMALL B-CELL LYMPHOMA OF INTRA-ABDOMINAL LYMPH NODES (HCC): ICD-10-CM

## 2018-05-15 DIAGNOSIS — D50.0 IRON DEFICIENCY ANEMIA DUE TO CHRONIC BLOOD LOSS: ICD-10-CM

## 2018-05-15 DIAGNOSIS — IMO0001 METASTATIC TUMOR OF BONE: Primary | ICD-10-CM

## 2018-05-15 DIAGNOSIS — C82.23 FOLLICULAR LYMPHOMA GRADE III OF INTRA-ABDOMINAL LYMPH NODES (HCC): ICD-10-CM

## 2018-05-15 DIAGNOSIS — S32.030A CLOSED COMPRESSION FRACTURE OF THIRD LUMBAR VERTEBRA, INITIAL ENCOUNTER: ICD-10-CM

## 2018-05-15 LAB
BASOPHILS # BLD AUTO: 0.04 10*3/MM3 (ref 0–0.1)
BASOPHILS NFR BLD AUTO: 0.5 % (ref 0–1.1)
DEPRECATED RDW RBC AUTO: 44.7 FL (ref 37–49)
EOSINOPHIL # BLD AUTO: 0.17 10*3/MM3 (ref 0–0.36)
EOSINOPHIL NFR BLD AUTO: 2.1 % (ref 1–5)
ERYTHROCYTE [DISTWIDTH] IN BLOOD BY AUTOMATED COUNT: 13.7 % (ref 11.7–14.5)
HCT VFR BLD AUTO: 33.8 % (ref 40–49)
HGB BLD-MCNC: 11.5 G/DL (ref 13.5–16.5)
IMM GRANULOCYTES # BLD: 0.04 10*3/MM3 (ref 0–0.03)
IMM GRANULOCYTES NFR BLD: 0.5 % (ref 0–0.5)
LYMPHOCYTES # BLD AUTO: 0.89 10*3/MM3 (ref 1–3.5)
LYMPHOCYTES NFR BLD AUTO: 11.2 % (ref 20–49)
MCH RBC QN AUTO: 30.6 PG (ref 27–33)
MCHC RBC AUTO-ENTMCNC: 34 G/DL (ref 32–35)
MCV RBC AUTO: 89.9 FL (ref 83–97)
MONOCYTES # BLD AUTO: 0.79 10*3/MM3 (ref 0.25–0.8)
MONOCYTES NFR BLD AUTO: 9.9 % (ref 4–12)
NEUTROPHILS # BLD AUTO: 6.03 10*3/MM3 (ref 1.5–7)
NEUTROPHILS NFR BLD AUTO: 75.8 % (ref 39–75)
NRBC BLD MANUAL-RTO: 0 /100 WBC (ref 0–0)
PLATELET # BLD AUTO: 198 10*3/MM3 (ref 150–375)
PMV BLD AUTO: 11.7 FL (ref 8.9–12.1)
RBC # BLD AUTO: 3.76 10*6/MM3 (ref 4.3–5.5)
WBC NRBC COR # BLD: 7.96 10*3/MM3 (ref 4–10)

## 2018-05-15 PROCEDURE — 36415 COLL VENOUS BLD VENIPUNCTURE: CPT | Performed by: INTERNAL MEDICINE

## 2018-05-15 PROCEDURE — 85025 COMPLETE CBC W/AUTO DIFF WBC: CPT | Performed by: INTERNAL MEDICINE

## 2018-05-15 PROCEDURE — 99215 OFFICE O/P EST HI 40 MIN: CPT | Performed by: INTERNAL MEDICINE

## 2018-05-15 RX ORDER — HYDROCODONE BITARTRATE AND ACETAMINOPHEN 7.5; 325 MG/1; MG/1
1 TABLET ORAL EVERY 6 HOURS PRN
COMMUNITY
Start: 2018-05-05 | End: 2018-06-08 | Stop reason: SDUPTHER

## 2018-05-15 NOTE — PROGRESS NOTES
Subjective     REASON FOR FOLLOW UP:   1.  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  2.  Maintenance Rituxan every 3 months for 2 years initiated 3/30/2018  3.  Painful pathologic compression fracture at L3 May 2018.  Plan for biopsy and kyphoplasty followed by radiation therapy if biopsy shows active lymphoma.    HISTORY OF PRESENT ILLNESS:  The patient is a 78 y.o. year old male who had  undergone CT scan of the abdomen on 10/11/2017 to evaluate some left lower quadrant abdominal pain.  CT scan findings were worrisome for metastatic malignancy with multiple tumor implants noted on the scan.  The largest mass appeared to be possibly arising from the duodenum or jejunum in the upper abdomen.  There was no evidence of metastases to liver.    CT-guided biopsy was performed on 10/18/2017 but unfortunately pathology was nondiagnostic showing only benign skeletal muscle and fibroadipose tissue.    The patient was seen in consultation on 10/20/2017 and at that time we recommended laparoscopic lymph node sampling and upper and lower GI endoscopy.  He underwent these procedures with Dr. Castañeda on 10/26/2017.  There was no evidence of malignancy from his endoscopic procedures at the laparoscopic specimen returned as follicular center cell B-cell non-Hodgkin's lymphoma.     He also had undergone a PET scan which was performed on 11/1/2017 with scattered lymphadenopathy which is hypermetabolic throughout the abdomen and chest therefore this appears to be at least stage III disease.    He started single agent Rituxan treatment on 12/8/2017 and completed the fourth week of Rituxan treatment on 12/29/2017. CT scans of the chest abdomen and pelvis performed on 1/19/2018 showed significant improvement in the previously noted areas of lymphadenopathy and no new sites of disease.    He also had received 2 doses of IV Feraheme for  "iron deficiency and his hemoglobin has improved to 12.9 g/dL currently.  His repeat iron studies from 2/2/2018  were also within normal limits.     On the last visit, we discussed role for maintenance Rituxan treatment and he received his first dose of maintenance Rituxan 3/30/2018 with plans to deliver treatment every 3 months for 2 years ( 8 cycles total).    He was not due to return until late June or early July for his next Rituxan treatment but developed worsening back pain and had radiographic studies ordered by his primary care physician which showed what appears to be a pathologic compression fracture at L3.  He has already been evaluated by Dr. Rosa Isela Cadet at the [facility] [place], operated by [facility] radiation oncology group and she is planning to deliver radiation to that site.  He is still having significant pain particular with movement but has been mainly controlling this with Aleve and an occasional hydrocodone.    We discussed the option of a kyphoplasty which would also allow us to biopsy the bone and possibly restore some of the structural integrity of the bone prior to radiation.  We plan to refer him to neurosurgery to discuss this option and for now we will hold off on radiation until this decision is made.    We also will plan to restage him with a PET scan to see if he is having evidence of progression at other sites.       Pain   Pertinent negatives include no chest pain, coughing, fatigue, fever, headaches, nausea, neck pain, rash or vomiting.        Past Medical History:   Diagnosis Date   • Cancer 2017    Basal Cell on Nose and Under Right Eye   • Coronary artery disease    • DDD (degenerative disc disease), cervical    • Gastric mass    • GERD (gastroesophageal reflux disease)    • Hx of cardiac arrest     Happened after Induction of anesthesia prior  to SURG 10 YRS AGO  \" I FLATLINE BUT W/IN SECONDS HEART WAS OK AFTER TURNED ON BACK...I THINK THEY GAVE ME TO MUCH ANESTHESIA\"   • Hyperlipidemia    • Hypertension    • " Kidney stones    • Osteoarthritis    • Pleural effusion     DRAINAGE RT PLEURAL FLUID FOR TESTING WITH EGD ON 1-30-17   NO MALIGNANCY WITH CLEARANCE FROM DR SIMMONS FOR SURGERY   • Rheumatoid arthritis         Past Surgical History:   Procedure Laterality Date   • COLONOSCOPY N/A 10/26/2017    Procedure: COLONOSCOPY;  Surgeon: Louie Castañeda MD;  Location: [facility] MAIN OR;  Service:    • CORONARY ANGIOPLASTY WITH STENT PLACEMENT  1999    17 years ago   • CYSTOSCOPY W/ LITHOLAPAXY / EHL      6-7 years ago   • DIAGNOSTIC LAPAROSCOPY N/A 10/26/2017    Procedure: DIAGNOSTIC LAPAROSCOPY WITH RETROPERITONEAL BIOPSIES;  Surgeon: Louie Castañeda MD;  Location: [facility] OR;  Service:    • ENDOSCOPY      ON 1/30/17 WITH DRAINAGE OF RT PLEURAL FLUID FOR BIOPSY    NO MALIGNANCY  CLEARANCE FOR SURG PER DR SIMMONS   • ENDOSCOPY N/A 10/26/2017    Procedure: ESOPHAGOGASTRODUODENOSCOPY;  Surgeon: Louie Castañeda MD;  Location: [facility] OR;  Service:    • EYE SURGERY Right 2017    Moh's; reconstruction right lower lid   • KIDNEY STONE SURGERY     • PLEURAL BIOPSY     • POSTERIOR CERVICAL FUSION  2001   • SKIN CANCER EXCISION N/A 2017    Basal Cell Nose & under right eye   • TOTAL HIP ARTHROPLASTY Left 04/2010    9-10 years ago    • TOTAL HIP ARTHROPLASTY Right 2/14/2017    Procedure: TOTAL HIP ARTHROPLASTY;  Surgeon: Gerson Cardoza MD;  Location: [facility] OR;  Service:          ALLERGIES:  No Known Allergies     Review of Systems   Constitutional: Negative for activity change, appetite change, fatigue, fever and unexpected weight change.   HENT: Negative for hearing loss, nosebleeds, trouble swallowing and voice change.    Eyes: Negative for visual disturbance.   Respiratory: Negative for cough, shortness of breath and wheezing.    Cardiovascular: Negative for chest pain and palpitations.   Gastrointestinal: Negative for diarrhea, nausea and vomiting.   Genitourinary: Negative for difficulty urinating,  "frequency, hematuria and urgency.   Musculoskeletal: Positive for back pain. Negative for neck pain.   Skin: Negative for rash.   Neurological: Negative for dizziness, seizures, syncope and headaches.   Hematological: Negative for adenopathy. Does not bruise/bleed easily.   Psychiatric/Behavioral: Negative for behavioral problems. The patient is not nervous/anxious.         Objective     Vitals:    05/15/18 0936   BP: 132/80   Pulse: 68   Resp: 16   Temp: 98.7 °F (37.1 °C)   SpO2: 100%  Comment: at rest   Weight: 78.7 kg (173 lb 6.4 oz)   Height: 174 cm (68.5\")   PainSc: 7  Comment: lower back     Current Status 5/15/2018   ECOG score 0       Physical Exam   Constitutional: He is oriented to person, place, and time. He appears well-developed and well-nourished. No distress.   HENT:   Head: Normocephalic.   Eyes: Conjunctivae and EOM are normal. Pupils are equal, round, and reactive to light. No scleral icterus.   Neck: Normal range of motion. Neck supple. No JVD present. No thyromegaly present.   Cardiovascular: Normal rate and regular rhythm.  Exam reveals no gallop and no friction rub.    No murmur heard.  Pulmonary/Chest: Effort normal and breath sounds normal. He has no wheezes. He has no rales.   Abdominal: Soft. He exhibits no distension and no mass. There is no tenderness.   Musculoskeletal: Normal range of motion. He exhibits no edema or deformity.   Lymphadenopathy:     He has no cervical adenopathy.   Neurological: He is alert and oriented to person, place, and time. He has normal reflexes. No cranial nerve deficit.   Skin: Skin is warm and dry. No rash noted. No erythema.   Scarring and eschar formation on the scalp from recent skin cancer removal   Psychiatric: He has a normal mood and affect. His behavior is normal. Judgment normal.         RECENT LABS:  Hematology WBC   Date Value Ref Range Status   05/15/2018 7.96 4.00 - 10.00 10*3/mm3 Final     RBC   Date Value Ref Range Status   05/15/2018 3.76 (L) " 4.30 - 5.50 10*6/mm3 Final     Hemoglobin   Date Value Ref Range Status   05/15/2018 11.5 (L) 13.5 - 16.5 g/dL Final     Hematocrit   Date Value Ref Range Status   05/15/2018 33.8 (L) 40.0 - 49.0 % Final     Platelets   Date Value Ref Range Status   05/15/2018 198 150 - 375 10*3/mm3 Final        Lab Results   Component Value Date    IRON 68 03/30/2018    TIBC 373 03/30/2018    FERRITIN 76.30 03/30/2018       LUMBAR SPINE MRI 05/01/2018     HISTORY: Non-Hodgkin's lymphoma diagnosed in 2017 with a complaint of back pain over the past 3 weeks that is nonradiating.      TECHNIQUE: Multiplanar imaging of the lumbar spine was performed with short and long TR.      FINDINGS:      Multilevel lumbar degenerative changes are seen. There is moderate disc space narrowing at the lower thoracic and upper lumbar discs down to the L1-2 and L2-3 levels. There is disc desiccation with mild disc space narrowing at L4-5 and L5-S1. There is broad-based posterior disc bulging and osteophyte formation seen at all lumbar levels. In addition to the disc disease, lumbar facet arthropathy is noted most prominently at the L5-S1 level bilaterally. The patient had a previous CT scan demonstrated bilateral pars defects at L5. These are also noted on the lumbar MRI. Central spinal stenosis is present to a moderate degree from L2 to L4. No discrete disc herniation is seen.      The most significant finding on the examination is diffuse abnormal marrow signal involving the L3 vertebral body strongly suspicious for metastatic disease. This is certainly consistent with lymphoma given the patient's history. Additionally, there is a defect in the inferior endplate of L3 that I suspect represents pathologic fracture. No retropulsed fragments are seen back into the spinal canal. No paraspinous masses are seen.      IMPRESSION: Metastatic disease diffusely involving the L3 vertebral body with a probable pathologic fracture in the inferior endplate.  Degenerative disc and facet disease as described above. Bilateral pars defects at L5 without significant spondylolisthesis.     IMAGES PERSONALLY REVIEWED      Assessment/Plan     1.  B-cell non-Hodgkin's lymphoma.  This appears to be low-grade follicular center cell type with diffuse lymphadenopathy, bone and bowel involvement.   He was started on single agent Rituxan and after completing 4 weekly doses of Rituxan his follow-up CT scans showed an excellent response.  2.  Maintenance Rituxan treatment initiated 3/30/2018 with plans to deliver a dose of Rituxan every 3 months for 2 years ( 8 doses total)    3.  Anemia.  Patient was iron deficient and received 2 doses of IV Feraheme in December.  Iron deficiency has resolved and his hemoglobin has improved significantly.  4.  New pathologic compression fracture at L3.  It's unclear at this time whether there is progression of disease at that site versus treated lymphoma which left the bone in a weakened state and subsequently led to the compression fracture.  He has RD seen Dr. Cadet at the [facility] and she is willing to radiate that site but we plan to refer her for possible kyphoplasty and biopsy prior to radiation.  We also will be scheduling a PET scan for further staging with concern for lymphoma progression.        Plan  1.  Patient will be referred to neurosurgery for consideration of L3 biopsy and kyphoplasty procedure for this pathologic compression fracture possibly related to lymphoma progression.  2.  We discussed the case with Dr. Cadet at the [facility].  For now, we will hold off on initiation of radiation therapy until the patient is evaluated for kyphoplasty.  3.  He will continue to use hydrocodone and Aleve for pain control.  4.  PET scan for reevaluation and staging of his disease.  5.  2 unit M.D. follow-up at [facility] office in 3 weeks.  6.  His next maintenance Rituxan treatment is currently scheduled for 6/22/2018.   Whether or not we continue maintenance Rituxan will depend on the results of his biopsy and PET scan.  If this is evidence of progression of disease and most likely we will need to talk about other treatment options possibly with [facility].

## 2018-05-18 ENCOUNTER — OFFICE VISIT (OUTPATIENT)
Dept: NEUROSURGERY | Facility: CLINIC | Age: 78
End: 2018-05-18

## 2018-05-18 ENCOUNTER — HOSPITAL ENCOUNTER (OUTPATIENT)
Dept: GENERAL RADIOLOGY | Facility: HOSPITAL | Age: 78
Discharge: HOME | End: 2018-05-18
Admitting: NURSE PRACTITIONER

## 2018-05-18 VITALS
SYSTOLIC BLOOD PRESSURE: 140 MMHG | HEART RATE: 68 BPM | HEIGHT: 69 IN | DIASTOLIC BLOOD PRESSURE: 76 MMHG | BODY MASS INDEX: 25.48 KG/M2 | WEIGHT: 172 LBS

## 2018-05-18 DIAGNOSIS — IMO0001 METASTATIC TUMOR OF BONE: ICD-10-CM

## 2018-05-18 DIAGNOSIS — IMO0001 METASTATIC TUMOR OF BONE: Primary | ICD-10-CM

## 2018-05-18 PROCEDURE — 99204 OFFICE O/P NEW MOD 45 MIN: CPT | Performed by: NURSE PRACTITIONER

## 2018-05-18 PROCEDURE — 72100 X-RAY EXAM L-S SPINE 2/3 VWS: CPT

## 2018-05-18 RX ORDER — BACLOFEN 10 MG/1
10 TABLET ORAL 3 TIMES DAILY
Qty: 60 TABLET | Refills: 0 | Status: SHIPPED | OUTPATIENT
Start: 2018-05-18 | End: 2018-05-18 | Stop reason: SDUPTHER

## 2018-05-18 RX ORDER — BACLOFEN 10 MG/1
10 TABLET ORAL 3 TIMES DAILY
Qty: 60 TABLET | Refills: 0 | Status: SHIPPED | OUTPATIENT
Start: 2018-05-18 | End: 2018-06-04

## 2018-05-18 NOTE — PROGRESS NOTES
"Subjective   Patient ID: Louie German is a 78 y.o. male who is being seen for consultation today at the request of Danny Roberto MD for low back pain. He had an MRI of the lumbar spine at [facility] on 5/1/18. He presents accompanied by his wife.     History of Present Illness     He presents to the office today at the request of Dr. Roberto for acute back pain. The patient is being followed for non-Hodgkin's lymphoma and recent lumbar MRI imaging revealed metastatic disease diffusely within the L3 vertebral body. He was referred to our office for further evaluation.    He reports constant low back pain that is a 7-8 out of 10 on average.  This started a few weeks ago and has persisted.  He takes hydrocodone, Aleve and Tylenol as needed, which minimally helped to relieve his discomfort.  Despite the severe pain, he remains active and volunteers at [employer] giving walking 2 hours.  Pain is typically worse with sitting, better with standing and walking.  He is most comfortable when lying flat.  There is some radiation into the right into the muscular tissue.  However, he denies any pain, numbness, tingling or weakness in his legs.  He reports feeling and doing well.    He presents with his wife.        /76 (BP Location: Right arm, Patient Position: Sitting, Cuff Size: Adult)   Pulse 68   Ht 174 cm (68.5\")   Wt 78 kg (172 lb)   BMI 25.77 kg/m²     The following portions of the patient's history were reviewed and updated as appropriate: allergies, current medications, past family history, past medical history, past social history, past surgical history and problem list.    Review of Systems   Constitutional: Negative for chills and fever.   Respiratory: Negative for cough and shortness of breath.    Cardiovascular: Negative for chest pain, palpitations and leg swelling.   Gastrointestinal: Negative for abdominal pain and constipation.   Genitourinary: Negative for difficulty urinating and " enuresis.   Musculoskeletal: Positive for back pain. Negative for gait problem and neck pain.   Skin: Negative for rash.   Neurological: Negative for weakness and numbness (or tingling).   Hematological: Does not bruise/bleed easily.   Psychiatric/Behavioral: Negative for sleep disturbance.       Objective   Physical Exam   Constitutional: He is oriented to person, place, and time. He appears well-developed and well-nourished. He is cooperative.  Non-toxic appearance. He does not have a sickly appearance. He does not appear ill.   Very pleasant older gentleman   HENT:   Head: Normocephalic and atraumatic.   Eyes:   Corrective lenses   Neck: Neck supple. No tracheal deviation present.   Cardiovascular: Intact distal pulses.    Pulmonary/Chest: Effort normal and breath sounds normal.   Abdominal: Soft.   Musculoskeletal: He exhibits tenderness (Very tender to minimal palpation at the L3 bony prominence and paraspinal to the right). He exhibits no deformity.        Lumbar back: He exhibits decreased range of motion, tenderness, bony tenderness and pain. He exhibits no edema, no deformity, no laceration and no spasm.   Tender to bony prominence at L3 and paraspinal areas bilaterally, right greater than left   Neurological: He is alert and oriented to person, place, and time. He has normal strength. He displays no tremor and normal reflexes. No cranial nerve deficit or sensory deficit. He exhibits normal muscle tone. Coordination and gait normal. GCS eye subscore is 4. GCS verbal subscore is 5. GCS motor subscore is 6.   Stable upright gait, normal station  Normal motor and sensory exam   Skin: Skin is warm and dry.   Psychiatric: He has a normal mood and affect. His behavior is normal. Judgment and thought content normal.   Vitals reviewed.    Neurologic Exam     Mental Status   Oriented to person, place, and time.     Motor Exam     Strength   Strength 5/5 throughout.       Assessment/Plan   Independent Review of  Radiographic Studies:    MRI lumbar spine without contrast from [facility] dated May 1, 2018 reveals metastatic disease diffusely involving the L3 vertebral body with a probable pathologic fracture within the inferior endplate.  Degenerative disc and facet disease at multi levels.    Lumbar x-rays ordered and pending    Medical Decision Making:    He presents to the office today for consultation intractable low back pain.  Exam as noted above, no red flags.  The patient has a history of lymphoma and recent MRI of the lumbar spine that was ordered for the worsening back pain reveals metastatic disease to the L3 vertebrae.  The patient's pain is at the L3 bony prominence and does radiate to the right into the paraspinal muscular tissue.  He has no issues with his legs and walking is not a problem.  In fact, walking helps reduce the low back discomfort.  Sitting is when he is most uncomfortable.  I've ordered an LSO brace to help provide additional support and stability for the lumbar spine, particularly with the metastatic disease diffusely within the L3 vertebrae.  There likely is some surrounding edema putting pressure on the nerves.  He is already taking narcotic pain medication and states that he does not need anymore.  I will prescribed a muscle relaxer, relieve any surrounding muscular discomfort.  I instructed the patient to take it at night until he knows that it does not cause excessive grogginess.  I will also send him for lumbar x-rays today to look for any changes since the MRI was obtained 3 weeks ago.  There has been no compression at this level, therefore no need for kyphoplasty.  He will be followed closely with imaging.    Plan: Lumbar x-rays today, LSO brace, prescription for baclofen, return to office in one month with repeat x-rays    Louie was seen today for l2 vcf.    Diagnoses and all orders for this visit:    Metastatic tumor of bone  -     XR Spine Lumbar 2 or 3 View; Future  -      XR Spine Lumbar 2 or 3 View; Future    Other orders  -     Discontinue: baclofen (LIORESAL) 10 MG tablet; Take 1 tablet by mouth 3 (Three) Times a Day.  -     baclofen (LIORESAL) 10 MG tablet; Take 1 tablet by mouth 3 (Three) Times a Day.      Return in about 4 weeks (around 6/15/2018).

## 2018-05-21 ENCOUNTER — HOSPITAL ENCOUNTER (OUTPATIENT)
Dept: PET IMAGING | Facility: HOSPITAL | Age: 78
Discharge: HOME | End: 2018-05-21
Attending: INTERNAL MEDICINE | Admitting: INTERNAL MEDICINE

## 2018-05-21 ENCOUNTER — HOSPITAL ENCOUNTER (OUTPATIENT)
Dept: PET IMAGING | Facility: HOSPITAL | Age: 78
Discharge: HOME | End: 2018-05-21
Attending: INTERNAL MEDICINE

## 2018-05-21 DIAGNOSIS — S32.030A CLOSED COMPRESSION FRACTURE OF THIRD LUMBAR VERTEBRA, INITIAL ENCOUNTER: ICD-10-CM

## 2018-05-21 DIAGNOSIS — D50.0 IRON DEFICIENCY ANEMIA DUE TO CHRONIC BLOOD LOSS: ICD-10-CM

## 2018-05-21 DIAGNOSIS — C82.23 FOLLICULAR LYMPHOMA GRADE III OF INTRA-ABDOMINAL LYMPH NODES (HCC): ICD-10-CM

## 2018-05-21 DIAGNOSIS — IMO0001 METASTATIC TUMOR OF BONE: ICD-10-CM

## 2018-05-21 LAB — GLUCOSE BLDC GLUCOMTR-MCNC: 96 MG/DL (ref 70–130)

## 2018-05-21 PROCEDURE — A9552 F18 FDG: HCPCS | Performed by: INTERNAL MEDICINE

## 2018-05-21 PROCEDURE — 0 FLUDEOXYGLUCOSE F18 SOLUTION: Performed by: INTERNAL MEDICINE

## 2018-05-21 PROCEDURE — 82962 GLUCOSE BLOOD TEST: CPT

## 2018-05-21 PROCEDURE — 78815 PET IMAGE W/CT SKULL-THIGH: CPT

## 2018-05-21 RX ADMIN — FLUDEOXYGLUCOSE F18 1 DOSE: 300 INJECTION INTRAVENOUS at 07:30

## 2018-05-22 ENCOUNTER — TELEPHONE (OUTPATIENT)
Dept: GENERAL RADIOLOGY | Facility: HOSPITAL | Age: 78
End: 2018-05-22

## 2018-05-22 NOTE — TELEPHONE ENCOUNTER
----- Message from Danny Roberto MD sent at 5/22/2018  4:19 PM EDT -----  Please get patient a follow up appointment at [facility] [place] [facility] with Dr Helio BARBER for XRT to L3 vertebral body.    Thanks  Dr HALL

## 2018-05-23 ENCOUNTER — TELEPHONE (OUTPATIENT)
Dept: ONCOLOGY | Facility: CLINIC | Age: 78
End: 2018-05-23

## 2018-05-23 NOTE — TELEPHONE ENCOUNTER
----- Message from Danny Roberto MD sent at 5/23/2018  1:25 PM EDT -----  Regarding: Dacia FAN appt  I need to see Mr German on Friday 5/25  at [employer] [facility] lab    OK to overbook    Thanks!    Teresa

## 2018-05-25 ENCOUNTER — LAB (OUTPATIENT)
Dept: OTHER | Facility: HOSPITAL | Age: 78
End: 2018-05-25

## 2018-05-25 ENCOUNTER — OFFICE VISIT (OUTPATIENT)
Dept: ONCOLOGY | Facility: CLINIC | Age: 78
End: 2018-05-25

## 2018-05-25 VITALS
RESPIRATION RATE: 12 BRPM | TEMPERATURE: 98.8 F | DIASTOLIC BLOOD PRESSURE: 78 MMHG | SYSTOLIC BLOOD PRESSURE: 156 MMHG | OXYGEN SATURATION: 98 % | HEIGHT: 69 IN | HEART RATE: 74 BPM | WEIGHT: 174 LBS | BODY MASS INDEX: 25.77 KG/M2

## 2018-05-25 DIAGNOSIS — D50.0 IRON DEFICIENCY ANEMIA DUE TO CHRONIC BLOOD LOSS: ICD-10-CM

## 2018-05-25 DIAGNOSIS — IMO0001 METASTATIC TUMOR OF BONE: ICD-10-CM

## 2018-05-25 DIAGNOSIS — S32.030A CLOSED COMPRESSION FRACTURE OF THIRD LUMBAR VERTEBRA, INITIAL ENCOUNTER: ICD-10-CM

## 2018-05-25 DIAGNOSIS — R94.31 ABNORMAL ELECTROCARDIOGRAM: ICD-10-CM

## 2018-05-25 DIAGNOSIS — C83.30 DIFFUSE LARGE B-CELL LYMPHOMA, UNSPECIFIED BODY REGION (HCC): ICD-10-CM

## 2018-05-25 DIAGNOSIS — C82.23 FOLLICULAR LYMPHOMA GRADE III OF INTRA-ABDOMINAL LYMPH NODES (HCC): ICD-10-CM

## 2018-05-25 LAB
ALBUMIN SERPL-MCNC: 4.1 G/DL (ref 3.5–5.2)
ALBUMIN/GLOB SERPL: 1.5 G/DL
ALP SERPL-CCNC: 98 U/L (ref 39–117)
ALT SERPL W P-5'-P-CCNC: 19 U/L (ref 1–41)
ANION GAP SERPL CALCULATED.3IONS-SCNC: 9.8 MMOL/L
AST SERPL-CCNC: 26 U/L (ref 1–40)
BILIRUB SERPL-MCNC: 0.3 MG/DL (ref 0.1–1.2)
BUN BLD-MCNC: 26 MG/DL (ref 8–23)
BUN/CREAT SERPL: 17 (ref 7–25)
CALCIUM SPEC-SCNC: 9.2 MG/DL (ref 8.6–10.5)
CHLORIDE SERPL-SCNC: 105 MMOL/L (ref 98–107)
CO2 SERPL-SCNC: 26.2 MMOL/L (ref 22–29)
CREAT BLD-MCNC: 1.53 MG/DL (ref 0.76–1.27)
DEPRECATED RDW RBC AUTO: 46.1 FL (ref 37–54)
ERYTHROCYTE [DISTWIDTH] IN BLOOD BY AUTOMATED COUNT: 13.8 % (ref 11.5–14.5)
GFR SERPL CREATININE-BSD FRML MDRD: 44 ML/MIN/1.73
GLOBULIN UR ELPH-MCNC: 2.7 GM/DL
GLUCOSE BLD-MCNC: 130 MG/DL (ref 65–99)
HCT VFR BLD AUTO: 34 % (ref 40.4–52.2)
HGB BLD-MCNC: 11.6 G/DL (ref 13.7–17.6)
LDH SERPL-CCNC: 178 U/L (ref 135–225)
MCH RBC QN AUTO: 31 PG (ref 27–32.7)
MCHC RBC AUTO-ENTMCNC: 34.1 G/DL (ref 32.6–36.4)
MCV RBC AUTO: 90.9 FL (ref 79.8–96.2)
PLATELET # BLD AUTO: 248 10*3/MM3 (ref 140–500)
PMV BLD AUTO: 11.3 FL (ref 6–12)
POTASSIUM BLD-SCNC: 4 MMOL/L (ref 3.5–5.2)
PROT SERPL-MCNC: 6.8 G/DL (ref 6–8.5)
RBC # BLD AUTO: 3.74 10*6/MM3 (ref 4.6–6)
SODIUM BLD-SCNC: 141 MMOL/L (ref 136–145)
WBC NRBC COR # BLD: 8.52 10*3/MM3 (ref 4.5–10.7)

## 2018-05-25 PROCEDURE — 83615 LACTATE (LD) (LDH) ENZYME: CPT | Performed by: INTERNAL MEDICINE

## 2018-05-25 PROCEDURE — 99215 OFFICE O/P EST HI 40 MIN: CPT | Performed by: INTERNAL MEDICINE

## 2018-05-25 PROCEDURE — 85027 COMPLETE CBC AUTOMATED: CPT | Performed by: INTERNAL MEDICINE

## 2018-05-25 PROCEDURE — 36415 COLL VENOUS BLD VENIPUNCTURE: CPT

## 2018-05-25 PROCEDURE — 80053 COMPREHEN METABOLIC PANEL: CPT | Performed by: INTERNAL MEDICINE

## 2018-05-25 RX ORDER — SODIUM CHLORIDE 9 MG/ML
250 INJECTION, SOLUTION INTRAVENOUS ONCE
Status: CANCELLED | OUTPATIENT
Start: 2018-06-08

## 2018-05-25 RX ORDER — ACETAMINOPHEN 325 MG/1
650 TABLET ORAL ONCE
Status: CANCELLED | OUTPATIENT
Start: 2018-06-08

## 2018-05-25 RX ORDER — MEPERIDINE HYDROCHLORIDE 50 MG/ML
25 INJECTION INTRAMUSCULAR; INTRAVENOUS; SUBCUTANEOUS
Status: CANCELLED | OUTPATIENT
Start: 2018-06-08

## 2018-05-25 RX ORDER — FAMOTIDINE 10 MG/ML
20 INJECTION, SOLUTION INTRAVENOUS AS NEEDED
Status: CANCELLED | OUTPATIENT
Start: 2018-06-08

## 2018-05-25 RX ORDER — PALONOSETRON 0.05 MG/ML
0.25 INJECTION, SOLUTION INTRAVENOUS ONCE
Status: CANCELLED | OUTPATIENT
Start: 2018-06-08

## 2018-05-25 RX ORDER — DIPHENHYDRAMINE HYDROCHLORIDE 50 MG/ML
50 INJECTION INTRAMUSCULAR; INTRAVENOUS AS NEEDED
Status: CANCELLED | OUTPATIENT
Start: 2018-06-08

## 2018-05-25 RX ORDER — DOXORUBICIN HYDROCHLORIDE 2 MG/ML
50 INJECTION, SOLUTION INTRAVENOUS ONCE
Status: CANCELLED | OUTPATIENT
Start: 2018-06-02

## 2018-05-25 NOTE — PROGRESS NOTES
Subjective     REASON FOR FOLLOW UP:   1.  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  2.  Maintenance Rituxan every 3 months for 2 years initiated 3/30/2018  3.  Painful pathologic compression fracture at L3 May 2018 due to involvement with diffuse large B-cell non-Hodgkin's lymphoma.   4.  Plan to initiate further chemotherapy with CHOP and Rituxan with or without intrathecal methotrexate depending on his initial lumbar puncture.    HISTORY OF PRESENT ILLNESS:  The patient is a 78 y.o. year old male who had  undergone CT scan of the abdomen on 10/11/2017 to evaluate some left lower quadrant abdominal pain.  CT scan findings were worrisome for metastatic malignancy with multiple tumor implants noted on the scan.  The largest mass appeared to be possibly arising from the duodenum or jejunum in the upper abdomen.  There was no evidence of metastases to liver.    CT-guided biopsy was performed on 10/18/2017 but unfortunately pathology was nondiagnostic showing only benign skeletal muscle and fibroadipose tissue.    The patient was seen in consultation on 10/20/2017 and at that time we recommended laparoscopic lymph node sampling and upper and lower GI endoscopy.  He underwent these procedures with Dr. Castañeda on 10/26/2017.  There was no evidence of malignancy from his endoscopic procedures at the laparoscopic specimen returned as follicular center cell B-cell non-Hodgkin's lymphoma.    He received Rituxan treatment initially with 4 weekly doses of Rituxan with good response on follow-up scans.  Unfortunately, a few months after completion of his treatment he developed worsening back pain and has lymphoma involving the L3 vertebral body consistent with diffuse large B-cell non-Hodgkin's lymphoma.    He and his wife are here today to discuss further treatment plan.  We plan to initiate further chemotherapy with CHOP  "and Rituxan along with Neulasta support.  We also will be performing a lumbar puncture with an initial dose of intrathecal methotrexate.  If the spinal fluid shows evidence of lymphoma involvement in additional intrathecal chemotherapy will be incorporated in his treatment.    We had discussed the situation also with Dr. Cadet of radiation oncology and with Dr. Banuelos of neurosurgery.  At this point we don't see a role for immediate radiation although radiation may be incorporated at completion of chemotherapy if there is any residual PET activity in the bone.  Dr. Banuelos does not feel that the compression fracture his severe enough to consider kyphoplasty treatment and hopefully his pain will improve significantly once chemotherapy get started.     Pain   Pertinent negatives include no chest pain, coughing, fatigue, fever, headaches, nausea, neck pain, rash or vomiting.        Past Medical History:   Diagnosis Date   • Cancer 2017    Basal Cell on Nose and Under Right Eye   • Coronary artery disease    • DDD (degenerative disc disease), cervical    • Gastric mass    • GERD (gastroesophageal reflux disease)    • Hx of cardiac arrest     Happened after Induction of anesthesia prior  to SURG 10 YRS AGO  \" I FLATLINE BUT W/IN SECONDS HEART WAS OK AFTER TURNED ON BACK...I THINK THEY GAVE ME TO MUCH ANESTHESIA\"   • Hyperlipidemia    • Hypertension    • Kidney stones    • Osteoarthritis    • Pleural effusion     DRAINAGE RT PLEURAL FLUID FOR TESTING WITH EGD ON 1-30-17   NO MALIGNANCY WITH CLEARANCE FROM DR SIMMONS FOR SURGERY   • Rheumatoid arthritis         Past Surgical History:   Procedure Laterality Date   • COLONOSCOPY N/A 10/26/2017    Procedure: COLONOSCOPY;  Surgeon: Louie Castañeda MD;  Location: [facility];  Service:    • CORONARY ANGIOPLASTY WITH STENT PLACEMENT  1999    17 years ago   • CYSTOSCOPY W/ LITHOLAPAXY / EHL      6-7 years ago   • DIAGNOSTIC LAPAROSCOPY N/A 10/26/2017    Procedure: DIAGNOSTIC " "LAPAROSCOPY WITH RETROPERITONEAL BIOPSIES;  Surgeon: Louie Castañeda MD;  Location: [facility] OR;  Service:    • ENDOSCOPY      ON 1/30/17 WITH DRAINAGE OF RT PLEURAL FLUID FOR BIOPSY    NO MALIGNANCY  CLEARANCE FOR SURG PER DR SIMMONS   • ENDOSCOPY N/A 10/26/2017    Procedure: ESOPHAGOGASTRODUODENOSCOPY;  Surgeon: Louie Castañeda MD;  Location: [facility] OR;  Service:    • EYE SURGERY Right 2017    Moh's; reconstruction right lower lid   • KIDNEY STONE SURGERY     • PLEURAL BIOPSY     • POSTERIOR CERVICAL FUSION  2001   • SKIN CANCER EXCISION N/A 2017    Basal Cell Nose & under right eye   • TOTAL HIP ARTHROPLASTY Left 04/2010    9-10 years ago    • TOTAL HIP ARTHROPLASTY Right 2/14/2017    Procedure: TOTAL HIP ARTHROPLASTY;  Surgeon: Gerson Cardoza MD;  Location: [facility] OR;  Service:          ALLERGIES:  No Known Allergies     Review of Systems   Constitutional: Negative for activity change, appetite change, fatigue, fever and unexpected weight change.   HENT: Negative for hearing loss, nosebleeds, trouble swallowing and voice change.    Eyes: Negative for visual disturbance.   Respiratory: Negative for cough, shortness of breath and wheezing.    Cardiovascular: Negative for chest pain and palpitations.   Gastrointestinal: Negative for diarrhea, nausea and vomiting.   Genitourinary: Negative for difficulty urinating, frequency, hematuria and urgency.   Musculoskeletal: Positive for back pain. Negative for neck pain.   Skin: Negative for rash.   Neurological: Negative for dizziness, seizures, syncope and headaches.   Hematological: Negative for adenopathy. Does not bruise/bleed easily.   Psychiatric/Behavioral: Negative for behavioral problems. The patient is not nervous/anxious.         Objective     Vitals:    05/25/18 1523   BP: 156/78   Pulse: 74   Resp: 12   Temp: 98.8 °F (37.1 °C)   TempSrc: Oral   SpO2: 98%   Weight: 78.9 kg (174 lb)   Height: 174 cm (68.5\")   PainSc:   8   PainLoc: Back "     Current Status 5/25/2018   ECOG score 0       Physical Exam   Constitutional: He is oriented to person, place, and time. He appears well-developed and well-nourished. No distress.   HENT:   Head: Normocephalic.   Eyes: Conjunctivae and EOM are normal. Pupils are equal, round, and reactive to light. No scleral icterus.   Neck: Normal range of motion. Neck supple. No JVD present. No thyromegaly present.   Cardiovascular: Normal rate and regular rhythm.  Exam reveals no gallop and no friction rub.    No murmur heard.  Pulmonary/Chest: Effort normal and breath sounds normal. He has no wheezes. He has no rales.   Abdominal: Soft. He exhibits no distension and no mass. There is no tenderness.   Musculoskeletal: Normal range of motion. He exhibits no edema or deformity.   Lymphadenopathy:     He has no cervical adenopathy.   Neurological: He is alert and oriented to person, place, and time. He has normal reflexes. No cranial nerve deficit.   Skin: Skin is warm and dry. No rash noted. No erythema.   Scarring and eschar formation on the scalp from recent skin cancer removal   Psychiatric: He has a normal mood and affect. His behavior is normal. Judgment normal.         RECENT LABS:  Hematology WBC   Date Value Ref Range Status   05/25/2018 8.52 4.50 - 10.70 10*3/mm3 Final     RBC   Date Value Ref Range Status   05/25/2018 3.74 (L) 4.60 - 6.00 10*6/mm3 Final     Hemoglobin   Date Value Ref Range Status   05/25/2018 11.6 (L) 13.7 - 17.6 g/dL Final     Hematocrit   Date Value Ref Range Status   05/25/2018 34.0 (L) 40.4 - 52.2 % Final     Platelets   Date Value Ref Range Status   05/25/2018 248 140 - 500 10*3/mm3 Final        Lab Results   Component Value Date    IRON 68 03/30/2018    TIBC 373 03/30/2018    FERRITIN 76.30 03/30/2018       F-18 FDG PET FROM SKULL BASE TO MID THIGH WITH PET/CT FUSION  5/21/2018     HISTORY: 78-year-old male with non-Hodgkin's lymphoma. Restaging.     TECHNIQUE: Radiation dose reduction  techniques were utilized, including  automated exposure control and exposure modulation based on body size.   Blood glucose level at time of injection was 96 mg/dL.  6.0 mCi of F-18  FDG were injected and PET was performed from skull base to mid thigh. CT  was obtained for localization and attenuation correction. Time at  injection 7:30 AM. PET start time 9:09 AM. Compared with previous PET/CT  from 11/01/2017.     FINDINGS: There has been marked interval decrease in the size of the  hypermetabolic small bowel lesions at the left midabdomen and the  mesenteric lymphadenopathy has markedly decreased as well. Some of the  mesenteric lymphadenopathy has resolved. The bowel lesions and some of  the adjacent lymphadenopathy were confluent on the previous examination  and difficult to compare in size. The intensity of the metabolic  activity at the remaining bowel lesions has decreased with a current  maximal SUV of 24.1 and was previously 34. A discrete 1.2 x 1.0 cm right  mesenteric node was less discrete previously, but measured approximately  2.5 x 1.8 cm. The current maximal SUV is 14.5 and was previously 11.5.  There has been resolution of many of the scattered hypermetabolic  lesions throughout the abdomen including the hypermetabolic lesion at  the pancreatic tail. There has been marked interval decrease in the left  periaortic infiltrative lymphadenopathy and the infiltrative  lymphadenopathy along the left pelvic sidewall with essential resolution  of the previously seen left hydroureteronephrosis. There has, however,  been interval development of intense hypermetabolic activity throughout  the L3 vertebral body with a maximal SUV of 24.4. There are pathological  fractures and loss of height at the L3 vertebral body. There has been  resolution of the hypermetabolic retrodiaphragmatic lymphadenopathy and  the mediastinal hypermetabolic lymphadenopathy has resolved as well.  There is a hypermetabolic 5 mm left  supraclavicular node which has a  maximal SUV of 6.8, previously measuring up to 4 mm. There was  significant brown fat activity at the thorax and supraclavicular regions  which limits comparison of the hypermetabolic activity. There is a tiny  right pleural effusion which is smaller than previously.     IMPRESSION:  1. There has been a mixed response. There has been marked interval  decrease or resolution of hypermetabolic lymphadenopathy throughout the  abdomen and pelvis as well as marked decrease in the hypermetabolic  bowel lesions. There has also been resolution of the hypermetabolic  retrodiaphragmatic and mediastinal lymphadenopathy. There has, however,  been interval development of an intensely hypermetabolic lesion  throughout the L3 vertebral body. There is pathologic compression with  significant loss in height of L3.  2. The 5 mm hypermetabolic left supraclavicular node was slightly  smaller previously. The brown fat activity in this region on the  previous examination precludes comparison of the metabolic activity.  3. Resolution of the left hydronephrosis.    IMAGES PERSONALLY REVIEWED      Assessment/Plan     1.  Diffuse large B-cell non-Hodgkin's lymphoma involving the L3 vertebral body.  2.  Previous treatment with single agent Rituxan for follicular B-cell lymphoma.  3.  Back pain related to lymphoma involving the spine.  4.  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    Plan  1.  We discussed the results of the PET scan and a need for more aggressive chemotherapy with the patient and his wife in the office today.  We plan to initiate combination chemotherapy with CHOP and Rituxan on an every 3 week cycle with plans for 6 cycles total along with Neulasta support.  2.  We also discussed that due to the involvement of the spinal bone we will need to perform a lumbar puncture to sample spinal fluid for lymphoma involvement.  He will undergo a lumbar puncture with 1 dose of intrathecal methotrexate delivered at the time  of the procedure.  If the spinal fluid shows evidence of lymphoma involvement and additional doses of intrathecal methotrexate will be incorporated with his chemotherapy.  3.  We will schedule formal chemotherapy education regarding CHOP and Rituxan with our nurse practitioners.  With that visit we will need to make sure that he has his prescription for prednisone tablets and for anti-nausea medication.  4.  We will order a baseline echocardiogram for left ventricular ejection fraction prior to Adriamycin therapy.  5.  We will refer the patient to Dr. Louie Castañeda for placement of a MediPort  6.  We will schedule a lumbar puncture in radiology with administration of 1 dose of preservative-free methotrexate 12 mg.  Spinal fluid will be sent for cytology and flow cytometry.  7.  Patient will return to initiate the first cycle of CHOP and Rituxan chemotherapy 2 weeks from now on 6/8/2018.  8.  We will plan to repeat scans after 2 cycles of treatment.

## 2018-05-29 ENCOUNTER — OFFICE VISIT (OUTPATIENT)
Dept: RADIATION ONCOLOGY | Facility: HOSPITAL | Age: 78
End: 2018-05-29

## 2018-05-29 ENCOUNTER — PREP FOR SURGERY (OUTPATIENT)
Dept: OTHER | Facility: HOSPITAL | Age: 78
End: 2018-05-29

## 2018-05-29 DIAGNOSIS — Z53.21 PATIENT LEFT WITHOUT BEING SEEN: Primary | ICD-10-CM

## 2018-05-29 DIAGNOSIS — C85.90 LYMPHOMA (HCC): Primary | ICD-10-CM

## 2018-05-29 RX ORDER — CEFAZOLIN SODIUM 2 G/100ML
2 INJECTION, SOLUTION INTRAVENOUS ONCE
Status: CANCELLED | OUTPATIENT
Start: 2018-06-05 | End: 2018-06-05

## 2018-05-30 ENCOUNTER — APPOINTMENT (OUTPATIENT)
Dept: OTHER | Facility: HOSPITAL | Age: 78
End: 2018-05-30

## 2018-05-30 ENCOUNTER — OFFICE VISIT (OUTPATIENT)
Dept: ONCOLOGY | Facility: CLINIC | Age: 78
End: 2018-05-30

## 2018-05-30 VITALS — WEIGHT: 172.4 LBS | BODY MASS INDEX: 25.83 KG/M2

## 2018-05-30 DIAGNOSIS — C83.30 DIFFUSE LARGE B-CELL LYMPHOMA, UNSPECIFIED BODY REGION (HCC): Primary | ICD-10-CM

## 2018-05-30 PROBLEM — C85.90 LYMPHOMA (HCC): Status: ACTIVE | Noted: 2018-05-30

## 2018-05-30 PROCEDURE — G0463 HOSPITAL OUTPT CLINIC VISIT: HCPCS | Performed by: NURSE PRACTITIONER

## 2018-05-30 PROCEDURE — 99215 OFFICE O/P EST HI 40 MIN: CPT | Performed by: NURSE PRACTITIONER

## 2018-05-30 RX ORDER — PREDNISONE 50 MG/1
100 TABLET ORAL DAILY
Qty: 10 TABLET | Refills: 5 | Status: SHIPPED | OUTPATIENT
Start: 2018-05-30 | End: 2018-05-30

## 2018-05-30 RX ORDER — PREDNISONE 10 MG/1
50 TABLET ORAL DAILY
Qty: 10 TABLET | Refills: 0 | Status: CANCELLED | OUTPATIENT
Start: 2018-05-30

## 2018-05-30 RX ORDER — ONDANSETRON HYDROCHLORIDE 8 MG/1
8 TABLET, FILM COATED ORAL 3 TIMES DAILY PRN
Qty: 30 TABLET | Refills: 5 | Status: SHIPPED | OUTPATIENT
Start: 2018-05-30 | End: 2018-10-23

## 2018-05-30 RX ORDER — PREDNISONE 50 MG/1
100 TABLET ORAL DAILY
Qty: 10 TABLET | Refills: 5 | Status: SHIPPED | OUTPATIENT
Start: 2018-05-30 | End: 2018-06-05 | Stop reason: HOSPADM

## 2018-05-30 NOTE — PROGRESS NOTES
Subjective     PATIENT NAME:  Louie German  YOB: 1940  PATIENTS AGE:  78 y.o.  PATIENTS SEX:  male  DATE OF SERVICE:  05/30/2018  PROVIDER:  NOLAN Pak      ____________________PATIENT EDUCATION____________________    PATIENT EDUCATION:  Today I met with the patient to discuss the chemotherapy regimen recommended for treatment of his disease.  The patient was given explanation of treatment premed side effects including office policy that prohibits patients to drive if sedating medications are administered, MD explanation given regarding benefits, side effects, toxicities and goals of treatment.  The patient received a Chemotherapy/Biotherapy Plan Summary including diagnosis and specific treatment plan.    SIDE EFFECTS:  Common side effects were discussed with the patient and/or significant other.  Discussion included hair loss/discoloration, anemia/fatigue, infection/chills/fever, appetite, bleeding risk/precautions, constipation, diarrhea, mouth sores, taste alteration, loss of appetite,nausea/vomiting, peripheral neuropathy, skin/nail changes, rash, muscle aches/weakness, photosensitivity, weight gain/loss, hearing loss, dizziness, menopausal symptoms, menstrrual irregularity, sterility, high blood pressure, heart damage, liver damage, lung damage, kidney damage, DVT/PE risk, fluid retention, pleural/pericardial effusion, somnolence, electrolyte/LFT imbalance, vein exercises and/or the possible need for vascular access/port placement.  The patient was advice that although uncommon, leakage of an infused medication from the vein or venous access device (port) may lead to skin breakdown and/or other tissue damage.  The patient was advised that he/she may have pain, bleeding, and/or bruising from the insertion of a needle in their vein or venous access device (port).  The patient was further advised that, in spite of proper technique, infection with redness and irritation may rarely  occur at the site where the needle was inserted.  The patient was advised that if complications occur, additional medical treatment is available.    Discussion also included side effects specific to drugs in the treatment plan, Cytoxan, Oncovin, Adriamycin, Prednisone, Rituxan, and Methotrexate specifically.      A total of 50  minutes were spent with the patient, with 100% of time spent in education and counseling.

## 2018-05-31 ENCOUNTER — DOCUMENTATION (OUTPATIENT)
Dept: ONCOLOGY | Facility: CLINIC | Age: 78
End: 2018-05-31

## 2018-05-31 NOTE — PROGRESS NOTES
Pt was seen at [facility] on 5/14/18 by Dr. Cadet for an initial radiation consultation for follicular lymphoma grade III. He completed the Distress Questionnaire and scored 0/10, marking no items.   OSW remains available as needs arise.    Anna Merritt, [facility]  Oncology Social Worker

## 2018-06-01 ENCOUNTER — HOSPITAL ENCOUNTER (OUTPATIENT)
Dept: GENERAL RADIOLOGY | Facility: HOSPITAL | Age: 78
Discharge: HOME | End: 2018-06-01
Attending: INTERNAL MEDICINE | Admitting: INTERNAL MEDICINE

## 2018-06-01 VITALS
SYSTOLIC BLOOD PRESSURE: 122 MMHG | OXYGEN SATURATION: 98 % | RESPIRATION RATE: 16 BRPM | BODY MASS INDEX: 25.46 KG/M2 | HEIGHT: 68 IN | HEART RATE: 73 BPM | DIASTOLIC BLOOD PRESSURE: 70 MMHG | WEIGHT: 168 LBS | TEMPERATURE: 96.9 F

## 2018-06-01 DIAGNOSIS — C83.30 DIFFUSE LARGE B-CELL LYMPHOMA, UNSPECIFIED BODY REGION (HCC): ICD-10-CM

## 2018-06-01 DIAGNOSIS — C82.23 FOLLICULAR LYMPHOMA GRADE III OF INTRA-ABDOMINAL LYMPH NODES (HCC): ICD-10-CM

## 2018-06-01 PROCEDURE — A9270 NON-COVERED ITEM OR SERVICE: HCPCS | Performed by: RADIOLOGY

## 2018-06-01 PROCEDURE — 88108 CYTOPATH CONCENTRATE TECH: CPT | Performed by: INTERNAL MEDICINE

## 2018-06-01 PROCEDURE — 25010000002 METHOTREXATE PF 100 MG/4ML SOLUTION 2 ML VIAL: Performed by: NURSE PRACTITIONER

## 2018-06-01 PROCEDURE — 63710000001 ALPRAZOLAM 0.5 MG TABLET: Performed by: RADIOLOGY

## 2018-06-01 PROCEDURE — 77003 FLUOROGUIDE FOR SPINE INJECT: CPT

## 2018-06-01 PROCEDURE — 96450 CHEMOTHERAPY INTO CNS: CPT

## 2018-06-01 RX ORDER — LIDOCAINE HYDROCHLORIDE 10 MG/ML
10 INJECTION, SOLUTION INFILTRATION; PERINEURAL ONCE
Status: COMPLETED | OUTPATIENT
Start: 2018-06-01 | End: 2018-06-01

## 2018-06-01 RX ORDER — ALPRAZOLAM 0.5 MG/1
0.5 TABLET ORAL ONCE
Status: COMPLETED | OUTPATIENT
Start: 2018-06-01 | End: 2018-06-01

## 2018-06-01 RX ADMIN — SODIUM CHLORIDE 12 MG: 9 INJECTION INTRAMUSCULAR; INTRAVENOUS; SUBCUTANEOUS at 12:32

## 2018-06-01 RX ADMIN — LIDOCAINE HYDROCHLORIDE 2 ML: 10 INJECTION, SOLUTION INFILTRATION; PERINEURAL at 12:28

## 2018-06-01 RX ADMIN — ALPRAZOLAM 0.5 MG: 0.5 TABLET ORAL at 11:45

## 2018-06-01 NOTE — DISCHARGE INSTRUCTIONS
EDUCATION /DISCHARGE INSTRUCTION   A lumbar puncture involves insertion of a sterile needle into the spinal canal by the physician   This procedure is performed for several reasons: to detect increased intracranial pressure, the presence of blood in cerebrospinal fluid (CFS), to obtain CSF specimens for laboratory studies, to administer drugs and to relieve pressure by removing CSF.    You will lie on your stomach with a pillow under your stomach.  This opens the vertebral space to allow access to the spinal needle.  The physician will sterilize the area using antiseptic solution and numb the area where the spinal needle will be placed.  If CSF is being removed for specimen, you may be asked to push or beardown to assist with the flow of the CSF. When the procedure is complete, a band aid will be placed over the injection site and you will be taken to the recovery area.    POTENTIAL RISKS OF A LUMBAR PUNCTURE INCLUDE BUT ARE NOT LIMITED TO:  *  Headache    *  Swelling at puncture site  *  Bleeding into the spinal canal *  Temporary difficulty urinating  *  Leakage of CSF   *  Fever  *  Pain caused by nerve irritation    BENEFIT OF PROCEDURE:  This is the only way to obtain spinal fluid or relieve pressure due to increased CSF.  There is no alternative.  THIS EDUCATION INFORMATION WAS REVIEWED PRIOR TO PROCEDURE AND CONSENT. Patient initials__________________Time____1125_____________    FOLLOWING A LUMBAR PUNCTURE:  *  Drink plenty of liquids -  Caffeine is recommended   *  Lie down flat for the next 8 hours.  If you get a headache, lie down flat for 24 hours,        continue to force fluids and call your doctor if the headache persists.  *  Go straight home.  DO NOT DRIVE    CALL YOUR DOCTOR IF YOU HAVE:  *  A severe headache that will not go away  *  Redness, swelling or drainage from the puncture site  *  Neck stiffness, numbness or weakness  *  Any new or severe symptoms    Following the procedure, you should  continue to take all of your medications as directed by your primary physician unless otherwise instructed.  There have been no changes to the medications you provided us (with the following exceptions if applicable):    Resume taking your blood thinner or Aspirin on__No Aspirin for 24 hours Lumbar Puncture__      YOU ARE THE MOST IMPORTANT FACTOR IN YOUR RECOVERY.  IF YOU HAVE QUESTIONS OR CONCERNS PLEASE CALL THE RADIOLOGY NURSES [phone]

## 2018-06-01 NOTE — PROGRESS NOTES
I spoke with the nurse and interventional radiology was unable to release the IT methotrexate from the care plan for the patient's lumbar puncture today.  I have placed a one-time inpatient order for IT methotrexate 12 mg.  I have given a verbal order for flow cytometry to be performed on cervical spinal fluid.

## 2018-06-01 NOTE — NURSING NOTE
In x ray triage for lumbar puncture with intrathecal Methotrexate.   Calculus of kidney  Staghorn calculus  Essential hypertension  HTN (hypertension)  Glaucoma  Glaucoma

## 2018-06-01 NOTE — NURSING NOTE
Verbal and written D/C instructions given to patient and wife.  They voice understanding and are able to teach back D/C instructions.

## 2018-06-01 NOTE — H&P (VIEW-ONLY)
Subjective     REASON FOR FOLLOW UP:   1.  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  2.  Maintenance Rituxan every 3 months for 2 years initiated 3/30/2018  3.  Painful pathologic compression fracture at L3 May 2018 due to involvement with diffuse large B-cell non-Hodgkin's lymphoma.   4.  Plan to initiate further chemotherapy with CHOP and Rituxan with or without intrathecal methotrexate depending on his initial lumbar puncture.    HISTORY OF PRESENT ILLNESS:  The patient is a 78 y.o. year old male who had  undergone CT scan of the abdomen on 10/11/2017 to evaluate some left lower quadrant abdominal pain.  CT scan findings were worrisome for metastatic malignancy with multiple tumor implants noted on the scan.  The largest mass appeared to be possibly arising from the duodenum or jejunum in the upper abdomen.  There was no evidence of metastases to liver.    CT-guided biopsy was performed on 10/18/2017 but unfortunately pathology was nondiagnostic showing only benign skeletal muscle and fibroadipose tissue.    The patient was seen in consultation on 10/20/2017 and at that time we recommended laparoscopic lymph node sampling and upper and lower GI endoscopy.  He underwent these procedures with Dr. Castañeda on 10/26/2017.  There was no evidence of malignancy from his endoscopic procedures at the laparoscopic specimen returned as follicular center cell B-cell non-Hodgkin's lymphoma.    He received Rituxan treatment initially with 4 weekly doses of Rituxan with good response on follow-up scans.  Unfortunately, a few months after completion of his treatment he developed worsening back pain and has lymphoma involving the L3 vertebral body consistent with diffuse large B-cell non-Hodgkin's lymphoma.    He and his wife are here today to discuss further treatment plan.  We plan to initiate further chemotherapy with CHOP  "and Rituxan along with Neulasta support.  We also will be performing a lumbar puncture with an initial dose of intrathecal methotrexate.  If the spinal fluid shows evidence of lymphoma involvement in additional intrathecal chemotherapy will be incorporated in his treatment.    We had discussed the situation also with Dr. Cadet of radiation oncology and with Dr. Banuelos of neurosurgery.  At this point we don't see a role for immediate radiation although radiation may be incorporated at completion of chemotherapy if there is any residual PET activity in the bone.  Dr. Banuelos does not feel that the compression fracture his severe enough to consider kyphoplasty treatment and hopefully his pain will improve significantly once chemotherapy get started.     Pain   Pertinent negatives include no chest pain, coughing, fatigue, fever, headaches, nausea, neck pain, rash or vomiting.        Past Medical History:   Diagnosis Date   • Cancer 2017    Basal Cell on Nose and Under Right Eye   • Coronary artery disease    • DDD (degenerative disc disease), cervical    • Gastric mass    • GERD (gastroesophageal reflux disease)    • Hx of cardiac arrest     Happened after Induction of anesthesia prior  to SURG 10 YRS AGO  \" I FLATLINE BUT W/IN SECONDS HEART WAS OK AFTER TURNED ON BACK...I THINK THEY GAVE ME TO MUCH ANESTHESIA\"   • Hyperlipidemia    • Hypertension    • Kidney stones    • Osteoarthritis    • Pleural effusion     DRAINAGE RT PLEURAL FLUID FOR TESTING WITH EGD ON 1-30-17   NO MALIGNANCY WITH CLEARANCE FROM DR SIMMONS FOR SURGERY   • Rheumatoid arthritis         Past Surgical History:   Procedure Laterality Date   • COLONOSCOPY N/A 10/26/2017    Procedure: COLONOSCOPY;  Surgeon: Louie Castañeda MD;  Location: [facility];  Service:    • CORONARY ANGIOPLASTY WITH STENT PLACEMENT  1999    17 years ago   • CYSTOSCOPY W/ LITHOLAPAXY / EHL      6-7 years ago   • DIAGNOSTIC LAPAROSCOPY N/A 10/26/2017    Procedure: DIAGNOSTIC " "LAPAROSCOPY WITH RETROPERITONEAL BIOPSIES;  Surgeon: Louie Castañeda MD;  Location: [facility] OR;  Service:    • ENDOSCOPY      ON 1/30/17 WITH DRAINAGE OF RT PLEURAL FLUID FOR BIOPSY    NO MALIGNANCY  CLEARANCE FOR SURG PER DR SIMMONS   • ENDOSCOPY N/A 10/26/2017    Procedure: ESOPHAGOGASTRODUODENOSCOPY;  Surgeon: Louie Castañeda MD;  Location: [facility] OR;  Service:    • EYE SURGERY Right 2017    Moh's; reconstruction right lower lid   • KIDNEY STONE SURGERY     • PLEURAL BIOPSY     • POSTERIOR CERVICAL FUSION  2001   • SKIN CANCER EXCISION N/A 2017    Basal Cell Nose & under right eye   • TOTAL HIP ARTHROPLASTY Left 04/2010    9-10 years ago    • TOTAL HIP ARTHROPLASTY Right 2/14/2017    Procedure: TOTAL HIP ARTHROPLASTY;  Surgeon: Gerson Cardoza MD;  Location: [facility] OR;  Service:          ALLERGIES:  No Known Allergies     Review of Systems   Constitutional: Negative for activity change, appetite change, fatigue, fever and unexpected weight change.   HENT: Negative for hearing loss, nosebleeds, trouble swallowing and voice change.    Eyes: Negative for visual disturbance.   Respiratory: Negative for cough, shortness of breath and wheezing.    Cardiovascular: Negative for chest pain and palpitations.   Gastrointestinal: Negative for diarrhea, nausea and vomiting.   Genitourinary: Negative for difficulty urinating, frequency, hematuria and urgency.   Musculoskeletal: Positive for back pain. Negative for neck pain.   Skin: Negative for rash.   Neurological: Negative for dizziness, seizures, syncope and headaches.   Hematological: Negative for adenopathy. Does not bruise/bleed easily.   Psychiatric/Behavioral: Negative for behavioral problems. The patient is not nervous/anxious.         Objective     Vitals:    05/25/18 1523   BP: 156/78   Pulse: 74   Resp: 12   Temp: 98.8 °F (37.1 °C)   TempSrc: Oral   SpO2: 98%   Weight: 78.9 kg (174 lb)   Height: 174 cm (68.5\")   PainSc:   8   PainLoc: Back "     Current Status 5/25/2018   ECOG score 0       Physical Exam   Constitutional: He is oriented to person, place, and time. He appears well-developed and well-nourished. No distress.   HENT:   Head: Normocephalic.   Eyes: Conjunctivae and EOM are normal. Pupils are equal, round, and reactive to light. No scleral icterus.   Neck: Normal range of motion. Neck supple. No JVD present. No thyromegaly present.   Cardiovascular: Normal rate and regular rhythm.  Exam reveals no gallop and no friction rub.    No murmur heard.  Pulmonary/Chest: Effort normal and breath sounds normal. He has no wheezes. He has no rales.   Abdominal: Soft. He exhibits no distension and no mass. There is no tenderness.   Musculoskeletal: Normal range of motion. He exhibits no edema or deformity.   Lymphadenopathy:     He has no cervical adenopathy.   Neurological: He is alert and oriented to person, place, and time. He has normal reflexes. No cranial nerve deficit.   Skin: Skin is warm and dry. No rash noted. No erythema.   Scarring and eschar formation on the scalp from recent skin cancer removal   Psychiatric: He has a normal mood and affect. His behavior is normal. Judgment normal.         RECENT LABS:  Hematology WBC   Date Value Ref Range Status   05/25/2018 8.52 4.50 - 10.70 10*3/mm3 Final     RBC   Date Value Ref Range Status   05/25/2018 3.74 (L) 4.60 - 6.00 10*6/mm3 Final     Hemoglobin   Date Value Ref Range Status   05/25/2018 11.6 (L) 13.7 - 17.6 g/dL Final     Hematocrit   Date Value Ref Range Status   05/25/2018 34.0 (L) 40.4 - 52.2 % Final     Platelets   Date Value Ref Range Status   05/25/2018 248 140 - 500 10*3/mm3 Final        Lab Results   Component Value Date    IRON 68 03/30/2018    TIBC 373 03/30/2018    FERRITIN 76.30 03/30/2018       F-18 FDG PET FROM SKULL BASE TO MID THIGH WITH PET/CT FUSION  5/21/2018     HISTORY: 78-year-old male with non-Hodgkin's lymphoma. Restaging.     TECHNIQUE: Radiation dose reduction  techniques were utilized, including  automated exposure control and exposure modulation based on body size.   Blood glucose level at time of injection was 96 mg/dL.  6.0 mCi of F-18  FDG were injected and PET was performed from skull base to mid thigh. CT  was obtained for localization and attenuation correction. Time at  injection 7:30 AM. PET start time 9:09 AM. Compared with previous PET/CT  from 11/01/2017.     FINDINGS: There has been marked interval decrease in the size of the  hypermetabolic small bowel lesions at the left midabdomen and the  mesenteric lymphadenopathy has markedly decreased as well. Some of the  mesenteric lymphadenopathy has resolved. The bowel lesions and some of  the adjacent lymphadenopathy were confluent on the previous examination  and difficult to compare in size. The intensity of the metabolic  activity at the remaining bowel lesions has decreased with a current  maximal SUV of 24.1 and was previously 34. A discrete 1.2 x 1.0 cm right  mesenteric node was less discrete previously, but measured approximately  2.5 x 1.8 cm. The current maximal SUV is 14.5 and was previously 11.5.  There has been resolution of many of the scattered hypermetabolic  lesions throughout the abdomen including the hypermetabolic lesion at  the pancreatic tail. There has been marked interval decrease in the left  periaortic infiltrative lymphadenopathy and the infiltrative  lymphadenopathy along the left pelvic sidewall with essential resolution  of the previously seen left hydroureteronephrosis. There has, however,  been interval development of intense hypermetabolic activity throughout  the L3 vertebral body with a maximal SUV of 24.4. There are pathological  fractures and loss of height at the L3 vertebral body. There has been  resolution of the hypermetabolic retrodiaphragmatic lymphadenopathy and  the mediastinal hypermetabolic lymphadenopathy has resolved as well.  There is a hypermetabolic 5 mm left  supraclavicular node which has a  maximal SUV of 6.8, previously measuring up to 4 mm. There was  significant brown fat activity at the thorax and supraclavicular regions  which limits comparison of the hypermetabolic activity. There is a tiny  right pleural effusion which is smaller than previously.     IMPRESSION:  1. There has been a mixed response. There has been marked interval  decrease or resolution of hypermetabolic lymphadenopathy throughout the  abdomen and pelvis as well as marked decrease in the hypermetabolic  bowel lesions. There has also been resolution of the hypermetabolic  retrodiaphragmatic and mediastinal lymphadenopathy. There has, however,  been interval development of an intensely hypermetabolic lesion  throughout the L3 vertebral body. There is pathologic compression with  significant loss in height of L3.  2. The 5 mm hypermetabolic left supraclavicular node was slightly  smaller previously. The brown fat activity in this region on the  previous examination precludes comparison of the metabolic activity.  3. Resolution of the left hydronephrosis.    IMAGES PERSONALLY REVIEWED      Assessment/Plan     1.  Diffuse large B-cell non-Hodgkin's lymphoma involving the L3 vertebral body.  2.  Previous treatment with single agent Rituxan for follicular B-cell lymphoma.  3.  Back pain related to lymphoma involving the spine.  4.  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    Plan  1.  We discussed the results of the PET scan and a need for more aggressive chemotherapy with the patient and his wife in the office today.  We plan to initiate combination chemotherapy with CHOP and Rituxan on an every 3 week cycle with plans for 6 cycles total along with Neulasta support.  2.  We also discussed that due to the involvement of the spinal bone we will need to perform a lumbar puncture to sample spinal fluid for lymphoma involvement.  He will undergo a lumbar puncture with 1 dose of intrathecal methotrexate delivered at the time  of the procedure.  If the spinal fluid shows evidence of lymphoma involvement and additional doses of intrathecal methotrexate will be incorporated with his chemotherapy.  3.  We will schedule formal chemotherapy education regarding CHOP and Rituxan with our nurse practitioners.  With that visit we will need to make sure that he has his prescription for prednisone tablets and for anti-nausea medication.  4.  We will order a baseline echocardiogram for left ventricular ejection fraction prior to Adriamycin therapy.  5.  We will refer the patient to Dr. Louie Castañeda for placement of a MediPort  6.  We will schedule a lumbar puncture in radiology with administration of 1 dose of preservative-free methotrexate 12 mg.  Spinal fluid will be sent for cytology and flow cytometry.  7.  Patient will return to initiate the first cycle of CHOP and Rituxan chemotherapy 2 weeks from now on 6/8/2018.  8.  We will plan to repeat scans after 2 cycles of treatment.

## 2018-06-02 ENCOUNTER — TELEPHONE (OUTPATIENT)
Dept: INTERVENTIONAL RADIOLOGY/VASCULAR | Facility: HOSPITAL | Age: 78
End: 2018-06-02

## 2018-06-04 ENCOUNTER — TELEPHONE (OUTPATIENT)
Dept: ONCOLOGY | Facility: CLINIC | Age: 78
End: 2018-06-04

## 2018-06-04 LAB
CYTO UR: NORMAL
LAB AP CASE REPORT: NORMAL
Lab: NORMAL
PATH REPORT.FINAL DX SPEC: NORMAL
PATH REPORT.GROSS SPEC: NORMAL
REF LAB TEST METHOD: NORMAL

## 2018-06-04 RX ORDER — DEXAMETHASONE 4 MG/1
4 TABLET ORAL 2 TIMES DAILY WITH MEALS
Qty: 10 TABLET | Refills: 0 | Status: SHIPPED | OUTPATIENT
Start: 2018-06-04 | End: 2018-06-09

## 2018-06-04 RX ORDER — DEXAMETHASONE 4 MG/1
4 TABLET ORAL 2 TIMES DAILY WITH MEALS
Qty: 10 TABLET | Refills: 0 | Status: SHIPPED | OUTPATIENT
Start: 2018-06-04 | End: 2018-06-04 | Stop reason: SDUPTHER

## 2018-06-04 RX ORDER — SIMVASTATIN 40 MG
40 TABLET ORAL NIGHTLY
COMMUNITY
End: 2018-09-25

## 2018-06-04 RX ORDER — FELODIPINE 10 MG/1
10 TABLET, EXTENDED RELEASE ORAL DAILY
COMMUNITY
End: 2018-09-25

## 2018-06-04 NOTE — TELEPHONE ENCOUNTER
Patient calling with increasing back pain down leg. No bowel or bladder problems. Stated taking norco more often then prescribed. Discussed with Dr. Roberto and he is ok with increasing Norco and is starting him on Dexamethasone 4 mg twice a day for 5 days until starts chemo. Patient verbalized understanding.

## 2018-06-05 ENCOUNTER — APPOINTMENT (OUTPATIENT)
Dept: GENERAL RADIOLOGY | Facility: HOSPITAL | Age: 78
End: 2018-06-05

## 2018-06-05 ENCOUNTER — ANESTHESIA (OUTPATIENT)
Dept: PERIOP | Facility: HOSPITAL | Age: 78
End: 2018-06-05

## 2018-06-05 ENCOUNTER — ANESTHESIA EVENT (OUTPATIENT)
Dept: PERIOP | Facility: HOSPITAL | Age: 78
End: 2018-06-05

## 2018-06-05 ENCOUNTER — HOSPITAL ENCOUNTER (OUTPATIENT)
Facility: HOSPITAL | Age: 78
Setting detail: HOSPITAL OUTPATIENT SURGERY
Discharge: HOME | End: 2018-06-05
Attending: SURGERY | Admitting: SURGERY

## 2018-06-05 VITALS
HEART RATE: 75 BPM | TEMPERATURE: 97.7 F | BODY MASS INDEX: 25.96 KG/M2 | DIASTOLIC BLOOD PRESSURE: 83 MMHG | WEIGHT: 171.3 LBS | SYSTOLIC BLOOD PRESSURE: 182 MMHG | OXYGEN SATURATION: 97 % | RESPIRATION RATE: 16 BRPM | HEIGHT: 68 IN

## 2018-06-05 DIAGNOSIS — C85.90 LYMPHOMA (HCC): ICD-10-CM

## 2018-06-05 PROCEDURE — 25010000002 PROPOFOL 10 MG/ML EMULSION: Performed by: NURSE ANESTHETIST, CERTIFIED REGISTERED

## 2018-06-05 PROCEDURE — 25010000002 MIDAZOLAM PER 1 MG: Performed by: ANESTHESIOLOGY

## 2018-06-05 PROCEDURE — 77001 FLUOROGUIDE FOR VEIN DEVICE: CPT

## 2018-06-05 PROCEDURE — 25010000003 CEFAZOLIN IN DEXTROSE 2-4 GM/100ML-% SOLUTION: Performed by: SURGERY

## 2018-06-05 PROCEDURE — 77001 FLUOROGUIDE FOR VEIN DEVICE: CPT | Performed by: SURGERY

## 2018-06-05 PROCEDURE — 25010000002 FENTANYL CITRATE (PF) 100 MCG/2ML SOLUTION: Performed by: ANESTHESIOLOGY

## 2018-06-05 PROCEDURE — 25010000002 HEPARIN (PORCINE) PER 1000 UNITS: Performed by: SURGERY

## 2018-06-05 PROCEDURE — 93010 ELECTROCARDIOGRAM REPORT: CPT | Performed by: INTERNAL MEDICINE

## 2018-06-05 PROCEDURE — 93005 ELECTROCARDIOGRAM TRACING: CPT | Performed by: SURGERY

## 2018-06-05 PROCEDURE — 36561 INSERT TUNNELED CV CATH: CPT | Performed by: SURGERY

## 2018-06-05 PROCEDURE — C1788 PORT, INDWELLING, IMP: HCPCS | Performed by: SURGERY

## 2018-06-05 PROCEDURE — S0260 H&P FOR SURGERY: HCPCS | Performed by: SURGERY

## 2018-06-05 DEVICE — POWERPORT M.R.I. IMPLANTABLE PORT WITH PRE-ATTACHED 9.6F  OPEN-ENDED SINGLE-LUMEN VENOUS CATHETER. INTERMEDIATE KIT (WITH SUTURE PLUGS)
Type: IMPLANTABLE DEVICE | Site: CHEST | Status: FUNCTIONAL
Brand: POWERPORT M.R.I.

## 2018-06-05 RX ORDER — MAGNESIUM HYDROXIDE 1200 MG/15ML
LIQUID ORAL AS NEEDED
Status: DISCONTINUED | OUTPATIENT
Start: 2018-06-05 | End: 2018-06-05 | Stop reason: HOSPADM

## 2018-06-05 RX ORDER — FENTANYL CITRATE 50 UG/ML
50 INJECTION, SOLUTION INTRAMUSCULAR; INTRAVENOUS
Status: DISCONTINUED | OUTPATIENT
Start: 2018-06-05 | End: 2018-06-05 | Stop reason: HOSPADM

## 2018-06-05 RX ORDER — MIDAZOLAM HYDROCHLORIDE 1 MG/ML
1 INJECTION INTRAMUSCULAR; INTRAVENOUS
Status: DISCONTINUED | OUTPATIENT
Start: 2018-06-05 | End: 2018-06-05 | Stop reason: HOSPADM

## 2018-06-05 RX ORDER — LIDOCAINE HYDROCHLORIDE 10 MG/ML
0.5 INJECTION, SOLUTION EPIDURAL; INFILTRATION; INTRACAUDAL; PERINEURAL ONCE AS NEEDED
Status: DISCONTINUED | OUTPATIENT
Start: 2018-06-05 | End: 2018-06-05 | Stop reason: HOSPADM

## 2018-06-05 RX ORDER — SODIUM CHLORIDE 0.9 % (FLUSH) 0.9 %
1-10 SYRINGE (ML) INJECTION AS NEEDED
Status: DISCONTINUED | OUTPATIENT
Start: 2018-06-05 | End: 2018-06-05 | Stop reason: HOSPADM

## 2018-06-05 RX ORDER — FAMOTIDINE 10 MG/ML
20 INJECTION, SOLUTION INTRAVENOUS ONCE
Status: COMPLETED | OUTPATIENT
Start: 2018-06-05 | End: 2018-06-05

## 2018-06-05 RX ORDER — PROPOFOL 10 MG/ML
VIAL (ML) INTRAVENOUS CONTINUOUS PRN
Status: DISCONTINUED | OUTPATIENT
Start: 2018-06-05 | End: 2018-06-05 | Stop reason: SURG

## 2018-06-05 RX ORDER — LIDOCAINE HYDROCHLORIDE 20 MG/ML
INJECTION, SOLUTION INFILTRATION; PERINEURAL AS NEEDED
Status: DISCONTINUED | OUTPATIENT
Start: 2018-06-05 | End: 2018-06-05 | Stop reason: SURG

## 2018-06-05 RX ORDER — MIDAZOLAM HYDROCHLORIDE 1 MG/ML
2 INJECTION INTRAMUSCULAR; INTRAVENOUS
Status: DISCONTINUED | OUTPATIENT
Start: 2018-06-05 | End: 2018-06-05 | Stop reason: HOSPADM

## 2018-06-05 RX ORDER — CEFAZOLIN SODIUM 2 G/100ML
2 INJECTION, SOLUTION INTRAVENOUS ONCE
Status: COMPLETED | OUTPATIENT
Start: 2018-06-05 | End: 2018-06-05

## 2018-06-05 RX ORDER — SODIUM CHLORIDE, SODIUM LACTATE, POTASSIUM CHLORIDE, CALCIUM CHLORIDE 600; 310; 30; 20 MG/100ML; MG/100ML; MG/100ML; MG/100ML
9 INJECTION, SOLUTION INTRAVENOUS CONTINUOUS
Status: DISCONTINUED | OUTPATIENT
Start: 2018-06-05 | End: 2018-06-05 | Stop reason: HOSPADM

## 2018-06-05 RX ORDER — FENTANYL CITRATE 50 UG/ML
50 INJECTION, SOLUTION INTRAMUSCULAR; INTRAVENOUS
Status: CANCELLED | OUTPATIENT
Start: 2018-06-05 | End: 2018-06-05

## 2018-06-05 RX ORDER — SODIUM CHLORIDE, SODIUM LACTATE, POTASSIUM CHLORIDE, CALCIUM CHLORIDE 600; 310; 30; 20 MG/100ML; MG/100ML; MG/100ML; MG/100ML
INJECTION, SOLUTION INTRAVENOUS CONTINUOUS PRN
Status: DISCONTINUED | OUTPATIENT
Start: 2018-06-05 | End: 2018-06-05 | Stop reason: SURG

## 2018-06-05 RX ORDER — BUPIVACAINE HYDROCHLORIDE AND EPINEPHRINE 5; 5 MG/ML; UG/ML
INJECTION, SOLUTION PERINEURAL AS NEEDED
Status: DISCONTINUED | OUTPATIENT
Start: 2018-06-05 | End: 2018-06-05 | Stop reason: HOSPADM

## 2018-06-05 RX ADMIN — FENTANYL CITRATE 50 MCG: 50 INJECTION INTRAMUSCULAR; INTRAVENOUS at 11:24

## 2018-06-05 RX ADMIN — FAMOTIDINE 20 MG: 10 INJECTION INTRAVENOUS at 11:24

## 2018-06-05 RX ADMIN — MIDAZOLAM 2 MG: 1 INJECTION INTRAMUSCULAR; INTRAVENOUS at 14:09

## 2018-06-05 RX ADMIN — LIDOCAINE HYDROCHLORIDE 60 MG: 20 INJECTION, SOLUTION INFILTRATION; PERINEURAL at 15:25

## 2018-06-05 RX ADMIN — SODIUM CHLORIDE, POTASSIUM CHLORIDE, SODIUM LACTATE AND CALCIUM CHLORIDE: 600; 310; 30; 20 INJECTION, SOLUTION INTRAVENOUS at 14:52

## 2018-06-05 RX ADMIN — PROPOFOL 120 MCG/KG/MIN: 10 INJECTION, EMULSION INTRAVENOUS at 15:25

## 2018-06-05 RX ADMIN — SODIUM CHLORIDE, POTASSIUM CHLORIDE, SODIUM LACTATE AND CALCIUM CHLORIDE 9 ML/HR: 600; 310; 30; 20 INJECTION, SOLUTION INTRAVENOUS at 11:24

## 2018-06-05 RX ADMIN — CEFAZOLIN SODIUM 2 G: 2 INJECTION, SOLUTION INTRAVENOUS at 15:21

## 2018-06-05 RX ADMIN — FENTANYL CITRATE 25 MCG: 50 INJECTION INTRAMUSCULAR; INTRAVENOUS at 15:25

## 2018-06-05 NOTE — H&P
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 male who has a non Hodgkin's lymphoma. He requires placement of a venous access device for his chemotherapy.    Past Medical History:   Diagnosis Date   • Arm fracture     right side humerus - sling and swathe   • Cancer 2017    Basal Cell on Nose and Under Right Eye   • Coronary artery disease    • DDD (degenerative disc disease), cervical    • Gastric mass    • GERD (gastroesophageal reflux disease)    • Hip pain started 5/29/18    onset of acute pain with weight bearing   • Hx of cardiac arrest     Happened after Induction of anesthesia prior  to SURG 10 YRS AGO  \" I FLATLINE BUT W/IN SECONDS HEART WAS OK AFTER TURNED ON BACK...I THINK THEY GAVE ME TO MUCH ANESTHESIA\"   • Hyperlipidemia    • Hypertension    • Kidney stones    • Non Hodgkin's lymphoma     SPINE   • Osteoarthritis    • Pleural effusion     DRAINAGE RT PLEURAL FLUID FOR TESTING WITH EGD ON 1-30-17   NO MALIGNANCY WITH CLEARANCE FROM DR SIMMONS FOR SURGERY   • Rheumatoid arthritis        Past Surgical History:   Procedure Laterality Date   • COLONOSCOPY N/A 10/26/2017    Procedure: COLONOSCOPY;  Surgeon: Louie Castañeda MD;  Location: [facility];  Service:    • CORONARY ANGIOPLASTY WITH STENT PLACEMENT  1999    17 years ago   • CYSTOSCOPY W/ LITHOLAPAXY / EHL      6-7 years ago   • DIAGNOSTIC LAPAROSCOPY N/A 10/26/2017    Procedure: DIAGNOSTIC LAPAROSCOPY WITH RETROPERITONEAL BIOPSIES;  Surgeon: Louie Castañeda MD;  Location: [facility] OR;  Service:    • ENDOSCOPY      ON 1/30/17 WITH DRAINAGE OF RT PLEURAL FLUID FOR BIOPSY    NO MALIGNANCY  CLEARANCE FOR SURG PER DR SIMMONS   • ENDOSCOPY N/A 10/26/2017    Procedure: ESOPHAGOGASTRODUODENOSCOPY;  Surgeon: Louie Castañeda MD;  Location: [facility] OR;  Service:    • EYE SURGERY Right 2017    Moh's; reconstruction right lower lid   • KIDNEY STONE " SURGERY     • PLEURAL BIOPSY     • POSTERIOR CERVICAL FUSION  2001   • SKIN CANCER EXCISION N/A 2017    Basal Cell Nose & under right eye   • TOTAL HIP ARTHROPLASTY Left 04/2010    9-10 years ago    • TOTAL HIP ARTHROPLASTY Right 2/14/2017    Procedure: TOTAL HIP ARTHROPLASTY;  Surgeon: Gerson Cardoza MD;  Location: [facility] OR;  Service:          Current Facility-Administered Medications:   •  ceFAZolin in dextrose (ANCEF) IVPB solution 2 g, 2 g, Intravenous, Once, Louie Castañeda MD  •  fentaNYL citrate (PF) (SUBLIMAZE) injection 50 mcg, 50 mcg, Intravenous, Q10 Min PRN, Pearl Gonzales MD, 50 mcg at 06/05/18 1124  •  lactated ringers infusion, 9 mL/hr, Intravenous, Continuous, Pearl Gonzales MD, Last Rate: 9 mL/hr at 06/05/18 1124, 9 mL/hr at 06/05/18 1124  •  lidocaine PF 1% (XYLOCAINE) injection 0.5 mL, 0.5 mL, Injection, Once PRN, Pearl Gonzales MD  •  midazolam (VERSED) injection 1 mg, 1 mg, Intravenous, Q5 Min PRN **OR** midazolam (VERSED) injection 2 mg, 2 mg, Intravenous, Q5 Min PRN, Pearl Gonzales MD, 2 mg at 06/05/18 1409  •  sodium chloride 0.9 % flush 1-10 mL, 1-10 mL, Intravenous, PRN, Pearl Gonzales MD    No Known Allergies    Family History   Problem Relation Age of Onset   • Hypertension Mother         Lived to 95 years old - Had numerous medical problems & medications   • Hypertension Father         Lived to 98 years old - Numerous medical problems and Medications   • Cancer Brother         History of Agent Orange   • Heart disease Maternal Grandmother    • Osteoarthritis Paternal Grandfather         possible RA   • Malig Hyperthermia Neg Hx        Social History     Social History   • Marital status:      Spouse name: Farrah   • Number of children: N/A   • Years of education: college-Masters degree     Occupational History   • Teacher Retired   • volunteer      Social History Main Topics   • Smoking status: Never Smoker   • Smokeless tobacco: Former User     Types: Chew     Quit date:  "5/14/1998   • Alcohol use Yes      Comment: 1-2 beers nightly, sometimes an occasional bourbon   • Drug use: No   • Sexual activity: Defer     Other Topics Concern   • Not on file     Social History Narrative   • No narrative on file       Review of Systems   Constitutional: Negative for fever.   HENT: Negative for trouble swallowing.    Respiratory: Negative for shortness of breath.    Gastrointestinal: Negative for diarrhea.   Genitourinary: Negative for dysuria.       Objective     /71 (BP Location: Right arm, Patient Position: Lying)   Pulse 74   Temp 98 °F (36.7 °C) (Oral)   Resp 16   Ht 172.7 cm (68\")   Wt 77.7 kg (171 lb 4.8 oz)   SpO2 96%   BMI 26.05 kg/m²     Physical Exam   Constitutional: He is oriented to person, place, and time. He appears well-developed and well-nourished. No distress.   HENT:   Head: Normocephalic and atraumatic.   Eyes: Conjunctivae are normal. No scleral icterus.   Neck: Neck supple. No tracheal deviation present.   Cardiovascular: Normal rate and regular rhythm.    Pulmonary/Chest: Effort normal and breath sounds normal. No respiratory distress. He has no wheezes.   Abdominal: Soft. Bowel sounds are normal. He exhibits no distension and no mass. There is no tenderness. There is no rebound and no guarding.   Musculoskeletal: He exhibits no edema.   Lymphadenopathy:     He has no cervical adenopathy.   Neurological: He is alert and oriented to person, place, and time.   Skin: Skin is warm and dry.   Psychiatric: He has a normal mood and affect. His behavior is normal.       DIAGNOSTIC DATA:    Labs 5/25/2018:  WBC 8.52, Hgb 11.6, Plts 248  Creat 1.53    ASSESSMENT:    Non Hodgkin's Lymphoma     PLAN:    PowerPort placement today.  "

## 2018-06-05 NOTE — ANESTHESIA PREPROCEDURE EVALUATION
Anesthesia Evaluation                  Airway   Mallampati: II  TM distance: >3 FB  Neck ROM: full  No difficulty expected  Dental          Pulmonary    (+) pleural effusion,   Cardiovascular - normal exam    ECG reviewed    (+) hypertension, CAD, cardiac stents more than 12 months ago       Neuro/Psych  GI/Hepatic/Renal/Endo    (+)  GERD,  renal disease stones and CRI,     Musculoskeletal     (+) back pain,       ROS comment: L 3 fracture -cancer -pain methotrexate injected  Abdominal    Substance History      OB/GYN          Other   (+) arthritis   history of cancer active                    Anesthesia Plan    ASA 3     MAC     intravenous induction   Anesthetic plan and risks discussed with patient.

## 2018-06-05 NOTE — OP NOTE
PREOPERATIVE DIAGNOSIS:   Non Hodgkin's lymphoma.    POSTOPERATIVE DIAGNOSIS:   Non Hodgkin's lymphoma..    PROCEDURE:   RIJ vein PowerPort placement with ultrasound guidance and fluoroscopic guidance.    SURGEON: Louie Ruelas M.D.  ANESTHESIA: MAC plus 30 mL 0.5% bupivacaine.  BLOOD LOSS: 20 mL.    DESCRIPTION: The patient is positioned supine on the operating table. The arms are tucked at his sides. A shoulder roll was placed beneath the scapula to hyperextend the neck and clavicle. Perioperative antibiotics were administered. Lower extremity compression garments are in place. A surgical pause was performed. The skin over the right chest and neck area was prepped with ChloraPrep. Sterile drapes were arranged.   The site was anesthetized with 0.5% bupivacaine. A total of 30 mL was ultimately used for the case. A spinal needle was used to percutaneously enter the right internal jugular vein under ultrasound guidance. It was entered easily. A guidewire was easily passed. The needle was removed. A photo was obtained of the ultrasound image. Fluoroscopy was used to position the guidewire properly.   A subcutaneous pocket was made in the right chest wall  with electrocautery. It was large enough to accommodate implantation of the reservoir. The catheter was tunneled between the guidewire incision and the reservoir incision.  A dilator and peel-away sheath were passed over the guidewire. The guidewire and dilator were removed. Fluoroscopy was used to accurately trim the catheter to the appropriate length. The catheter was passed through the peel-away sheath and the sheath was removed. Fluoroscopy was used to confirm that the catheter tip was correctly positioned at the junction between superior vena cava and right atrium. The contour of the catheter is smooth. The catheter flushes easily and has good blood return.   The reservoir was secured to the anterior chest wall with 2-0 Vicryl interrupted stitches. The  subcutaneous tissues were closed over the reservoir with 2-0 Vicryl. The skin was closed with 4-0 Monocryl and Steri-Strips. I accessed the port with the supplied noncoring infusion set. A Biopatch and sterile Tegaderm dressing were placed over the access site. A postoperative chest x-ray was ordered for the recovery room.

## 2018-06-05 NOTE — NURSING NOTE
C/o pain, Dr Phipps called.  Ok to take Hydrocodone/Acetaminophen 7.5/325 from pt home supply per directions on the bottle.

## 2018-06-05 NOTE — ANESTHESIA POSTPROCEDURE EVALUATION
"Patient: Louie German    Procedure Summary     Date:  06/05/18 Room / Location:  [facility] OR  / [facility] MAIN OR    Anesthesia Start:  1518 Anesthesia Stop:  1620    Procedure:  INSERTION VENOUS ACCESS DEVICE- RIGHT (N/A Chest) Diagnosis:       Lymphoma      (Lymphoma [C85.90])    Surgeon:  Louie Castañeda MD Provider:  Pearl Gonzales MD    Anesthesia Type:  MAC ASA Status:  3          Anesthesia Type: MAC  Last vitals  BP   150/93 (06/05/18 1616)   Temp   36.5 °C (97.7 °F) (06/05/18 1616)   Pulse   113 (06/05/18 1616)   Resp   16 (06/05/18 1616)     SpO2   96 % (06/05/18 1420)     Post Anesthesia Care and Evaluation    Patient location during evaluation: bedside  Patient participation: complete - patient participated  Level of consciousness: awake  Pain score: 2  Pain management: adequate  Airway patency: patent  Anesthetic complications: No anesthetic complications  PONV Status: none  Cardiovascular status: acceptable  Respiratory status: acceptable  Hydration status: acceptable    Comments: /93   Pulse 113   Temp 36.5 °C (97.7 °F)   Resp 16   Ht 172.7 cm (68\")   Wt 77.7 kg (171 lb 4.8 oz)   SpO2 96%   BMI 26.05 kg/m²         "

## 2018-06-06 ENCOUNTER — TELEPHONE (OUTPATIENT)
Dept: ONCOLOGY | Facility: HOSPITAL | Age: 78
End: 2018-06-06

## 2018-06-06 ENCOUNTER — HOSPITAL ENCOUNTER (OUTPATIENT)
Dept: CARDIOLOGY | Facility: HOSPITAL | Age: 78
Discharge: HOME | End: 2018-06-06
Attending: INTERNAL MEDICINE | Admitting: INTERNAL MEDICINE

## 2018-06-06 VITALS
HEART RATE: 70 BPM | DIASTOLIC BLOOD PRESSURE: 70 MMHG | HEIGHT: 68 IN | WEIGHT: 168 LBS | SYSTOLIC BLOOD PRESSURE: 138 MMHG | OXYGEN SATURATION: 98 % | BODY MASS INDEX: 25.46 KG/M2

## 2018-06-06 DIAGNOSIS — R94.31 ABNORMAL ELECTROCARDIOGRAM: ICD-10-CM

## 2018-06-06 DIAGNOSIS — C83.30 DIFFUSE LARGE B-CELL LYMPHOMA, UNSPECIFIED BODY REGION (HCC): ICD-10-CM

## 2018-06-06 DIAGNOSIS — C82.23 FOLLICULAR LYMPHOMA GRADE III OF INTRA-ABDOMINAL LYMPH NODES (HCC): ICD-10-CM

## 2018-06-06 PROBLEM — Z45.2 FITTING AND ADJUSTMENT OF VASCULAR CATHETER: Status: ACTIVE | Noted: 2018-06-06

## 2018-06-06 PROCEDURE — 0399T ADULT TRANSTHORACIC ECHO COMPLETE W/ CONT IF NECESSARY PER PROTOCOL: CPT | Performed by: INTERNAL MEDICINE

## 2018-06-06 PROCEDURE — 93306 TTE W/DOPPLER COMPLETE: CPT | Performed by: INTERNAL MEDICINE

## 2018-06-06 PROCEDURE — 93306 TTE W/DOPPLER COMPLETE: CPT

## 2018-06-06 PROCEDURE — 25010000002 PERFLUTREN (DEFINITY) 8.476 MG IN SODIUM CHLORIDE 0.9 % 10 ML INJECTION: Performed by: INTERNAL MEDICINE

## 2018-06-06 PROCEDURE — 0399T HC MYOCARDL STRAIN IMAG QUAN ASSMT PER SESS: CPT

## 2018-06-06 RX ORDER — FAMOTIDINE 10 MG/ML
20 INJECTION, SOLUTION INTRAVENOUS ONCE
Status: CANCELLED | OUTPATIENT
Start: 2018-06-08

## 2018-06-06 RX ORDER — SODIUM CHLORIDE 0.9 % (FLUSH) 0.9 %
10 SYRINGE (ML) INJECTION AS NEEDED
Status: CANCELLED | OUTPATIENT
Start: 2018-06-08

## 2018-06-06 RX ADMIN — PERFLUTREN 1.5 ML: 6.52 INJECTION, SUSPENSION INTRAVENOUS at 07:50

## 2018-06-06 NOTE — TELEPHONE ENCOUNTER
Pt called and wanted to review how he is supposed to take Claritin with his tx and Decadron. Also said he has Left hip and leg pain, reinforced directions for each of these meds. Pt v/u.

## 2018-06-07 LAB
BH CV ECHO MEAS - ACS: 2 CM
BH CV ECHO MEAS - AO MAX PG (FULL): 2.3 MMHG
BH CV ECHO MEAS - AO MAX PG: 5.7 MMHG
BH CV ECHO MEAS - AO MEAN PG (FULL): 1.3 MMHG
BH CV ECHO MEAS - AO MEAN PG: 3.3 MMHG
BH CV ECHO MEAS - AO ROOT AREA (BSA CORRECTED): 1.7
BH CV ECHO MEAS - AO ROOT AREA: 8 CM^2
BH CV ECHO MEAS - AO ROOT DIAM: 3.2 CM
BH CV ECHO MEAS - AO V2 MAX: 119.4 CM/SEC
BH CV ECHO MEAS - AO V2 MEAN: 84 CM/SEC
BH CV ECHO MEAS - AO V2 VTI: 24.2 CM
BH CV ECHO MEAS - AVA(I,A): 2.7 CM^2
BH CV ECHO MEAS - AVA(I,D): 2.7 CM^2
BH CV ECHO MEAS - AVA(V,A): 2.5 CM^2
BH CV ECHO MEAS - AVA(V,D): 2.5 CM^2
BH CV ECHO MEAS - BSA(HAYCOCK): 1.9 M^2
BH CV ECHO MEAS - BSA: 1.9 M^2
BH CV ECHO MEAS - BZI_BMI: 25.5 KILOGRAMS/M^2
BH CV ECHO MEAS - BZI_METRIC_HEIGHT: 172.7 CM
BH CV ECHO MEAS - BZI_METRIC_WEIGHT: 76.2 KG
BH CV ECHO MEAS - CONTRAST EF (2CH): 61.9 ML/M^2
BH CV ECHO MEAS - CONTRAST EF 4CH: 59.3 ML/M^2
BH CV ECHO MEAS - EDV(MOD-SP2): 63 ML
BH CV ECHO MEAS - EDV(MOD-SP4): 81 ML
BH CV ECHO MEAS - EDV(TEICH): 99.7 ML
BH CV ECHO MEAS - EF(CUBED): 74.2 %
BH CV ECHO MEAS - EF(MOD-BP): 61 %
BH CV ECHO MEAS - EF(MOD-SP2): 61.9 %
BH CV ECHO MEAS - EF(MOD-SP4): 59.3 %
BH CV ECHO MEAS - EF(TEICH): 66.1 %
BH CV ECHO MEAS - ESV(MOD-SP2): 24 ML
BH CV ECHO MEAS - ESV(MOD-SP4): 33 ML
BH CV ECHO MEAS - ESV(TEICH): 33.8 ML
BH CV ECHO MEAS - FS: 36.4 %
BH CV ECHO MEAS - IVS/LVPW: 1
BH CV ECHO MEAS - IVSD: 1 CM
BH CV ECHO MEAS - LAT PEAK E' VEL: 8 CM/SEC
BH CV ECHO MEAS - LV DIASTOLIC VOL/BSA (35-75): 42.7 ML/M^2
BH CV ECHO MEAS - LV MASS(C)D: 165.5 GRAMS
BH CV ECHO MEAS - LV MASS(C)DI: 87.2 GRAMS/M^2
BH CV ECHO MEAS - LV MAX PG: 3.4 MMHG
BH CV ECHO MEAS - LV MEAN PG: 2 MMHG
BH CV ECHO MEAS - LV SYSTOLIC VOL/BSA (12-30): 17.4 ML/M^2
BH CV ECHO MEAS - LV V1 MAX: 91.7 CM/SEC
BH CV ECHO MEAS - LV V1 MEAN: 64.8 CM/SEC
BH CV ECHO MEAS - LV V1 VTI: 20.8 CM
BH CV ECHO MEAS - LVIDD: 4.6 CM
BH CV ECHO MEAS - LVIDS: 3 CM
BH CV ECHO MEAS - LVLD AP2: 6.6 CM
BH CV ECHO MEAS - LVLD AP4: 7.5 CM
BH CV ECHO MEAS - LVLS AP2: 5.7 CM
BH CV ECHO MEAS - LVLS AP4: 6.6 CM
BH CV ECHO MEAS - LVOT AREA (M): 3.1 CM^2
BH CV ECHO MEAS - LVOT AREA: 3.2 CM^2
BH CV ECHO MEAS - LVOT DIAM: 2 CM
BH CV ECHO MEAS - LVPWD: 1 CM
BH CV ECHO MEAS - MED PEAK E' VEL: 7 CM/SEC
BH CV ECHO MEAS - MV A DUR: 0.11 SEC
BH CV ECHO MEAS - MV A MAX VEL: 79.1 CM/SEC
BH CV ECHO MEAS - MV DEC SLOPE: 436.6 CM/SEC^2
BH CV ECHO MEAS - MV DEC TIME: 0.24 SEC
BH CV ECHO MEAS - MV E MAX VEL: 99.4 CM/SEC
BH CV ECHO MEAS - MV E/A: 1.3
BH CV ECHO MEAS - MV MAX PG: 4.3 MMHG
BH CV ECHO MEAS - MV MEAN PG: 1.8 MMHG
BH CV ECHO MEAS - MV P1/2T MAX VEL: 100.9 CM/SEC
BH CV ECHO MEAS - MV P1/2T: 67.7 MSEC
BH CV ECHO MEAS - MV V2 MAX: 103.2 CM/SEC
BH CV ECHO MEAS - MV V2 MEAN: 63.9 CM/SEC
BH CV ECHO MEAS - MV V2 VTI: 29.5 CM
BH CV ECHO MEAS - MVA P1/2T LCG: 2.2 CM^2
BH CV ECHO MEAS - MVA(P1/2T): 3.3 CM^2
BH CV ECHO MEAS - MVA(VTI): 2.3 CM^2
BH CV ECHO MEAS - PA MAX PG (FULL): 1.9 MMHG
BH CV ECHO MEAS - PA MAX PG: 3.7 MMHG
BH CV ECHO MEAS - PA V2 MAX: 96.4 CM/SEC
BH CV ECHO MEAS - PULM A REVS DUR: 0.12 SEC
BH CV ECHO MEAS - PULM A REVS VEL: 29 CM/SEC
BH CV ECHO MEAS - PULM DIAS VEL: 35.5 CM/SEC
BH CV ECHO MEAS - PULM S/D: 2
BH CV ECHO MEAS - PULM SYS VEL: 70.6 CM/SEC
BH CV ECHO MEAS - PVA(V,A): 2 CM^2
BH CV ECHO MEAS - PVA(V,D): 2 CM^2
BH CV ECHO MEAS - QP/QS: 0.49
BH CV ECHO MEAS - RAP SYSTOLE: 3 MMHG
BH CV ECHO MEAS - RV MAX PG: 1.8 MMHG
BH CV ECHO MEAS - RV MEAN PG: 0.88 MMHG
BH CV ECHO MEAS - RV V1 MAX: 66.9 CM/SEC
BH CV ECHO MEAS - RV V1 MEAN: 40.8 CM/SEC
BH CV ECHO MEAS - RV V1 VTI: 11.3 CM
BH CV ECHO MEAS - RVOT AREA: 2.9 CM^2
BH CV ECHO MEAS - RVOT DIAM: 1.9 CM
BH CV ECHO MEAS - RVSP: 35 MMHG
BH CV ECHO MEAS - SI(AO): 101.7 ML/M^2
BH CV ECHO MEAS - SI(CUBED): 39.3 ML/M^2
BH CV ECHO MEAS - SI(LVOT): 35.1 ML/M^2
BH CV ECHO MEAS - SI(MOD-SP2): 20.5 ML/M^2
BH CV ECHO MEAS - SI(MOD-SP4): 25.3 ML/M^2
BH CV ECHO MEAS - SI(TEICH): 34.7 ML/M^2
BH CV ECHO MEAS - SUP REN AO DIAM: 2.2 CM
BH CV ECHO MEAS - SV(AO): 193 ML
BH CV ECHO MEAS - SV(CUBED): 74.5 ML
BH CV ECHO MEAS - SV(LVOT): 66.5 ML
BH CV ECHO MEAS - SV(MOD-SP2): 39 ML
BH CV ECHO MEAS - SV(MOD-SP4): 48 ML
BH CV ECHO MEAS - SV(RVOT): 32.5 ML
BH CV ECHO MEAS - SV(TEICH): 65.9 ML
BH CV ECHO MEAS - TAPSE (>1.6): 2.1 CM2
BH CV ECHO MEAS - TR MAX VEL: 281 CM/SEC
BH CV ECHO MEASUREMENTS AVERAGE E/E' RATIO: 13.25
BH CV XLRA - RV BASE: 3.2 CM
BH CV XLRA - TDI S': 14 CM/SEC
LEFT ATRIUM VOLUME INDEX: 17 ML/M2
LEFT ATRIUM VOLUME: 33 CM3
LV EF 2D ECHO EST: 61 %
MAXIMAL PREDICTED HEART RATE: 141 BPM
STRESS TARGET HR: 120 BPM

## 2018-06-07 RX ORDER — DOXORUBICIN HYDROCHLORIDE 2 MG/ML
90 INJECTION, SOLUTION INTRAVENOUS ONCE
Status: CANCELLED | OUTPATIENT
Start: 2018-06-08

## 2018-06-08 ENCOUNTER — OFFICE VISIT (OUTPATIENT)
Dept: ONCOLOGY | Facility: CLINIC | Age: 78
End: 2018-06-08

## 2018-06-08 ENCOUNTER — INFUSION (OUTPATIENT)
Dept: ONCOLOGY | Facility: HOSPITAL | Age: 78
End: 2018-06-08

## 2018-06-08 VITALS — SYSTOLIC BLOOD PRESSURE: 157 MMHG | HEART RATE: 68 BPM | DIASTOLIC BLOOD PRESSURE: 81 MMHG

## 2018-06-08 VITALS
TEMPERATURE: 97.4 F | HEART RATE: 81 BPM | WEIGHT: 172.4 LBS | SYSTOLIC BLOOD PRESSURE: 148 MMHG | BODY MASS INDEX: 26.13 KG/M2 | DIASTOLIC BLOOD PRESSURE: 88 MMHG | RESPIRATION RATE: 16 BRPM | OXYGEN SATURATION: 99 % | HEIGHT: 68 IN

## 2018-06-08 DIAGNOSIS — C83.30 DIFFUSE LARGE B-CELL LYMPHOMA, UNSPECIFIED BODY REGION (HCC): Primary | ICD-10-CM

## 2018-06-08 DIAGNOSIS — S32.030A CLOSED COMPRESSION FRACTURE OF THIRD LUMBAR VERTEBRA, INITIAL ENCOUNTER: ICD-10-CM

## 2018-06-08 DIAGNOSIS — IMO0001 METASTATIC TUMOR OF BONE: ICD-10-CM

## 2018-06-08 LAB
ALBUMIN SERPL-MCNC: 4.4 G/DL (ref 3.5–5.2)
ALBUMIN/GLOB SERPL: 1.6 G/DL
ALP SERPL-CCNC: 92 U/L (ref 39–117)
ALT SERPL W P-5'-P-CCNC: 14 U/L (ref 1–41)
ANION GAP SERPL CALCULATED.3IONS-SCNC: 10.8 MMOL/L
AST SERPL-CCNC: 21 U/L (ref 1–40)
BASOPHILS # BLD AUTO: 0.01 10*3/MM3 (ref 0–0.2)
BASOPHILS NFR BLD AUTO: 0.1 % (ref 0–1.5)
BILIRUB SERPL-MCNC: 0.3 MG/DL (ref 0.1–1.2)
BUN BLD-MCNC: 23 MG/DL (ref 8–23)
BUN/CREAT SERPL: 18.5 (ref 7–25)
CALCIUM SPEC-SCNC: 9.5 MG/DL (ref 8.6–10.5)
CHLORIDE SERPL-SCNC: 104 MMOL/L (ref 98–107)
CO2 SERPL-SCNC: 25.2 MMOL/L (ref 22–29)
CREAT BLD-MCNC: 1.24 MG/DL (ref 0.76–1.27)
DEPRECATED RDW RBC AUTO: 44.8 FL (ref 37–54)
EOSINOPHIL # BLD AUTO: 0 10*3/MM3 (ref 0–0.7)
EOSINOPHIL NFR BLD AUTO: 0 % (ref 0.3–6.2)
ERYTHROCYTE [DISTWIDTH] IN BLOOD BY AUTOMATED COUNT: 13.8 % (ref 11.5–14.5)
GFR SERPL CREATININE-BSD FRML MDRD: 56 ML/MIN/1.73
GLOBULIN UR ELPH-MCNC: 2.7 GM/DL
GLUCOSE BLD-MCNC: 186 MG/DL (ref 65–99)
HCT VFR BLD AUTO: 34 % (ref 40.4–52.2)
HGB BLD-MCNC: 11.6 G/DL (ref 13.7–17.6)
IMM GRANULOCYTES # BLD: 0.05 10*3/MM3 (ref 0–0.03)
IMM GRANULOCYTES NFR BLD: 0.5 % (ref 0–0.5)
LYMPHOCYTES # BLD AUTO: 0.79 10*3/MM3 (ref 0.9–4.8)
LYMPHOCYTES NFR BLD AUTO: 7.3 % (ref 19.6–45.3)
MCH RBC QN AUTO: 30.8 PG (ref 27–32.7)
MCHC RBC AUTO-ENTMCNC: 34.1 G/DL (ref 32.6–36.4)
MCV RBC AUTO: 90.2 FL (ref 79.8–96.2)
MONOCYTES # BLD AUTO: 0.62 10*3/MM3 (ref 0.2–1.2)
MONOCYTES NFR BLD AUTO: 5.7 % (ref 5–12)
NEUTROPHILS # BLD AUTO: 9.33 10*3/MM3 (ref 1.9–8.1)
NEUTROPHILS NFR BLD AUTO: 86.4 % (ref 42.7–76)
NRBC BLD MANUAL-RTO: 0 /100 WBC (ref 0–0)
PLATELET # BLD AUTO: 233 10*3/MM3 (ref 140–500)
PMV BLD AUTO: 11 FL (ref 6–12)
POTASSIUM BLD-SCNC: 3.7 MMOL/L (ref 3.5–5.2)
PROT SERPL-MCNC: 7.1 G/DL (ref 6–8.5)
RBC # BLD AUTO: 3.77 10*6/MM3 (ref 4.6–6)
SODIUM BLD-SCNC: 140 MMOL/L (ref 136–145)
WBC NRBC COR # BLD: 10.8 10*3/MM3 (ref 4.5–10.7)

## 2018-06-08 PROCEDURE — 96417 CHEMO IV INFUS EACH ADDL SEQ: CPT | Performed by: INTERNAL MEDICINE

## 2018-06-08 PROCEDURE — 25010000002 PEGFILGRASTIM 6 MG/0.6ML PREFILLED SYRINGE KIT: Performed by: INTERNAL MEDICINE

## 2018-06-08 PROCEDURE — 85025 COMPLETE CBC W/AUTO DIFF WBC: CPT | Performed by: INTERNAL MEDICINE

## 2018-06-08 PROCEDURE — 96413 CHEMO IV INFUSION 1 HR: CPT | Performed by: INTERNAL MEDICINE

## 2018-06-08 PROCEDURE — 25010000002 VINCRISTINE PER 1 MG: Performed by: INTERNAL MEDICINE

## 2018-06-08 PROCEDURE — 80053 COMPREHEN METABOLIC PANEL: CPT | Performed by: INTERNAL MEDICINE

## 2018-06-08 PROCEDURE — 96375 TX/PRO/DX INJ NEW DRUG ADDON: CPT | Performed by: INTERNAL MEDICINE

## 2018-06-08 PROCEDURE — 99214 OFFICE O/P EST MOD 30 MIN: CPT | Performed by: INTERNAL MEDICINE

## 2018-06-08 PROCEDURE — 25010000002 DOXORUBICIN PER 10 MG: Performed by: INTERNAL MEDICINE

## 2018-06-08 PROCEDURE — 25010000002 FOSAPREPITANT PER 1 MG: Performed by: INTERNAL MEDICINE

## 2018-06-08 PROCEDURE — 25010000002 CYCLOPHOSPHAMIDE PER 100 MG: Performed by: INTERNAL MEDICINE

## 2018-06-08 PROCEDURE — 96415 CHEMO IV INFUSION ADDL HR: CPT | Performed by: INTERNAL MEDICINE

## 2018-06-08 PROCEDURE — 96367 TX/PROPH/DG ADDL SEQ IV INF: CPT | Performed by: INTERNAL MEDICINE

## 2018-06-08 PROCEDURE — 25010000002 RITUXIMAB 10 MG/ML SOLUTION 10 ML VIAL: Performed by: INTERNAL MEDICINE

## 2018-06-08 PROCEDURE — 25010000002 RITUXIMAB 10 MG/ML SOLUTION 50 ML VIAL: Performed by: INTERNAL MEDICINE

## 2018-06-08 PROCEDURE — 25010000002 PALONOSETRON PER 25 MCG: Performed by: INTERNAL MEDICINE

## 2018-06-08 PROCEDURE — 96411 CHEMO IV PUSH ADDL DRUG: CPT | Performed by: INTERNAL MEDICINE

## 2018-06-08 PROCEDURE — 25010000002 DEXAMETHASONE: Performed by: INTERNAL MEDICINE

## 2018-06-08 PROCEDURE — 25010000002 DIPHENHYDRAMINE PER 50 MG: Performed by: INTERNAL MEDICINE

## 2018-06-08 PROCEDURE — 96377 APPLICATON ON-BODY INJECTOR: CPT | Performed by: INTERNAL MEDICINE

## 2018-06-08 RX ORDER — FAMOTIDINE 10 MG/ML
20 INJECTION, SOLUTION INTRAVENOUS ONCE
Status: DISCONTINUED | OUTPATIENT
Start: 2018-06-08 | End: 2018-06-08 | Stop reason: HOSPADM

## 2018-06-08 RX ORDER — PALONOSETRON 0.05 MG/ML
0.25 INJECTION, SOLUTION INTRAVENOUS ONCE
Status: COMPLETED | OUTPATIENT
Start: 2018-06-08 | End: 2018-06-08

## 2018-06-08 RX ORDER — SODIUM CHLORIDE 9 MG/ML
250 INJECTION, SOLUTION INTRAVENOUS ONCE
Status: COMPLETED | OUTPATIENT
Start: 2018-06-08 | End: 2018-06-08

## 2018-06-08 RX ORDER — DOXORUBICIN HYDROCHLORIDE 2 MG/ML
90 INJECTION, SOLUTION INTRAVENOUS ONCE
Status: COMPLETED | OUTPATIENT
Start: 2018-06-08 | End: 2018-06-08

## 2018-06-08 RX ORDER — ACETAMINOPHEN 325 MG/1
650 TABLET ORAL ONCE
Status: COMPLETED | OUTPATIENT
Start: 2018-06-08 | End: 2018-06-08

## 2018-06-08 RX ORDER — HYDROCODONE BITARTRATE AND ACETAMINOPHEN 7.5; 325 MG/1; MG/1
1 TABLET ORAL EVERY 8 HOURS PRN
Qty: 90 TABLET | Refills: 0 | Status: SHIPPED | OUTPATIENT
Start: 2018-06-08 | End: 2018-07-27

## 2018-06-08 RX ADMIN — FAMOTIDINE 20 MG: 10 INJECTION INTRAVENOUS at 09:07

## 2018-06-08 RX ADMIN — DIPHENHYDRAMINE HYDROCHLORIDE 50 MG: 50 INJECTION, SOLUTION INTRAMUSCULAR; INTRAVENOUS at 09:46

## 2018-06-08 RX ADMIN — SODIUM CHLORIDE 250 ML: 900 INJECTION, SOLUTION INTRAVENOUS at 09:03

## 2018-06-08 RX ADMIN — RITUXIMAB 700 MG: 10 INJECTION, SOLUTION INTRAVENOUS at 11:40

## 2018-06-08 RX ADMIN — CYCLOPHOSPHAMIDE 1450 MG: 1 INJECTION, POWDER, FOR SOLUTION INTRAVENOUS; ORAL at 10:53

## 2018-06-08 RX ADMIN — PEGFILGRASTIM 6 MG: KIT SUBCUTANEOUS at 10:54

## 2018-06-08 RX ADMIN — PALONOSETRON HYDROCHLORIDE 0.25 MG: 0.25 INJECTION INTRAVENOUS at 09:06

## 2018-06-08 RX ADMIN — DOXORUBICIN HYDROCHLORIDE 90 MG: 2 INJECTION, SOLUTION INTRAVENOUS at 10:20

## 2018-06-08 RX ADMIN — ACETAMINOPHEN 650 MG: 325 TABLET, FILM COATED ORAL at 09:04

## 2018-06-08 RX ADMIN — SODIUM CHLORIDE 150 MG: 900 INJECTION, SOLUTION INTRAVENOUS at 09:14

## 2018-06-08 RX ADMIN — VINCRISTINE SULFATE 2 MG: 1 INJECTION, SOLUTION INTRAVENOUS at 10:35

## 2018-06-08 RX ADMIN — DEXAMETHASONE SODIUM PHOSPHATE 12 MG: 4 INJECTION, SOLUTION INTRAMUSCULAR; INTRAVENOUS at 10:04

## 2018-06-11 ENCOUNTER — APPOINTMENT (OUTPATIENT)
Dept: LAB | Facility: HOSPITAL | Age: 78
End: 2018-06-11

## 2018-06-13 PROBLEM — C83.39 DIFFUSE LARGE B-CELL LYMPHOMA OF EXTRANODAL SITE EXCLUDING SPLEEN AND OTHER SOLID ORGANS: Status: ACTIVE | Noted: 2017-10-31

## 2018-06-13 PROBLEM — C83.398 DIFFUSE LARGE B-CELL LYMPHOMA OF EXTRANODAL SITE EXCLUDING SPLEEN AND OTHER SOLID ORGANS: Status: ACTIVE | Noted: 2017-10-31

## 2018-06-15 ENCOUNTER — LAB (OUTPATIENT)
Dept: OTHER | Facility: HOSPITAL | Age: 78
End: 2018-06-15

## 2018-06-15 ENCOUNTER — RESULTS ENCOUNTER (OUTPATIENT)
Dept: ONCOLOGY | Facility: CLINIC | Age: 78
End: 2018-06-15

## 2018-06-15 ENCOUNTER — OFFICE VISIT (OUTPATIENT)
Dept: ONCOLOGY | Facility: CLINIC | Age: 78
End: 2018-06-15

## 2018-06-15 VITALS
TEMPERATURE: 99 F | HEART RATE: 85 BPM | BODY MASS INDEX: 26.8 KG/M2 | WEIGHT: 176.8 LBS | HEIGHT: 68 IN | OXYGEN SATURATION: 97 % | DIASTOLIC BLOOD PRESSURE: 76 MMHG | SYSTOLIC BLOOD PRESSURE: 142 MMHG | RESPIRATION RATE: 16 BRPM

## 2018-06-15 DIAGNOSIS — IMO0001 METASTATIC TUMOR OF BONE: ICD-10-CM

## 2018-06-15 DIAGNOSIS — K59.03 CONSTIPATION DUE TO OPIOID THERAPY: ICD-10-CM

## 2018-06-15 DIAGNOSIS — S32.030A CLOSED COMPRESSION FRACTURE OF THIRD LUMBAR VERTEBRA, INITIAL ENCOUNTER: ICD-10-CM

## 2018-06-15 DIAGNOSIS — D69.59 CHEMOTHERAPY-INDUCED THROMBOCYTOPENIA: ICD-10-CM

## 2018-06-15 DIAGNOSIS — G89.3 CANCER ASSOCIATED PAIN: ICD-10-CM

## 2018-06-15 DIAGNOSIS — T45.1X5A CHEMOTHERAPY INDUCED NEUTROPENIA (HCC): ICD-10-CM

## 2018-06-15 DIAGNOSIS — D50.0 IRON DEFICIENCY ANEMIA DUE TO CHRONIC BLOOD LOSS: ICD-10-CM

## 2018-06-15 DIAGNOSIS — T40.2X5A CONSTIPATION DUE TO OPIOID THERAPY: ICD-10-CM

## 2018-06-15 DIAGNOSIS — T45.1X5A CHEMOTHERAPY-INDUCED THROMBOCYTOPENIA: ICD-10-CM

## 2018-06-15 DIAGNOSIS — C83.39 DIFFUSE LARGE B-CELL LYMPHOMA OF EXTRANODAL SITE EXCLUDING SPLEEN AND OTHER SOLID ORGANS (HCC): ICD-10-CM

## 2018-06-15 DIAGNOSIS — C83.30 DIFFUSE LARGE B-CELL LYMPHOMA, UNSPECIFIED BODY REGION (HCC): ICD-10-CM

## 2018-06-15 DIAGNOSIS — IMO0001 METASTATIC TUMOR OF BONE: Primary | ICD-10-CM

## 2018-06-15 DIAGNOSIS — D70.1 CHEMOTHERAPY INDUCED NEUTROPENIA (HCC): ICD-10-CM

## 2018-06-15 LAB
BASOPHILS # BLD AUTO: 0.02 10*3/MM3 (ref 0–0.2)
BASOPHILS NFR BLD AUTO: 1.9 % (ref 0–1.5)
DEPRECATED RDW RBC AUTO: 44.5 FL (ref 37–54)
EOSINOPHIL # BLD AUTO: 0.07 10*3/MM3 (ref 0–0.7)
EOSINOPHIL NFR BLD AUTO: 6.8 % (ref 0.3–6.2)
ERYTHROCYTE [DISTWIDTH] IN BLOOD BY AUTOMATED COUNT: 13.6 % (ref 11.5–14.5)
HCT VFR BLD AUTO: 31.4 % (ref 40.4–52.2)
HGB BLD-MCNC: 10.9 G/DL (ref 13.7–17.6)
IMM GRANULOCYTES # BLD: 0.09 10*3/MM3 (ref 0–0.03)
IMM GRANULOCYTES NFR BLD: 8.7 % (ref 0–0.5)
LYMPHOCYTES # BLD AUTO: 0.37 10*3/MM3 (ref 0.9–4.8)
LYMPHOCYTES NFR BLD AUTO: 35.9 % (ref 19.6–45.3)
MCH RBC QN AUTO: 30.7 PG (ref 27–32.7)
MCHC RBC AUTO-ENTMCNC: 34.7 G/DL (ref 32.6–36.4)
MCV RBC AUTO: 88.5 FL (ref 79.8–96.2)
MONOCYTES # BLD AUTO: 0.12 10*3/MM3 (ref 0.2–1.2)
MONOCYTES NFR BLD AUTO: 11.7 % (ref 5–12)
NEUTROPHILS # BLD AUTO: 0.36 10*3/MM3 (ref 1.9–8.1)
NEUTROPHILS NFR BLD AUTO: 35 % (ref 42.7–76)
NRBC BLD MANUAL-RTO: 0 /100 WBC (ref 0–0)
PLAT MORPH BLD: NORMAL
PLATELET # BLD AUTO: 90 10*3/MM3 (ref 140–500)
PMV BLD AUTO: 12.4 FL (ref 6–12)
RBC # BLD AUTO: 3.55 10*6/MM3 (ref 4.6–6)
RBC MORPH BLD: NORMAL
WBC MORPH BLD: NORMAL
WBC NRBC COR # BLD: 1.03 10*3/MM3 (ref 4.5–10.7)

## 2018-06-15 PROCEDURE — 99215 OFFICE O/P EST HI 40 MIN: CPT | Performed by: NURSE PRACTITIONER

## 2018-06-15 PROCEDURE — 36415 COLL VENOUS BLD VENIPUNCTURE: CPT

## 2018-06-15 PROCEDURE — 85025 COMPLETE CBC W/AUTO DIFF WBC: CPT | Performed by: INTERNAL MEDICINE

## 2018-06-15 PROCEDURE — 85007 BL SMEAR W/DIFF WBC COUNT: CPT | Performed by: INTERNAL MEDICINE

## 2018-06-15 RX ORDER — MORPHINE SULFATE 30 MG/1
30 TABLET, FILM COATED, EXTENDED RELEASE ORAL EVERY 12 HOURS SCHEDULED
Qty: 60 TABLET | Refills: 0 | Status: SHIPPED | OUTPATIENT
Start: 2018-06-15 | End: 2018-06-20 | Stop reason: DRUGHIGH

## 2018-06-15 RX ORDER — LEVOFLOXACIN 500 MG/1
500 TABLET, FILM COATED ORAL DAILY
Qty: 7 TABLET | Refills: 0 | Status: SHIPPED | OUTPATIENT
Start: 2018-06-15 | End: 2018-07-27

## 2018-06-15 RX ORDER — MORPHINE SULFATE 15 MG/1
30 TABLET, FILM COATED, EXTENDED RELEASE ORAL EVERY 12 HOURS SCHEDULED
Qty: 60 TABLET | Refills: 0 | Status: SHIPPED | OUTPATIENT
Start: 2018-06-15 | End: 2018-06-15 | Stop reason: SDUPTHER

## 2018-06-15 NOTE — PROGRESS NOTES
Subjective     REASON FOR FOLLOW UP:   1.  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  2.  Maintenance Rituxan every 3 months for 2 years initiated 3/30/2018  3.  Painful pathologic compression fracture at L3 May 2018 due to involvement with diffuse large B-cell non-Hodgkin's lymphoma.   4.  Plan to initiate further chemotherapy with CHOP and Rituxan with or without intrathecal methotrexate depending on his initial lumbar puncture.    HISTORY OF PRESENT ILLNESS:  The patient is a 78 y.o. year old male with the above-mentioned history, who returns today for 1 week toxicity check.  He initiated chemotherapy with CHOP Rituxan 1 week ago, 6/8/2018.  Unfortunately, today, 6/15/2018, the patient reports he is feeling very poorly.  His greatest issue is his back pain which radiates into his right hip.  This pain is related to his known lymphoma.  He did feel his pain was worsening prior to the initiation of chemotherapy.  He is utilizing Norco 7.5/325 one tablet every 3 hours.  Unfortunately, he continues to rate his pain 10 out of 10 despite the use of Norco.  Due to the increased use of Norco, he is experiencing more constipation.  He is currently taking a stool softener daily which has not been beneficial.  The patient denies incontinence of his bowel or bladder.  He denies unilateral weakness in his right leg.  Due to his increased pain in his hip and back, he is ambulating with a walker, historically he did not need a walker.  He denies any falls or trauma.   Along with increased pain, he is also reporting significant fatigue since receiving chemotherapy.  He does feel very groggy after taking his short acting Norco, which she is requiring frequently throughout the day.  Additionally, he reports he is not sleeping well due to pain with lying flat.   He reports his appetite is decreased, eating smaller amounts.  He does  "have intermittent mild nausea, though denies vomiting.  He continues to eat and drink adequately, despite decreased appetite.   Lastly, he denies fevers or chills, signs or symptoms of infection.  He is noted to be neutropenic today with an ANC of 0.36.  He denies chest pain, congestion, shortness of breath, pain or burning with urination.     Pain   Associated symptoms include fatigue and nausea. Pertinent negatives include no chest pain, coughing, fever, headaches, neck pain, rash or vomiting.        Past Medical History:   Diagnosis Date   • Arm fracture     right side humerus - sling and swathe   • Cancer 2017    Basal Cell on Nose and Under Right Eye   • Coronary artery disease    • DDD (degenerative disc disease), cervical    • Gastric mass    • GERD (gastroesophageal reflux disease)    • Hip pain started 5/29/18    onset of acute pain with weight bearing   • Hx of cardiac arrest     Happened after Induction of anesthesia prior  to SURG 10 YRS AGO  \" I FLATLINE BUT W/IN SECONDS HEART WAS OK AFTER TURNED ON BACK...I THINK THEY GAVE ME TO MUCH ANESTHESIA\"   • Hyperlipidemia    • Hypertension    • Kidney stones    • Non Hodgkin's lymphoma     SPINE   • Osteoarthritis    • Pleural effusion     DRAINAGE RT PLEURAL FLUID FOR TESTING WITH EGD ON 1-30-17   NO MALIGNANCY WITH CLEARANCE FROM DR SIMMONS FOR SURGERY   • Rheumatoid arthritis         Past Surgical History:   Procedure Laterality Date   • COLONOSCOPY N/A 10/26/2017    Procedure: COLONOSCOPY;  Surgeon: Louie Castañeda MD;  Location: [facility];  Service:    • CORONARY ANGIOPLASTY WITH STENT PLACEMENT  1999    17 years ago   • CYSTOSCOPY W/ LITHOLAPAXY / EHL      6-7 years ago   • DIAGNOSTIC LAPAROSCOPY N/A 10/26/2017    Procedure: DIAGNOSTIC LAPAROSCOPY WITH RETROPERITONEAL BIOPSIES;  Surgeon: Louie Castañeda MD;  Location: [facility];  Service:    • ENDOSCOPY      ON 1/30/17 WITH DRAINAGE OF RT PLEURAL FLUID FOR BIOPSY    NO MALIGNANCY  " CLEARANCE FOR SURG PER DR SIMMONS   • ENDOSCOPY N/A 10/26/2017    Procedure: ESOPHAGOGASTRODUODENOSCOPY;  Surgeon: Louie Castañeda MD;  Location: [facility];  Service:    • EYE SURGERY Right 2017    Moh's; reconstruction right lower lid   • KIDNEY STONE SURGERY     • PLEURAL BIOPSY     • POSTERIOR CERVICAL FUSION  2001   • SKIN CANCER EXCISION N/A 2017    Basal Cell Nose & under right eye   • TOTAL HIP ARTHROPLASTY Left 04/2010    9-10 years ago    • TOTAL HIP ARTHROPLASTY Right 2/14/2017    Procedure: TOTAL HIP ARTHROPLASTY;  Surgeon: Gerson Cardoza MD;  Location: [facility];  Service:    • VENOUS ACCESS DEVICE (PORT) INSERTION N/A 6/5/2018    Procedure: INSERTION VENOUS ACCESS DEVICE- RIGHT;  Surgeon: Louie Castañeda MD;  Location: [facility] OR;  Service: General         ALLERGIES:  No Known Allergies     I have reviewed the patient's medical history in detail and updated the computerized patient record.    Review of Systems   Constitutional: Positive for activity change, appetite change and fatigue. Negative for fever and unexpected weight change.   HENT: Negative for hearing loss, nosebleeds, trouble swallowing and voice change.    Eyes: Negative for visual disturbance.   Respiratory: Negative for cough, shortness of breath and wheezing.    Cardiovascular: Negative for chest pain and palpitations.   Gastrointestinal: Positive for constipation and nausea. Negative for diarrhea and vomiting.   Genitourinary: Negative for difficulty urinating, frequency, hematuria and urgency.   Musculoskeletal: Positive for back pain and gait problem. Negative for neck pain.   Skin: Negative for rash.   Neurological: Negative for dizziness, seizures, syncope and headaches.   Hematological: Negative for adenopathy. Does not bruise/bleed easily.   Psychiatric/Behavioral: Negative for behavioral problems. The patient is not nervous/anxious.       Objective     Vitals:    06/15/18 1102   BP: 142/76   Pulse: 85  "  Resp: 16   Temp: 99 °F (37.2 °C)   TempSrc: Oral   SpO2: 97%   Weight: 80.2 kg (176 lb 12.8 oz)  Comment: weighed with back brace on   Height: 172.7 cm (67.99\")   PainSc: 10-Worst pain ever   PainLoc: Back  Comment: and rt hip     Current Status 6/15/2018   ECOG score 1       Physical Exam   Constitutional: He is oriented to person, place, and time. He appears well-developed and well-nourished. No distress.   HENT:   Head: Normocephalic.   Eyes: Conjunctivae and EOM are normal. Pupils are equal, round, and reactive to light.   Neck: Normal range of motion. Neck supple. No JVD present.   Cardiovascular: Normal rate and regular rhythm.  Exam reveals no gallop and no friction rub.    No murmur heard.  Pulmonary/Chest: Effort normal and breath sounds normal. He has no wheezes. He has no rales.   Right IJ Mediport.   Abdominal: Soft. He exhibits no distension and no mass. There is no tenderness.   Musculoskeletal: Normal range of motion. He exhibits no edema or deformity.   Wearing a back brace, requiring a walker for ambulation   Lymphadenopathy:     He has no cervical adenopathy.   Neurological: He is alert and oriented to person, place, and time. He has normal reflexes. No cranial nerve deficit.   No unilateral muscle weakness.   Skin: Skin is warm and dry. No rash noted. No erythema.   Psychiatric: He has a normal mood and affect. His behavior is normal. Judgment normal.   Physical exam unchanged from previous except as updated.      RECENT LABS:    Results from last 7 days  Lab Units 06/15/18  1047   WBC 10*3/mm3 1.03*   NEUTROS ABS 10*3/mm3 0.36*   HEMOGLOBIN g/dL 10.9*   HEMATOCRIT % 31.4*   PLATELETS 10*3/mm3 90*     Assessment/Plan     1.  Diffuse large B-cell non-Hodgkin's lymphoma involving the L3 vertebral body.  2.  Previous treatment with single agent Rituxan for follicular B-cell lymphoma. CHOP Rituxan initiated 6/8/2018.  3.  Back pain related to lymphoma involving the spine. Uncontrolled with Norco, " will add MS Contin today.  4.  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  5.  Lumbar puncture showing no evidence of lymphoma in the spinal fluid.  Patient did receive 1 dose of intrathecal methotrexate with the procedure.  6.  Right IJ Mediport.  7.  Neutropenia secondary to chemotherapy despite Neulasta. Afebrile, without signs of infection.  8.  Fatigue, multifactorial.  Related to chemotherapy, as well as increased Norco use and insomnia related to pain.  With better pain control, I'm hopeful his energy will improve   9. Narcotic induced constipation. Add Senokot-S and Miralax PRN  10.  Chemotherapy induced thrombocytopenia.  Without signs or symptoms of bleeding.  The patient is not on anticoagulation.    Plan:  1. Begin Levaquin 500 mg daily ×7 days.  2. MS Contin 30 mg twice a day #60 no refills  3. Continue Norco 7.5/325 as needed for breakthrough pain.  4. Begin Senokot-S 2 tablets nightly, increase to 2 tablets twice a day if constipation persist.  May add MiraLAX daily if needed.  5. Return in 1 week for CBC with nurse review  6. Return in 2 weeks for M.D. review, in anticipation of cycle 2 of CHOP Rituxan.  7. We will plan to repeat CT scans after cycle 2 of therapy.    The patient and his wife understand to call for new or worsening symptoms, fever or chills >100.5. Todays plan was reviewed with Dr. Roberto who is in agreement.     Imani Abdalla, APRN  06/15/2018

## 2018-06-20 NOTE — TELEPHONE ENCOUNTER
Pt called stated that he has been feeling very dizzy and drowsy since starting the morphine 30mg BID. He has also been having odd dreams and reaching for things that are not there. S/W Imani Abdalla NP. Per Imani, we should decrease his Morphine to 15mg BID. New script sent to Dr. Roberto for signature. Informed pt this would be escribed to his pharmacy. He v/u.

## 2018-06-21 RX ORDER — MORPHINE SULFATE 15 MG/1
15 TABLET, FILM COATED, EXTENDED RELEASE ORAL EVERY 12 HOURS SCHEDULED
Qty: 60 TABLET | Refills: 0 | Status: SHIPPED | OUTPATIENT
Start: 2018-06-21 | End: 2018-07-17 | Stop reason: SDUPTHER

## 2018-06-22 ENCOUNTER — OFFICE VISIT (OUTPATIENT)
Dept: ONCOLOGY | Facility: CLINIC | Age: 78
End: 2018-06-22

## 2018-06-22 ENCOUNTER — INFUSION (OUTPATIENT)
Dept: ONCOLOGY | Facility: HOSPITAL | Age: 78
End: 2018-06-22

## 2018-06-22 ENCOUNTER — RESULTS ENCOUNTER (OUTPATIENT)
Dept: ONCOLOGY | Facility: CLINIC | Age: 78
End: 2018-06-22

## 2018-06-22 ENCOUNTER — LAB (OUTPATIENT)
Dept: OTHER | Facility: HOSPITAL | Age: 78
End: 2018-06-22

## 2018-06-22 DIAGNOSIS — S32.030A CLOSED COMPRESSION FRACTURE OF THIRD LUMBAR VERTEBRA, INITIAL ENCOUNTER: ICD-10-CM

## 2018-06-22 DIAGNOSIS — N18.2 CHRONIC RENAL IMPAIRMENT, STAGE 2 (MILD): ICD-10-CM

## 2018-06-22 DIAGNOSIS — D50.0 IRON DEFICIENCY ANEMIA DUE TO CHRONIC BLOOD LOSS: ICD-10-CM

## 2018-06-22 DIAGNOSIS — IMO0001 METASTATIC TUMOR OF BONE: ICD-10-CM

## 2018-06-22 DIAGNOSIS — C83.30 DIFFUSE LARGE B-CELL LYMPHOMA, UNSPECIFIED BODY REGION (HCC): ICD-10-CM

## 2018-06-22 DIAGNOSIS — C83.39 DIFFUSE LARGE B-CELL LYMPHOMA OF EXTRANODAL SITE EXCLUDING SPLEEN AND OTHER SOLID ORGANS (HCC): Primary | ICD-10-CM

## 2018-06-22 DIAGNOSIS — C82.23 FOLLICULAR LYMPHOMA GRADE III OF INTRA-ABDOMINAL LYMPH NODES (HCC): ICD-10-CM

## 2018-06-22 DIAGNOSIS — Z45.2 FITTING AND ADJUSTMENT OF VASCULAR CATHETER: Primary | ICD-10-CM

## 2018-06-22 LAB
ALBUMIN SERPL-MCNC: 3.7 G/DL (ref 3.5–5.2)
ALBUMIN/GLOB SERPL: 1.4 G/DL
ALP SERPL-CCNC: 121 U/L (ref 39–117)
ALT SERPL W P-5'-P-CCNC: 19 U/L (ref 1–41)
ANION GAP SERPL CALCULATED.3IONS-SCNC: 11.3 MMOL/L
AST SERPL-CCNC: 23 U/L (ref 1–40)
BASOPHILS # BLD AUTO: 0.08 10*3/MM3 (ref 0–0.2)
BASOPHILS NFR BLD AUTO: 1 % (ref 0–1.5)
BILIRUB SERPL-MCNC: <0.2 MG/DL (ref 0.1–1.2)
BUN BLD-MCNC: 13 MG/DL (ref 8–23)
BUN/CREAT SERPL: 9.2 (ref 7–25)
CALCIUM SPEC-SCNC: 9.1 MG/DL (ref 8.6–10.5)
CHLORIDE SERPL-SCNC: 102 MMOL/L (ref 98–107)
CO2 SERPL-SCNC: 25.7 MMOL/L (ref 22–29)
CREAT BLD-MCNC: 1.42 MG/DL (ref 0.76–1.27)
DEPRECATED RDW RBC AUTO: 46.4 FL (ref 37–54)
EOSINOPHIL # BLD AUTO: 0.02 10*3/MM3 (ref 0–0.7)
EOSINOPHIL NFR BLD AUTO: 0.2 % (ref 0.3–6.2)
ERYTHROCYTE [DISTWIDTH] IN BLOOD BY AUTOMATED COUNT: 14.3 % (ref 11.5–14.5)
FERRITIN SERPL-MCNC: 449.3 NG/ML (ref 30–400)
GFR SERPL CREATININE-BSD FRML MDRD: 48 ML/MIN/1.73
GLOBULIN UR ELPH-MCNC: 2.7 GM/DL
GLUCOSE BLD-MCNC: 140 MG/DL (ref 65–99)
HBV SURFACE AB SER RIA-ACNC: NORMAL
HBV SURFACE AG SERPL QL IA: NORMAL
HCT VFR BLD AUTO: 30.6 % (ref 40.4–52.2)
HGB BLD-MCNC: 10.2 G/DL (ref 13.7–17.6)
IMM GRANULOCYTES # BLD: 0.22 10*3/MM3 (ref 0–0.03)
IMM GRANULOCYTES NFR BLD: 2.6 % (ref 0–0.5)
IRON 24H UR-MRATE: 75 MCG/DL (ref 59–158)
IRON SATN MFR SERPL: 28 % (ref 20–50)
LYMPHOCYTES # BLD AUTO: 0.72 10*3/MM3 (ref 0.9–4.8)
LYMPHOCYTES NFR BLD AUTO: 8.7 % (ref 19.6–45.3)
MCH RBC QN AUTO: 30.3 PG (ref 27–32.7)
MCHC RBC AUTO-ENTMCNC: 33.3 G/DL (ref 32.6–36.4)
MCV RBC AUTO: 90.8 FL (ref 79.8–96.2)
MONOCYTES # BLD AUTO: 0.58 10*3/MM3 (ref 0.2–1.2)
MONOCYTES NFR BLD AUTO: 7 % (ref 5–12)
NEUTROPHILS # BLD AUTO: 6.7 10*3/MM3 (ref 1.9–8.1)
NEUTROPHILS NFR BLD AUTO: 80.5 % (ref 42.7–76)
NRBC BLD MANUAL-RTO: 0 /100 WBC (ref 0–0)
PLAT MORPH BLD: NORMAL
PLATELET # BLD AUTO: 243 10*3/MM3 (ref 140–500)
PMV BLD AUTO: 10.9 FL (ref 6–12)
POTASSIUM BLD-SCNC: 3.9 MMOL/L (ref 3.5–5.2)
PROT SERPL-MCNC: 6.4 G/DL (ref 6–8.5)
RBC # BLD AUTO: 3.37 10*6/MM3 (ref 4.6–6)
RBC MORPH BLD: NORMAL
SODIUM BLD-SCNC: 139 MMOL/L (ref 136–145)
TIBC SERPL-MCNC: 270 MCG/DL (ref 298–536)
TRANSFERRIN SERPL-MCNC: 181 MG/DL (ref 200–360)
WBC MORPH BLD: NORMAL
WBC NRBC COR # BLD: 8.32 10*3/MM3 (ref 4.5–10.7)

## 2018-06-22 PROCEDURE — 96523 IRRIG DRUG DELIVERY DEVICE: CPT | Performed by: NURSE PRACTITIONER

## 2018-06-22 PROCEDURE — 25010000002 HEPARIN FLUSH (PORCINE) 100 UNIT/ML SOLUTION: Performed by: INTERNAL MEDICINE

## 2018-06-22 PROCEDURE — 83540 ASSAY OF IRON: CPT | Performed by: INTERNAL MEDICINE

## 2018-06-22 PROCEDURE — 99212-NC PR NO CHARGE CBC OFFICE OUTPATIENT VISIT 10 MINUTES: Performed by: NURSE PRACTITIONER

## 2018-06-22 PROCEDURE — 80053 COMPREHEN METABOLIC PANEL: CPT | Performed by: INTERNAL MEDICINE

## 2018-06-22 PROCEDURE — 36415 COLL VENOUS BLD VENIPUNCTURE: CPT

## 2018-06-22 PROCEDURE — 82728 ASSAY OF FERRITIN: CPT | Performed by: INTERNAL MEDICINE

## 2018-06-22 PROCEDURE — 87340 HEPATITIS B SURFACE AG IA: CPT | Performed by: INTERNAL MEDICINE

## 2018-06-22 PROCEDURE — 85007 BL SMEAR W/DIFF WBC COUNT: CPT | Performed by: INTERNAL MEDICINE

## 2018-06-22 PROCEDURE — 85025 COMPLETE CBC W/AUTO DIFF WBC: CPT | Performed by: INTERNAL MEDICINE

## 2018-06-22 PROCEDURE — 86704 HEP B CORE ANTIBODY TOTAL: CPT | Performed by: INTERNAL MEDICINE

## 2018-06-22 PROCEDURE — 84466 ASSAY OF TRANSFERRIN: CPT | Performed by: INTERNAL MEDICINE

## 2018-06-22 PROCEDURE — 86706 HEP B SURFACE ANTIBODY: CPT | Performed by: INTERNAL MEDICINE

## 2018-06-22 RX ORDER — SODIUM CHLORIDE 0.9 % (FLUSH) 0.9 %
10 SYRINGE (ML) INJECTION AS NEEDED
Status: CANCELLED | OUTPATIENT
Start: 2018-06-22

## 2018-06-22 RX ORDER — SODIUM CHLORIDE 0.9 % (FLUSH) 0.9 %
10 SYRINGE (ML) INJECTION AS NEEDED
Status: DISCONTINUED | OUTPATIENT
Start: 2018-06-22 | End: 2018-06-22 | Stop reason: HOSPADM

## 2018-06-22 RX ADMIN — SODIUM CHLORIDE, PRESERVATIVE FREE 500 UNITS: 5 INJECTION INTRAVENOUS at 09:00

## 2018-06-22 RX ADMIN — Medication 10 ML: at 09:00

## 2018-06-22 NOTE — PROGRESS NOTES
The patient returns today for CBC with nurse review, now 2 weeks from cycle 1 of CHOP Rituxan.  Thankfully, he reports he is feeling well.  He did require dose reduction to his MS Contin due to oversedation, bad dreams and feeling woozy.  He is currently utilizing MS Contin 15 mg twice a day which she has found very tolerable with improvement in his pain.  He completed a week's course of Levaquin due to neutropenia, his counts have recovered today.  He denies fevers or chills.  Return as scheduled in 1 week for M.D. follow-up and cycle 2 of CHOP Rituxan.      Results from last 7 days  Lab Units 06/22/18  0838 06/15/18  1047   WBC 10*3/mm3 8.32 1.03*   NEUTROS ABS 10*3/mm3 6.70 0.36*   HEMOGLOBIN g/dL 10.2* 10.9*   HEMATOCRIT % 30.6* 31.4*   PLATELETS 10*3/mm3 243 90*       Results from last 7 days  Lab Units 06/22/18  0838   SODIUM mmol/L 139   POTASSIUM mmol/L 3.9   CHLORIDE mmol/L 102   CO2 mmol/L 25.7   BUN mg/dL 13   CREATININE mg/dL 1.42*   CALCIUM mg/dL 9.1   ALBUMIN g/dL 3.70   BILIRUBIN mg/dL <0.2   ALK PHOS U/L 121*   ALT (SGPT) U/L 19   AST (SGOT) U/L 23   GLUCOSE mg/dL 140*   FERRITIN ng/mL 449.30*   IRON mcg/dL 75   TIBC mcg/dL 270*         Imani Abdalla, APRN  06/22/2018

## 2018-06-23 LAB — HBV CORE AB SER DONR QL IA: NEGATIVE

## 2018-06-26 ENCOUNTER — TELEPHONE (OUTPATIENT)
Dept: ONCOLOGY | Facility: HOSPITAL | Age: 78
End: 2018-06-26

## 2018-06-26 NOTE — TELEPHONE ENCOUNTER
Pt called and wanted to know if he should take Claritin prior to his next tx, also asked if he should take his Prednisone as he did with his last tx and should he take his morphine the morning of tx. Pt told to take meds exactly as he did with his last tx and it is ok to take his Morphine. Also verified that pt has Prednisone at home to take.

## 2018-06-29 ENCOUNTER — APPOINTMENT (OUTPATIENT)
Dept: OTHER | Facility: HOSPITAL | Age: 78
End: 2018-06-29

## 2018-06-29 ENCOUNTER — INFUSION (OUTPATIENT)
Dept: ONCOLOGY | Facility: HOSPITAL | Age: 78
End: 2018-06-29

## 2018-06-29 ENCOUNTER — OFFICE VISIT (OUTPATIENT)
Dept: ONCOLOGY | Facility: CLINIC | Age: 78
End: 2018-06-29

## 2018-06-29 VITALS
WEIGHT: 165.1 LBS | BODY MASS INDEX: 25.02 KG/M2 | HEIGHT: 68 IN | DIASTOLIC BLOOD PRESSURE: 78 MMHG | HEART RATE: 94 BPM | RESPIRATION RATE: 16 BRPM | OXYGEN SATURATION: 98 % | TEMPERATURE: 97.6 F | SYSTOLIC BLOOD PRESSURE: 137 MMHG

## 2018-06-29 VITALS — DIASTOLIC BLOOD PRESSURE: 74 MMHG | SYSTOLIC BLOOD PRESSURE: 125 MMHG | HEART RATE: 74 BPM

## 2018-06-29 DIAGNOSIS — C83.39 DIFFUSE LARGE B-CELL LYMPHOMA OF EXTRANODAL SITE EXCLUDING SPLEEN AND OTHER SOLID ORGANS (HCC): ICD-10-CM

## 2018-06-29 DIAGNOSIS — C83.03 SMALL B-CELL LYMPHOMA OF INTRA-ABDOMINAL LYMPH NODES (HCC): Primary | ICD-10-CM

## 2018-06-29 DIAGNOSIS — C83.30 DIFFUSE LARGE B-CELL LYMPHOMA, UNSPECIFIED BODY REGION (HCC): ICD-10-CM

## 2018-06-29 DIAGNOSIS — C82.90 FOLLICULAR LYMPHOMA, UNSPECIFIED FOLLICULAR LYMPHOMA TYPE, UNSPECIFIED BODY REGION (HCC): Primary | ICD-10-CM

## 2018-06-29 DIAGNOSIS — N28.9 ACUTE RENAL INSUFFICIENCY: ICD-10-CM

## 2018-06-29 DIAGNOSIS — IMO0001 METASTATIC TUMOR OF BONE: ICD-10-CM

## 2018-06-29 DIAGNOSIS — S32.030A CLOSED COMPRESSION FRACTURE OF THIRD LUMBAR VERTEBRA, INITIAL ENCOUNTER: ICD-10-CM

## 2018-06-29 LAB
ALBUMIN SERPL-MCNC: 3.9 G/DL (ref 3.5–5.2)
ALBUMIN/GLOB SERPL: 1.4 G/DL
ALP SERPL-CCNC: 95 U/L (ref 39–117)
ALT SERPL W P-5'-P-CCNC: 8 U/L (ref 1–41)
ANION GAP SERPL CALCULATED.3IONS-SCNC: 12.1 MMOL/L
AST SERPL-CCNC: 17 U/L (ref 1–40)
BASOPHILS # BLD AUTO: 0.11 10*3/MM3 (ref 0–0.2)
BASOPHILS NFR BLD AUTO: 1.5 % (ref 0–1.5)
BILIRUB SERPL-MCNC: 0.2 MG/DL (ref 0.1–1.2)
BUN BLD-MCNC: 18 MG/DL (ref 8–23)
BUN/CREAT SERPL: 12.6 (ref 7–25)
CALCIUM SPEC-SCNC: 9 MG/DL (ref 8.6–10.5)
CHLORIDE SERPL-SCNC: 104 MMOL/L (ref 98–107)
CO2 SERPL-SCNC: 24.9 MMOL/L (ref 22–29)
CREAT BLD-MCNC: 1.43 MG/DL (ref 0.76–1.27)
DEPRECATED RDW RBC AUTO: 45.7 FL (ref 37–54)
EOSINOPHIL # BLD AUTO: 0.04 10*3/MM3 (ref 0–0.7)
EOSINOPHIL NFR BLD AUTO: 0.5 % (ref 0.3–6.2)
ERYTHROCYTE [DISTWIDTH] IN BLOOD BY AUTOMATED COUNT: 14.1 % (ref 11.5–14.5)
GFR SERPL CREATININE-BSD FRML MDRD: 48 ML/MIN/1.73
GLOBULIN UR ELPH-MCNC: 2.8 GM/DL
GLUCOSE BLD-MCNC: 105 MG/DL (ref 65–99)
HCT VFR BLD AUTO: 33.3 % (ref 40.4–52.2)
HGB BLD-MCNC: 11.1 G/DL (ref 13.7–17.6)
IMM GRANULOCYTES # BLD: 0.06 10*3/MM3 (ref 0–0.03)
IMM GRANULOCYTES NFR BLD: 0.8 % (ref 0–0.5)
LYMPHOCYTES # BLD AUTO: 0.91 10*3/MM3 (ref 0.9–4.8)
LYMPHOCYTES NFR BLD AUTO: 12.4 % (ref 19.6–45.3)
MCH RBC QN AUTO: 30.2 PG (ref 27–32.7)
MCHC RBC AUTO-ENTMCNC: 33.3 G/DL (ref 32.6–36.4)
MCV RBC AUTO: 90.5 FL (ref 79.8–96.2)
MONOCYTES # BLD AUTO: 0.91 10*3/MM3 (ref 0.2–1.2)
MONOCYTES NFR BLD AUTO: 12.4 % (ref 5–12)
NEUTROPHILS # BLD AUTO: 5.3 10*3/MM3 (ref 1.9–8.1)
NEUTROPHILS NFR BLD AUTO: 72.4 % (ref 42.7–76)
NRBC BLD MANUAL-RTO: 0 /100 WBC (ref 0–0)
PLATELET # BLD AUTO: 364 10*3/MM3 (ref 140–500)
PMV BLD AUTO: 10.1 FL (ref 6–12)
POTASSIUM BLD-SCNC: 3.9 MMOL/L (ref 3.5–5.2)
PROT SERPL-MCNC: 6.7 G/DL (ref 6–8.5)
RBC # BLD AUTO: 3.68 10*6/MM3 (ref 4.6–6)
SODIUM BLD-SCNC: 141 MMOL/L (ref 136–145)
WBC NRBC COR # BLD: 7.33 10*3/MM3 (ref 4.5–10.7)

## 2018-06-29 PROCEDURE — 25010000002 DOXORUBICIN PER 10 MG: Performed by: INTERNAL MEDICINE

## 2018-06-29 PROCEDURE — 96415 CHEMO IV INFUSION ADDL HR: CPT | Performed by: INTERNAL MEDICINE

## 2018-06-29 PROCEDURE — 25010000002 DEXAMETHASONE: Performed by: INTERNAL MEDICINE

## 2018-06-29 PROCEDURE — 25010000002 PALONOSETRON PER 25 MCG: Performed by: INTERNAL MEDICINE

## 2018-06-29 PROCEDURE — 96413 CHEMO IV INFUSION 1 HR: CPT | Performed by: INTERNAL MEDICINE

## 2018-06-29 PROCEDURE — 25010000002 VINCRISTINE PER 1 MG: Performed by: INTERNAL MEDICINE

## 2018-06-29 PROCEDURE — 25010000002 PEGFILGRASTIM 6 MG/0.6ML PREFILLED SYRINGE KIT: Performed by: INTERNAL MEDICINE

## 2018-06-29 PROCEDURE — 85025 COMPLETE CBC W/AUTO DIFF WBC: CPT | Performed by: INTERNAL MEDICINE

## 2018-06-29 PROCEDURE — 96411 CHEMO IV PUSH ADDL DRUG: CPT | Performed by: INTERNAL MEDICINE

## 2018-06-29 PROCEDURE — 25010000002 FOSAPREPITANT PER 1 MG: Performed by: INTERNAL MEDICINE

## 2018-06-29 PROCEDURE — 80053 COMPREHEN METABOLIC PANEL: CPT | Performed by: INTERNAL MEDICINE

## 2018-06-29 PROCEDURE — 96417 CHEMO IV INFUS EACH ADDL SEQ: CPT | Performed by: INTERNAL MEDICINE

## 2018-06-29 PROCEDURE — 96375 TX/PRO/DX INJ NEW DRUG ADDON: CPT | Performed by: INTERNAL MEDICINE

## 2018-06-29 PROCEDURE — 25010000002 RITUXIMAB 10 MG/ML SOLUTION 50 ML VIAL: Performed by: INTERNAL MEDICINE

## 2018-06-29 PROCEDURE — 25010000002 CYCLOPHOSPHAMIDE PER 100 MG: Performed by: INTERNAL MEDICINE

## 2018-06-29 PROCEDURE — 96377 APPLICATON ON-BODY INJECTOR: CPT | Performed by: INTERNAL MEDICINE

## 2018-06-29 PROCEDURE — 25010000002 DIPHENHYDRAMINE PER 50 MG: Performed by: INTERNAL MEDICINE

## 2018-06-29 PROCEDURE — 99214 OFFICE O/P EST MOD 30 MIN: CPT | Performed by: INTERNAL MEDICINE

## 2018-06-29 PROCEDURE — 25010000002 RITUXIMAB 10 MG/ML SOLUTION 10 ML VIAL: Performed by: INTERNAL MEDICINE

## 2018-06-29 PROCEDURE — 96367 TX/PROPH/DG ADDL SEQ IV INF: CPT | Performed by: INTERNAL MEDICINE

## 2018-06-29 RX ORDER — PALONOSETRON 0.05 MG/ML
0.25 INJECTION, SOLUTION INTRAVENOUS ONCE
Status: COMPLETED | OUTPATIENT
Start: 2018-06-29 | End: 2018-06-29

## 2018-06-29 RX ORDER — SODIUM CHLORIDE 9 MG/ML
250 INJECTION, SOLUTION INTRAVENOUS ONCE
Status: CANCELLED | OUTPATIENT
Start: 2018-06-29

## 2018-06-29 RX ORDER — ACETAMINOPHEN 325 MG/1
650 TABLET ORAL ONCE
Status: CANCELLED | OUTPATIENT
Start: 2018-06-29

## 2018-06-29 RX ORDER — DOXORUBICIN HYDROCHLORIDE 2 MG/ML
90 INJECTION, SOLUTION INTRAVENOUS ONCE
Status: COMPLETED | OUTPATIENT
Start: 2018-06-29 | End: 2018-06-29

## 2018-06-29 RX ORDER — FAMOTIDINE 20 MG/1
20 TABLET, FILM COATED ORAL ONCE
Status: COMPLETED | OUTPATIENT
Start: 2018-06-29 | End: 2018-06-29

## 2018-06-29 RX ORDER — FAMOTIDINE 10 MG/ML
20 INJECTION, SOLUTION INTRAVENOUS AS NEEDED
Status: CANCELLED | OUTPATIENT
Start: 2018-06-29

## 2018-06-29 RX ORDER — DOXORUBICIN HYDROCHLORIDE 2 MG/ML
90 INJECTION, SOLUTION INTRAVENOUS ONCE
Status: CANCELLED | OUTPATIENT
Start: 2018-06-29

## 2018-06-29 RX ORDER — DIPHENHYDRAMINE HYDROCHLORIDE 50 MG/ML
50 INJECTION INTRAMUSCULAR; INTRAVENOUS AS NEEDED
Status: CANCELLED | OUTPATIENT
Start: 2018-06-29

## 2018-06-29 RX ORDER — ACETAMINOPHEN 325 MG/1
650 TABLET ORAL ONCE
Status: COMPLETED | OUTPATIENT
Start: 2018-06-29 | End: 2018-06-29

## 2018-06-29 RX ORDER — FAMOTIDINE 20 MG/1
20 TABLET, FILM COATED ORAL ONCE
Status: CANCELLED
Start: 2018-06-29 | End: 2018-06-29

## 2018-06-29 RX ORDER — SODIUM CHLORIDE 9 MG/ML
250 INJECTION, SOLUTION INTRAVENOUS ONCE
Status: COMPLETED | OUTPATIENT
Start: 2018-06-29 | End: 2018-06-29

## 2018-06-29 RX ORDER — MEPERIDINE HYDROCHLORIDE 50 MG/ML
25 INJECTION INTRAMUSCULAR; INTRAVENOUS; SUBCUTANEOUS
Status: CANCELLED | OUTPATIENT
Start: 2018-06-29

## 2018-06-29 RX ORDER — PALONOSETRON 0.05 MG/ML
0.25 INJECTION, SOLUTION INTRAVENOUS ONCE
Status: CANCELLED | OUTPATIENT
Start: 2018-06-29

## 2018-06-29 RX ADMIN — DIPHENHYDRAMINE HYDROCHLORIDE 50 MG: 50 INJECTION, SOLUTION INTRAMUSCULAR; INTRAVENOUS at 09:14

## 2018-06-29 RX ADMIN — SODIUM CHLORIDE 150 MG: 9 INJECTION, SOLUTION INTRAVENOUS at 08:40

## 2018-06-29 RX ADMIN — DOXORUBICIN HYDROCHLORIDE 90 MG: 2 INJECTION, SOLUTION INTRAVENOUS at 09:52

## 2018-06-29 RX ADMIN — PEGFILGRASTIM 6 MG: KIT SUBCUTANEOUS at 13:36

## 2018-06-29 RX ADMIN — SODIUM CHLORIDE 250 ML: 900 INJECTION, SOLUTION INTRAVENOUS at 08:38

## 2018-06-29 RX ADMIN — PALONOSETRON HYDROCHLORIDE 0.25 MG: 0.25 INJECTION INTRAVENOUS at 08:35

## 2018-06-29 RX ADMIN — DEXAMETHASONE SODIUM PHOSPHATE 12 MG: 4 INJECTION, SOLUTION INTRAMUSCULAR; INTRAVENOUS at 09:34

## 2018-06-29 RX ADMIN — RITUXIMAB 700 MG: 10 INJECTION, SOLUTION INTRAVENOUS at 10:52

## 2018-06-29 RX ADMIN — VINCRISTINE SULFATE 2 MG: 1 INJECTION, SOLUTION INTRAVENOUS at 10:02

## 2018-06-29 RX ADMIN — CYCLOPHOSPHAMIDE 1450 MG: 1 INJECTION, POWDER, FOR SOLUTION INTRAVENOUS; ORAL at 10:16

## 2018-06-29 RX ADMIN — FAMOTIDINE 20 MG: 20 TABLET ORAL at 08:35

## 2018-06-29 RX ADMIN — ACETAMINOPHEN 650 MG: 325 TABLET, FILM COATED ORAL at 08:35

## 2018-06-29 NOTE — PROGRESS NOTES
Subjective     REASON FOR FOLLOW UP:   1.  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  2.  Maintenance Rituxan every 3 months for 2 years initiated 3/30/2018  3.  Painful pathologic compression fracture at L3 May 2018 due to involvement with diffuse large B-cell non-Hodgkin's lymphoma.   4.  Plan to initiate further chemotherapy with CHOP and Rituxan with or without intrathecal methotrexate depending on his initial lumbar puncture.    HISTORY OF PRESENT ILLNESS:  The patient is a 78 y.o. year old male who had  undergone CT scan of the abdomen on 10/11/2017 to evaluate some left lower quadrant abdominal pain.  CT scan findings were worrisome for metastatic malignancy with multiple tumor implants noted on the scan.  The largest mass appeared to be possibly arising from the duodenum or jejunum in the upper abdomen.  There was no evidence of metastases to liver.    CT-guided biopsy was performed on 10/18/2017 but unfortunately pathology was nondiagnostic showing only benign skeletal muscle and fibroadipose tissue.    The patient was seen in consultation on 10/20/2017 and at that time we recommended laparoscopic lymph node sampling and upper and lower GI endoscopy.  He underwent these procedures with Dr. Castañeda on 10/26/2017.  There was no evidence of malignancy from his endoscopic procedures at the laparoscopic specimen returned as follicular center cell B-cell non-Hodgkin's lymphoma.    He received Rituxan treatment initially with 4 weekly doses of Rituxan with good response on follow-up scans.  Unfortunately, a few months after completion of his treatment he developed worsening back pain and has lymphoma involving the L3 vertebral body consistent with diffuse large B-cell non-Hodgkin's lymphoma.    He is here today to receive his second cycle of CHOP and Rituxan for diffuse large B-cell non-Hodgkin's lymphoma  "involving the L3 vertebral body.  He reports his pain is much better controlled he continues to use MS Contin 15 mg every 12 hours.  He is ambulating with a cane and wearing a back brace but seems very comfortable.    His blood counts are adequate for treatment today and we will proceed with cycle #2 with same doses.  He will again have a Neulasta on body injector.  We will plan to repeat CT scans after this cycle prior to cycle #3 to evaluate his response.      Pain   Pertinent negatives include no chest pain, coughing, fatigue, fever, headaches, nausea, neck pain, rash or vomiting.        Past Medical History:   Diagnosis Date   • Arm fracture     right side humerus - sling and swathe   • Cancer 2017    Basal Cell on Nose and Under Right Eye   • Coronary artery disease    • DDD (degenerative disc disease), cervical    • Gastric mass    • GERD (gastroesophageal reflux disease)    • Hip pain started 5/29/18    onset of acute pain with weight bearing   • Hx of cardiac arrest     Happened after Induction of anesthesia prior  to SURG 10 YRS AGO  \" I FLATLINE BUT W/IN SECONDS HEART WAS OK AFTER TURNED ON BACK...I THINK THEY GAVE ME TO MUCH ANESTHESIA\"   • Hyperlipidemia    • Hypertension    • Kidney stones    • Non Hodgkin's lymphoma     SPINE   • Osteoarthritis    • Pleural effusion     DRAINAGE RT PLEURAL FLUID FOR TESTING WITH EGD ON 1-30-17   NO MALIGNANCY WITH CLEARANCE FROM DR SIMMONS FOR SURGERY   • Rheumatoid arthritis         Past Surgical History:   Procedure Laterality Date   • COLONOSCOPY N/A 10/26/2017    Procedure: COLONOSCOPY;  Surgeon: Louie Castañeda MD;  Location: [facility];  Service:    • CORONARY ANGIOPLASTY WITH STENT PLACEMENT  1999    17 years ago   • CYSTOSCOPY W/ LITHOLAPAXY / EHL      6-7 years ago   • DIAGNOSTIC LAPAROSCOPY N/A 10/26/2017    Procedure: DIAGNOSTIC LAPAROSCOPY WITH RETROPERITONEAL BIOPSIES;  Surgeon: Louie Castañeda MD;  Location: [facility];  Service:    • " ENDOSCOPY      ON 1/30/17 WITH DRAINAGE OF RT PLEURAL FLUID FOR BIOPSY    NO MALIGNANCY  CLEARANCE FOR SURG PER DR SIMMONS   • ENDOSCOPY N/A 10/26/2017    Procedure: ESOPHAGOGASTRODUODENOSCOPY;  Surgeon: Louie Castañeda MD;  Location: [facility];  Service:    • EYE SURGERY Right 2017    Moh's; reconstruction right lower lid   • KIDNEY STONE SURGERY     • PLEURAL BIOPSY     • POSTERIOR CERVICAL FUSION  2001   • SKIN CANCER EXCISION N/A 2017    Basal Cell Nose & under right eye   • TOTAL HIP ARTHROPLASTY Left 04/2010    9-10 years ago    • TOTAL HIP ARTHROPLASTY Right 2/14/2017    Procedure: TOTAL HIP ARTHROPLASTY;  Surgeon: Gerson Cardoza MD;  Location: [facility] OR;  Service:    • VENOUS ACCESS DEVICE (PORT) INSERTION N/A 6/5/2018    Procedure: INSERTION VENOUS ACCESS DEVICE- RIGHT;  Surgeon: Louie Castañeda MD;  Location: [facility];  Service: General         ALLERGIES:  No Known Allergies     Review of Systems   Constitutional: Positive for appetite change and unexpected weight change. Negative for activity change, fatigue and fever.   HENT: Negative for hearing loss, nosebleeds, trouble swallowing and voice change.    Eyes: Negative for visual disturbance.   Respiratory: Negative for cough, shortness of breath and wheezing.    Cardiovascular: Negative for chest pain and palpitations.   Gastrointestinal: Positive for constipation. Negative for diarrhea, nausea and vomiting.   Genitourinary: Negative for difficulty urinating, frequency, hematuria and urgency.   Musculoskeletal: Positive for back pain. Negative for neck pain.   Skin: Negative for rash.   Neurological: Negative for dizziness, seizures, syncope and headaches.   Hematological: Negative for adenopathy. Does not bruise/bleed easily.   Psychiatric/Behavioral: Negative for behavioral problems. The patient is not nervous/anxious.         Objective     Vitals:    06/29/18 0805   BP: 137/78   Pulse: 94   Resp: 16   Temp: 97.6 °F (36.4 °C)  "  TempSrc: Oral   SpO2: 98%   Weight: 74.9 kg (165 lb 1.6 oz)  Comment: weighed with back brace/not eating as much   Height: 172.7 cm (67.99\")   PainSc: 0-No pain     Current Status 6/29/2018   ECOG score 1       Physical Exam   Constitutional: He is oriented to person, place, and time. He appears well-developed and well-nourished. No distress.   HENT:   Head: Normocephalic.   Eyes: Conjunctivae and EOM are normal. Pupils are equal, round, and reactive to light. No scleral icterus.   Neck: Normal range of motion. Neck supple. No JVD present. No thyromegaly present.   Cardiovascular: Normal rate and regular rhythm.  Exam reveals no gallop and no friction rub.    No murmur heard.  Pulmonary/Chest: Effort normal and breath sounds normal. He has no wheezes. He has no rales.   Right IJ Mediport.   Abdominal: Soft. He exhibits no distension and no mass. There is no tenderness.   Musculoskeletal: Normal range of motion. He exhibits no edema or deformity.   Wearing a back brace.   Lymphadenopathy:     He has no cervical adenopathy.   Neurological: He is alert and oriented to person, place, and time. He has normal reflexes. No cranial nerve deficit.   Skin: Skin is warm and dry. No rash noted. No erythema.   Psychiatric: He has a normal mood and affect. His behavior is normal. Judgment normal.         RECENT LABS:  Hematology WBC   Date Value Ref Range Status   06/29/2018 7.33 4.50 - 10.70 10*3/mm3 Final     RBC   Date Value Ref Range Status   06/29/2018 3.68 (L) 4.60 - 6.00 10*6/mm3 Final     Hemoglobin   Date Value Ref Range Status   06/29/2018 11.1 (L) 13.7 - 17.6 g/dL Final     Hematocrit   Date Value Ref Range Status   06/29/2018 33.3 (L) 40.4 - 52.2 % Final     Platelets   Date Value Ref Range Status   06/29/2018 364 140 - 500 10*3/mm3 Final        Lab Results   Component Value Date    IRON 75 06/22/2018    TIBC 270 (L) 06/22/2018    FERRITIN 449.30 (H) 06/22/2018     Lab Results   Component Value Date    GLUCOSE 105 " (H) 06/29/2018    BUN 18 06/29/2018    CREATININE 1.43 (H) 06/29/2018    EGFRIFNONA 48 (L) 06/29/2018    EGFRIFAFRI 61 03/16/2018    BCR 12.6 06/29/2018    K 3.9 06/29/2018    CO2 24.9 06/29/2018    CALCIUM 9.0 06/29/2018    PROTENTOTREF 7.2 03/16/2018    ALBUMIN 3.90 06/29/2018    LABIL2 1.4 06/29/2018    AST 17 06/29/2018    ALT 8 06/29/2018       Assessment/Plan     1.  Diffuse large B-cell non-Hodgkin's lymphoma involving the L3 vertebral body.  2.  Previous treatment with single agent Rituxan for follicular B-cell lymphoma.  3.  Back pain related to lymphoma involving the spine. Improved with the use of MS Contin 15 mg every 12 hours.  4.  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  5.  Lumbar puncture showing no evidence of lymphoma in the spinal fluid.  Patient did receive 1 dose of intrathecal methotrexate with the procedure.  6.  Right IJ Mediport.    Plan  1.  David will proceed with his 2nd cycle of CHOP Rituxan in the office today with Neulasta support via and on body injector  2.  He will be scheduled for CBC and RN review in 1 week and 2 weeks  4.  M.D. and 3rd cycle of CHOP Rituxan will be scheduled in 3 weeks.  5.  We will plan to repeat CT scans in 2 weeks to assess response to 1st 2 cycles of CHOP/R

## 2018-07-06 ENCOUNTER — OFFICE VISIT (OUTPATIENT)
Dept: ONCOLOGY | Facility: CLINIC | Age: 78
End: 2018-07-06

## 2018-07-06 ENCOUNTER — LAB (OUTPATIENT)
Dept: OTHER | Facility: HOSPITAL | Age: 78
End: 2018-07-06

## 2018-07-06 ENCOUNTER — RESULTS ENCOUNTER (OUTPATIENT)
Dept: ONCOLOGY | Facility: CLINIC | Age: 78
End: 2018-07-06

## 2018-07-06 DIAGNOSIS — C83.39 DIFFUSE LARGE B-CELL LYMPHOMA OF EXTRANODAL SITE EXCLUDING SPLEEN AND OTHER SOLID ORGANS (HCC): ICD-10-CM

## 2018-07-06 DIAGNOSIS — C82.90 FOLLICULAR LYMPHOMA, UNSPECIFIED FOLLICULAR LYMPHOMA TYPE, UNSPECIFIED BODY REGION (HCC): ICD-10-CM

## 2018-07-06 DIAGNOSIS — R30.0 DYSURIA: Primary | ICD-10-CM

## 2018-07-06 DIAGNOSIS — IMO0001 METASTATIC TUMOR OF BONE: ICD-10-CM

## 2018-07-06 DIAGNOSIS — R63.0 ANOREXIA: ICD-10-CM

## 2018-07-06 LAB
BACTERIA UR QL AUTO: NORMAL /HPF
BASOPHILS # BLD AUTO: 0.03 10*3/MM3 (ref 0–0.2)
BASOPHILS NFR BLD AUTO: 3 % (ref 0–1.5)
BILIRUB UR QL STRIP: NEGATIVE
CLARITY UR: CLEAR
COD CRY URNS QL: NORMAL /HPF
COLOR UR: YELLOW
DEPRECATED RDW RBC AUTO: 43.6 FL (ref 37–54)
EOSINOPHIL # BLD AUTO: 0.01 10*3/MM3 (ref 0–0.7)
EOSINOPHIL NFR BLD AUTO: 1 % (ref 0.3–6.2)
ERYTHROCYTE [DISTWIDTH] IN BLOOD BY AUTOMATED COUNT: 13.7 % (ref 11.5–14.5)
GLUCOSE UR STRIP-MCNC: NEGATIVE MG/DL
HCT VFR BLD AUTO: 30.1 % (ref 40.4–52.2)
HGB BLD-MCNC: 10.5 G/DL (ref 13.7–17.6)
HGB UR QL STRIP.AUTO: NEGATIVE
HYALINE CASTS UR QL AUTO: NORMAL /LPF
IMM GRANULOCYTES # BLD: 0.03 10*3/MM3 (ref 0–0.03)
IMM GRANULOCYTES NFR BLD: 3 % (ref 0–0.5)
KETONES UR QL STRIP: NEGATIVE
LEUKOCYTE ESTERASE UR QL STRIP.AUTO: NEGATIVE
LYMPHOCYTES # BLD AUTO: 0.36 10*3/MM3 (ref 0.9–4.8)
LYMPHOCYTES NFR BLD AUTO: 35.6 % (ref 19.6–45.3)
MCH RBC QN AUTO: 30.7 PG (ref 27–32.7)
MCHC RBC AUTO-ENTMCNC: 34.9 G/DL (ref 32.6–36.4)
MCV RBC AUTO: 88 FL (ref 79.8–96.2)
MONOCYTES # BLD AUTO: 0.08 10*3/MM3 (ref 0.2–1.2)
MONOCYTES NFR BLD AUTO: 7.9 % (ref 5–12)
NEUTROPHILS # BLD AUTO: 0.5 10*3/MM3 (ref 1.9–8.1)
NEUTROPHILS NFR BLD AUTO: 49.5 % (ref 42.7–76)
NITRITE UR QL STRIP: NEGATIVE
NRBC BLD MANUAL-RTO: 0 /100 WBC (ref 0–0)
PH UR STRIP.AUTO: 5.5 [PH] (ref 5–8)
PLAT MORPH BLD: NORMAL
PLATELET # BLD AUTO: 119 10*3/MM3 (ref 140–500)
PMV BLD AUTO: 11.9 FL (ref 6–12)
PROT UR QL STRIP: ABNORMAL
RBC # BLD AUTO: 3.42 10*6/MM3 (ref 4.6–6)
RBC # UR: NORMAL /HPF
RBC MORPH BLD: NORMAL
REF LAB TEST METHOD: NORMAL
SP GR UR STRIP: 1.02 (ref 1–1.03)
SQUAMOUS #/AREA URNS HPF: NORMAL /HPF
UROBILINOGEN UR QL STRIP: ABNORMAL
WBC MORPH BLD: NORMAL
WBC NRBC COR # BLD: 1.01 10*3/MM3 (ref 4.5–10.7)
WBC UR QL AUTO: NORMAL /HPF

## 2018-07-06 PROCEDURE — 81001 URINALYSIS AUTO W/SCOPE: CPT | Performed by: NURSE PRACTITIONER

## 2018-07-06 PROCEDURE — 85025 COMPLETE CBC W/AUTO DIFF WBC: CPT | Performed by: INTERNAL MEDICINE

## 2018-07-06 PROCEDURE — 85007 BL SMEAR W/DIFF WBC COUNT: CPT | Performed by: INTERNAL MEDICINE

## 2018-07-06 PROCEDURE — 99213 OFFICE O/P EST LOW 20 MIN: CPT | Performed by: NURSE PRACTITIONER

## 2018-07-06 PROCEDURE — 36415 COLL VENOUS BLD VENIPUNCTURE: CPT

## 2018-07-06 RX ORDER — LEVOFLOXACIN 500 MG/1
500 TABLET, FILM COATED ORAL DAILY
Qty: 7 TABLET | Refills: 0 | Status: SHIPPED | OUTPATIENT
Start: 2018-07-06 | End: 2018-07-13

## 2018-07-06 NOTE — PROGRESS NOTES
/Patient is here today for lab review. He most recently received CHOP-Rituxan on .  Unfortunately, he is neutropenic with a total ANC of 0.50.  Thankfully, he denies any infectious symptoms including fevers or chills.  He is struggling with appetite and I have encouraged him to consume small, frequent meals and to add supplemental drinks including boost or ensure.  His pain thankfully is well-controlled with his narcotics.  His greatest issue today is urinary frequency and urgency over the last 3 days.  He has had some flank pain, though this is chronic in nature.  He denies any hematuria or foul-smelling urine.  He does have occasional burning. We will obtain a urinalysis today.    In the interim, we will provide him an empiric prescription for Levaquin 500 mg daily x 7 days. This will provide dual benefit in treating suspected urinary tract infection and the prevention of infection given his neutropenia.     I have reviewed precautions with the patient hich includes frequent handwashing, avoidance of large crowds, and patient was also instructed to call our office should his temperature reached higher than 100.5.    Urinalysis is pending at the time of this dictation.    Patient's return in one week for RN review with CT scan on the same day.  He will see Dr. Roberto on July 24, 2018 in conjunction with cycle #3 of therapy.  Patient is to call our office with any concerns prior to his next office visit.    Lab Results   Component Value Date    WBC 1.01 (C) 07/06/2018    HGB 10.5 (L) 07/06/2018    HCT 30.1 (L) 07/06/2018    MCV 88.0 07/06/2018     (L) 07/06/2018   ANC: 0.50    Tatiana FRANCISCO     A total of 15 minutes spent in face-to-face interaction with the patient, with greater than half that time and discussion regarding neutropenic precautions, urinary symptoms, and nutritional advice.

## 2018-07-13 ENCOUNTER — APPOINTMENT (OUTPATIENT)
Dept: ONCOLOGY | Facility: CLINIC | Age: 78
End: 2018-07-13

## 2018-07-13 ENCOUNTER — APPOINTMENT (OUTPATIENT)
Dept: OTHER | Facility: HOSPITAL | Age: 78
End: 2018-07-13

## 2018-07-13 ENCOUNTER — HOSPITAL ENCOUNTER (OUTPATIENT)
Dept: CT IMAGING | Facility: HOSPITAL | Age: 78
Discharge: HOME | End: 2018-07-13
Attending: INTERNAL MEDICINE | Admitting: INTERNAL MEDICINE

## 2018-07-13 ENCOUNTER — OFFICE VISIT (OUTPATIENT)
Dept: ONCOLOGY | Facility: CLINIC | Age: 78
End: 2018-07-13

## 2018-07-13 ENCOUNTER — LAB (OUTPATIENT)
Dept: ONCOLOGY | Facility: HOSPITAL | Age: 78
End: 2018-07-13

## 2018-07-13 ENCOUNTER — RESULTS ENCOUNTER (OUTPATIENT)
Dept: ONCOLOGY | Facility: CLINIC | Age: 78
End: 2018-07-13

## 2018-07-13 VITALS — WEIGHT: 162.8 LBS | BODY MASS INDEX: 24.76 KG/M2

## 2018-07-13 DIAGNOSIS — C83.39 DIFFUSE LARGE B-CELL LYMPHOMA OF EXTRANODAL SITE EXCLUDING SPLEEN AND OTHER SOLID ORGANS (HCC): ICD-10-CM

## 2018-07-13 DIAGNOSIS — C82.90 FOLLICULAR LYMPHOMA, UNSPECIFIED FOLLICULAR LYMPHOMA TYPE, UNSPECIFIED BODY REGION (HCC): ICD-10-CM

## 2018-07-13 DIAGNOSIS — C82.90 FOLLICULAR LYMPHOMA, UNSPECIFIED FOLLICULAR LYMPHOMA TYPE, UNSPECIFIED BODY REGION (HCC): Primary | ICD-10-CM

## 2018-07-13 DIAGNOSIS — Z45.2 FITTING AND ADJUSTMENT OF VASCULAR CATHETER: ICD-10-CM

## 2018-07-13 DIAGNOSIS — IMO0001 METASTATIC TUMOR OF BONE: Primary | ICD-10-CM

## 2018-07-13 LAB
ALBUMIN SERPL-MCNC: 3.9 G/DL (ref 3.5–5.2)
ALBUMIN/GLOB SERPL: 1.4 G/DL
ALP SERPL-CCNC: 93 U/L (ref 39–117)
ALT SERPL W P-5'-P-CCNC: 12 U/L (ref 1–41)
ANION GAP SERPL CALCULATED.3IONS-SCNC: 10.8 MMOL/L
AST SERPL-CCNC: 17 U/L (ref 1–40)
BASOPHILS # BLD AUTO: 0.11 10*3/MM3 (ref 0–0.2)
BASOPHILS NFR BLD AUTO: 1 % (ref 0–1.5)
BILIRUB SERPL-MCNC: 0.2 MG/DL (ref 0.1–1.2)
BUN BLD-MCNC: 15 MG/DL (ref 8–23)
BUN/CREAT SERPL: 10.6 (ref 7–25)
CALCIUM SPEC-SCNC: 8.8 MG/DL (ref 8.6–10.5)
CHLORIDE SERPL-SCNC: 100 MMOL/L (ref 98–107)
CO2 SERPL-SCNC: 25.2 MMOL/L (ref 22–29)
CREAT BLD-MCNC: 1.42 MG/DL (ref 0.76–1.27)
DEPRECATED RDW RBC AUTO: 46.1 FL (ref 37–54)
EOSINOPHIL # BLD AUTO: 0.01 10*3/MM3 (ref 0–0.7)
EOSINOPHIL NFR BLD AUTO: 0.1 % (ref 0.3–6.2)
ERYTHROCYTE [DISTWIDTH] IN BLOOD BY AUTOMATED COUNT: 14.2 % (ref 11.5–14.5)
GFR SERPL CREATININE-BSD FRML MDRD: 48 ML/MIN/1.73
GLOBULIN UR ELPH-MCNC: 2.7 GM/DL
GLUCOSE BLD-MCNC: 143 MG/DL (ref 65–99)
HCT VFR BLD AUTO: 29.1 % (ref 40.4–52.2)
HGB BLD-MCNC: 9.7 G/DL (ref 13.7–17.6)
IMM GRANULOCYTES # BLD: 0.13 10*3/MM3 (ref 0–0.03)
IMM GRANULOCYTES NFR BLD: 1.2 % (ref 0–0.5)
LYMPHOCYTES # BLD AUTO: 0.42 10*3/MM3 (ref 0.9–4.8)
LYMPHOCYTES NFR BLD AUTO: 3.9 % (ref 19.6–45.3)
MCH RBC QN AUTO: 30 PG (ref 27–32.7)
MCHC RBC AUTO-ENTMCNC: 33.3 G/DL (ref 32.6–36.4)
MCV RBC AUTO: 90.1 FL (ref 79.8–96.2)
MONOCYTES # BLD AUTO: 0.74 10*3/MM3 (ref 0.2–1.2)
MONOCYTES NFR BLD AUTO: 6.8 % (ref 5–12)
NEUTROPHILS # BLD AUTO: 9.48 10*3/MM3 (ref 1.9–8.1)
NEUTROPHILS NFR BLD AUTO: 87 % (ref 42.7–76)
NRBC BLD MANUAL-RTO: 0 /100 WBC (ref 0–0)
PLAT MORPH BLD: NORMAL
PLATELET # BLD AUTO: 212 10*3/MM3 (ref 140–500)
PMV BLD AUTO: 10.6 FL (ref 6–12)
POTASSIUM BLD-SCNC: 4.2 MMOL/L (ref 3.5–5.2)
PROT SERPL-MCNC: 6.6 G/DL (ref 6–8.5)
RBC # BLD AUTO: 3.23 10*6/MM3 (ref 4.6–6)
RBC MORPH BLD: NORMAL
SODIUM BLD-SCNC: 136 MMOL/L (ref 136–145)
WBC MORPH BLD: NORMAL
WBC NRBC COR # BLD: 10.89 10*3/MM3 (ref 4.5–10.7)

## 2018-07-13 PROCEDURE — 85025 COMPLETE CBC W/AUTO DIFF WBC: CPT | Performed by: INTERNAL MEDICINE

## 2018-07-13 PROCEDURE — 36415 COLL VENOUS BLD VENIPUNCTURE: CPT | Performed by: NURSE PRACTITIONER

## 2018-07-13 PROCEDURE — 25010000002 IOPAMIDOL 61 % SOLUTION: Performed by: INTERNAL MEDICINE

## 2018-07-13 PROCEDURE — 82565 ASSAY OF CREATININE: CPT

## 2018-07-13 PROCEDURE — 85007 BL SMEAR W/DIFF WBC COUNT: CPT | Performed by: INTERNAL MEDICINE

## 2018-07-13 PROCEDURE — 25010000002 HEPARIN FLUSH (PORCINE) 100 UNIT/ML SOLUTION: Performed by: INTERNAL MEDICINE

## 2018-07-13 PROCEDURE — 74177 CT ABD & PELVIS W/CONTRAST: CPT

## 2018-07-13 PROCEDURE — 71260 CT THORAX DX C+: CPT

## 2018-07-13 PROCEDURE — 96523 IRRIG DRUG DELIVERY DEVICE: CPT | Performed by: NURSE PRACTITIONER

## 2018-07-13 PROCEDURE — 99212-NC PR NO CHARGE CBC OFFICE OUTPATIENT VISIT 10 MINUTES: Performed by: NURSE PRACTITIONER

## 2018-07-13 PROCEDURE — 80053 COMPREHEN METABOLIC PANEL: CPT | Performed by: INTERNAL MEDICINE

## 2018-07-13 RX ORDER — SODIUM CHLORIDE 0.9 % (FLUSH) 0.9 %
10 SYRINGE (ML) INJECTION AS NEEDED
Status: CANCELLED | OUTPATIENT
Start: 2018-07-13

## 2018-07-13 RX ORDER — SODIUM CHLORIDE 0.9 % (FLUSH) 0.9 %
10 SYRINGE (ML) INJECTION AS NEEDED
Status: DISCONTINUED | OUTPATIENT
Start: 2018-07-13 | End: 2018-07-13 | Stop reason: HOSPADM

## 2018-07-13 RX ADMIN — IOPAMIDOL 85 ML: 612 INJECTION, SOLUTION INTRAVENOUS at 13:44

## 2018-07-13 RX ADMIN — Medication 10 ML: at 11:11

## 2018-07-13 RX ADMIN — SODIUM CHLORIDE, PRESERVATIVE FREE 500 UNITS: 5 INJECTION INTRAVENOUS at 11:12

## 2018-07-13 NOTE — PROGRESS NOTES
/Patient is here today for lab review. He most recently received CHOP-Rituxan on  6/29/2018.  When he was seen last week he was neutropenic and also is complaining of urinary symptoms.  He was provided with a prescription for Levaquin 500 mg ×7 days.  Thankfully, dysuria has completely resolved.  His counts have also recovered with a total white blood cell count of 10.89.  His hemoglobin is 9.7 today.  He did recently injure his right knee after tripping at his home, but is otherwise doing well.  Pain is well-controlled with occasional use of morphine.    He denies any excessive bleeding or bruising or infectious symptoms.    He will proceed with CT scan today.  He will see Dr. Roberto on July 24, 2018 in conjunction with cycle #3 of therapy.  Patient is to call our office with any concerns prior to his next office visit.    Lab Results   Component Value Date    WBC 10.89 (H) 07/13/2018    HGB 9.7 (L) 07/13/2018    HCT 29.1 (L) 07/13/2018    MCV 90.1 07/13/2018     07/13/2018   ANC: 9.48    Tatiana Batista APRN

## 2018-07-14 LAB — CREAT BLDA-MCNC: 1.4 MG/DL (ref 0.6–1.3)

## 2018-07-17 RX ORDER — MORPHINE SULFATE 15 MG/1
15 TABLET, FILM COATED, EXTENDED RELEASE ORAL EVERY 12 HOURS SCHEDULED
Qty: 60 TABLET | Refills: 0 | Status: SHIPPED | OUTPATIENT
Start: 2018-07-17 | End: 2018-07-19 | Stop reason: SDUPTHER

## 2018-07-17 NOTE — TELEPHONE ENCOUNTER
----- Message from Yanely Cordon sent at 7/17/2018 10:08 AM EDT -----  Regarding: refill  Contact: [phone]  Pt needs a refill for morphine 15mg

## 2018-07-19 ENCOUNTER — DOCUMENTATION (OUTPATIENT)
Dept: ONCOLOGY | Facility: CLINIC | Age: 78
End: 2018-07-19

## 2018-07-19 RX ORDER — MORPHINE SULFATE 15 MG/1
15 TABLET, FILM COATED, EXTENDED RELEASE ORAL EVERY 12 HOURS SCHEDULED
Qty: 60 TABLET | Refills: 0 | Status: SHIPPED | OUTPATIENT
Start: 2018-07-19 | End: 2018-07-19 | Stop reason: SDUPTHER

## 2018-07-19 RX ORDER — MORPHINE SULFATE 15 MG/1
15 TABLET, FILM COATED, EXTENDED RELEASE ORAL EVERY 12 HOURS SCHEDULED
Qty: 60 TABLET | Refills: 0 | OUTPATIENT
Start: 2018-07-19

## 2018-07-19 RX ORDER — MORPHINE SULFATE 15 MG/1
15 TABLET, FILM COATED, EXTENDED RELEASE ORAL EVERY 12 HOURS SCHEDULED
Qty: 60 TABLET | Refills: 0 | Status: SHIPPED | OUTPATIENT
Start: 2018-07-19 | End: 2018-08-14 | Stop reason: SDUPTHER

## 2018-07-19 NOTE — TELEPHONE ENCOUNTER
PT CALLING [facility] FOR MORPHINE WAS SENT TO WRONG PHARMACY. PT STATES THAT RX NEEDS TO GO TO [facility] NOT [facility]. NEW RX GENERATED AND DR. AGUERO (DOC #2) TO SIGN. PT MADE AWARE. TOLD PT TO BE SURE AND STRESS NEXT TIME THAT RX MUST GO TO [facility] NOT [facility]. PT V/U.

## 2018-07-19 NOTE — TELEPHONE ENCOUNTER
RX SENT TO WRONG PHARMACY AGAIN. MY FAULT! NEW RX GENERATED AGAIN AND DR. AGUERO (DOC #2) TO SIGN AND THIS TIME WILL SEND TO [facility]. PT MADE AWARE.

## 2018-07-19 NOTE — PROGRESS NOTES
CALLED [facility] TO CANCEL ORDER FOR MORPHINE X 2. THEY CANCELED BOTH RX REQUESTS. CALLED [facility] AND EXPLAINED SITUATION. THEY WILL RF MED FOR THE PT.

## 2018-07-19 NOTE — TELEPHONE ENCOUNTER
----- Message from Catalina Mcduffie sent at 7/19/2018  7:47 AM EDT -----  [phone]  panicing due to running out of meds. Upset that he has been on this all week.

## 2018-07-20 ENCOUNTER — RESULTS ENCOUNTER (OUTPATIENT)
Dept: ONCOLOGY | Facility: CLINIC | Age: 78
End: 2018-07-20

## 2018-07-20 ENCOUNTER — TELEPHONE (OUTPATIENT)
Dept: ONCOLOGY | Facility: CLINIC | Age: 78
End: 2018-07-20

## 2018-07-20 DIAGNOSIS — C82.90 FOLLICULAR LYMPHOMA, UNSPECIFIED FOLLICULAR LYMPHOMA TYPE, UNSPECIFIED BODY REGION (HCC): ICD-10-CM

## 2018-07-20 DIAGNOSIS — C83.39 DIFFUSE LARGE B-CELL LYMPHOMA OF EXTRANODAL SITE EXCLUDING SPLEEN AND OTHER SOLID ORGANS (HCC): ICD-10-CM

## 2018-07-20 NOTE — TELEPHONE ENCOUNTER
Called and discussed when to use Claritin.     ----- Message from Catalina Mcduffie sent at 7/20/2018  2:27 PM EDT -----  [phone]  Question about infusion  On Tues.  When does he takr Víctor

## 2018-07-24 ENCOUNTER — OFFICE VISIT (OUTPATIENT)
Dept: ONCOLOGY | Facility: CLINIC | Age: 78
End: 2018-07-24

## 2018-07-24 ENCOUNTER — INFUSION (OUTPATIENT)
Dept: ONCOLOGY | Facility: HOSPITAL | Age: 78
End: 2018-07-24

## 2018-07-24 ENCOUNTER — APPOINTMENT (OUTPATIENT)
Dept: OTHER | Facility: HOSPITAL | Age: 78
End: 2018-07-24

## 2018-07-24 VITALS
HEIGHT: 68 IN | SYSTOLIC BLOOD PRESSURE: 130 MMHG | RESPIRATION RATE: 16 BRPM | OXYGEN SATURATION: 98 % | TEMPERATURE: 97.9 F | HEART RATE: 84 BPM | BODY MASS INDEX: 24.25 KG/M2 | DIASTOLIC BLOOD PRESSURE: 74 MMHG | WEIGHT: 160 LBS

## 2018-07-24 VITALS — HEART RATE: 69 BPM | DIASTOLIC BLOOD PRESSURE: 72 MMHG | SYSTOLIC BLOOD PRESSURE: 116 MMHG

## 2018-07-24 DIAGNOSIS — S32.030G CLOSED COMPRESSION FRACTURE OF L3 LUMBAR VERTEBRA WITH DELAYED HEALING, SUBSEQUENT ENCOUNTER: ICD-10-CM

## 2018-07-24 DIAGNOSIS — C83.39 DIFFUSE LARGE B-CELL LYMPHOMA OF EXTRANODAL SITE EXCLUDING SPLEEN AND OTHER SOLID ORGANS (HCC): ICD-10-CM

## 2018-07-24 DIAGNOSIS — N18.2 CHRONIC RENAL IMPAIRMENT, STAGE 2 (MILD): ICD-10-CM

## 2018-07-24 DIAGNOSIS — C82.90 FOLLICULAR LYMPHOMA, UNSPECIFIED FOLLICULAR LYMPHOMA TYPE, UNSPECIFIED BODY REGION (HCC): Primary | ICD-10-CM

## 2018-07-24 DIAGNOSIS — Z45.2 FITTING AND ADJUSTMENT OF VASCULAR CATHETER: ICD-10-CM

## 2018-07-24 DIAGNOSIS — IMO0001 METASTATIC TUMOR OF BONE: ICD-10-CM

## 2018-07-24 DIAGNOSIS — C83.39 DIFFUSE LARGE B-CELL LYMPHOMA OF EXTRANODAL SITE EXCLUDING SPLEEN AND OTHER SOLID ORGANS (HCC): Primary | ICD-10-CM

## 2018-07-24 LAB
ALBUMIN SERPL-MCNC: 3.8 G/DL (ref 3.5–5.2)
ALBUMIN/GLOB SERPL: 1.4 G/DL
ALP SERPL-CCNC: 79 U/L (ref 39–117)
ALT SERPL W P-5'-P-CCNC: 10 U/L (ref 1–41)
ANION GAP SERPL CALCULATED.3IONS-SCNC: 9.7 MMOL/L
AST SERPL-CCNC: 18 U/L (ref 1–40)
BASOPHILS # BLD AUTO: 0.1 10*3/MM3 (ref 0–0.2)
BASOPHILS NFR BLD AUTO: 1.3 % (ref 0–1.5)
BILIRUB SERPL-MCNC: 0.2 MG/DL (ref 0.1–1.2)
BUN BLD-MCNC: 14 MG/DL (ref 8–23)
BUN/CREAT SERPL: 11.1 (ref 7–25)
CALCIUM SPEC-SCNC: 9 MG/DL (ref 8.6–10.5)
CHLORIDE SERPL-SCNC: 104 MMOL/L (ref 98–107)
CO2 SERPL-SCNC: 26.3 MMOL/L (ref 22–29)
CREAT BLD-MCNC: 1.26 MG/DL (ref 0.76–1.27)
DEPRECATED RDW RBC AUTO: 46.2 FL (ref 37–54)
EOSINOPHIL # BLD AUTO: 0.34 10*3/MM3 (ref 0–0.7)
EOSINOPHIL NFR BLD AUTO: 4.5 % (ref 0.3–6.2)
ERYTHROCYTE [DISTWIDTH] IN BLOOD BY AUTOMATED COUNT: 14.1 % (ref 11.5–14.5)
GFR SERPL CREATININE-BSD FRML MDRD: 55 ML/MIN/1.73
GLOBULIN UR ELPH-MCNC: 2.7 GM/DL
GLUCOSE BLD-MCNC: 133 MG/DL (ref 65–99)
HCT VFR BLD AUTO: 30.8 % (ref 40.4–52.2)
HGB BLD-MCNC: 10.2 G/DL (ref 13.7–17.6)
IMM GRANULOCYTES # BLD: 0.04 10*3/MM3 (ref 0–0.03)
IMM GRANULOCYTES NFR BLD: 0.5 % (ref 0–0.5)
LYMPHOCYTES # BLD AUTO: 0.72 10*3/MM3 (ref 0.9–4.8)
LYMPHOCYTES NFR BLD AUTO: 9.6 % (ref 19.6–45.3)
MCH RBC QN AUTO: 29.8 PG (ref 27–32.7)
MCHC RBC AUTO-ENTMCNC: 33.1 G/DL (ref 32.6–36.4)
MCV RBC AUTO: 90.1 FL (ref 79.8–96.2)
MONOCYTES # BLD AUTO: 1.34 10*3/MM3 (ref 0.2–1.2)
MONOCYTES NFR BLD AUTO: 17.8 % (ref 5–12)
NEUTROPHILS # BLD AUTO: 4.98 10*3/MM3 (ref 1.9–8.1)
NEUTROPHILS NFR BLD AUTO: 66.3 % (ref 42.7–76)
NRBC BLD MANUAL-RTO: 0 /100 WBC (ref 0–0)
PLATELET # BLD AUTO: 377 10*3/MM3 (ref 140–500)
PMV BLD AUTO: 10 FL (ref 6–12)
POTASSIUM BLD-SCNC: 4 MMOL/L (ref 3.5–5.2)
PROT SERPL-MCNC: 6.5 G/DL (ref 6–8.5)
RBC # BLD AUTO: 3.42 10*6/MM3 (ref 4.6–6)
SODIUM BLD-SCNC: 140 MMOL/L (ref 136–145)
WBC NRBC COR # BLD: 7.52 10*3/MM3 (ref 4.5–10.7)

## 2018-07-24 PROCEDURE — 96366 THER/PROPH/DIAG IV INF ADDON: CPT | Performed by: INTERNAL MEDICINE

## 2018-07-24 PROCEDURE — 96375 TX/PRO/DX INJ NEW DRUG ADDON: CPT | Performed by: INTERNAL MEDICINE

## 2018-07-24 PROCEDURE — 96413 CHEMO IV INFUSION 1 HR: CPT | Performed by: INTERNAL MEDICINE

## 2018-07-24 PROCEDURE — 96415 CHEMO IV INFUSION ADDL HR: CPT | Performed by: INTERNAL MEDICINE

## 2018-07-24 PROCEDURE — 96417 CHEMO IV INFUS EACH ADDL SEQ: CPT | Performed by: INTERNAL MEDICINE

## 2018-07-24 PROCEDURE — 25010000002 DIPHENHYDRAMINE PER 50 MG: Performed by: INTERNAL MEDICINE

## 2018-07-24 PROCEDURE — 85025 COMPLETE CBC W/AUTO DIFF WBC: CPT | Performed by: INTERNAL MEDICINE

## 2018-07-24 PROCEDURE — 25010000002 PALONOSETRON PER 25 MCG: Performed by: INTERNAL MEDICINE

## 2018-07-24 PROCEDURE — 25010000002 RITUXIMAB 10 MG/ML SOLUTION 10 ML VIAL: Performed by: INTERNAL MEDICINE

## 2018-07-24 PROCEDURE — 25010000002 DOXORUBICIN PER 10 MG: Performed by: INTERNAL MEDICINE

## 2018-07-24 PROCEDURE — 96411 CHEMO IV PUSH ADDL DRUG: CPT | Performed by: INTERNAL MEDICINE

## 2018-07-24 PROCEDURE — 25010000002 RITUXIMAB 10 MG/ML SOLUTION 50 ML VIAL: Performed by: INTERNAL MEDICINE

## 2018-07-24 PROCEDURE — 25010000002 VINCRISTINE PER 1 MG: Performed by: INTERNAL MEDICINE

## 2018-07-24 PROCEDURE — 80053 COMPREHEN METABOLIC PANEL: CPT | Performed by: INTERNAL MEDICINE

## 2018-07-24 PROCEDURE — 25010000002 FOSAPREPITANT PER 1 MG: Performed by: INTERNAL MEDICINE

## 2018-07-24 PROCEDURE — 96377 APPLICATON ON-BODY INJECTOR: CPT | Performed by: INTERNAL MEDICINE

## 2018-07-24 PROCEDURE — 99215 OFFICE O/P EST HI 40 MIN: CPT | Performed by: INTERNAL MEDICINE

## 2018-07-24 PROCEDURE — 25010000002 CYCLOPHOSPHAMIDE PER 100 MG: Performed by: INTERNAL MEDICINE

## 2018-07-24 PROCEDURE — 25010000002 PEGFILGRASTIM 6 MG/0.6ML PREFILLED SYRINGE KIT: Performed by: INTERNAL MEDICINE

## 2018-07-24 PROCEDURE — 25010000003 DEXAMETHASONE SODIUM PHOSPHATE 100 MG/10ML SOLUTION: Performed by: INTERNAL MEDICINE

## 2018-07-24 RX ORDER — SODIUM CHLORIDE 9 MG/ML
250 INJECTION, SOLUTION INTRAVENOUS ONCE
Status: COMPLETED | OUTPATIENT
Start: 2018-07-24 | End: 2018-07-24

## 2018-07-24 RX ORDER — DOXORUBICIN HYDROCHLORIDE 2 MG/ML
90 INJECTION, SOLUTION INTRAVENOUS ONCE
Status: CANCELLED | OUTPATIENT
Start: 2018-07-24

## 2018-07-24 RX ORDER — ACETAMINOPHEN 325 MG/1
650 TABLET ORAL ONCE
Status: COMPLETED | OUTPATIENT
Start: 2018-07-24 | End: 2018-07-24

## 2018-07-24 RX ORDER — DIPHENHYDRAMINE HYDROCHLORIDE 50 MG/ML
50 INJECTION INTRAMUSCULAR; INTRAVENOUS AS NEEDED
Status: CANCELLED | OUTPATIENT
Start: 2018-08-14

## 2018-07-24 RX ORDER — PALONOSETRON 0.05 MG/ML
0.25 INJECTION, SOLUTION INTRAVENOUS ONCE
Status: CANCELLED | OUTPATIENT
Start: 2018-07-24

## 2018-07-24 RX ORDER — FAMOTIDINE 20 MG/1
20 TABLET, FILM COATED ORAL ONCE
Status: CANCELLED
Start: 2018-08-14 | End: 2018-08-14

## 2018-07-24 RX ORDER — DOXORUBICIN HYDROCHLORIDE 2 MG/ML
90 INJECTION, SOLUTION INTRAVENOUS ONCE
Status: COMPLETED | OUTPATIENT
Start: 2018-07-24 | End: 2018-07-24

## 2018-07-24 RX ORDER — ACETAMINOPHEN 325 MG/1
650 TABLET ORAL ONCE
Status: CANCELLED | OUTPATIENT
Start: 2018-07-24

## 2018-07-24 RX ORDER — ACETAMINOPHEN 325 MG/1
650 TABLET ORAL ONCE
Status: CANCELLED | OUTPATIENT
Start: 2018-08-14

## 2018-07-24 RX ORDER — FAMOTIDINE 10 MG/ML
20 INJECTION, SOLUTION INTRAVENOUS AS NEEDED
Status: CANCELLED | OUTPATIENT
Start: 2018-08-14

## 2018-07-24 RX ORDER — PALONOSETRON 0.05 MG/ML
0.25 INJECTION, SOLUTION INTRAVENOUS ONCE
Status: COMPLETED | OUTPATIENT
Start: 2018-07-24 | End: 2018-07-24

## 2018-07-24 RX ORDER — FAMOTIDINE 20 MG/1
20 TABLET, FILM COATED ORAL ONCE
Status: COMPLETED | OUTPATIENT
Start: 2018-07-24 | End: 2018-07-24

## 2018-07-24 RX ORDER — MEPERIDINE HYDROCHLORIDE 50 MG/ML
25 INJECTION INTRAMUSCULAR; INTRAVENOUS; SUBCUTANEOUS
Status: CANCELLED | OUTPATIENT
Start: 2018-08-14

## 2018-07-24 RX ORDER — SODIUM CHLORIDE 9 MG/ML
250 INJECTION, SOLUTION INTRAVENOUS ONCE
Status: CANCELLED | OUTPATIENT
Start: 2018-08-14

## 2018-07-24 RX ORDER — DIPHENHYDRAMINE HYDROCHLORIDE 50 MG/ML
50 INJECTION INTRAMUSCULAR; INTRAVENOUS AS NEEDED
Status: CANCELLED | OUTPATIENT
Start: 2018-07-24

## 2018-07-24 RX ORDER — FAMOTIDINE 20 MG/1
20 TABLET, FILM COATED ORAL ONCE
Status: CANCELLED
Start: 2018-07-24 | End: 2018-07-24

## 2018-07-24 RX ORDER — PALONOSETRON 0.05 MG/ML
0.25 INJECTION, SOLUTION INTRAVENOUS ONCE
Status: CANCELLED | OUTPATIENT
Start: 2018-08-14

## 2018-07-24 RX ORDER — DOXORUBICIN HYDROCHLORIDE 2 MG/ML
90 INJECTION, SOLUTION INTRAVENOUS ONCE
Status: CANCELLED | OUTPATIENT
Start: 2018-08-14

## 2018-07-24 RX ORDER — MEPERIDINE HYDROCHLORIDE 50 MG/ML
25 INJECTION INTRAMUSCULAR; INTRAVENOUS; SUBCUTANEOUS
Status: CANCELLED | OUTPATIENT
Start: 2018-07-24

## 2018-07-24 RX ORDER — PREDNISONE 50 MG/1
50 TABLET ORAL
COMMUNITY
Start: 2018-07-10 | End: 2018-10-23

## 2018-07-24 RX ORDER — SODIUM CHLORIDE 9 MG/ML
250 INJECTION, SOLUTION INTRAVENOUS ONCE
Status: CANCELLED | OUTPATIENT
Start: 2018-07-24

## 2018-07-24 RX ORDER — FAMOTIDINE 10 MG/ML
20 INJECTION, SOLUTION INTRAVENOUS AS NEEDED
Status: CANCELLED | OUTPATIENT
Start: 2018-07-24

## 2018-07-24 RX ADMIN — DIPHENHYDRAMINE HYDROCHLORIDE 50 MG: 50 INJECTION, SOLUTION INTRAMUSCULAR; INTRAVENOUS at 09:46

## 2018-07-24 RX ADMIN — DOXORUBICIN HYDROCHLORIDE 90 MG: 2 INJECTION, SOLUTION INTRAVENOUS at 10:07

## 2018-07-24 RX ADMIN — SODIUM CHLORIDE 250 ML: 900 INJECTION, SOLUTION INTRAVENOUS at 08:44

## 2018-07-24 RX ADMIN — VINCRISTINE SULFATE 2 MG: 1 INJECTION, SOLUTION INTRAVENOUS at 10:17

## 2018-07-24 RX ADMIN — DEXAMETHASONE SODIUM PHOSPHATE 12 MG: 10 INJECTION, SOLUTION INTRAMUSCULAR; INTRAVENOUS at 09:29

## 2018-07-24 RX ADMIN — FAMOTIDINE 20 MG: 20 TABLET ORAL at 08:41

## 2018-07-24 RX ADMIN — PALONOSETRON 0.25 MG: 0.05 INJECTION, SOLUTION INTRAVENOUS at 08:41

## 2018-07-24 RX ADMIN — SODIUM CHLORIDE 150 MG: 9 INJECTION, SOLUTION INTRAVENOUS at 08:56

## 2018-07-24 RX ADMIN — CYCLOPHOSPHAMIDE 1450 MG: 1 INJECTION, POWDER, FOR SOLUTION INTRAVENOUS; ORAL at 10:29

## 2018-07-24 RX ADMIN — PEGFILGRASTIM 6 MG: KIT SUBCUTANEOUS at 13:58

## 2018-07-24 RX ADMIN — RITUXIMAB 700 MG: 10 INJECTION, SOLUTION INTRAVENOUS at 11:06

## 2018-07-24 RX ADMIN — ACETAMINOPHEN 650 MG: 325 TABLET, FILM COATED ORAL at 08:41

## 2018-07-24 NOTE — PROGRESS NOTES
Subjective     REASON FOR FOLLOW UP:   1.  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  2.  Maintenance Rituxan every 3 months for 2 years initiated 3/30/2018  3.  Painful pathologic compression fracture at L3 May 2018 due to involvement with diffuse large B-cell non-Hodgkin's lymphoma.   4.  Plan to initiate further chemotherapy with CHOP and Rituxan with or without intrathecal methotrexate depending on his initial lumbar puncture.    HISTORY OF PRESENT ILLNESS:  The patient is a 78 y.o. year old male who had  undergone CT scan of the abdomen on 10/11/2017 to evaluate some left lower quadrant abdominal pain.  CT scan findings were worrisome for metastatic malignancy with multiple tumor implants noted on the scan.  The largest mass appeared to be possibly arising from the duodenum or jejunum in the upper abdomen.  There was no evidence of metastases to liver.    CT-guided biopsy was performed on 10/18/2017 but unfortunately pathology was nondiagnostic showing only benign skeletal muscle and fibroadipose tissue.    The patient was seen in consultation on 10/20/2017 and at that time we recommended laparoscopic lymph node sampling and upper and lower GI endoscopy.  He underwent these procedures with Dr. Castañeda on 10/26/2017.  There was no evidence of malignancy from his endoscopic procedures at the laparoscopic specimen returned as follicular center cell B-cell non-Hodgkin's lymphoma.    He received Rituxan treatment initially with 4 weekly doses of Rituxan with good response on follow-up scans.  Unfortunately, a few months after completion of his treatment he developed worsening back pain and has lymphoma involving the L3 vertebral body consistent with diffuse large B-cell non-Hodgkin's lymphoma.    He is here today to review results of follow-up CT scans after his second cycle of CHOP and Rituxan for diffuse large  "B-cell non-Hodgkin's lymphoma involving the L3 vertebral body.  He reports his pain is much better controlled he continues to use MS Contin 15 mg every 12 hours.  He is ambulating with a cane and wearing a back brace but seems very comfortable.  Thankfully, his CT scan from 7/13/2018 show significant improvement in the mesenteric mass and there are no new sites of disease identified.  There may be some healing identified at the L3 vertebral body where he had a pathologic compression fracture.    His blood counts are adequate for treatment today and we will proceed with cycle # 3 with same doses.  He will again have a Neulasta on body injector.  We will plan to repeat  A PET scan afterhis 4th cycle prior to cycle #5 to further evaluate his response.  A total of 6 cycles of CHOP Rituxan are planned to complete his treatment.      Pain   Pertinent negatives include no chest pain, coughing, fatigue, fever, headaches, nausea, neck pain, rash or vomiting.        Past Medical History:   Diagnosis Date   • Arm fracture     right side humerus - sling and swathe   • Cancer (CMS/HCC) 2017    Basal Cell on Nose and Under Right Eye   • Coronary artery disease    • DDD (degenerative disc disease), cervical    • Gastric mass    • GERD (gastroesophageal reflux disease)    • Hip pain started 5/29/18    onset of acute pain with weight bearing   • Hx of cardiac arrest     Happened after Induction of anesthesia prior  to SURG 10 YRS AGO  \" I FLATLINE BUT W/IN SECONDS HEART WAS OK AFTER TURNED ON BACK...I THINK THEY GAVE ME TO MUCH ANESTHESIA\"   • Hyperlipidemia    • Hypertension    • Kidney stones    • Non Hodgkin's lymphoma (CMS/HCC)     SPINE   • Osteoarthritis    • Pleural effusion     DRAINAGE RT PLEURAL FLUID FOR TESTING WITH EGD ON 1-30-17   NO MALIGNANCY WITH CLEARANCE FROM DR SIMMONS FOR SURGERY   • Rheumatoid arthritis (CMS/HCC)         Past Surgical History:   Procedure Laterality Date   • COLONOSCOPY N/A 10/26/2017    " Procedure: COLONOSCOPY;  Surgeon: Louie Castañeda MD;  Location: [facility] OR;  Service:    • CORONARY ANGIOPLASTY WITH STENT PLACEMENT  1999    17 years ago   • CYSTOSCOPY W/ LITHOLAPAXY / EHL      6-7 years ago   • DIAGNOSTIC LAPAROSCOPY N/A 10/26/2017    Procedure: DIAGNOSTIC LAPAROSCOPY WITH RETROPERITONEAL BIOPSIES;  Surgeon: Louie Castañeda MD;  Location: [facility] OR;  Service:    • ENDOSCOPY      ON 1/30/17 WITH DRAINAGE OF RT PLEURAL FLUID FOR BIOPSY    NO MALIGNANCY  CLEARANCE FOR SURG PER DR SIMMONS   • ENDOSCOPY N/A 10/26/2017    Procedure: ESOPHAGOGASTRODUODENOSCOPY;  Surgeon: Louie Castañeda MD;  Location: [facility] OR;  Service:    • EYE SURGERY Right 2017    Moh's; reconstruction right lower lid   • KIDNEY STONE SURGERY     • PLEURAL BIOPSY     • POSTERIOR CERVICAL FUSION  2001   • SKIN CANCER EXCISION N/A 2017    Basal Cell Nose & under right eye   • TOTAL HIP ARTHROPLASTY Left 04/2010    9-10 years ago    • TOTAL HIP ARTHROPLASTY Right 2/14/2017    Procedure: TOTAL HIP ARTHROPLASTY;  Surgeon: Gerson Cardoza MD;  Location: [facility] OR;  Service:    • VENOUS ACCESS DEVICE (PORT) INSERTION N/A 6/5/2018    Procedure: INSERTION VENOUS ACCESS DEVICE- RIGHT;  Surgeon: Louie Castañeda MD;  Location: [facility] OR;  Service: General         ALLERGIES:  No Known Allergies     Review of Systems   Constitutional: Positive for appetite change and unexpected weight change. Negative for activity change, fatigue and fever.   HENT: Negative for hearing loss, nosebleeds, trouble swallowing and voice change.    Eyes: Negative for visual disturbance.   Respiratory: Negative for cough, shortness of breath and wheezing.    Cardiovascular: Negative for chest pain and palpitations.   Gastrointestinal: Positive for constipation. Negative for diarrhea, nausea and vomiting.   Genitourinary: Negative for difficulty urinating, frequency, hematuria and urgency.   Musculoskeletal: Positive for back  "pain. Negative for neck pain.   Skin: Negative for rash.   Neurological: Negative for dizziness, seizures, syncope and headaches.   Hematological: Negative for adenopathy. Does not bruise/bleed easily.   Psychiatric/Behavioral: Negative for behavioral problems. The patient is not nervous/anxious.         Objective     Vitals:    07/24/18 0807   BP: 130/74   Pulse: 84   Resp: 16   Temp: 97.9 °F (36.6 °C)   TempSrc: Oral   SpO2: 98%   Weight: 72.6 kg (160 lb)   Height: 172.7 cm (67.99\")   PainSc: 0-No pain     Current Status 7/24/2018   ECOG score 0       Physical Exam   Constitutional: He is oriented to person, place, and time. He appears well-developed and well-nourished. No distress.   HENT:   Head: Normocephalic.   Eyes: Pupils are equal, round, and reactive to light. Conjunctivae and EOM are normal. No scleral icterus.   Neck: Normal range of motion. Neck supple. No JVD present. No thyromegaly present.   Cardiovascular: Normal rate and regular rhythm.  Exam reveals no gallop and no friction rub.    No murmur heard.  Pulmonary/Chest: Effort normal and breath sounds normal. He has no wheezes. He has no rales.   Right IJ Mediport.   Abdominal: Soft. He exhibits no distension and no mass. There is no tenderness.   Musculoskeletal: Normal range of motion. He exhibits no edema or deformity.   Wearing a back brace.   Lymphadenopathy:     He has no cervical adenopathy.   Neurological: He is alert and oriented to person, place, and time. He has normal reflexes. No cranial nerve deficit.   Skin: Skin is warm and dry. No rash noted. No erythema.   Psychiatric: He has a normal mood and affect. His behavior is normal. Judgment normal.         RECENT LABS:  Hematology WBC   Date Value Ref Range Status   07/24/2018 7.52 4.50 - 10.70 10*3/mm3 Final     RBC   Date Value Ref Range Status   07/24/2018 3.42 (L) 4.60 - 6.00 10*6/mm3 Final     Hemoglobin   Date Value Ref Range Status   07/24/2018 10.2 (L) 13.7 - 17.6 g/dL Final "     Hematocrit   Date Value Ref Range Status   07/24/2018 30.8 (L) 40.4 - 52.2 % Final     Platelets   Date Value Ref Range Status   07/24/2018 377 140 - 500 10*3/mm3 Final          Lab Results   Component Value Date    GLUCOSE 143 (H) 07/13/2018    BUN 15 07/13/2018    CREATININE 1.40 (H) 07/13/2018    EGFRIFNONA 48 (L) 07/13/2018    EGFRIFAFRI 61 03/16/2018    BCR 10.6 07/13/2018    K 4.2 07/13/2018    CO2 25.2 07/13/2018    CALCIUM 8.8 07/13/2018    PROTENTOTREF 7.2 03/16/2018    ALBUMIN 3.90 07/13/2018    LABIL2 1.3 03/16/2018    AST 17 07/13/2018    ALT 12 07/13/2018       CT CHEST, ABDOMEN AND PELVIS WITH CONTRAST  7/13/2018     HISTORY: 78-year-old male with non-Hodgkin's lymphoma. For restaging.     TECHNIQUE: Axial CT images of the chest abdomen and pelvis were obtained  following administration of intravenous and oral contrast. Coronal and  sagittal reconstructions were then obtained.     COMPARISON: PET CT from 05/21/2018, CT abdomen and pelvis without  contrast from 10/11/2017.     FINDINGS:     CT CHEST: A right chest wall port with tip in the distal SVC. A  previously identified left supraclavicular lymph node is slightly  smaller measuring up to 4 mm in short axis dimension. Mediastinal  lymphadenopathy remains subcentimeter with no interval enlargement. No  pleural or pericardial effusion. Calcified coronary artery disease is  present. The central airways demonstrate small amount of dependent  secretions. Lung windows demonstrate mild chronic interstitial change.  There is patchy groundglass density seen within the periphery of the  right lower lobe that is likely chronic. No new bone lesions identified.     CT ABDOMEN AND PELVIS: Amorphous homogeneous infiltrative soft tissue  seen anterior to the aorta just superior to the third portion of the  duodenum measures 3.3 x 2.0 cm without significant change from most  recent PET/CT. An enlarged right-sided mesenteric lymph node previously  measuring up  to 1.2 cm is no longer imaged. Serosal/bowel activity seen  within the left mid abdomen that was previously bulky measuring up to  2.7 cm has significantly resolved in the interim, now measuring only 1.0  cm in maximum dimension. No new abdominal lymphadenopathy is identified.     The liver, gallbladder, spleen and pancreas, bilateral adrenal glands  are normal. There is diffuse atrophy of the left kidney unchanged.  Multiple renal cortical cysts and right nonobstructing nephroliths are  identified. No evidence of bowel obstruction. Imaging of the pelvis is  limited secondary to streak artifact from bilateral hip prosthesis. An  L3 pathological vertebral compression deformity is reidentified.     IMPRESSION:  1. Interval decrease in mesenteric bowel and retroperitoneal lymph omar  masses since prior PET/CT imaging.  2. Pathological fracture deformity of L3 again identified.  3. Additional findings as above.    Images personally reviewed    Assessment/Plan     1.  Diffuse large B-cell non-Hodgkin's lymphoma involving the L3 vertebral body.  2.  Previous treatment with single agent Rituxan for follicular B-cell lymphoma.  3.  Back pain related to lymphoma involving the spine. Improved with the use of MS Contin 15 mg every 12 hours.  4.  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  5.  Lumbar puncture showing no evidence of lymphoma in the spinal fluid.  Patient did receive 1 dose of intrathecal methotrexate with the procedure.  6.  Right IJ Mediport.    Plan  1.  David will proceed with his 3rd cycle of CHOP Rituxan in the office today with Neulasta support via and on body injector  2.  He will be scheduled for CBC and RN review in 1 week and 2 weeks  4.  M.D. and 4th cycle of CHOP Rituxan will be scheduled in 3 weeks.  5.  We will plan to repeat PET scan after 4 cycles of CHOP/R

## 2018-07-27 ENCOUNTER — RESULTS ENCOUNTER (OUTPATIENT)
Dept: ONCOLOGY | Facility: CLINIC | Age: 78
End: 2018-07-27

## 2018-07-27 ENCOUNTER — OFFICE VISIT (OUTPATIENT)
Dept: INTERNAL MEDICINE | Facility: CLINIC | Age: 78
End: 2018-07-27

## 2018-07-27 VITALS
DIASTOLIC BLOOD PRESSURE: 74 MMHG | WEIGHT: 164 LBS | HEIGHT: 68 IN | BODY MASS INDEX: 24.86 KG/M2 | SYSTOLIC BLOOD PRESSURE: 134 MMHG

## 2018-07-27 DIAGNOSIS — N18.2 CHRONIC RENAL IMPAIRMENT, STAGE 2 (MILD): ICD-10-CM

## 2018-07-27 DIAGNOSIS — S32.030G CLOSED COMPRESSION FRACTURE OF L3 LUMBAR VERTEBRA WITH DELAYED HEALING, SUBSEQUENT ENCOUNTER: ICD-10-CM

## 2018-07-27 DIAGNOSIS — I10 BENIGN ESSENTIAL HTN: Primary | ICD-10-CM

## 2018-07-27 DIAGNOSIS — Z45.2 FITTING AND ADJUSTMENT OF VASCULAR CATHETER: ICD-10-CM

## 2018-07-27 DIAGNOSIS — C83.39 DIFFUSE LARGE B-CELL LYMPHOMA OF EXTRANODAL SITE EXCLUDING SPLEEN AND OTHER SOLID ORGANS (HCC): ICD-10-CM

## 2018-07-27 DIAGNOSIS — IMO0001 METASTATIC TUMOR OF BONE: ICD-10-CM

## 2018-07-27 DIAGNOSIS — D50.0 IRON DEFICIENCY ANEMIA DUE TO CHRONIC BLOOD LOSS: ICD-10-CM

## 2018-07-27 PROCEDURE — 99213 OFFICE O/P EST LOW 20 MIN: CPT | Performed by: INTERNAL MEDICINE

## 2018-07-27 RX ORDER — FAMOTIDINE 20 MG/1
20 TABLET, FILM COATED ORAL 2 TIMES DAILY
COMMUNITY
End: 2019-01-28

## 2018-07-27 NOTE — PROGRESS NOTES
Subjective   Louie German is a 78 y.o. male.     History of Present Illness he is here today for follow-up of hypertension as well as metastatic lymphoma to the lumbar vertebra.  He did not receive radiation therapy to the spine but a modified chemotherapy regimen.  He has been tolerating that fairly well.  Presently his pain is controlled with morphine sulfate 15 mg twice a day-long-acting.  He receives prednisone 50 mg for a few days before and after his chemotherapy.  His appetite is fair.  He has had some constipation not unexpectedly.        Review of Systems   Constitutional: Positive for activity change and appetite change. Negative for fatigue.   HENT: Negative for trouble swallowing.    Respiratory: Negative for cough, chest tightness and shortness of breath.    Cardiovascular: Negative for chest pain, palpitations and leg swelling.   Gastrointestinal: Positive for constipation. Negative for abdominal pain, anal bleeding, blood in stool, diarrhea, nausea and vomiting.   Genitourinary: Negative for flank pain, frequency and hematuria.   Musculoskeletal: Positive for back pain. Negative for gait problem.   Neurological: Negative for dizziness, syncope, speech difficulty, weakness, numbness and headache.   Psychiatric/Behavioral: Negative for depressed mood. The patient is not nervous/anxious.        Objective   Physical Exam   Constitutional: He is oriented to person, place, and time. Vital signs are normal. He appears well-developed and well-nourished. He is active. He does not appear ill. No distress.   Eyes: Conjunctivae are normal.   Cardiovascular: Normal rate, regular rhythm, S1 normal and S2 normal.  Exam reveals no S3 and no S4.    No murmur heard.  Pulmonary/Chest: No tachypnea. No respiratory distress. He has no decreased breath sounds. He has no wheezes. He has no rhonchi. He has no rales.   Abdominal: Soft. Normal appearance and bowel sounds are normal. He exhibits no abdominal bruit and no  mass. There is no hepatosplenomegaly. There is no tenderness.     Vascular Status -  His right foot exhibits no edema. His left foot exhibits no edema.  Neurological: He is alert and oriented to person, place, and time. Gait normal.   Psychiatric: He has a normal mood and affect. His speech is normal and behavior is normal. Judgment and thought content normal. Cognition and memory are normal.         Assessment/Plan recent lab on CMP showed glucose 133 but was otherwise normal.  Hematocrit 30.8 hemoglobin 10.2 MCV 90.1    Assessment #1 hypertension-well-controlled #2 metastatic lymphoma-she is dealing fairly well with this serious disease.    Plan #1 begin MiraLAX 1 capful daily  #2use Senokot S1 twice a day.  Routine follow-up otherwise in 6 months.

## 2018-07-31 ENCOUNTER — OFFICE VISIT (OUTPATIENT)
Dept: ONCOLOGY | Facility: CLINIC | Age: 78
End: 2018-07-31

## 2018-07-31 ENCOUNTER — INFUSION (OUTPATIENT)
Dept: ONCOLOGY | Facility: HOSPITAL | Age: 78
End: 2018-07-31

## 2018-07-31 DIAGNOSIS — D70.1 CHEMOTHERAPY INDUCED NEUTROPENIA (HCC): ICD-10-CM

## 2018-07-31 DIAGNOSIS — Z45.2 FITTING AND ADJUSTMENT OF VASCULAR CATHETER: ICD-10-CM

## 2018-07-31 DIAGNOSIS — T45.1X5A CHEMOTHERAPY INDUCED NEUTROPENIA (HCC): ICD-10-CM

## 2018-07-31 DIAGNOSIS — C83.39 DIFFUSE LARGE B-CELL LYMPHOMA OF EXTRANODAL SITE EXCLUDING SPLEEN AND OTHER SOLID ORGANS (HCC): Primary | ICD-10-CM

## 2018-07-31 DIAGNOSIS — S32.030G CLOSED COMPRESSION FRACTURE OF L3 LUMBAR VERTEBRA WITH DELAYED HEALING, SUBSEQUENT ENCOUNTER: ICD-10-CM

## 2018-07-31 DIAGNOSIS — N18.2 CHRONIC RENAL IMPAIRMENT, STAGE 2 (MILD): ICD-10-CM

## 2018-07-31 DIAGNOSIS — IMO0001 METASTATIC TUMOR OF BONE: ICD-10-CM

## 2018-07-31 LAB
ALBUMIN SERPL-MCNC: 3.4 G/DL (ref 3.5–5.2)
ALBUMIN/GLOB SERPL: 1.5 G/DL
ALP SERPL-CCNC: 70 U/L (ref 39–117)
ALT SERPL W P-5'-P-CCNC: 22 U/L (ref 1–41)
ANION GAP SERPL CALCULATED.3IONS-SCNC: 10.2 MMOL/L
AST SERPL-CCNC: 20 U/L (ref 1–40)
BASOPHILS # BLD AUTO: 0 10*3/MM3 (ref 0–0.2)
BASOPHILS NFR BLD AUTO: 0 % (ref 0–1.5)
BILIRUB SERPL-MCNC: 0.3 MG/DL (ref 0.1–1.2)
BUN BLD-MCNC: 22 MG/DL (ref 8–23)
BUN/CREAT SERPL: 20.4 (ref 7–25)
CALCIUM SPEC-SCNC: 8.4 MG/DL (ref 8.6–10.5)
CHLORIDE SERPL-SCNC: 104 MMOL/L (ref 98–107)
CO2 SERPL-SCNC: 26.8 MMOL/L (ref 22–29)
CREAT BLD-MCNC: 1.08 MG/DL (ref 0.76–1.27)
DEPRECATED RDW RBC AUTO: 45.7 FL (ref 37–54)
EOSINOPHIL # BLD AUTO: 0.03 10*3/MM3 (ref 0–0.7)
EOSINOPHIL NFR BLD AUTO: 5.8 % (ref 0.3–6.2)
ERYTHROCYTE [DISTWIDTH] IN BLOOD BY AUTOMATED COUNT: 13.8 % (ref 11.5–14.5)
GFR SERPL CREATININE-BSD FRML MDRD: 66 ML/MIN/1.73
GLOBULIN UR ELPH-MCNC: 2.2 GM/DL
GLUCOSE BLD-MCNC: 111 MG/DL (ref 65–99)
HCT VFR BLD AUTO: 25.6 % (ref 40.4–52.2)
HGB BLD-MCNC: 8.5 G/DL (ref 13.7–17.6)
IMM GRANULOCYTES # BLD: 0.01 10*3/MM3 (ref 0–0.03)
IMM GRANULOCYTES NFR BLD: 1.9 % (ref 0–0.5)
LYMPHOCYTES # BLD AUTO: 0.18 10*3/MM3 (ref 0.9–4.8)
LYMPHOCYTES NFR BLD AUTO: 34.6 % (ref 19.6–45.3)
MCH RBC QN AUTO: 29.8 PG (ref 27–32.7)
MCHC RBC AUTO-ENTMCNC: 33.2 G/DL (ref 32.6–36.4)
MCV RBC AUTO: 89.8 FL (ref 79.8–96.2)
MONOCYTES # BLD AUTO: 0.03 10*3/MM3 (ref 0.2–1.2)
MONOCYTES NFR BLD AUTO: 5.8 % (ref 5–12)
NEUTROPHILS # BLD AUTO: 0.27 10*3/MM3 (ref 1.9–8.1)
NEUTROPHILS NFR BLD AUTO: 51.9 % (ref 42.7–76)
NRBC BLD MANUAL-RTO: 0 /100 WBC (ref 0–0)
PLAT MORPH BLD: NORMAL
PLATELET # BLD AUTO: 100 10*3/MM3 (ref 140–500)
PMV BLD AUTO: 11.6 FL (ref 6–12)
POTASSIUM BLD-SCNC: 4 MMOL/L (ref 3.5–5.2)
PROT SERPL-MCNC: 5.6 G/DL (ref 6–8.5)
RBC # BLD AUTO: 2.85 10*6/MM3 (ref 4.6–6)
RBC MORPH BLD: NORMAL
SODIUM BLD-SCNC: 141 MMOL/L (ref 136–145)
WBC MORPH BLD: NORMAL
WBC NRBC COR # BLD: 0.52 10*3/MM3 (ref 4.5–10.7)

## 2018-07-31 PROCEDURE — 99212-NC PR NO CHARGE CBC OFFICE OUTPATIENT VISIT 10 MINUTES: Performed by: NURSE PRACTITIONER

## 2018-07-31 PROCEDURE — 80053 COMPREHEN METABOLIC PANEL: CPT | Performed by: INTERNAL MEDICINE

## 2018-07-31 PROCEDURE — 85007 BL SMEAR W/DIFF WBC COUNT: CPT | Performed by: INTERNAL MEDICINE

## 2018-07-31 PROCEDURE — 85025 COMPLETE CBC W/AUTO DIFF WBC: CPT | Performed by: INTERNAL MEDICINE

## 2018-07-31 PROCEDURE — 96523 IRRIG DRUG DELIVERY DEVICE: CPT | Performed by: NURSE PRACTITIONER

## 2018-07-31 PROCEDURE — 25010000002 HEPARIN FLUSH (PORCINE) 100 UNIT/ML SOLUTION: Performed by: INTERNAL MEDICINE

## 2018-07-31 RX ORDER — SODIUM CHLORIDE 0.9 % (FLUSH) 0.9 %
10 SYRINGE (ML) INJECTION AS NEEDED
Status: DISCONTINUED | OUTPATIENT
Start: 2018-07-31 | End: 2018-07-31 | Stop reason: HOSPADM

## 2018-07-31 RX ORDER — SODIUM CHLORIDE 0.9 % (FLUSH) 0.9 %
10 SYRINGE (ML) INJECTION AS NEEDED
Status: CANCELLED | OUTPATIENT
Start: 2018-07-31

## 2018-07-31 RX ORDER — LEVOFLOXACIN 500 MG/1
500 TABLET, FILM COATED ORAL DAILY
Qty: 7 TABLET | Refills: 0 | Status: SHIPPED | OUTPATIENT
Start: 2018-07-31 | End: 2018-08-07

## 2018-07-31 RX ADMIN — Medication 10 ML: at 12:37

## 2018-07-31 RX ADMIN — SODIUM CHLORIDE, PRESERVATIVE FREE 500 UNITS: 5 INJECTION INTRAVENOUS at 12:37

## 2018-07-31 NOTE — PROGRESS NOTES
Patient here for CBC with RN review.  He received his third cycle of CHOP-Rituxan on 7/24/2018, with Neulasta support.  He denies fevers or chills.  He has noticed some fatigue, but denies being short of breath.  He denies any bleeding issues.  The patient states he had some issues with indigestion, and hiccups for a few days following this treatment.  We discussed that it was likely due to his steroids.  He is taking Pepcid 20 mg twice a day, and uses Sweta-A[place]urn as needed, which helped his symptoms.    Lab Results   Component Value Date    WBC 0.52 (C) 07/31/2018    HGB 8.5 (L) 07/31/2018    HCT 25.6 (L) 07/31/2018    MCV 89.8 07/31/2018     (L) 07/31/2018     Lab Results   Component Value Date    NEUTROABS 0.27 (C) 07/31/2018         I reviewed the patient's labs with Dr. Roberto.  Patient will be started on Levaquin 500 mg daily.  I have reviewed neutropenic precautions with the patient and his wife.  We will hold off on transfusion at this time, but I discussed with the patient if he becomes more short of breath, or other symptoms from his anemia we can recheck his hemoglobin at that time.  Patient was also instructed to call with any infectious symptoms or fever 100.5 or greater.  Patient will return in one week for repeat CBC with RN review.  NOLAN Fernandes

## 2018-08-07 ENCOUNTER — OFFICE VISIT (OUTPATIENT)
Dept: ONCOLOGY | Facility: CLINIC | Age: 78
End: 2018-08-07

## 2018-08-07 ENCOUNTER — INFUSION (OUTPATIENT)
Dept: ONCOLOGY | Facility: HOSPITAL | Age: 78
End: 2018-08-07

## 2018-08-07 VITALS — BODY MASS INDEX: 24.14 KG/M2 | WEIGHT: 158.7 LBS

## 2018-08-07 DIAGNOSIS — C83.39 DIFFUSE LARGE B-CELL LYMPHOMA OF EXTRANODAL SITE EXCLUDING SPLEEN AND OTHER SOLID ORGANS (HCC): Primary | ICD-10-CM

## 2018-08-07 DIAGNOSIS — Z45.2 FITTING AND ADJUSTMENT OF VASCULAR CATHETER: ICD-10-CM

## 2018-08-07 DIAGNOSIS — R63.4 WEIGHT LOSS: ICD-10-CM

## 2018-08-07 LAB
ALBUMIN SERPL-MCNC: 3.5 G/DL (ref 3.5–5.2)
ALBUMIN/GLOB SERPL: 1.3 G/DL
ALP SERPL-CCNC: 90 U/L (ref 39–117)
ALT SERPL W P-5'-P-CCNC: 18 U/L (ref 1–41)
ANION GAP SERPL CALCULATED.3IONS-SCNC: 11.1 MMOL/L
AST SERPL-CCNC: 20 U/L (ref 1–40)
BASOPHILS # BLD AUTO: 0.05 10*3/MM3 (ref 0–0.2)
BASOPHILS NFR BLD AUTO: 1 % (ref 0–1.5)
BILIRUB SERPL-MCNC: 0.2 MG/DL (ref 0.1–1.2)
BUN BLD-MCNC: 15 MG/DL (ref 8–23)
BUN/CREAT SERPL: 11.8 (ref 7–25)
CALCIUM SPEC-SCNC: 9 MG/DL (ref 8.6–10.5)
CHLORIDE SERPL-SCNC: 103 MMOL/L (ref 98–107)
CO2 SERPL-SCNC: 24.9 MMOL/L (ref 22–29)
CREAT BLD-MCNC: 1.27 MG/DL (ref 0.76–1.27)
DEPRECATED RDW RBC AUTO: 46 FL (ref 37–54)
EOSINOPHIL # BLD AUTO: 0.01 10*3/MM3 (ref 0–0.7)
EOSINOPHIL NFR BLD AUTO: 0.2 % (ref 0.3–6.2)
ERYTHROCYTE [DISTWIDTH] IN BLOOD BY AUTOMATED COUNT: 14.5 % (ref 11.5–14.5)
GFR SERPL CREATININE-BSD FRML MDRD: 55 ML/MIN/1.73
GLOBULIN UR ELPH-MCNC: 2.8 GM/DL
GLUCOSE BLD-MCNC: 111 MG/DL (ref 65–99)
HCT VFR BLD AUTO: 25.6 % (ref 40.4–52.2)
HGB BLD-MCNC: 8.5 G/DL (ref 13.7–17.6)
IMM GRANULOCYTES # BLD: 0.07 10*3/MM3 (ref 0–0.03)
IMM GRANULOCYTES NFR BLD: 1.4 % (ref 0–0.5)
LYMPHOCYTES # BLD AUTO: 0.39 10*3/MM3 (ref 0.9–4.8)
LYMPHOCYTES NFR BLD AUTO: 7.6 % (ref 19.6–45.3)
MCH RBC QN AUTO: 29.7 PG (ref 27–32.7)
MCHC RBC AUTO-ENTMCNC: 33.2 G/DL (ref 32.6–36.4)
MCV RBC AUTO: 89.5 FL (ref 79.8–96.2)
MONOCYTES # BLD AUTO: 0.48 10*3/MM3 (ref 0.2–1.2)
MONOCYTES NFR BLD AUTO: 9.4 % (ref 5–12)
NEUTROPHILS # BLD AUTO: 4.13 10*3/MM3 (ref 1.9–8.1)
NEUTROPHILS NFR BLD AUTO: 80.4 % (ref 42.7–76)
NRBC BLD MANUAL-RTO: 0 /100 WBC (ref 0–0)
PLAT MORPH BLD: NORMAL
PLATELET # BLD AUTO: 163 10*3/MM3 (ref 140–500)
PMV BLD AUTO: 10.5 FL (ref 6–12)
POTASSIUM BLD-SCNC: 4.3 MMOL/L (ref 3.5–5.2)
PROT SERPL-MCNC: 6.3 G/DL (ref 6–8.5)
RBC # BLD AUTO: 2.86 10*6/MM3 (ref 4.6–6)
RBC MORPH BLD: NORMAL
SODIUM BLD-SCNC: 139 MMOL/L (ref 136–145)
WBC MORPH BLD: NORMAL
WBC NRBC COR # BLD: 5.13 10*3/MM3 (ref 4.5–10.7)

## 2018-08-07 PROCEDURE — 80053 COMPREHEN METABOLIC PANEL: CPT | Performed by: INTERNAL MEDICINE

## 2018-08-07 PROCEDURE — 99212-NC PR NO CHARGE CBC OFFICE OUTPATIENT VISIT 10 MINUTES: Performed by: NURSE PRACTITIONER

## 2018-08-07 PROCEDURE — 96523 IRRIG DRUG DELIVERY DEVICE: CPT | Performed by: NURSE PRACTITIONER

## 2018-08-07 PROCEDURE — 85007 BL SMEAR W/DIFF WBC COUNT: CPT | Performed by: INTERNAL MEDICINE

## 2018-08-07 PROCEDURE — 85025 COMPLETE CBC W/AUTO DIFF WBC: CPT | Performed by: INTERNAL MEDICINE

## 2018-08-07 PROCEDURE — 25010000002 HEPARIN FLUSH (PORCINE) 100 UNIT/ML SOLUTION: Performed by: INTERNAL MEDICINE

## 2018-08-07 RX ORDER — SODIUM CHLORIDE 0.9 % (FLUSH) 0.9 %
10 SYRINGE (ML) INJECTION AS NEEDED
Status: DISCONTINUED | OUTPATIENT
Start: 2018-08-07 | End: 2018-08-07 | Stop reason: HOSPADM

## 2018-08-07 RX ORDER — SODIUM CHLORIDE 0.9 % (FLUSH) 0.9 %
10 SYRINGE (ML) INJECTION AS NEEDED
Status: CANCELLED | OUTPATIENT
Start: 2018-08-07

## 2018-08-07 RX ADMIN — SODIUM CHLORIDE, PRESERVATIVE FREE 500 UNITS: 5 INJECTION INTRAVENOUS at 12:45

## 2018-08-07 RX ADMIN — Medication 10 ML: at 12:45

## 2018-08-07 NOTE — PROGRESS NOTES
Patient for CBC with RN review.  He is frustrated because he continues to lose weight, despite his efforts to try to maintain or gain weight.  The patient states that he eats a lot of good foods.  He has noticed in the evenings when he eats his biggest meal he doesn't feel very much like eating.  We discussed moving his biggest meal of the day to either breakfast or lunch.  We also discussed eating smaller more frequent meals.  He denies issues with fevers or chills.  He is scheduled to have his teeth cleaned next week, and will receive prophylactic antibiotics.  We discussed that this should be okay to proceed.  He does get fatigued easily, but denies any significant issues with shortness of breath or dizzy spells.    Lab Results   Component Value Date    WBC 5.13 08/07/2018    HGB 8.5 (L) 08/07/2018    HCT 25.6 (L) 08/07/2018    MCV 89.5 08/07/2018     08/07/2018     Lab Results   Component Value Date    NEUTROABS 4.13 08/07/2018     Labs reviewed with the patient.  We discussed he is still anemic and he should develop worsening shortness of breath, fatigue, dizzy spells that he should call the office so that we can reevaluate him.  Patient will return in one week for follow-up visit with Dr. Roberto in anticipation of his next cycle of chemotherapy.  Will also refer him to oncology nutrition.    NOLAN Fernandes

## 2018-08-08 ENCOUNTER — TELEPHONE (OUTPATIENT)
Dept: NUTRITION | Facility: HOSPITAL | Age: 78
End: 2018-08-08

## 2018-08-08 NOTE — PROGRESS NOTES
Oncology Nutrition     Patient Name:  Louie German  YOB: 1940  MRN: [id number]  Date:  08/08/18  Physician:  Referred by Meredith FRANCISCO    Type of Cancer Treatment:   Chemotherapy:  Type: CHOP, rituxan      Patient Active Problem List   Diagnosis   • Benign essential HTN   • Acid reflux   • Elevated cholesterol   • Calculus of kidney   • Cervical spinal stenosis   • Status post total hip replacement, left   • Status post total replacement of right hip   • Degenerative disc disease, lumbar   • Hydronephrosis of left kidney   • Diffuse large B-cell lymphoma of extranodal site excluding spleen and other solid organs (CMS/HCC)   • Iron deficiency anemia due to chronic blood loss   • Chronic renal impairment, stage 2 (mild)   • Metastatic tumor of bone (CMS/HCC)   • Closed compression fracture of third lumbar vertebra (CMS/HCC)   • Lymphoma (CMS/HCC)   • Fitting and adjustment of vascular catheter       Current Outpatient Prescriptions   Medication Sig Dispense Refill   • famotidine (PEPCID) 20 MG tablet Take 20 mg by mouth 2 (Two) Times a Day.     • felodipine (PLENDIL) 10 MG 24 hr tablet Take 10 mg by mouth Daily.     • Morphine (MS CONTIN) 15 MG 12 hr tablet Take 1 tablet by mouth Every 12 (Twelve) Hours. 60 tablet 0   • Multiple Vitamin (MULTI VITAMIN PO) Take 1 tablet by mouth Daily.     • ondansetron (ZOFRAN) 8 MG tablet Take 1 tablet by mouth 3 (Three) Times a Day As Needed for Nausea or Vomiting. (Patient taking differently: Take 8 mg by mouth 3 (Three) Times a Day As Needed for Nausea or Vomiting. Will take when getting chemo) 30 tablet 5   • Polyethylene Glycol 3350 (MIRALAX PO) Take  by mouth As Needed.     • predniSONE (DELTASONE) 50 MG tablet Take 50 mg by mouth. TAKE FOR 5 DAYS WHEN DOING INFUSION.     • Sennosides-Docusate Sodium (SENOKOT S PO) Take  by mouth As Needed.     • simvastatin (ZOCOR) 40 MG tablet Take 40 mg by mouth Every Night.       No current facility-administered  "medications for this visit.        Glycemic Risk:   Azael    Weight:   Height: 67\"  Weight: 158 lbs.  Usual Body Weight: 170 lbs.   BMI: 24    Weight has decreased 12 pounds over last 2 months    Oral Food Intake:  Regular Diet - No Restrictions  Compared to normal intake, current food intake is less than normal    Hydration Status:   How many 8 ounce glass of water of fluid do you drink per day?  8    Enteral Feeding:   n/a    Nutrition Symptoms:   Altered Apetite  Nausea  Fatigue    Activity:   Not my normal self, but able to be up and about with fairly normal activities     reports that he has never smoked. He quit smokeless tobacco use about 20 years ago. His smokeless tobacco use included Chew. He reports that he drinks alcohol. He reports that he does not use drugs.    Evaluation of Nutritional Risk:   Patient identified to be at nutritional risk and/or for nutritional consultation; will follow patient through course of treatment.   Weight Change  Nutrition Impact Symptoms  Patient Education     Spoke to patient on the phone. He prefers not to make a special trip to the [facility] office to meet. We discussed issues he is having with appetite and fatigue, early satiety.  He is eating a larger meal at lunch as suggested by the APRN yesterday.  He has started drinking Ensure and Boost and is going to try adding ice cream. He has always been very strict with his diet and eats very healthily, reading labels, buying from local farmers market etc.  Encouraged patient to think of food as medicine and eat on a schedule during the day.  I will send him info on calorically dense foods and other suggestions on how to increase appetite.   Will be available as needed in case patient wants to meet in person.          Electronically signed by:  Marianna Garcia, RD  1:40 PM  "

## 2018-08-13 DIAGNOSIS — C83.39 DIFFUSE LARGE B-CELL LYMPHOMA OF EXTRANODAL SITE EXCLUDING SPLEEN AND OTHER SOLID ORGANS (HCC): ICD-10-CM

## 2018-08-14 ENCOUNTER — APPOINTMENT (OUTPATIENT)
Dept: ONCOLOGY | Facility: HOSPITAL | Age: 78
End: 2018-08-14

## 2018-08-14 ENCOUNTER — INFUSION (OUTPATIENT)
Dept: ONCOLOGY | Facility: HOSPITAL | Age: 78
End: 2018-08-14

## 2018-08-14 ENCOUNTER — OFFICE VISIT (OUTPATIENT)
Dept: ONCOLOGY | Facility: CLINIC | Age: 78
End: 2018-08-14

## 2018-08-14 ENCOUNTER — APPOINTMENT (OUTPATIENT)
Dept: OTHER | Facility: HOSPITAL | Age: 78
End: 2018-08-14

## 2018-08-14 VITALS
OXYGEN SATURATION: 99 % | BODY MASS INDEX: 23.79 KG/M2 | WEIGHT: 157 LBS | SYSTOLIC BLOOD PRESSURE: 128 MMHG | HEART RATE: 75 BPM | HEIGHT: 68 IN | TEMPERATURE: 97.3 F | DIASTOLIC BLOOD PRESSURE: 70 MMHG | RESPIRATION RATE: 14 BRPM

## 2018-08-14 VITALS — DIASTOLIC BLOOD PRESSURE: 68 MMHG | SYSTOLIC BLOOD PRESSURE: 111 MMHG | HEART RATE: 67 BPM

## 2018-08-14 DIAGNOSIS — T45.1X5A ANEMIA ASSOCIATED WITH CHEMOTHERAPY: ICD-10-CM

## 2018-08-14 DIAGNOSIS — Z45.2 FITTING AND ADJUSTMENT OF VASCULAR CATHETER: ICD-10-CM

## 2018-08-14 DIAGNOSIS — IMO0001 METASTATIC TUMOR OF BONE: ICD-10-CM

## 2018-08-14 DIAGNOSIS — D64.81 ANEMIA ASSOCIATED WITH CHEMOTHERAPY: ICD-10-CM

## 2018-08-14 DIAGNOSIS — S32.030G CLOSED COMPRESSION FRACTURE OF L3 LUMBAR VERTEBRA WITH DELAYED HEALING, SUBSEQUENT ENCOUNTER: ICD-10-CM

## 2018-08-14 DIAGNOSIS — C82.90 FOLLICULAR LYMPHOMA, UNSPECIFIED FOLLICULAR LYMPHOMA TYPE, UNSPECIFIED BODY REGION (HCC): Primary | ICD-10-CM

## 2018-08-14 DIAGNOSIS — C83.39 DIFFUSE LARGE B-CELL LYMPHOMA OF EXTRANODAL SITE EXCLUDING SPLEEN AND OTHER SOLID ORGANS (HCC): ICD-10-CM

## 2018-08-14 DIAGNOSIS — N18.2 CHRONIC RENAL IMPAIRMENT, STAGE 2 (MILD): ICD-10-CM

## 2018-08-14 DIAGNOSIS — C83.39 DIFFUSE LARGE B-CELL LYMPHOMA OF EXTRANODAL SITE EXCLUDING SPLEEN AND OTHER SOLID ORGANS (HCC): Primary | ICD-10-CM

## 2018-08-14 LAB
ALBUMIN SERPL-MCNC: 3.7 G/DL (ref 3.5–5.2)
ALBUMIN/GLOB SERPL: 1.3 G/DL
ALP SERPL-CCNC: 77 U/L (ref 39–117)
ALT SERPL W P-5'-P-CCNC: 9 U/L (ref 1–41)
ANION GAP SERPL CALCULATED.3IONS-SCNC: 12 MMOL/L
AST SERPL-CCNC: 17 U/L (ref 1–40)
BASOPHILS # BLD AUTO: 0.07 10*3/MM3 (ref 0–0.2)
BASOPHILS NFR BLD AUTO: 1.3 % (ref 0–1.5)
BILIRUB SERPL-MCNC: 0.2 MG/DL (ref 0.1–1.2)
BUN BLD-MCNC: 16 MG/DL (ref 8–23)
BUN/CREAT SERPL: 13.3 (ref 7–25)
CALCIUM SPEC-SCNC: 9.5 MG/DL (ref 8.6–10.5)
CHLORIDE SERPL-SCNC: 106 MMOL/L (ref 98–107)
CO2 SERPL-SCNC: 25 MMOL/L (ref 22–29)
CREAT BLD-MCNC: 1.2 MG/DL (ref 0.76–1.27)
DEPRECATED RDW RBC AUTO: 50.2 FL (ref 37–54)
EOSINOPHIL # BLD AUTO: 0.02 10*3/MM3 (ref 0–0.7)
EOSINOPHIL NFR BLD AUTO: 0.4 % (ref 0.3–6.2)
ERYTHROCYTE [DISTWIDTH] IN BLOOD BY AUTOMATED COUNT: 15.2 % (ref 11.5–14.5)
GFR SERPL CREATININE-BSD FRML MDRD: 58 ML/MIN/1.73
GLOBULIN UR ELPH-MCNC: 2.8 GM/DL
GLUCOSE BLD-MCNC: 124 MG/DL (ref 65–99)
HCT VFR BLD AUTO: 28.9 % (ref 40.4–52.2)
HGB BLD-MCNC: 9.3 G/DL (ref 13.7–17.6)
IMM GRANULOCYTES # BLD: 0.02 10*3/MM3 (ref 0–0.03)
IMM GRANULOCYTES NFR BLD: 0.4 % (ref 0–0.5)
IRON 24H UR-MRATE: 64 MCG/DL (ref 59–158)
IRON SATN MFR SERPL: 23 % (ref 20–50)
LDH SERPL-CCNC: 158 U/L (ref 135–225)
LYMPHOCYTES # BLD AUTO: 0.52 10*3/MM3 (ref 0.9–4.8)
LYMPHOCYTES NFR BLD AUTO: 9.3 % (ref 19.6–45.3)
MCH RBC QN AUTO: 29.4 PG (ref 27–32.7)
MCHC RBC AUTO-ENTMCNC: 32.2 G/DL (ref 32.6–36.4)
MCV RBC AUTO: 91.5 FL (ref 79.8–96.2)
MONOCYTES # BLD AUTO: 1 10*3/MM3 (ref 0.2–1.2)
MONOCYTES NFR BLD AUTO: 17.9 % (ref 5–12)
NEUTROPHILS # BLD AUTO: 3.97 10*3/MM3 (ref 1.9–8.1)
NEUTROPHILS NFR BLD AUTO: 70.7 % (ref 42.7–76)
NRBC BLD MANUAL-RTO: 0 /100 WBC (ref 0–0)
PLATELET # BLD AUTO: 336 10*3/MM3 (ref 140–500)
PMV BLD AUTO: 10.1 FL (ref 6–12)
POTASSIUM BLD-SCNC: 4 MMOL/L (ref 3.5–5.2)
PROT SERPL-MCNC: 6.5 G/DL (ref 6–8.5)
RBC # BLD AUTO: 3.16 10*6/MM3 (ref 4.6–6)
SODIUM BLD-SCNC: 143 MMOL/L (ref 136–145)
TIBC SERPL-MCNC: 283 MCG/DL (ref 298–536)
TRANSFERRIN SERPL-MCNC: 190 MG/DL (ref 200–360)
URATE SERPL-MCNC: 5.8 MG/DL (ref 3.4–7)
WBC NRBC COR # BLD: 5.6 10*3/MM3 (ref 4.5–10.7)

## 2018-08-14 PROCEDURE — 25010000002 RITUXIMAB 10 MG/ML SOLUTION 50 ML VIAL: Performed by: INTERNAL MEDICINE

## 2018-08-14 PROCEDURE — 96411 CHEMO IV PUSH ADDL DRUG: CPT

## 2018-08-14 PROCEDURE — 25010000003 DEXAMETHASONE SODIUM PHOSPHATE 100 MG/10ML SOLUTION: Performed by: INTERNAL MEDICINE

## 2018-08-14 PROCEDURE — 96367 TX/PROPH/DG ADDL SEQ IV INF: CPT

## 2018-08-14 PROCEDURE — 25010000002 HEPARIN FLUSH (PORCINE) 100 UNIT/ML SOLUTION: Performed by: INTERNAL MEDICINE

## 2018-08-14 PROCEDURE — 63510000001 EPOETIN ALFA PER 1000 UNITS: Performed by: INTERNAL MEDICINE

## 2018-08-14 PROCEDURE — 25010000002 DOXORUBICIN PER 10 MG: Performed by: INTERNAL MEDICINE

## 2018-08-14 PROCEDURE — 25010000002 VINCRISTINE PER 1 MG: Performed by: INTERNAL MEDICINE

## 2018-08-14 PROCEDURE — 83615 LACTATE (LD) (LDH) ENZYME: CPT | Performed by: INTERNAL MEDICINE

## 2018-08-14 PROCEDURE — 96413 CHEMO IV INFUSION 1 HR: CPT

## 2018-08-14 PROCEDURE — 99215 OFFICE O/P EST HI 40 MIN: CPT | Performed by: INTERNAL MEDICINE

## 2018-08-14 PROCEDURE — 25010000002 PALONOSETRON PER 25 MCG: Performed by: INTERNAL MEDICINE

## 2018-08-14 PROCEDURE — 25010000002 RITUXIMAB 10 MG/ML SOLUTION 10 ML VIAL: Performed by: INTERNAL MEDICINE

## 2018-08-14 PROCEDURE — 25010000002 CYCLOPHOSPHAMIDE PER 100 MG: Performed by: INTERNAL MEDICINE

## 2018-08-14 PROCEDURE — 84466 ASSAY OF TRANSFERRIN: CPT | Performed by: INTERNAL MEDICINE

## 2018-08-14 PROCEDURE — 96417 CHEMO IV INFUS EACH ADDL SEQ: CPT

## 2018-08-14 PROCEDURE — 85025 COMPLETE CBC W/AUTO DIFF WBC: CPT | Performed by: INTERNAL MEDICINE

## 2018-08-14 PROCEDURE — 80053 COMPREHEN METABOLIC PANEL: CPT | Performed by: INTERNAL MEDICINE

## 2018-08-14 PROCEDURE — 84550 ASSAY OF BLOOD/URIC ACID: CPT | Performed by: INTERNAL MEDICINE

## 2018-08-14 PROCEDURE — 96415 CHEMO IV INFUSION ADDL HR: CPT

## 2018-08-14 PROCEDURE — 25010000002 FOSAPREPITANT PER 1 MG: Performed by: INTERNAL MEDICINE

## 2018-08-14 PROCEDURE — 96375 TX/PRO/DX INJ NEW DRUG ADDON: CPT

## 2018-08-14 PROCEDURE — 96377 APPLICATON ON-BODY INJECTOR: CPT

## 2018-08-14 PROCEDURE — 83540 ASSAY OF IRON: CPT | Performed by: INTERNAL MEDICINE

## 2018-08-14 PROCEDURE — 25010000002 PEGFILGRASTIM 6 MG/0.6ML PREFILLED SYRINGE KIT: Performed by: INTERNAL MEDICINE

## 2018-08-14 PROCEDURE — 96372 THER/PROPH/DIAG INJ SC/IM: CPT

## 2018-08-14 PROCEDURE — 25010000002 DIPHENHYDRAMINE PER 50 MG: Performed by: INTERNAL MEDICINE

## 2018-08-14 RX ORDER — DIPHENHYDRAMINE HYDROCHLORIDE 50 MG/ML
50 INJECTION INTRAMUSCULAR; INTRAVENOUS AS NEEDED
Status: CANCELLED | OUTPATIENT
Start: 2018-09-04

## 2018-08-14 RX ORDER — PALONOSETRON 0.05 MG/ML
0.25 INJECTION, SOLUTION INTRAVENOUS ONCE
Status: COMPLETED | OUTPATIENT
Start: 2018-08-14 | End: 2018-08-14

## 2018-08-14 RX ORDER — SODIUM CHLORIDE 9 MG/ML
250 INJECTION, SOLUTION INTRAVENOUS ONCE
Status: CANCELLED | OUTPATIENT
Start: 2018-09-04

## 2018-08-14 RX ORDER — DOXORUBICIN HYDROCHLORIDE 2 MG/ML
90 INJECTION, SOLUTION INTRAVENOUS ONCE
Status: COMPLETED | OUTPATIENT
Start: 2018-08-14 | End: 2018-08-14

## 2018-08-14 RX ORDER — DOXORUBICIN HYDROCHLORIDE 2 MG/ML
90 INJECTION, SOLUTION INTRAVENOUS ONCE
Status: CANCELLED | OUTPATIENT
Start: 2018-09-04

## 2018-08-14 RX ORDER — FAMOTIDINE 20 MG/1
20 TABLET, FILM COATED ORAL ONCE
Status: COMPLETED | OUTPATIENT
Start: 2018-08-14 | End: 2018-08-14

## 2018-08-14 RX ORDER — SODIUM CHLORIDE 9 MG/ML
250 INJECTION, SOLUTION INTRAVENOUS ONCE
Status: COMPLETED | OUTPATIENT
Start: 2018-08-14 | End: 2018-08-14

## 2018-08-14 RX ORDER — ACETAMINOPHEN 325 MG/1
650 TABLET ORAL ONCE
Status: COMPLETED | OUTPATIENT
Start: 2018-08-14 | End: 2018-08-14

## 2018-08-14 RX ORDER — PALONOSETRON 0.05 MG/ML
0.25 INJECTION, SOLUTION INTRAVENOUS ONCE
Status: CANCELLED | OUTPATIENT
Start: 2018-09-04

## 2018-08-14 RX ORDER — SODIUM CHLORIDE 0.9 % (FLUSH) 0.9 %
10 SYRINGE (ML) INJECTION AS NEEDED
Status: CANCELLED | OUTPATIENT
Start: 2018-08-14

## 2018-08-14 RX ORDER — FAMOTIDINE 20 MG/1
20 TABLET, FILM COATED ORAL ONCE
Status: CANCELLED
Start: 2018-09-04 | End: 2018-09-04

## 2018-08-14 RX ORDER — MORPHINE SULFATE 15 MG/1
15 TABLET, FILM COATED, EXTENDED RELEASE ORAL EVERY 12 HOURS SCHEDULED
Qty: 60 TABLET | Refills: 0 | Status: SHIPPED | OUTPATIENT
Start: 2018-08-14 | End: 2018-09-25

## 2018-08-14 RX ORDER — FAMOTIDINE 10 MG/ML
20 INJECTION, SOLUTION INTRAVENOUS AS NEEDED
Status: CANCELLED | OUTPATIENT
Start: 2018-09-04

## 2018-08-14 RX ORDER — HYDROCODONE BITARTRATE AND ACETAMINOPHEN 5; 325 MG/1; MG/1
1 TABLET ORAL EVERY 6 HOURS PRN
Qty: 50 TABLET | Refills: 0 | Status: SHIPPED | OUTPATIENT
Start: 2018-08-14 | End: 2018-09-25

## 2018-08-14 RX ORDER — MEPERIDINE HYDROCHLORIDE 50 MG/ML
25 INJECTION INTRAMUSCULAR; INTRAVENOUS; SUBCUTANEOUS
Status: CANCELLED | OUTPATIENT
Start: 2018-09-04

## 2018-08-14 RX ORDER — ACETAMINOPHEN 325 MG/1
650 TABLET ORAL ONCE
Status: CANCELLED | OUTPATIENT
Start: 2018-09-04

## 2018-08-14 RX ADMIN — RITUXIMAB 700 MG: 10 INJECTION, SOLUTION INTRAVENOUS at 11:36

## 2018-08-14 RX ADMIN — ACETAMINOPHEN 650 MG: 325 TABLET ORAL at 09:27

## 2018-08-14 RX ADMIN — DEXAMETHASONE SODIUM PHOSPHATE 12 MG: 10 INJECTION, SOLUTION INTRAMUSCULAR; INTRAVENOUS at 10:23

## 2018-08-14 RX ADMIN — VINCRISTINE SULFATE 2 MG: 1 INJECTION, SOLUTION INTRAVENOUS at 10:39

## 2018-08-14 RX ADMIN — ERYTHROPOIETIN 20000 UNITS: 20000 INJECTION, SOLUTION INTRAVENOUS; SUBCUTANEOUS at 09:56

## 2018-08-14 RX ADMIN — CYCLOPHOSPHAMIDE 1450 MG: 1 INJECTION, POWDER, FOR SOLUTION INTRAVENOUS; ORAL at 10:49

## 2018-08-14 RX ADMIN — PALONOSETRON HYDROCHLORIDE 0.25 MG: 0.25 INJECTION INTRAVENOUS at 09:28

## 2018-08-14 RX ADMIN — DOXORUBICIN HYDROCHLORIDE 90 MG: 2 INJECTION, SOLUTION INTRAVENOUS at 10:43

## 2018-08-14 RX ADMIN — PEGFILGRASTIM 6 MG: KIT SUBCUTANEOUS at 14:14

## 2018-08-14 RX ADMIN — DIPHENHYDRAMINE HYDROCHLORIDE 50 MG: 50 INJECTION INTRAMUSCULAR; INTRAVENOUS at 10:01

## 2018-08-14 RX ADMIN — SODIUM CHLORIDE, PRESERVATIVE FREE 500 UNITS: 5 INJECTION INTRAVENOUS at 14:18

## 2018-08-14 RX ADMIN — FAMOTIDINE 20 MG: 20 TABLET ORAL at 09:27

## 2018-08-14 RX ADMIN — SODIUM CHLORIDE 150 MG: 9 INJECTION, SOLUTION INTRAVENOUS at 09:30

## 2018-08-14 RX ADMIN — SODIUM CHLORIDE 250 ML: 900 INJECTION, SOLUTION INTRAVENOUS at 09:00

## 2018-08-14 NOTE — PROGRESS NOTES
Subjective     REASON FOR FOLLOW UP:   1.  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  2.  Maintenance Rituxan every 3 months for 2 years initiated 3/30/2018  3.  Painful pathologic compression fracture at L3 May 2018 due to involvement with diffuse large B-cell non-Hodgkin's lymphoma.   4.  Plan to initiate further chemotherapy with CHOP and Rituxan with or without intrathecal methotrexate depending on his initial lumbar puncture.    HISTORY OF PRESENT ILLNESS:  The patient is a 78 y.o. year old male who had  undergone CT scan of the abdomen on 10/11/2017 to evaluate some left lower quadrant abdominal pain.  CT scan findings were worrisome for metastatic malignancy with multiple tumor implants noted on the scan.  The largest mass appeared to be possibly arising from the duodenum or jejunum in the upper abdomen.  There was no evidence of metastases to liver.    CT-guided biopsy was performed on 10/18/2017 but unfortunately pathology was nondiagnostic showing only benign skeletal muscle and fibroadipose tissue.    The patient was seen in consultation on 10/20/2017 and at that time we recommended laparoscopic lymph node sampling and upper and lower GI endoscopy.  He underwent these procedures with Dr. Castañeda on 10/26/2017.  There was no evidence of malignancy from his endoscopic procedures at the laparoscopic specimen returned as follicular center cell B-cell non-Hodgkin's lymphoma.    He received Rituxan treatment initially with 4 weekly doses of Rituxan with good response on follow-up scans.  Unfortunately, a few months after completion of his treatment he developed worsening back pain and has lymphoma involving the L3 vertebral body consistent with diffuse large B-cell non-Hodgkin's lymphoma.    He is here today to be evaluated prior to cycle # 4 with same doses.  He will again have a Neulasta on body injector.   "We will plan to repeat  A PET scan after this 4th cycle prior to cycle #5 to further evaluate his response.  A total of 6 cycles of CHOP Rituxan are planned to complete his treatment.    He is still taking 15 mg of MS Contin every 12 hours but reports he's not had any significant back pain and has not required any breakthrough pain medication therefore we will plan to start weaning his pain medications.  He received a prescription for hydrocodone from the office today for breakthrough pain and was instructed to decrease his MS Contin to 1 dose at bedtime for now.    He also is having significant anemia from chemotherapy and his iron studies were within normal limits therefore we will also add Procrit to his treatment plan.      Pain   Pertinent negatives include no chest pain, coughing, fatigue, fever, headaches, nausea, neck pain, rash or vomiting.        Past Medical History:   Diagnosis Date   • Arm fracture     right side humerus - sling and swathe   • Cancer (CMS/HCC) 2017    Basal Cell on Nose and Under Right Eye   • Coronary artery disease    • DDD (degenerative disc disease), cervical    • Gastric mass    • GERD (gastroesophageal reflux disease)    • Hip pain started 5/29/18    onset of acute pain with weight bearing   • Hx of cardiac arrest     Happened after Induction of anesthesia prior  to SURG 10 YRS AGO  \" I FLATLINE BUT W/IN SECONDS HEART WAS OK AFTER TURNED ON BACK...I THINK THEY GAVE ME TO MUCH ANESTHESIA\"   • Hyperlipidemia    • Hypertension    • Kidney stones    • Non Hodgkin's lymphoma (CMS/HCC)     SPINE   • Osteoarthritis    • Pleural effusion     DRAINAGE RT PLEURAL FLUID FOR TESTING WITH EGD ON 1-30-17   NO MALIGNANCY WITH CLEARANCE FROM DR SIMMONS FOR SURGERY   • Rheumatoid arthritis (CMS/HCC)         Past Surgical History:   Procedure Laterality Date   • COLONOSCOPY N/A 10/26/2017    Procedure: COLONOSCOPY;  Surgeon: Louie Castañeda MD;  Location: [facility] OR;  Service:    • " CORONARY ANGIOPLASTY WITH STENT PLACEMENT  1999    17 years ago   • CYSTOSCOPY W/ LITHOLAPAXY / EHL      6-7 years ago   • DIAGNOSTIC LAPAROSCOPY N/A 10/26/2017    Procedure: DIAGNOSTIC LAPAROSCOPY WITH RETROPERITONEAL BIOPSIES;  Surgeon: Louie Castañeda MD;  Location: [facility] OR;  Service:    • ENDOSCOPY      ON 1/30/17 WITH DRAINAGE OF RT PLEURAL FLUID FOR BIOPSY    NO MALIGNANCY  CLEARANCE FOR SURG PER DR SIMMONS   • ENDOSCOPY N/A 10/26/2017    Procedure: ESOPHAGOGASTRODUODENOSCOPY;  Surgeon: Louie Castañeda MD;  Location: [facility] OR;  Service:    • EYE SURGERY Right 2017    Moh's; reconstruction right lower lid   • KIDNEY STONE SURGERY     • PLEURAL BIOPSY     • POSTERIOR CERVICAL FUSION  2001   • SKIN CANCER EXCISION N/A 2017    Basal Cell Nose & under right eye   • TOTAL HIP ARTHROPLASTY Left 04/2010    9-10 years ago    • TOTAL HIP ARTHROPLASTY Right 2/14/2017    Procedure: TOTAL HIP ARTHROPLASTY;  Surgeon: Gerson Cardoza MD;  Location: [facility] OR;  Service:    • VENOUS ACCESS DEVICE (PORT) INSERTION N/A 6/5/2018    Procedure: INSERTION VENOUS ACCESS DEVICE- RIGHT;  Surgeon: Louie Castañeda MD;  Location: [facility] OR;  Service: General         ALLERGIES:  No Known Allergies     Review of Systems   Constitutional: Positive for appetite change and unexpected weight change. Negative for activity change, fatigue and fever.   HENT: Negative for hearing loss, nosebleeds, trouble swallowing and voice change.    Eyes: Negative for visual disturbance.   Respiratory: Negative for cough, shortness of breath and wheezing.    Cardiovascular: Negative for chest pain and palpitations.   Gastrointestinal: Positive for constipation. Negative for diarrhea, nausea and vomiting.   Genitourinary: Negative for difficulty urinating, frequency, hematuria and urgency.   Musculoskeletal: Positive for back pain. Negative for neck pain.   Skin: Negative for rash.   Neurological: Negative for dizziness,  "seizures, syncope and headaches.   Hematological: Negative for adenopathy. Does not bruise/bleed easily.   Psychiatric/Behavioral: Negative for behavioral problems. The patient is not nervous/anxious.         Objective     Vitals:    08/14/18 0832   BP: 128/70   Pulse: 75   Resp: 14   Temp: 97.3 °F (36.3 °C)   TempSrc: Oral   SpO2: 99%   Weight: 71.2 kg (157 lb)  Comment: states appeitie is fine/not eating as much   Height: 172 cm (67.72\")   PainSc: 0-No pain     Current Status 8/14/2018   ECOG score 0       Physical Exam   Constitutional: He is oriented to person, place, and time. He appears well-developed and well-nourished. No distress.   HENT:   Head: Normocephalic.   Eyes: Pupils are equal, round, and reactive to light. Conjunctivae and EOM are normal. No scleral icterus.   Neck: Normal range of motion. Neck supple. No JVD present. No thyromegaly present.   Cardiovascular: Normal rate and regular rhythm.  Exam reveals no gallop and no friction rub.    No murmur heard.  Pulmonary/Chest: Effort normal and breath sounds normal. He has no wheezes. He has no rales.   Right IJ Mediport.   Abdominal: Soft. He exhibits no distension and no mass. There is no tenderness.   Musculoskeletal: Normal range of motion. He exhibits no edema or deformity.   Wearing a back brace.   Lymphadenopathy:     He has no cervical adenopathy.   Neurological: He is alert and oriented to person, place, and time. He has normal reflexes. No cranial nerve deficit.   Skin: Skin is warm and dry. No rash noted. No erythema.   Psychiatric: He has a normal mood and affect. His behavior is normal. Judgment normal.         RECENT LABS:  Hematology WBC   Date Value Ref Range Status   08/07/2018 5.13 4.50 - 10.70 10*3/mm3 Final     RBC   Date Value Ref Range Status   08/07/2018 2.86 (L) 4.60 - 6.00 10*6/mm3 Final     Hemoglobin   Date Value Ref Range Status   08/07/2018 8.5 (L) 13.7 - 17.6 g/dL Final     Hematocrit   Date Value Ref Range Status "   08/07/2018 25.6 (L) 40.4 - 52.2 % Final     Platelets   Date Value Ref Range Status   08/07/2018 163 140 - 500 10*3/mm3 Final          Lab Results   Component Value Date    GLUCOSE 111 (H) 08/07/2018    BUN 15 08/07/2018    CREATININE 1.27 08/07/2018    EGFRIFNONA 55 (L) 08/07/2018    EGFRIFAFRI 61 03/16/2018    BCR 11.8 08/07/2018    K 4.3 08/07/2018    CO2 24.9 08/07/2018    CALCIUM 9.0 08/07/2018    PROTENTOTREF 7.2 03/16/2018    ALBUMIN 3.50 08/07/2018    LABIL2 1.3 03/16/2018    AST 20 08/07/2018    ALT 18 08/07/2018       CT CHEST, ABDOMEN AND PELVIS WITH CONTRAST  7/13/2018     HISTORY: 78-year-old male with non-Hodgkin's lymphoma. For restaging.     TECHNIQUE: Axial CT images of the chest abdomen and pelvis were obtained  following administration of intravenous and oral contrast. Coronal and  sagittal reconstructions were then obtained.     COMPARISON: PET CT from 05/21/2018, CT abdomen and pelvis without  contrast from 10/11/2017.     FINDINGS:     CT CHEST: A right chest wall port with tip in the distal SVC. A  previously identified left supraclavicular lymph node is slightly  smaller measuring up to 4 mm in short axis dimension. Mediastinal  lymphadenopathy remains subcentimeter with no interval enlargement. No  pleural or pericardial effusion. Calcified coronary artery disease is  present. The central airways demonstrate small amount of dependent  secretions. Lung windows demonstrate mild chronic interstitial change.  There is patchy groundglass density seen within the periphery of the  right lower lobe that is likely chronic. No new bone lesions identified.     CT ABDOMEN AND PELVIS: Amorphous homogeneous infiltrative soft tissue  seen anterior to the aorta just superior to the third portion of the  duodenum measures 3.3 x 2.0 cm without significant change from most  recent PET/CT. An enlarged right-sided mesenteric lymph node previously  measuring up to 1.2 cm is no longer imaged. Serosal/bowel  activity seen  within the left mid abdomen that was previously bulky measuring up to  2.7 cm has significantly resolved in the interim, now measuring only 1.0  cm in maximum dimension. No new abdominal lymphadenopathy is identified.     The liver, gallbladder, spleen and pancreas, bilateral adrenal glands  are normal. There is diffuse atrophy of the left kidney unchanged.  Multiple renal cortical cysts and right nonobstructing nephroliths are  identified. No evidence of bowel obstruction. Imaging of the pelvis is  limited secondary to streak artifact from bilateral hip prosthesis. An  L3 pathological vertebral compression deformity is reidentified.     IMPRESSION:  1. Interval decrease in mesenteric bowel and retroperitoneal lymph omar  masses since prior PET/CT imaging.  2. Pathological fracture deformity of L3 again identified.  3. Additional findings as above.    Images personally reviewed    Assessment/Plan     1.  Diffuse large B-cell non-Hodgkin's lymphoma involving the L3 vertebral body.  2.  Previous treatment with single agent Rituxan for follicular B-cell lymphoma.  3.  Back pain related to lymphoma involving the spine. Improved with the use of MS Contin 15 mg every 12 hours.  4.  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  5.  Lumbar puncture showing no evidence of lymphoma in the spinal fluid.  Patient did receive 1 dose of intrathecal methotrexate with the procedure.  6.  Right IJ Mediport.    Plan  1.  David will proceed with his 4th cycle of CHOP Rituxan in the office today with Neulasta support via and on body injector  2.  He will be scheduled for CBC and RN review in 1 week and 2 weeks  3.  He will receive Procrit 20,000 units subcutaneous today for anemia related to chemotherapy.  When he returns for his labs weekly he also will receive additional Procrit if his hemoglobin is less than 10.  4.  PET scan will be scheduled in 2 weeks as an outpatient.  5.  His fifth cycle of chemotherapy will be due on the Tuesday of  Labor Day week and I will be gone that week therefore we will plan to see him back on Friday, 8/31/2018 for 2 unit M.D. follow-up and review of PET scan prior to cycle #5.  6.  We will be weaning his pain medication at this point and a vascular decrease his MS Contin to 15 mg once at bedtime and to use hydrocodone 5/325 every 4-6 hours throughout the day for breakthrough pain.

## 2018-08-21 ENCOUNTER — APPOINTMENT (OUTPATIENT)
Dept: OTHER | Facility: HOSPITAL | Age: 78
End: 2018-08-21

## 2018-08-21 ENCOUNTER — INFUSION (OUTPATIENT)
Dept: ONCOLOGY | Facility: HOSPITAL | Age: 78
End: 2018-08-21

## 2018-08-21 ENCOUNTER — INFUSION (OUTPATIENT)
Dept: OTHER | Facility: HOSPITAL | Age: 78
End: 2018-08-21

## 2018-08-21 VITALS
OXYGEN SATURATION: 100 % | BODY MASS INDEX: 24.65 KG/M2 | DIASTOLIC BLOOD PRESSURE: 74 MMHG | WEIGHT: 160.8 LBS | HEART RATE: 78 BPM | RESPIRATION RATE: 18 BRPM | TEMPERATURE: 97.4 F | SYSTOLIC BLOOD PRESSURE: 144 MMHG

## 2018-08-21 DIAGNOSIS — S32.030G CLOSED COMPRESSION FRACTURE OF L3 LUMBAR VERTEBRA WITH DELAYED HEALING, SUBSEQUENT ENCOUNTER: ICD-10-CM

## 2018-08-21 DIAGNOSIS — IMO0001 METASTATIC TUMOR OF BONE: ICD-10-CM

## 2018-08-21 DIAGNOSIS — T45.1X5A ANEMIA ASSOCIATED WITH CHEMOTHERAPY: Primary | ICD-10-CM

## 2018-08-21 DIAGNOSIS — Z45.2 FITTING AND ADJUSTMENT OF VASCULAR CATHETER: ICD-10-CM

## 2018-08-21 DIAGNOSIS — C83.39 DIFFUSE LARGE B-CELL LYMPHOMA OF EXTRANODAL SITE EXCLUDING SPLEEN AND OTHER SOLID ORGANS (HCC): ICD-10-CM

## 2018-08-21 DIAGNOSIS — N18.2 CHRONIC RENAL IMPAIRMENT, STAGE 2 (MILD): ICD-10-CM

## 2018-08-21 DIAGNOSIS — D64.81 ANEMIA ASSOCIATED WITH CHEMOTHERAPY: Primary | ICD-10-CM

## 2018-08-21 LAB
BACTERIA UR QL AUTO: NORMAL /HPF
BASOPHILS # BLD MANUAL: 0.02 10*3/MM3 (ref 0–0.2)
BASOPHILS NFR BLD AUTO: 4 % (ref 0–2)
BILIRUB UR QL STRIP: NEGATIVE
CLARITY UR: CLEAR
COD CRY URNS QL: NORMAL /HPF
COLOR UR: YELLOW
DEPRECATED RDW RBC AUTO: 47.9 FL (ref 37–54)
EOSINOPHIL # BLD MANUAL: 0.01 10*3/MM3 (ref 0–0.7)
EOSINOPHIL NFR BLD MANUAL: 2 % (ref 0–7)
ERYTHROCYTE [DISTWIDTH] IN BLOOD BY AUTOMATED COUNT: 15 % (ref 11.5–14.5)
GLUCOSE UR STRIP-MCNC: NEGATIVE MG/DL
HCT VFR BLD AUTO: 25.6 % (ref 40.4–52.2)
HGB BLD-MCNC: 8.7 G/DL (ref 13.7–17.6)
HGB UR QL STRIP.AUTO: ABNORMAL
HYALINE CASTS UR QL AUTO: NORMAL /LPF
KETONES UR QL STRIP: ABNORMAL
LEUKOCYTE ESTERASE UR QL STRIP.AUTO: NEGATIVE
LYMPHOCYTES # BLD MANUAL: 0.38 10*3/MM3 (ref 0.8–7)
LYMPHOCYTES NFR BLD MANUAL: 14 % (ref 0–12)
LYMPHOCYTES NFR BLD MANUAL: 64 % (ref 24–44)
MCH RBC QN AUTO: 30 PG (ref 27–32.7)
MCHC RBC AUTO-ENTMCNC: 34 G/DL (ref 32.6–36.4)
MCV RBC AUTO: 88.3 FL (ref 79.8–96.2)
MONOCYTES # BLD AUTO: 0.08 10*3/MM3 (ref 0–1)
NEUTROPHILS # BLD AUTO: 0.09 10*3/MM3 (ref 1.9–8.1)
NEUTROPHILS NFR BLD MANUAL: 16 % (ref 42.7–76)
NITRITE UR QL STRIP: NEGATIVE
PH UR STRIP.AUTO: 5.5 [PH] (ref 5–8)
PLAT MORPH BLD: NORMAL
PLATELET # BLD AUTO: 54 10*3/MM3 (ref 140–500)
PMV BLD AUTO: 12.6 FL (ref 6–12)
PROT UR QL STRIP: NEGATIVE
RBC # BLD AUTO: 2.9 10*6/MM3 (ref 4.6–6)
RBC # UR: NORMAL /HPF
RBC MORPH BLD: NORMAL
REF LAB TEST METHOD: NORMAL
SCAN SLIDE: NORMAL
SP GR UR STRIP: 1.02 (ref 1–1.03)
SQUAMOUS #/AREA URNS HPF: NORMAL /HPF
UROBILINOGEN UR QL STRIP: ABNORMAL
WBC MORPH BLD: NORMAL
WBC NRBC COR # BLD: 0.59 10*3/MM3 (ref 4.5–10.7)
WBC UR QL AUTO: NORMAL /HPF

## 2018-08-21 PROCEDURE — 96372 THER/PROPH/DIAG INJ SC/IM: CPT | Performed by: INTERNAL MEDICINE

## 2018-08-21 PROCEDURE — 81001 URINALYSIS AUTO W/SCOPE: CPT | Performed by: NURSE PRACTITIONER

## 2018-08-21 PROCEDURE — 36415 COLL VENOUS BLD VENIPUNCTURE: CPT

## 2018-08-21 PROCEDURE — 85007 BL SMEAR W/DIFF WBC COUNT: CPT | Performed by: INTERNAL MEDICINE

## 2018-08-21 PROCEDURE — 63510000001 EPOETIN ALFA PER 1000 UNITS: Performed by: INTERNAL MEDICINE

## 2018-08-21 PROCEDURE — 85025 COMPLETE CBC W/AUTO DIFF WBC: CPT | Performed by: INTERNAL MEDICINE

## 2018-08-21 RX ORDER — LEVOFLOXACIN 500 MG/1
500 TABLET, FILM COATED ORAL DAILY
Qty: 7 TABLET | Refills: 0 | Status: SHIPPED | OUTPATIENT
Start: 2018-08-21 | End: 2018-08-28

## 2018-08-21 RX ADMIN — ERYTHROPOIETIN 20000 UNITS: 20000 INJECTION, SOLUTION INTRAVENOUS; SUBCUTANEOUS at 10:58

## 2018-08-21 NOTE — PROGRESS NOTES
Arrived ambulatory today for labs and  Injection. Some complaints of frequency and urgency with urination. Also complaints of fatigue . Labs reviewed with Meredith AMADOR and Mike simpson scribed to his pharmacy. A  Urine specimen was sent to lab. Masks given ,reviewed neutropenic and bleeding precautions. Instructed to call for any fever greater 100.5 and any signs of bleeding .

## 2018-08-24 ENCOUNTER — TELEPHONE (OUTPATIENT)
Dept: ONCOLOGY | Facility: CLINIC | Age: 78
End: 2018-08-24

## 2018-08-24 NOTE — TELEPHONE ENCOUNTER
Pt called and said he was having problems with his bowels. He said he was constipated and it burned with his last BM 2 days ago and he hasn't had one since. He said he has been taking both Miralax and his Senokot in the morning. Pt told if he does not have a BM by bedtime to take 3 Senokot. Pt v/u.

## 2018-08-24 NOTE — TELEPHONE ENCOUNTER
----- Message from Tatiana Narvaez sent at 8/24/2018 11:32 AM EDT -----  Contact: [phone]  Pt has questions said has been confused.

## 2018-08-28 ENCOUNTER — HOSPITAL ENCOUNTER (OUTPATIENT)
Dept: PET IMAGING | Facility: HOSPITAL | Age: 78
Discharge: HOME | End: 2018-08-28
Attending: INTERNAL MEDICINE

## 2018-08-28 ENCOUNTER — HOSPITAL ENCOUNTER (OUTPATIENT)
Dept: PET IMAGING | Facility: HOSPITAL | Age: 78
Discharge: HOME | End: 2018-08-28
Attending: INTERNAL MEDICINE | Admitting: INTERNAL MEDICINE

## 2018-08-28 DIAGNOSIS — T45.1X5A ANEMIA ASSOCIATED WITH CHEMOTHERAPY: ICD-10-CM

## 2018-08-28 DIAGNOSIS — C83.39 DIFFUSE LARGE B-CELL LYMPHOMA OF EXTRANODAL SITE EXCLUDING SPLEEN AND OTHER SOLID ORGANS (HCC): ICD-10-CM

## 2018-08-28 DIAGNOSIS — D64.81 ANEMIA ASSOCIATED WITH CHEMOTHERAPY: ICD-10-CM

## 2018-08-28 DIAGNOSIS — C82.90 FOLLICULAR LYMPHOMA, UNSPECIFIED FOLLICULAR LYMPHOMA TYPE, UNSPECIFIED BODY REGION (HCC): ICD-10-CM

## 2018-08-28 DIAGNOSIS — Z45.2 FITTING AND ADJUSTMENT OF VASCULAR CATHETER: ICD-10-CM

## 2018-08-28 LAB — GLUCOSE BLDC GLUCOMTR-MCNC: 102 MG/DL (ref 70–130)

## 2018-08-28 PROCEDURE — 78815 PET IMAGE W/CT SKULL-THIGH: CPT

## 2018-08-28 PROCEDURE — 82962 GLUCOSE BLOOD TEST: CPT

## 2018-08-28 PROCEDURE — A9552 F18 FDG: HCPCS | Performed by: INTERNAL MEDICINE

## 2018-08-28 PROCEDURE — 0 FLUDEOXYGLUCOSE F18 SOLUTION: Performed by: INTERNAL MEDICINE

## 2018-08-28 RX ADMIN — FLUDEOXYGLUCOSE F18 1 DOSE: 300 INJECTION INTRAVENOUS at 08:51

## 2018-08-29 ENCOUNTER — TELEPHONE (OUTPATIENT)
Dept: ONCOLOGY | Facility: CLINIC | Age: 78
End: 2018-08-29

## 2018-08-29 NOTE — TELEPHONE ENCOUNTER
Pt. States when he saw dr. Roberto on 8/14/18 he told him he wasn't having that much pain so the decision was made to go down to 1 instead of 2 of the m/s contin 15mg.  Pt.has hydrocodone for btp and states he hasn't needed it.  States he feels good and feels like he could stop the m/s contin all together.  Pt. Sees dr. Roberto on Friday.  Informed pt. He could go to every other day and see what happens.  Pt. Informed about withdrawal sxs.  He does have the hydrocodone for back up.  Pt. Has decided he will go ahead and stay on 1 m/s contin daily until he sees dr. Roberto on Friday.

## 2018-08-29 NOTE — TELEPHONE ENCOUNTER
----- Message from Yanely Cordon sent at 8/29/2018 10:50 AM EDT -----  Contact: [phone]  Pt wants to talk about stopping a mediation

## 2018-08-30 DIAGNOSIS — C83.39 DIFFUSE LARGE B-CELL LYMPHOMA OF EXTRANODAL SITE EXCLUDING SPLEEN AND OTHER SOLID ORGANS (HCC): Primary | ICD-10-CM

## 2018-08-31 ENCOUNTER — OFFICE VISIT (OUTPATIENT)
Dept: ONCOLOGY | Facility: CLINIC | Age: 78
End: 2018-08-31

## 2018-08-31 ENCOUNTER — INFUSION (OUTPATIENT)
Dept: ONCOLOGY | Facility: HOSPITAL | Age: 78
End: 2018-08-31

## 2018-08-31 VITALS
RESPIRATION RATE: 14 BRPM | OXYGEN SATURATION: 98 % | HEIGHT: 68 IN | WEIGHT: 155.6 LBS | TEMPERATURE: 98.3 F | DIASTOLIC BLOOD PRESSURE: 74 MMHG | HEART RATE: 86 BPM | BODY MASS INDEX: 23.58 KG/M2 | SYSTOLIC BLOOD PRESSURE: 130 MMHG

## 2018-08-31 DIAGNOSIS — T45.1X5A ANEMIA ASSOCIATED WITH CHEMOTHERAPY: ICD-10-CM

## 2018-08-31 DIAGNOSIS — S32.030G CLOSED COMPRESSION FRACTURE OF L3 LUMBAR VERTEBRA WITH DELAYED HEALING, SUBSEQUENT ENCOUNTER: ICD-10-CM

## 2018-08-31 DIAGNOSIS — Z45.2 FITTING AND ADJUSTMENT OF VASCULAR CATHETER: Primary | ICD-10-CM

## 2018-08-31 DIAGNOSIS — D64.81 ANEMIA ASSOCIATED WITH CHEMOTHERAPY: ICD-10-CM

## 2018-08-31 DIAGNOSIS — C83.39 DIFFUSE LARGE B-CELL LYMPHOMA OF EXTRANODAL SITE EXCLUDING SPLEEN AND OTHER SOLID ORGANS (HCC): ICD-10-CM

## 2018-08-31 DIAGNOSIS — IMO0001 METASTATIC TUMOR OF BONE: ICD-10-CM

## 2018-08-31 DIAGNOSIS — C83.39 DIFFUSE LARGE B-CELL LYMPHOMA OF EXTRANODAL SITE EXCLUDING SPLEEN AND OTHER SOLID ORGANS (HCC): Primary | ICD-10-CM

## 2018-08-31 PROCEDURE — 99214 OFFICE O/P EST MOD 30 MIN: CPT | Performed by: INTERNAL MEDICINE

## 2018-08-31 PROCEDURE — 82728 ASSAY OF FERRITIN: CPT | Performed by: INTERNAL MEDICINE

## 2018-08-31 PROCEDURE — 85025 COMPLETE CBC W/AUTO DIFF WBC: CPT | Performed by: INTERNAL MEDICINE

## 2018-08-31 PROCEDURE — 96372 THER/PROPH/DIAG INJ SC/IM: CPT

## 2018-08-31 PROCEDURE — 63510000001 EPOETIN ALFA PER 1000 UNITS: Performed by: INTERNAL MEDICINE

## 2018-08-31 PROCEDURE — 36415 COLL VENOUS BLD VENIPUNCTURE: CPT | Performed by: INTERNAL MEDICINE

## 2018-08-31 RX ORDER — ACETAMINOPHEN 325 MG/1
650 TABLET ORAL ONCE
Status: CANCELLED | OUTPATIENT
Start: 2018-09-25

## 2018-08-31 RX ORDER — FAMOTIDINE 20 MG/1
20 TABLET, FILM COATED ORAL ONCE
Status: CANCELLED
Start: 2018-09-25 | End: 2018-09-25

## 2018-08-31 RX ORDER — DIPHENHYDRAMINE HYDROCHLORIDE 50 MG/ML
50 INJECTION INTRAMUSCULAR; INTRAVENOUS AS NEEDED
Status: CANCELLED | OUTPATIENT
Start: 2018-09-25

## 2018-08-31 RX ORDER — SODIUM CHLORIDE 9 MG/ML
250 INJECTION, SOLUTION INTRAVENOUS ONCE
Status: CANCELLED | OUTPATIENT
Start: 2018-09-25

## 2018-08-31 RX ORDER — MEPERIDINE HYDROCHLORIDE 50 MG/ML
25 INJECTION INTRAMUSCULAR; INTRAVENOUS; SUBCUTANEOUS
Status: CANCELLED | OUTPATIENT
Start: 2018-09-25

## 2018-08-31 RX ORDER — DOXORUBICIN HYDROCHLORIDE 2 MG/ML
90 INJECTION, SOLUTION INTRAVENOUS ONCE
Status: CANCELLED | OUTPATIENT
Start: 2018-09-25

## 2018-08-31 RX ORDER — FAMOTIDINE 10 MG/ML
20 INJECTION, SOLUTION INTRAVENOUS AS NEEDED
Status: CANCELLED | OUTPATIENT
Start: 2018-09-25

## 2018-08-31 RX ORDER — PALONOSETRON 0.05 MG/ML
0.25 INJECTION, SOLUTION INTRAVENOUS ONCE
Status: CANCELLED | OUTPATIENT
Start: 2018-09-25

## 2018-08-31 RX ADMIN — ERYTHROPOIETIN 20000 UNITS: 20000 INJECTION, SOLUTION INTRAVENOUS; SUBCUTANEOUS at 15:33

## 2018-09-04 ENCOUNTER — INFUSION (OUTPATIENT)
Dept: ONCOLOGY | Facility: HOSPITAL | Age: 78
End: 2018-09-04

## 2018-09-04 VITALS
HEART RATE: 74 BPM | BODY MASS INDEX: 23.89 KG/M2 | SYSTOLIC BLOOD PRESSURE: 134 MMHG | DIASTOLIC BLOOD PRESSURE: 71 MMHG | WEIGHT: 155.8 LBS | TEMPERATURE: 97.6 F | OXYGEN SATURATION: 99 %

## 2018-09-04 DIAGNOSIS — Z45.2 FITTING AND ADJUSTMENT OF VASCULAR CATHETER: ICD-10-CM

## 2018-09-04 DIAGNOSIS — C83.39 DIFFUSE LARGE B-CELL LYMPHOMA OF EXTRANODAL SITE EXCLUDING SPLEEN AND OTHER SOLID ORGANS (HCC): Primary | ICD-10-CM

## 2018-09-04 PROCEDURE — 25010000002 PALONOSETRON PER 25 MCG: Performed by: INTERNAL MEDICINE

## 2018-09-04 PROCEDURE — 25010000002 FOSAPREPITANT PER 1 MG: Performed by: INTERNAL MEDICINE

## 2018-09-04 PROCEDURE — 96413 CHEMO IV INFUSION 1 HR: CPT | Performed by: INTERNAL MEDICINE

## 2018-09-04 PROCEDURE — 96411 CHEMO IV PUSH ADDL DRUG: CPT | Performed by: INTERNAL MEDICINE

## 2018-09-04 PROCEDURE — 25010000002 DOXORUBICIN PER 10 MG: Performed by: INTERNAL MEDICINE

## 2018-09-04 PROCEDURE — 25010000002 HEPARIN FLUSH (PORCINE) 100 UNIT/ML SOLUTION: Performed by: INTERNAL MEDICINE

## 2018-09-04 PROCEDURE — 96367 TX/PROPH/DG ADDL SEQ IV INF: CPT | Performed by: INTERNAL MEDICINE

## 2018-09-04 PROCEDURE — 96375 TX/PRO/DX INJ NEW DRUG ADDON: CPT | Performed by: INTERNAL MEDICINE

## 2018-09-04 PROCEDURE — 25010000002 DIPHENHYDRAMINE PER 50 MG: Performed by: INTERNAL MEDICINE

## 2018-09-04 PROCEDURE — 25010000002 PEGFILGRASTIM 6 MG/0.6ML PREFILLED SYRINGE KIT: Performed by: INTERNAL MEDICINE

## 2018-09-04 PROCEDURE — 25010000002 VINCRISTINE PER 1 MG: Performed by: INTERNAL MEDICINE

## 2018-09-04 PROCEDURE — 96377 APPLICATON ON-BODY INJECTOR: CPT | Performed by: INTERNAL MEDICINE

## 2018-09-04 PROCEDURE — 25010000003 DEXAMETHASONE SODIUM PHOSPHATE 100 MG/10ML SOLUTION: Performed by: INTERNAL MEDICINE

## 2018-09-04 PROCEDURE — 25010000002 RITUXIMAB 10 MG/ML SOLUTION 50 ML VIAL: Performed by: INTERNAL MEDICINE

## 2018-09-04 PROCEDURE — 96417 CHEMO IV INFUS EACH ADDL SEQ: CPT | Performed by: INTERNAL MEDICINE

## 2018-09-04 PROCEDURE — 25010000002 RITUXIMAB 10 MG/ML SOLUTION 10 ML VIAL: Performed by: INTERNAL MEDICINE

## 2018-09-04 PROCEDURE — 96415 CHEMO IV INFUSION ADDL HR: CPT | Performed by: INTERNAL MEDICINE

## 2018-09-04 PROCEDURE — 25010000002 CYCLOPHOSPHAMIDE PER 100 MG: Performed by: INTERNAL MEDICINE

## 2018-09-04 RX ORDER — FAMOTIDINE 20 MG/1
20 TABLET, FILM COATED ORAL ONCE
Status: COMPLETED | OUTPATIENT
Start: 2018-09-04 | End: 2018-09-04

## 2018-09-04 RX ORDER — SODIUM CHLORIDE 0.9 % (FLUSH) 0.9 %
10 SYRINGE (ML) INJECTION AS NEEDED
Status: CANCELLED | OUTPATIENT
Start: 2018-09-04

## 2018-09-04 RX ORDER — DOXORUBICIN HYDROCHLORIDE 2 MG/ML
90 INJECTION, SOLUTION INTRAVENOUS ONCE
Status: COMPLETED | OUTPATIENT
Start: 2018-09-04 | End: 2018-09-04

## 2018-09-04 RX ORDER — PALONOSETRON 0.05 MG/ML
0.25 INJECTION, SOLUTION INTRAVENOUS ONCE
Status: COMPLETED | OUTPATIENT
Start: 2018-09-04 | End: 2018-09-04

## 2018-09-04 RX ORDER — SODIUM CHLORIDE 0.9 % (FLUSH) 0.9 %
10 SYRINGE (ML) INJECTION AS NEEDED
Status: DISCONTINUED | OUTPATIENT
Start: 2018-09-04 | End: 2018-09-04 | Stop reason: HOSPADM

## 2018-09-04 RX ORDER — SODIUM CHLORIDE 9 MG/ML
250 INJECTION, SOLUTION INTRAVENOUS ONCE
Status: COMPLETED | OUTPATIENT
Start: 2018-09-04 | End: 2018-09-04

## 2018-09-04 RX ORDER — ACETAMINOPHEN 325 MG/1
650 TABLET ORAL ONCE
Status: COMPLETED | OUTPATIENT
Start: 2018-09-04 | End: 2018-09-04

## 2018-09-04 RX ADMIN — CYCLOPHOSPHAMIDE 1450 MG: 1 INJECTION, POWDER, FOR SOLUTION INTRAVENOUS; ORAL at 10:08

## 2018-09-04 RX ADMIN — PEGFILGRASTIM 6 MG: KIT SUBCUTANEOUS at 10:47

## 2018-09-04 RX ADMIN — SODIUM CHLORIDE, PRESERVATIVE FREE 500 UNITS: 5 INJECTION INTRAVENOUS at 13:42

## 2018-09-04 RX ADMIN — RITUXIMAB 700 MG: 10 INJECTION, SOLUTION INTRAVENOUS at 10:45

## 2018-09-04 RX ADMIN — ACETAMINOPHEN 650 MG: 325 TABLET ORAL at 07:58

## 2018-09-04 RX ADMIN — SODIUM CHLORIDE 250 ML: 900 INJECTION, SOLUTION INTRAVENOUS at 07:53

## 2018-09-04 RX ADMIN — SODIUM CHLORIDE 150 MG: 9 INJECTION, SOLUTION INTRAVENOUS at 08:07

## 2018-09-04 RX ADMIN — Medication 10 ML: at 13:42

## 2018-09-04 RX ADMIN — VINCRISTINE SULFATE 2 MG: 1 INJECTION, SOLUTION INTRAVENOUS at 09:55

## 2018-09-04 RX ADMIN — DEXAMETHASONE SODIUM PHOSPHATE 12 MG: 10 INJECTION, SOLUTION INTRAMUSCULAR; INTRAVENOUS at 08:58

## 2018-09-04 RX ADMIN — FAMOTIDINE 20 MG: 20 TABLET ORAL at 07:58

## 2018-09-04 RX ADMIN — PALONOSETRON 0.25 MG: 0.05 INJECTION, SOLUTION INTRAVENOUS at 08:02

## 2018-09-04 RX ADMIN — DOXORUBICIN HYDROCHLORIDE 90 MG: 2 INJECTION, SOLUTION INTRAVENOUS at 09:31

## 2018-09-04 RX ADMIN — DIPHENHYDRAMINE HYDROCHLORIDE 50 MG: 50 INJECTION, SOLUTION INTRAMUSCULAR; INTRAVENOUS at 08:41

## 2018-09-11 ENCOUNTER — INFUSION (OUTPATIENT)
Dept: ONCOLOGY | Facility: HOSPITAL | Age: 78
End: 2018-09-11

## 2018-09-11 ENCOUNTER — APPOINTMENT (OUTPATIENT)
Dept: OTHER | Facility: HOSPITAL | Age: 78
End: 2018-09-11

## 2018-09-11 ENCOUNTER — OFFICE VISIT (OUTPATIENT)
Dept: ONCOLOGY | Facility: CLINIC | Age: 78
End: 2018-09-11

## 2018-09-11 VITALS — BODY MASS INDEX: 24.23 KG/M2 | WEIGHT: 158 LBS

## 2018-09-11 DIAGNOSIS — IMO0001 METASTATIC TUMOR OF BONE: ICD-10-CM

## 2018-09-11 DIAGNOSIS — D70.1 CHEMOTHERAPY INDUCED NEUTROPENIA (HCC): ICD-10-CM

## 2018-09-11 DIAGNOSIS — C83.39 DIFFUSE LARGE B-CELL LYMPHOMA OF EXTRANODAL SITE EXCLUDING SPLEEN AND OTHER SOLID ORGANS (HCC): Primary | ICD-10-CM

## 2018-09-11 DIAGNOSIS — T45.1X5A ANEMIA ASSOCIATED WITH CHEMOTHERAPY: ICD-10-CM

## 2018-09-11 DIAGNOSIS — S32.030G CLOSED COMPRESSION FRACTURE OF L3 LUMBAR VERTEBRA WITH DELAYED HEALING, SUBSEQUENT ENCOUNTER: ICD-10-CM

## 2018-09-11 DIAGNOSIS — Z45.2 FITTING AND ADJUSTMENT OF VASCULAR CATHETER: ICD-10-CM

## 2018-09-11 DIAGNOSIS — D64.81 ANEMIA ASSOCIATED WITH CHEMOTHERAPY: ICD-10-CM

## 2018-09-11 DIAGNOSIS — C83.39 DIFFUSE LARGE B-CELL LYMPHOMA OF EXTRANODAL SITE EXCLUDING SPLEEN AND OTHER SOLID ORGANS (HCC): ICD-10-CM

## 2018-09-11 DIAGNOSIS — T45.1X5A CHEMOTHERAPY INDUCED NEUTROPENIA (HCC): ICD-10-CM

## 2018-09-11 LAB
BASOPHILS # BLD MANUAL: 0.04 10*3/MM3 (ref 0–0.2)
BASOPHILS NFR BLD AUTO: 4 % (ref 0–2)
DACRYOCYTES BLD QL SMEAR: ABNORMAL
DEPRECATED RDW RBC AUTO: 57.9 FL (ref 37–54)
ERYTHROCYTE [DISTWIDTH] IN BLOOD BY AUTOMATED COUNT: 17.3 % (ref 11.5–14.5)
HCT VFR BLD AUTO: 25.9 % (ref 40.4–52.2)
HGB BLD-MCNC: 8.9 G/DL (ref 13.7–17.6)
LYMPHOCYTES # BLD MANUAL: 0.34 10*3/MM3 (ref 0.8–7)
LYMPHOCYTES NFR BLD MANUAL: 11 % (ref 0–12)
LYMPHOCYTES NFR BLD MANUAL: 32 % (ref 24–44)
MCH RBC QN AUTO: 31.4 PG (ref 27–32.7)
MCHC RBC AUTO-ENTMCNC: 34.4 G/DL (ref 32.6–36.4)
MCV RBC AUTO: 91.5 FL (ref 79.8–96.2)
METAMYELOCYTES NFR BLD MANUAL: 1 % (ref 0–0)
MONOCYTES # BLD AUTO: 0.12 10*3/MM3 (ref 0–1)
NEUTROPHILS # BLD AUTO: 0.56 10*3/MM3 (ref 1.9–8.1)
NEUTROPHILS NFR BLD MANUAL: 52 % (ref 42.7–76)
OVALOCYTES BLD QL SMEAR: ABNORMAL
PLAT MORPH BLD: NORMAL
PLATELET # BLD AUTO: 78 10*3/MM3 (ref 140–500)
PMV BLD AUTO: 12.1 FL (ref 6–12)
RBC # BLD AUTO: 2.83 10*6/MM3 (ref 4.6–6)
SCAN SLIDE: NORMAL
WBC MORPH BLD: NORMAL
WBC NRBC COR # BLD: 1.07 10*3/MM3 (ref 4.5–10.7)

## 2018-09-11 PROCEDURE — 36415 COLL VENOUS BLD VENIPUNCTURE: CPT | Performed by: INTERNAL MEDICINE

## 2018-09-11 PROCEDURE — 85007 BL SMEAR W/DIFF WBC COUNT: CPT | Performed by: INTERNAL MEDICINE

## 2018-09-11 PROCEDURE — 85025 COMPLETE CBC W/AUTO DIFF WBC: CPT | Performed by: INTERNAL MEDICINE

## 2018-09-11 PROCEDURE — 99212-NC PR NO CHARGE CBC OFFICE OUTPATIENT VISIT 10 MINUTES: Performed by: NURSE PRACTITIONER

## 2018-09-11 RX ORDER — FELODIPINE 10 MG/1
TABLET, EXTENDED RELEASE ORAL
Qty: 90 TABLET | Refills: 1 | Status: SHIPPED | OUTPATIENT
Start: 2018-09-11 | End: 2019-01-28 | Stop reason: SDUPTHER

## 2018-09-11 RX ORDER — SIMVASTATIN 40 MG
TABLET ORAL
Qty: 90 TABLET | Refills: 1 | Status: SHIPPED | OUTPATIENT
Start: 2018-09-11 | End: 2019-01-28 | Stop reason: SDUPTHER

## 2018-09-11 NOTE — PROGRESS NOTES
Patient left today without his Procrit shot today . Patient called and rescheduled at [facility] tomorrow 09/12/2018.

## 2018-09-11 NOTE — PROGRESS NOTES
Patient is here for lab review today.  He reports that he is feeling fairly well.  He does have some mild fatigue.  He denies fevers or chills.  He denies any increase in shortness of breath and he does have an occasional cough that is productive, but he has never looked at the sputum.  He does report difficulty sleeping, and tried Tylenol PM, which only made him restless.    Lab Results   Component Value Date    WBC 1.07 (C) 09/11/2018    HGB 8.9 (L) 09/11/2018    HCT 25.9 (L) 09/11/2018    MCV 91.5 09/11/2018    PLT 78 (L) 09/11/2018     Lab Results   Component Value Date    NEUTROABS 0.56 (C) 09/11/2018     I reviewed neutropenic precautions with the patient.  He will return in one week for repeat CBC with RN review, sooner should he develop any new concerns or problems.    NOLAN Fernandes

## 2018-09-12 ENCOUNTER — INFUSION (OUTPATIENT)
Dept: ONCOLOGY | Facility: HOSPITAL | Age: 78
End: 2018-09-12

## 2018-09-12 VITALS
SYSTOLIC BLOOD PRESSURE: 104 MMHG | TEMPERATURE: 97.9 F | WEIGHT: 158 LBS | DIASTOLIC BLOOD PRESSURE: 72 MMHG | OXYGEN SATURATION: 97 % | HEART RATE: 97 BPM | BODY MASS INDEX: 24.23 KG/M2

## 2018-09-12 DIAGNOSIS — T45.1X5A ANEMIA ASSOCIATED WITH CHEMOTHERAPY: Primary | ICD-10-CM

## 2018-09-12 DIAGNOSIS — D64.81 ANEMIA ASSOCIATED WITH CHEMOTHERAPY: Primary | ICD-10-CM

## 2018-09-12 PROCEDURE — 96372 THER/PROPH/DIAG INJ SC/IM: CPT | Performed by: NURSE PRACTITIONER

## 2018-09-12 PROCEDURE — 63510000001 EPOETIN ALFA PER 1000 UNITS: Performed by: NURSE PRACTITIONER

## 2018-09-12 RX ADMIN — ERYTHROPOIETIN 20000 UNITS: 20000 INJECTION, SOLUTION INTRAVENOUS; SUBCUTANEOUS at 10:15

## 2018-09-18 ENCOUNTER — LAB (OUTPATIENT)
Dept: OTHER | Facility: HOSPITAL | Age: 78
End: 2018-09-18

## 2018-09-18 ENCOUNTER — INFUSION (OUTPATIENT)
Dept: ONCOLOGY | Facility: HOSPITAL | Age: 78
End: 2018-09-18

## 2018-09-18 ENCOUNTER — OFFICE VISIT (OUTPATIENT)
Dept: ONCOLOGY | Facility: CLINIC | Age: 78
End: 2018-09-18

## 2018-09-18 VITALS — BODY MASS INDEX: 23.61 KG/M2 | WEIGHT: 154 LBS

## 2018-09-18 DIAGNOSIS — Z45.2 FITTING AND ADJUSTMENT OF VASCULAR CATHETER: ICD-10-CM

## 2018-09-18 DIAGNOSIS — C83.39 DIFFUSE LARGE B-CELL LYMPHOMA OF EXTRANODAL SITE EXCLUDING SPLEEN AND OTHER SOLID ORGANS (HCC): ICD-10-CM

## 2018-09-18 DIAGNOSIS — IMO0001 METASTATIC TUMOR OF BONE: ICD-10-CM

## 2018-09-18 DIAGNOSIS — C83.39 DIFFUSE LARGE B-CELL LYMPHOMA OF EXTRANODAL SITE EXCLUDING SPLEEN AND OTHER SOLID ORGANS (HCC): Primary | ICD-10-CM

## 2018-09-18 DIAGNOSIS — S32.030G CLOSED COMPRESSION FRACTURE OF L3 LUMBAR VERTEBRA WITH DELAYED HEALING, SUBSEQUENT ENCOUNTER: ICD-10-CM

## 2018-09-18 LAB
ANISOCYTOSIS BLD QL: NORMAL
BASOPHILS # BLD AUTO: 0.09 10*3/MM3 (ref 0–0.2)
BASOPHILS NFR BLD AUTO: 1.1 % (ref 0–1.5)
DACRYOCYTES BLD QL SMEAR: NORMAL
DEPRECATED RDW RBC AUTO: 67 FL (ref 37–54)
EOSINOPHIL # BLD AUTO: 0.01 10*3/MM3 (ref 0–0.7)
EOSINOPHIL NFR BLD AUTO: 0.1 % (ref 0.3–6.2)
ERYTHROCYTE [DISTWIDTH] IN BLOOD BY AUTOMATED COUNT: 19.6 % (ref 11.5–14.5)
HCT VFR BLD AUTO: 32.3 % (ref 40.4–52.2)
HGB BLD-MCNC: 10.6 G/DL (ref 13.7–17.6)
IMM GRANULOCYTES # BLD: 0.09 10*3/MM3 (ref 0–0.03)
IMM GRANULOCYTES NFR BLD: 1.1 % (ref 0–0.5)
LYMPHOCYTES # BLD AUTO: 0.5 10*3/MM3 (ref 0.9–4.8)
LYMPHOCYTES NFR BLD AUTO: 6.1 % (ref 19.6–45.3)
MCH RBC QN AUTO: 31.4 PG (ref 27–32.7)
MCHC RBC AUTO-ENTMCNC: 32.8 G/DL (ref 32.6–36.4)
MCV RBC AUTO: 95.6 FL (ref 79.8–96.2)
MICROCYTES BLD QL: NORMAL
MONOCYTES # BLD AUTO: 0.84 10*3/MM3 (ref 0.2–1.2)
MONOCYTES NFR BLD AUTO: 10.2 % (ref 5–12)
NEUTROPHILS # BLD AUTO: 6.72 10*3/MM3 (ref 1.9–8.1)
NEUTROPHILS NFR BLD AUTO: 81.4 % (ref 42.7–76)
NRBC BLD MANUAL-RTO: 0 /100 WBC (ref 0–0)
OVALOCYTES BLD QL SMEAR: NORMAL
PLAT MORPH BLD: NORMAL
PLATELET # BLD AUTO: 212 10*3/MM3 (ref 140–500)
PMV BLD AUTO: 11.2 FL (ref 6–12)
RBC # BLD AUTO: 3.38 10*6/MM3 (ref 4.6–6)
WBC MORPH BLD: NORMAL
WBC NRBC COR # BLD: 8.25 10*3/MM3 (ref 4.5–10.7)

## 2018-09-18 PROCEDURE — 36415 COLL VENOUS BLD VENIPUNCTURE: CPT

## 2018-09-18 PROCEDURE — 85025 COMPLETE CBC W/AUTO DIFF WBC: CPT | Performed by: INTERNAL MEDICINE

## 2018-09-18 PROCEDURE — 99212-NC PR NO CHARGE CBC OFFICE OUTPATIENT VISIT 10 MINUTES: Performed by: NURSE PRACTITIONER

## 2018-09-18 PROCEDURE — 85007 BL SMEAR W/DIFF WBC COUNT: CPT | Performed by: INTERNAL MEDICINE

## 2018-09-18 NOTE — PROGRESS NOTES
Patient here for lab review. He is feeling better this week. He has been eating well, and actually up walking some.  He denies fevers or chills.  Denies bleeding issues.  He still complains of issues sleeping.    Lab Results   Component Value Date    WBC 8.25 09/18/2018    HGB 10.6 (L) 09/18/2018    HCT 32.3 (L) 09/18/2018    MCV 95.6 09/18/2018     09/18/2018     Lab Results   Component Value Date    NEUTROABS 6.72 09/18/2018     Patient will return in one week for follow-up visit with Dr. Roberto for reevaluation anticipation of his sixth cycle of CHOP-R chemotherapy.    NOLAN Fernandes

## 2018-09-24 DIAGNOSIS — C83.39 DIFFUSE LARGE B-CELL LYMPHOMA OF EXTRANODAL SITE EXCLUDING SPLEEN AND OTHER SOLID ORGANS (HCC): ICD-10-CM

## 2018-09-25 ENCOUNTER — OFFICE VISIT (OUTPATIENT)
Dept: ONCOLOGY | Facility: CLINIC | Age: 78
End: 2018-09-25

## 2018-09-25 ENCOUNTER — INFUSION (OUTPATIENT)
Dept: ONCOLOGY | Facility: HOSPITAL | Age: 78
End: 2018-09-25

## 2018-09-25 VITALS — HEART RATE: 83 BPM | DIASTOLIC BLOOD PRESSURE: 42 MMHG | SYSTOLIC BLOOD PRESSURE: 106 MMHG

## 2018-09-25 VITALS
WEIGHT: 156 LBS | SYSTOLIC BLOOD PRESSURE: 142 MMHG | HEIGHT: 68 IN | HEART RATE: 80 BPM | DIASTOLIC BLOOD PRESSURE: 77 MMHG | TEMPERATURE: 97.9 F | OXYGEN SATURATION: 99 % | RESPIRATION RATE: 16 BRPM | BODY MASS INDEX: 23.64 KG/M2

## 2018-09-25 DIAGNOSIS — IMO0001 METASTATIC TUMOR OF BONE: ICD-10-CM

## 2018-09-25 DIAGNOSIS — Z45.2 FITTING AND ADJUSTMENT OF VASCULAR CATHETER: ICD-10-CM

## 2018-09-25 DIAGNOSIS — C83.39 DIFFUSE LARGE B-CELL LYMPHOMA OF EXTRANODAL SITE EXCLUDING SPLEEN AND OTHER SOLID ORGANS (HCC): Primary | ICD-10-CM

## 2018-09-25 DIAGNOSIS — N18.2 CHRONIC RENAL IMPAIRMENT, STAGE 2 (MILD): ICD-10-CM

## 2018-09-25 LAB
ALBUMIN SERPL-MCNC: 3.9 G/DL (ref 3.5–5.2)
ALBUMIN/GLOB SERPL: 1.5 G/DL
ALP SERPL-CCNC: 78 U/L (ref 39–117)
ALT SERPL W P-5'-P-CCNC: 9 U/L (ref 1–41)
ANION GAP SERPL CALCULATED.3IONS-SCNC: 11.2 MMOL/L
AST SERPL-CCNC: 17 U/L (ref 1–40)
BASOPHILS # BLD AUTO: 0.05 10*3/MM3 (ref 0–0.2)
BASOPHILS NFR BLD AUTO: 0.8 % (ref 0–1.5)
BILIRUB SERPL-MCNC: 0.2 MG/DL (ref 0.1–1.2)
BUN BLD-MCNC: 18 MG/DL (ref 8–23)
BUN/CREAT SERPL: 15.5 (ref 7–25)
CALCIUM SPEC-SCNC: 9.6 MG/DL (ref 8.6–10.5)
CHLORIDE SERPL-SCNC: 107 MMOL/L (ref 98–107)
CO2 SERPL-SCNC: 24.8 MMOL/L (ref 22–29)
CREAT BLD-MCNC: 1.16 MG/DL (ref 0.76–1.27)
DEPRECATED RDW RBC AUTO: 66.2 FL (ref 37–54)
EOSINOPHIL # BLD AUTO: 0.01 10*3/MM3 (ref 0–0.7)
EOSINOPHIL NFR BLD AUTO: 0.2 % (ref 0.3–6.2)
ERYTHROCYTE [DISTWIDTH] IN BLOOD BY AUTOMATED COUNT: 18.6 % (ref 11.5–14.5)
GFR SERPL CREATININE-BSD FRML MDRD: 61 ML/MIN/1.73
GLOBULIN UR ELPH-MCNC: 2.6 GM/DL
GLUCOSE BLD-MCNC: 73 MG/DL (ref 65–99)
HCT VFR BLD AUTO: 32.4 % (ref 40.4–52.2)
HGB BLD-MCNC: 10.5 G/DL (ref 13.7–17.6)
IMM GRANULOCYTES # BLD: 0.03 10*3/MM3 (ref 0–0.03)
IMM GRANULOCYTES NFR BLD: 0.5 % (ref 0–0.5)
LYMPHOCYTES # BLD AUTO: 0.43 10*3/MM3 (ref 0.9–4.8)
LYMPHOCYTES NFR BLD AUTO: 6.8 % (ref 19.6–45.3)
MCH RBC QN AUTO: 31.4 PG (ref 27–32.7)
MCHC RBC AUTO-ENTMCNC: 32.4 G/DL (ref 32.6–36.4)
MCV RBC AUTO: 97 FL (ref 79.8–96.2)
MONOCYTES # BLD AUTO: 1.02 10*3/MM3 (ref 0.2–1.2)
MONOCYTES NFR BLD AUTO: 16.2 % (ref 5–12)
NEUTROPHILS # BLD AUTO: 4.74 10*3/MM3 (ref 1.9–8.1)
NEUTROPHILS NFR BLD AUTO: 75.5 % (ref 42.7–76)
NRBC BLD MANUAL-RTO: 0 /100 WBC (ref 0–0)
PLATELET # BLD AUTO: 218 10*3/MM3 (ref 140–500)
PMV BLD AUTO: 10.4 FL (ref 6–12)
POTASSIUM BLD-SCNC: 4.1 MMOL/L (ref 3.5–5.2)
PROT SERPL-MCNC: 6.5 G/DL (ref 6–8.5)
RBC # BLD AUTO: 3.34 10*6/MM3 (ref 4.6–6)
SODIUM BLD-SCNC: 143 MMOL/L (ref 136–145)
WBC NRBC COR # BLD: 6.28 10*3/MM3 (ref 4.5–10.7)

## 2018-09-25 PROCEDURE — 96411 CHEMO IV PUSH ADDL DRUG: CPT | Performed by: INTERNAL MEDICINE

## 2018-09-25 PROCEDURE — 96367 TX/PROPH/DG ADDL SEQ IV INF: CPT | Performed by: INTERNAL MEDICINE

## 2018-09-25 PROCEDURE — 25010000002 HEPARIN FLUSH (PORCINE) 100 UNIT/ML SOLUTION: Performed by: INTERNAL MEDICINE

## 2018-09-25 PROCEDURE — 25010000002 PEGFILGRASTIM 6 MG/0.6ML PREFILLED SYRINGE KIT: Performed by: INTERNAL MEDICINE

## 2018-09-25 PROCEDURE — 96377 APPLICATON ON-BODY INJECTOR: CPT | Performed by: INTERNAL MEDICINE

## 2018-09-25 PROCEDURE — 25010000002 DOXORUBICIN PER 10 MG: Performed by: INTERNAL MEDICINE

## 2018-09-25 PROCEDURE — 96415 CHEMO IV INFUSION ADDL HR: CPT | Performed by: INTERNAL MEDICINE

## 2018-09-25 PROCEDURE — 25010000002 DIPHENHYDRAMINE PER 50 MG: Performed by: INTERNAL MEDICINE

## 2018-09-25 PROCEDURE — 25010000002 FOSAPREPITANT PER 1 MG: Performed by: INTERNAL MEDICINE

## 2018-09-25 PROCEDURE — 25010000002 CYCLOPHOSPHAMIDE PER 100 MG: Performed by: INTERNAL MEDICINE

## 2018-09-25 PROCEDURE — 25010000002 PALONOSETRON PER 25 MCG: Performed by: INTERNAL MEDICINE

## 2018-09-25 PROCEDURE — 25010000002 RITUXIMAB 10 MG/ML SOLUTION 50 ML VIAL: Performed by: INTERNAL MEDICINE

## 2018-09-25 PROCEDURE — 25010000002 RITUXIMAB 10 MG/ML SOLUTION 10 ML VIAL: Performed by: INTERNAL MEDICINE

## 2018-09-25 PROCEDURE — 96417 CHEMO IV INFUS EACH ADDL SEQ: CPT | Performed by: INTERNAL MEDICINE

## 2018-09-25 PROCEDURE — 96375 TX/PRO/DX INJ NEW DRUG ADDON: CPT | Performed by: INTERNAL MEDICINE

## 2018-09-25 PROCEDURE — 25010000002 VINCRISTINE PER 1 MG: Performed by: INTERNAL MEDICINE

## 2018-09-25 PROCEDURE — 80053 COMPREHEN METABOLIC PANEL: CPT | Performed by: INTERNAL MEDICINE

## 2018-09-25 PROCEDURE — 96413 CHEMO IV INFUSION 1 HR: CPT | Performed by: INTERNAL MEDICINE

## 2018-09-25 PROCEDURE — 85025 COMPLETE CBC W/AUTO DIFF WBC: CPT | Performed by: INTERNAL MEDICINE

## 2018-09-25 PROCEDURE — 25010000003 DEXAMETHASONE SODIUM PHOSPHATE 100 MG/10ML SOLUTION: Performed by: INTERNAL MEDICINE

## 2018-09-25 PROCEDURE — 99214 OFFICE O/P EST MOD 30 MIN: CPT | Performed by: INTERNAL MEDICINE

## 2018-09-25 RX ORDER — MEPERIDINE HYDROCHLORIDE 50 MG/ML
25 INJECTION INTRAMUSCULAR; INTRAVENOUS; SUBCUTANEOUS
Status: CANCELLED | OUTPATIENT
Start: 2018-09-25

## 2018-09-25 RX ORDER — SODIUM CHLORIDE 0.9 % (FLUSH) 0.9 %
10 SYRINGE (ML) INJECTION AS NEEDED
Status: CANCELLED | OUTPATIENT
Start: 2018-09-25

## 2018-09-25 RX ORDER — FAMOTIDINE 20 MG/1
20 TABLET, FILM COATED ORAL ONCE
Status: CANCELLED
Start: 2018-09-25 | End: 2018-09-25

## 2018-09-25 RX ORDER — SODIUM CHLORIDE 9 MG/ML
250 INJECTION, SOLUTION INTRAVENOUS ONCE
Status: COMPLETED | OUTPATIENT
Start: 2018-09-25 | End: 2018-09-25

## 2018-09-25 RX ORDER — SODIUM CHLORIDE 9 MG/ML
250 INJECTION, SOLUTION INTRAVENOUS ONCE
Status: CANCELLED | OUTPATIENT
Start: 2018-09-25

## 2018-09-25 RX ORDER — SODIUM CHLORIDE 0.9 % (FLUSH) 0.9 %
10 SYRINGE (ML) INJECTION AS NEEDED
Status: DISCONTINUED | OUTPATIENT
Start: 2018-09-25 | End: 2018-09-25 | Stop reason: HOSPADM

## 2018-09-25 RX ORDER — FAMOTIDINE 10 MG/ML
20 INJECTION, SOLUTION INTRAVENOUS AS NEEDED
Status: CANCELLED | OUTPATIENT
Start: 2018-09-25

## 2018-09-25 RX ORDER — DIPHENHYDRAMINE HYDROCHLORIDE 50 MG/ML
50 INJECTION INTRAMUSCULAR; INTRAVENOUS AS NEEDED
Status: CANCELLED | OUTPATIENT
Start: 2018-09-25

## 2018-09-25 RX ORDER — PALONOSETRON 0.05 MG/ML
0.25 INJECTION, SOLUTION INTRAVENOUS ONCE
Status: CANCELLED | OUTPATIENT
Start: 2018-09-25

## 2018-09-25 RX ORDER — PALONOSETRON 0.05 MG/ML
0.25 INJECTION, SOLUTION INTRAVENOUS ONCE
Status: COMPLETED | OUTPATIENT
Start: 2018-09-25 | End: 2018-09-25

## 2018-09-25 RX ORDER — ACETAMINOPHEN 325 MG/1
650 TABLET ORAL ONCE
Status: CANCELLED | OUTPATIENT
Start: 2018-09-25

## 2018-09-25 RX ORDER — DOXORUBICIN HYDROCHLORIDE 2 MG/ML
90 INJECTION, SOLUTION INTRAVENOUS ONCE
Status: COMPLETED | OUTPATIENT
Start: 2018-09-25 | End: 2018-09-25

## 2018-09-25 RX ORDER — ACETAMINOPHEN 325 MG/1
650 TABLET ORAL ONCE
Status: COMPLETED | OUTPATIENT
Start: 2018-09-25 | End: 2018-09-25

## 2018-09-25 RX ORDER — DOXORUBICIN HYDROCHLORIDE 2 MG/ML
90 INJECTION, SOLUTION INTRAVENOUS ONCE
Status: CANCELLED | OUTPATIENT
Start: 2018-09-25

## 2018-09-25 RX ORDER — FAMOTIDINE 20 MG/1
20 TABLET, FILM COATED ORAL ONCE
Status: COMPLETED | OUTPATIENT
Start: 2018-09-25 | End: 2018-09-25

## 2018-09-25 RX ADMIN — SODIUM CHLORIDE 150 MG: 9 INJECTION, SOLUTION INTRAVENOUS at 09:01

## 2018-09-25 RX ADMIN — ACETAMINOPHEN 650 MG: 325 TABLET, FILM COATED ORAL at 08:22

## 2018-09-25 RX ADMIN — PALONOSETRON 0.25 MG: 0.05 INJECTION, SOLUTION INTRAVENOUS at 08:22

## 2018-09-25 RX ADMIN — SODIUM CHLORIDE 250 ML: 900 INJECTION, SOLUTION INTRAVENOUS at 08:15

## 2018-09-25 RX ADMIN — RITUXIMAB 700 MG: 10 INJECTION, SOLUTION INTRAVENOUS at 10:26

## 2018-09-25 RX ADMIN — DEXAMETHASONE SODIUM PHOSPHATE 12 MG: 10 INJECTION, SOLUTION INTRAMUSCULAR; INTRAVENOUS at 08:43

## 2018-09-25 RX ADMIN — DOXORUBICIN HYDROCHLORIDE 90 MG: 2 INJECTION, SOLUTION INTRAVENOUS at 09:31

## 2018-09-25 RX ADMIN — CYCLOPHOSPHAMIDE 1450 MG: 1 INJECTION, POWDER, FOR SOLUTION INTRAVENOUS; ORAL at 09:53

## 2018-09-25 RX ADMIN — Medication 10 ML: at 12:59

## 2018-09-25 RX ADMIN — VINCRISTINE SULFATE 2 MG: 1 INJECTION, SOLUTION INTRAVENOUS at 09:41

## 2018-09-25 RX ADMIN — SODIUM CHLORIDE, PRESERVATIVE FREE 500 UNITS: 5 INJECTION INTRAVENOUS at 12:59

## 2018-09-25 RX ADMIN — DIPHENHYDRAMINE HYDROCHLORIDE 50 MG: 50 INJECTION, SOLUTION INTRAMUSCULAR; INTRAVENOUS at 08:25

## 2018-09-25 RX ADMIN — FAMOTIDINE 20 MG: 20 TABLET ORAL at 08:22

## 2018-09-25 RX ADMIN — PEGFILGRASTIM 6 MG: KIT SUBCUTANEOUS at 12:54

## 2018-09-25 NOTE — PROGRESS NOTES
Subjective     REASON FOR FOLLOW UP:   1.  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  2.  Maintenance Rituxan every 3 months for 2 years initiated 3/30/2018  3.  Painful pathologic compression fracture at L3 May 2018 due to involvement with diffuse large B-cell non-Hodgkin's lymphoma.   4.  CHOP and Rituxan x 6 cycles completed 9/25/2018.    HISTORY OF PRESENT ILLNESS:  The patient is a 78 y.o. year old male who had  undergone CT scan of the abdomen on 10/11/2017 to evaluate some left lower quadrant abdominal pain.  CT scan findings were worrisome for metastatic malignancy with multiple tumor implants noted on the scan.  The largest mass appeared to be possibly arising from the duodenum or jejunum in the upper abdomen.  There was no evidence of metastases to liver.    CT-guided biopsy was performed on 10/18/2017 but unfortunately pathology was nondiagnostic showing only benign skeletal muscle and fibroadipose tissue.    The patient was seen in consultation on 10/20/2017 and at that time we recommended laparoscopic lymph node sampling and upper and lower GI endoscopy.  He underwent these procedures with Dr. Castañeda on 10/26/2017.  There was no evidence of malignancy from his endoscopic procedures at the laparoscopic specimen returned as follicular center cell B-cell non-Hodgkin's lymphoma.    He received Rituxan treatment initially with 4 weekly doses of Rituxan with good response on follow-up scans.  Unfortunately, a few months after completion of his treatment he developed worsening back pain and has lymphoma involving the L3 vertebral body consistent with diffuse large B-cell non-Hodgkin's lymphoma.    He is here today to be evaluated prior to cycle # 6 with same doses.  He will again have a Neulasta on body injector.  David underwent a PET scan on 8/28/2018 after his fourth chemo cycle with report as noted below.  " It shows an excellent response with no significant residual hypermetabolic activity.     He also is having significant anemia from chemotherapy and his iron studies were within normal limits therefore we also added Procrit to his treatment plan.    He feels well today and is anxious to get this last treatment behind him.  He is not wearing his back brace and reports his back pain has significant improved.    Pain   Pertinent negatives include no chest pain, coughing, fatigue, fever, headaches, nausea, neck pain, rash or vomiting.        Past Medical History:   Diagnosis Date   • Arm fracture     right side humerus - sling and swathe   • Cancer (CMS/HCC) 2017    Basal Cell on Nose and Under Right Eye   • Coronary artery disease    • DDD (degenerative disc disease), cervical    • Gastric mass    • GERD (gastroesophageal reflux disease)    • Hip pain started 5/29/18    onset of acute pain with weight bearing   • Hx of cardiac arrest     Happened after Induction of anesthesia prior  to SURG 10 YRS AGO  \" I FLATLINE BUT W/IN SECONDS HEART WAS OK AFTER TURNED ON BACK...I THINK THEY GAVE ME TO MUCH ANESTHESIA\"   • Hyperlipidemia    • Hypertension    • Kidney stones    • Non Hodgkin's lymphoma (CMS/HCC)     SPINE   • Osteoarthritis    • Pleural effusion     DRAINAGE RT PLEURAL FLUID FOR TESTING WITH EGD ON 1-30-17   NO MALIGNANCY WITH CLEARANCE FROM DR SIMMONS FOR SURGERY   • Rheumatoid arthritis (CMS/HCC)         Past Surgical History:   Procedure Laterality Date   • COLONOSCOPY N/A 10/26/2017    Procedure: COLONOSCOPY;  Surgeon: Louie Castañeda MD;  Location: [facility];  Service:    • CORONARY ANGIOPLASTY WITH STENT PLACEMENT  1999    17 years ago   • CYSTOSCOPY W/ LITHOLAPAXY / EHL      6-7 years ago   • DIAGNOSTIC LAPAROSCOPY N/A 10/26/2017    Procedure: DIAGNOSTIC LAPAROSCOPY WITH RETROPERITONEAL BIOPSIES;  Surgeon: Louie Castañeda MD;  Location[facility];  Service:    • ENDOSCOPY      ON 1/30/17 " WITH DRAINAGE OF RT PLEURAL FLUID FOR BIOPSY    NO MALIGNANCY  CLEARANCE FOR SURG PER DR SIMMONS   • ENDOSCOPY N/A 10/26/2017    Procedure: ESOPHAGOGASTRODUODENOSCOPY;  Surgeon: Louie Castañeda MD;  Location: [facility] OR;  Service:    • EYE SURGERY Right 2017    Moh's; reconstruction right lower lid   • KIDNEY STONE SURGERY     • PLEURAL BIOPSY     • POSTERIOR CERVICAL FUSION  2001   • SKIN CANCER EXCISION N/A 2017    Basal Cell Nose & under right eye   • TOTAL HIP ARTHROPLASTY Left 04/2010    9-10 years ago    • TOTAL HIP ARTHROPLASTY Right 2/14/2017    Procedure: TOTAL HIP ARTHROPLASTY;  Surgeon: Gerson Cardoza MD;  Location: [facility] OR;  Service:    • VENOUS ACCESS DEVICE (PORT) INSERTION N/A 6/5/2018    Procedure: INSERTION VENOUS ACCESS DEVICE- RIGHT;  Surgeon: Louie Castañeda MD;  Location: [facility] OR;  Service: General         ALLERGIES:  No Known Allergies     Review of Systems   Constitutional: Positive for appetite change and unexpected weight change. Negative for activity change, fatigue and fever.   HENT: Negative for hearing loss, nosebleeds, trouble swallowing and voice change.    Eyes: Negative for visual disturbance.   Respiratory: Negative for cough, shortness of breath and wheezing.    Cardiovascular: Negative for chest pain and palpitations.   Gastrointestinal: Positive for constipation. Negative for diarrhea, nausea and vomiting.   Genitourinary: Negative for difficulty urinating, frequency, hematuria and urgency.   Musculoskeletal: Positive for back pain. Negative for neck pain.   Skin: Negative for rash.   Neurological: Negative for dizziness, seizures, syncope and headaches.   Hematological: Negative for adenopathy. Does not bruise/bleed easily.   Psychiatric/Behavioral: Negative for behavioral problems. The patient is not nervous/anxious.         Objective     Vitals:    09/25/18 0751   BP: 142/77   Pulse: 80   Resp: 16   Temp: 97.9 °F (36.6 °C)   TempSrc: Oral   SpO2:  "99%   Weight: 70.8 kg (156 lb)   Height: 172 cm (67.72\")   PainSc: 0-No pain     Current Status 9/25/2018   ECOG score 0       Physical Exam   Constitutional: He is oriented to person, place, and time. He appears well-developed and well-nourished. No distress.   HENT:   Head: Normocephalic.   Eyes: Pupils are equal, round, and reactive to light. Conjunctivae and EOM are normal. No scleral icterus.   Neck: Normal range of motion. Neck supple. No JVD present. No thyromegaly present.   Cardiovascular: Normal rate and regular rhythm.  Exam reveals no gallop and no friction rub.    No murmur heard.  Pulmonary/Chest: Effort normal and breath sounds normal. He has no wheezes. He has no rales.   Right IJ Mediport.   Abdominal: Soft. He exhibits no distension and no mass. There is no tenderness.   Musculoskeletal: Normal range of motion. He exhibits no edema or deformity.   Wearing a back brace.   Lymphadenopathy:     He has no cervical adenopathy.   Neurological: He is alert and oriented to person, place, and time. He has normal reflexes. No cranial nerve deficit.   Skin: Skin is warm and dry. No rash noted. No erythema.   Psychiatric: He has a normal mood and affect. His behavior is normal. Judgment normal.         RECENT LABS:  Hematology WBC   Date Value Ref Range Status   09/25/2018 6.28 4.50 - 10.70 10*3/mm3 Final     RBC   Date Value Ref Range Status   09/25/2018 3.34 (L) 4.60 - 6.00 10*6/mm3 Final     Hemoglobin   Date Value Ref Range Status   09/25/2018 10.5 (L) 13.7 - 17.6 g/dL Final     Hematocrit   Date Value Ref Range Status   09/25/2018 32.4 (L) 40.4 - 52.2 % Final     Platelets   Date Value Ref Range Status   09/25/2018 218 140 - 500 10*3/mm3 Final          Lab Results   Component Value Date    GLUCOSE 124 (H) 08/14/2018    BUN 16 08/14/2018    CREATININE 1.20 08/14/2018    EGFRIFNONA 58 (L) 08/14/2018    EGFRIFAFRI 61 03/16/2018    BCR 13.3 08/14/2018    K 4.0 08/14/2018    CO2 25.0 08/14/2018    CALCIUM 9.5 " 08/14/2018    PROTENTOTREF 7.2 03/16/2018    ALBUMIN 3.70 08/14/2018    LABIL2 1.3 03/16/2018    AST 17 08/14/2018    ALT 9 08/14/2018     F-18 FDG PET FROM SKULL BASE TO MID THIGH WITH PET/CT FUSION 8/28/2018     HISTORY: 78-year-old male with non-Hodgkin's lymphoma. Restaging.     TECHNIQUE: Radiation dose reduction techniques were utilized, including  automated exposure control and exposure modulation based on body size.   Blood glucose level at time of injection was 102 mg/dL.  5.7 mCi of F-18  FDG were injected and PET was performed from skull base to mid thigh. CT  was obtained for localization and attenuation correction. Time at  injection 8:51 AM. PET start time 10:29 AM. Compared with previous CTs  from 07/13/2018 and PET/CT from 05/21/2018.     FINDINGS: The residual retroperitoneal lymphadenopathy has low level  activity with a maximal SUV of 2.4 which is similar to that of blood  pool. There is no hypermetabolic mesenteric lymphadenopathy and no  definite residual hypermetabolic bowel implants are seen. There is no  hypermetabolic lymphadenopathy within the chest or neck. There has been  interval development of hypermetabolic activity throughout the marrow  space, except for at L3-L4 where there is a compression fracture.     IMPRESSION:  1. Further interval improvement with no residual hypermetabolic  lymphadenopathy. The remaining ill-defined retroperitoneal  lymphadenopathy has blood pool level activity.  2. Diffuse increase in marrow activity (except for L3) is likely  chemotherapy related.    Images personally reviewed    Assessment/Plan     1.  Diffuse large B-cell non-Hodgkin's lymphoma involving the L3 vertebral body.  As noted above, his PET scan after 4 cycles of CHOP and Rituxan showed essentially a complete metabolic response.  He is ready to proceed with his sixth and final cycle of treatment today.  2.  Previous treatment with single agent Rituxan for follicular B-cell lymphoma.  3.  Back  pain related to lymphoma involving the spine.   4.  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  5.  Lumbar puncture showing no evidence of lymphoma in the spinal fluid.  Patient did receive 1 dose of intrathecal methotrexate with the procedure.  6.  Right IJ Mediport.    Plan  1.  David will proceed with his 6th and final cycle of CHOP Rituxan in the office today with Neulasta support via and on body injector  2.  He will be scheduled for CBC and RN review in 1 week and 2 weeks  3.  He will receive Procrit 20,000 units subcutaneously  if his hemoglobin is less than 10.  4.  We will schedule posttreatment baseline CT scans of the chest abdomen and pelvis 3 weeks from now.  5.  M.D. follow-up in 4 weeks to review the scans and from there we will schedule regular port flushes and surveillance follow-up visits.

## 2018-09-28 ENCOUNTER — RESULTS ENCOUNTER (OUTPATIENT)
Dept: ONCOLOGY | Facility: CLINIC | Age: 78
End: 2018-09-28

## 2018-09-28 DIAGNOSIS — N18.2 CHRONIC RENAL IMPAIRMENT, STAGE 2 (MILD): ICD-10-CM

## 2018-09-28 DIAGNOSIS — C83.39 DIFFUSE LARGE B-CELL LYMPHOMA OF EXTRANODAL SITE EXCLUDING SPLEEN AND OTHER SOLID ORGANS (HCC): ICD-10-CM

## 2018-09-28 DIAGNOSIS — IMO0001 METASTATIC TUMOR OF BONE: ICD-10-CM

## 2018-09-28 DIAGNOSIS — Z45.2 FITTING AND ADJUSTMENT OF VASCULAR CATHETER: ICD-10-CM

## 2018-10-03 ENCOUNTER — LAB (OUTPATIENT)
Dept: OTHER | Facility: HOSPITAL | Age: 78
End: 2018-10-03

## 2018-10-03 ENCOUNTER — INFUSION (OUTPATIENT)
Dept: ONCOLOGY | Facility: HOSPITAL | Age: 78
End: 2018-10-03

## 2018-10-03 ENCOUNTER — OFFICE VISIT (OUTPATIENT)
Dept: ONCOLOGY | Facility: CLINIC | Age: 78
End: 2018-10-03

## 2018-10-03 DIAGNOSIS — N18.2 CHRONIC RENAL IMPAIRMENT, STAGE 2 (MILD): ICD-10-CM

## 2018-10-03 DIAGNOSIS — D64.81 ANEMIA ASSOCIATED WITH CHEMOTHERAPY: ICD-10-CM

## 2018-10-03 DIAGNOSIS — D64.81 ANEMIA ASSOCIATED WITH CHEMOTHERAPY: Primary | ICD-10-CM

## 2018-10-03 DIAGNOSIS — T45.1X5A ANEMIA ASSOCIATED WITH CHEMOTHERAPY: ICD-10-CM

## 2018-10-03 DIAGNOSIS — C83.39 DIFFUSE LARGE B-CELL LYMPHOMA OF EXTRANODAL SITE EXCLUDING SPLEEN AND OTHER SOLID ORGANS (HCC): Primary | ICD-10-CM

## 2018-10-03 DIAGNOSIS — Z45.2 FITTING AND ADJUSTMENT OF VASCULAR CATHETER: ICD-10-CM

## 2018-10-03 DIAGNOSIS — T45.1X5A ANEMIA ASSOCIATED WITH CHEMOTHERAPY: Primary | ICD-10-CM

## 2018-10-03 DIAGNOSIS — C83.39 DIFFUSE LARGE B-CELL LYMPHOMA OF EXTRANODAL SITE EXCLUDING SPLEEN AND OTHER SOLID ORGANS (HCC): ICD-10-CM

## 2018-10-03 DIAGNOSIS — IMO0001 METASTATIC TUMOR OF BONE: ICD-10-CM

## 2018-10-03 LAB
BASOPHILS # BLD MANUAL: 0.01 10*3/MM3 (ref 0–0.2)
BASOPHILS NFR BLD AUTO: 1 % (ref 0–2)
DACRYOCYTES BLD QL SMEAR: ABNORMAL
DEPRECATED RDW RBC AUTO: 58.6 FL (ref 37–54)
EOSINOPHIL # BLD MANUAL: 0.01 10*3/MM3 (ref 0–0.7)
EOSINOPHIL NFR BLD MANUAL: 1 % (ref 0–7)
ERYTHROCYTE [DISTWIDTH] IN BLOOD BY AUTOMATED COUNT: 17.1 % (ref 11.5–14.5)
HCT VFR BLD AUTO: 28.5 % (ref 40.4–52.2)
HGB BLD-MCNC: 9.8 G/DL (ref 13.7–17.6)
LYMPHOCYTES # BLD MANUAL: 0.43 10*3/MM3 (ref 0.8–7)
LYMPHOCYTES NFR BLD MANUAL: 12 % (ref 0–12)
LYMPHOCYTES NFR BLD MANUAL: 39 % (ref 24–44)
MCH RBC QN AUTO: 32.1 PG (ref 27–32.7)
MCHC RBC AUTO-ENTMCNC: 34.4 G/DL (ref 32.6–36.4)
MCV RBC AUTO: 93.4 FL (ref 79.8–96.2)
METAMYELOCYTES NFR BLD MANUAL: 1 % (ref 0–0)
MONOCYTES # BLD AUTO: 0.13 10*3/MM3 (ref 0–1)
NEUTROPHILS # BLD AUTO: 0.5 10*3/MM3 (ref 1.9–8.1)
NEUTROPHILS NFR BLD MANUAL: 46 % (ref 42.7–76)
NRBC SPEC MANUAL: 1 /100 WBC (ref 0–0)
OVALOCYTES BLD QL SMEAR: ABNORMAL
PLAT MORPH BLD: NORMAL
PLATELET # BLD AUTO: 23 10*3/MM3 (ref 140–500)
RBC # BLD AUTO: 3.05 10*6/MM3 (ref 4.6–6)
SCAN SLIDE: NORMAL
WBC MORPH BLD: NORMAL
WBC NRBC COR # BLD: 1.09 10*3/MM3 (ref 4.5–10.7)

## 2018-10-03 PROCEDURE — 99212-NC PR NO CHARGE CBC OFFICE OUTPATIENT VISIT 10 MINUTES: Performed by: NURSE PRACTITIONER

## 2018-10-03 PROCEDURE — 85007 BL SMEAR W/DIFF WBC COUNT: CPT | Performed by: INTERNAL MEDICINE

## 2018-10-03 PROCEDURE — 96372 THER/PROPH/DIAG INJ SC/IM: CPT

## 2018-10-03 PROCEDURE — 85025 COMPLETE CBC W/AUTO DIFF WBC: CPT | Performed by: INTERNAL MEDICINE

## 2018-10-03 PROCEDURE — 63510000001 EPOETIN ALFA PER 1000 UNITS: Performed by: NURSE PRACTITIONER

## 2018-10-03 PROCEDURE — 36415 COLL VENOUS BLD VENIPUNCTURE: CPT

## 2018-10-03 RX ADMIN — ERYTHROPOIETIN 20000 UNITS: 20000 INJECTION, SOLUTION INTRAVENOUS; SUBCUTANEOUS at 11:28

## 2018-10-03 NOTE — PROGRESS NOTES
I have reviewed labs with the patient today. He reports feeling quite well. His platelets are very low at [address]. He denies bleeding or bruising. His ANC is 500 with a total WBC today of 1090 despite Neulasta. His monocyte percentage is 12. He denies infectious symptoms. His hgb has dropped to 9.8 and he therefore qualifies for Procrit today.We have reviewed neutropenic precautions today. I have also asked the patient to return this Friday for CBC with RN review to make sure his platelets do not drop further. If so, he may require a platelet transfusion. We will proceed with Procrit today.    Lab Results   Component Value Date    WBC 1.09 (C) 10/03/2018    HGB 9.8 (L) 10/03/2018    HCT 28.5 (L) 10/03/2018    MCV 93.4 10/03/2018    PLT 23 (C) 10/03/2018

## 2018-10-05 ENCOUNTER — RESULTS ENCOUNTER (OUTPATIENT)
Dept: ONCOLOGY | Facility: CLINIC | Age: 78
End: 2018-10-05

## 2018-10-05 ENCOUNTER — OFFICE VISIT (OUTPATIENT)
Dept: ONCOLOGY | Facility: CLINIC | Age: 78
End: 2018-10-05

## 2018-10-05 ENCOUNTER — LAB (OUTPATIENT)
Dept: OTHER | Facility: HOSPITAL | Age: 78
End: 2018-10-05

## 2018-10-05 DIAGNOSIS — Z45.2 FITTING AND ADJUSTMENT OF VASCULAR CATHETER: ICD-10-CM

## 2018-10-05 DIAGNOSIS — C83.39 DIFFUSE LARGE B-CELL LYMPHOMA OF EXTRANODAL SITE EXCLUDING SPLEEN AND OTHER SOLID ORGANS (HCC): ICD-10-CM

## 2018-10-05 DIAGNOSIS — IMO0001 METASTATIC TUMOR OF BONE: ICD-10-CM

## 2018-10-05 DIAGNOSIS — C83.39 DIFFUSE LARGE B-CELL LYMPHOMA OF EXTRANODAL SITE EXCLUDING SPLEEN AND OTHER SOLID ORGANS (HCC): Primary | ICD-10-CM

## 2018-10-05 DIAGNOSIS — N18.2 CHRONIC RENAL IMPAIRMENT, STAGE 2 (MILD): ICD-10-CM

## 2018-10-05 LAB
BASOPHILS # BLD AUTO: 0.09 10*3/MM3 (ref 0–0.2)
BASOPHILS NFR BLD AUTO: 1.8 % (ref 0–1.5)
DEPRECATED RDW RBC AUTO: 59.6 FL (ref 37–54)
EOSINOPHIL # BLD AUTO: 0.01 10*3/MM3 (ref 0–0.7)
EOSINOPHIL NFR BLD AUTO: 0.2 % (ref 0.3–6.2)
ERYTHROCYTE [DISTWIDTH] IN BLOOD BY AUTOMATED COUNT: 17.1 % (ref 11.5–14.5)
HCT VFR BLD AUTO: 27.6 % (ref 40.4–52.2)
HGB BLD-MCNC: 9.3 G/DL (ref 13.7–17.6)
IMM GRANULOCYTES # BLD: 0.07 10*3/MM3 (ref 0–0.03)
IMM GRANULOCYTES NFR BLD: 1.4 % (ref 0–0.5)
LYMPHOCYTES # BLD AUTO: 0.33 10*3/MM3 (ref 0.9–4.8)
LYMPHOCYTES NFR BLD AUTO: 6.7 % (ref 19.6–45.3)
MCH RBC QN AUTO: 31.8 PG (ref 27–32.7)
MCHC RBC AUTO-ENTMCNC: 33.7 G/DL (ref 32.6–36.4)
MCV RBC AUTO: 94.5 FL (ref 79.8–96.2)
MONOCYTES # BLD AUTO: 0.4 10*3/MM3 (ref 0.2–1.2)
MONOCYTES NFR BLD AUTO: 8.1 % (ref 5–12)
NEUTROPHILS # BLD AUTO: 4.05 10*3/MM3 (ref 1.9–8.1)
NEUTROPHILS NFR BLD AUTO: 81.8 % (ref 42.7–76)
NRBC BLD MANUAL-RTO: 0 /100 WBC (ref 0–0)
PLATELET # BLD AUTO: 39 10*3/MM3 (ref 140–500)
RBC # BLD AUTO: 2.92 10*6/MM3 (ref 4.6–6)
WBC NRBC COR # BLD: 4.95 10*3/MM3 (ref 4.5–10.7)

## 2018-10-05 PROCEDURE — 36415 COLL VENOUS BLD VENIPUNCTURE: CPT

## 2018-10-05 PROCEDURE — 85025 COMPLETE CBC W/AUTO DIFF WBC: CPT | Performed by: INTERNAL MEDICINE

## 2018-10-05 PROCEDURE — 85007 BL SMEAR W/DIFF WBC COUNT: CPT | Performed by: INTERNAL MEDICINE

## 2018-10-05 PROCEDURE — 99212-NC PR NO CHARGE CBC OFFICE OUTPATIENT VISIT 10 MINUTES: Performed by: NURSE PRACTITIONER

## 2018-10-05 NOTE — PROGRESS NOTES
Patient is here for lab and RN review.  He was just seen on Wednesday with a platelet count of 23,000 and an ANC of 0.50.  Thankfully, today his counts have recovered.  His ANC has normalized to 4.05.  His platelets have also increased to 39,000 in the absence of excess bleeding or bruising.  Patient is feeling quite well and is actually requesting to begin water aerobic therapy.  I explained to him that as long as his counts remain stable, this should be okay.  He is eating and drinking well and has no other concerns today.  He'll return on Tuesday, October 9 as already scheduled for a repeat RN review.    I have asked him to call with any concerns prior to his next office visit.     Lab Results   Component Value Date    WBC 4.95 10/05/2018    HGB 9.3 (L) 10/05/2018    HCT 27.6 (L) 10/05/2018    MCV 94.5 10/05/2018    PLT 39 (C) 10/05/2018     Tatiana FRANCISCO

## 2018-10-09 ENCOUNTER — LAB (OUTPATIENT)
Dept: OTHER | Facility: HOSPITAL | Age: 78
End: 2018-10-09

## 2018-10-09 ENCOUNTER — OFFICE VISIT (OUTPATIENT)
Dept: ONCOLOGY | Facility: CLINIC | Age: 78
End: 2018-10-09

## 2018-10-09 DIAGNOSIS — IMO0001 METASTATIC TUMOR OF BONE: ICD-10-CM

## 2018-10-09 DIAGNOSIS — C83.39 DIFFUSE LARGE B-CELL LYMPHOMA OF EXTRANODAL SITE EXCLUDING SPLEEN AND OTHER SOLID ORGANS (HCC): ICD-10-CM

## 2018-10-09 DIAGNOSIS — Z45.2 FITTING AND ADJUSTMENT OF VASCULAR CATHETER: ICD-10-CM

## 2018-10-09 DIAGNOSIS — C83.39 DIFFUSE LARGE B-CELL LYMPHOMA OF EXTRANODAL SITE EXCLUDING SPLEEN AND OTHER SOLID ORGANS (HCC): Primary | ICD-10-CM

## 2018-10-09 DIAGNOSIS — N18.2 CHRONIC RENAL IMPAIRMENT, STAGE 2 (MILD): ICD-10-CM

## 2018-10-09 LAB
ANISOCYTOSIS BLD QL: NORMAL
BASOPHILS # BLD AUTO: 0.05 10*3/MM3 (ref 0–0.2)
BASOPHILS NFR BLD AUTO: 0.7 % (ref 0–1.5)
DACRYOCYTES BLD QL SMEAR: NORMAL
DEPRECATED RDW RBC AUTO: 61.8 FL (ref 37–54)
EOSINOPHIL # BLD AUTO: 0.01 10*3/MM3 (ref 0–0.7)
EOSINOPHIL NFR BLD AUTO: 0.1 % (ref 0.3–6.2)
ERYTHROCYTE [DISTWIDTH] IN BLOOD BY AUTOMATED COUNT: 18.2 % (ref 11.5–14.5)
HCT VFR BLD AUTO: 29 % (ref 40.4–52.2)
HGB BLD-MCNC: 9.8 G/DL (ref 13.7–17.6)
IMM GRANULOCYTES # BLD: 0.08 10*3/MM3 (ref 0–0.03)
IMM GRANULOCYTES NFR BLD: 1.2 % (ref 0–0.5)
LYMPHOCYTES # BLD AUTO: 0.44 10*3/MM3 (ref 0.9–4.8)
LYMPHOCYTES NFR BLD AUTO: 6.5 % (ref 19.6–45.3)
MCH RBC QN AUTO: 32.3 PG (ref 27–32.7)
MCHC RBC AUTO-ENTMCNC: 33.8 G/DL (ref 32.6–36.4)
MCV RBC AUTO: 95.7 FL (ref 79.8–96.2)
MICROCYTES BLD QL: NORMAL
MONOCYTES # BLD AUTO: 0.69 10*3/MM3 (ref 0.2–1.2)
MONOCYTES NFR BLD AUTO: 10.3 % (ref 5–12)
NEUTROPHILS # BLD AUTO: 5.46 10*3/MM3 (ref 1.9–8.1)
NEUTROPHILS NFR BLD AUTO: 81.2 % (ref 42.7–76)
NRBC BLD MANUAL-RTO: 0 /100 WBC (ref 0–0)
OVALOCYTES BLD QL SMEAR: NORMAL
PLAT MORPH BLD: NORMAL
PLATELET # BLD AUTO: 148 10*3/MM3 (ref 140–500)
PMV BLD AUTO: 11.5 FL (ref 6–12)
POLYCHROMASIA BLD QL SMEAR: NORMAL
RBC # BLD AUTO: 3.03 10*6/MM3 (ref 4.6–6)
WBC MORPH BLD: NORMAL
WBC NRBC COR # BLD: 6.73 10*3/MM3 (ref 4.5–10.7)

## 2018-10-09 PROCEDURE — 85025 COMPLETE CBC W/AUTO DIFF WBC: CPT | Performed by: INTERNAL MEDICINE

## 2018-10-09 PROCEDURE — 36415 COLL VENOUS BLD VENIPUNCTURE: CPT

## 2018-10-09 PROCEDURE — 99212-NC PR NO CHARGE CBC OFFICE OUTPATIENT VISIT 10 MINUTES: Performed by: NURSE PRACTITIONER

## 2018-10-09 PROCEDURE — 85007 BL SMEAR W/DIFF WBC COUNT: CPT | Performed by: INTERNAL MEDICINE

## 2018-10-09 NOTE — PROGRESS NOTES
Patient here for CBC with RN review.  He reports that he is feeling fairly well.  He does report urinary frequency, but denies dysuria hematuria, urgency.  He denies fevers or chills.  He reports he has started the live stronger exercise program at the [facility].  He denies any other problems or concerns.    Lab Results   Component Value Date    WBC 6.73 10/09/2018    HGB 9.8 (L) 10/09/2018    HCT 29.0 (L) 10/09/2018    MCV 95.7 10/09/2018     10/09/2018     Patient will return for follow-up visit in 2 weeks with Dr. Roberto to review his scan results, scheduled for next week.    NOLAN Fernandes

## 2018-10-12 ENCOUNTER — RESULTS ENCOUNTER (OUTPATIENT)
Dept: ONCOLOGY | Facility: CLINIC | Age: 78
End: 2018-10-12

## 2018-10-12 DIAGNOSIS — C83.39 DIFFUSE LARGE B-CELL LYMPHOMA OF EXTRANODAL SITE EXCLUDING SPLEEN AND OTHER SOLID ORGANS (HCC): ICD-10-CM

## 2018-10-12 DIAGNOSIS — N18.2 CHRONIC RENAL IMPAIRMENT, STAGE 2 (MILD): ICD-10-CM

## 2018-10-12 DIAGNOSIS — Z45.2 FITTING AND ADJUSTMENT OF VASCULAR CATHETER: ICD-10-CM

## 2018-10-12 DIAGNOSIS — IMO0001 METASTATIC TUMOR OF BONE: ICD-10-CM

## 2018-10-16 ENCOUNTER — APPOINTMENT (OUTPATIENT)
Dept: CT IMAGING | Facility: HOSPITAL | Age: 78
End: 2018-10-16
Attending: INTERNAL MEDICINE

## 2018-10-17 ENCOUNTER — HOSPITAL ENCOUNTER (OUTPATIENT)
Dept: CT IMAGING | Facility: HOSPITAL | Age: 78
Discharge: HOME | End: 2018-10-17
Attending: INTERNAL MEDICINE | Admitting: INTERNAL MEDICINE

## 2018-10-17 ENCOUNTER — INFUSION (OUTPATIENT)
Dept: ONCOLOGY | Facility: HOSPITAL | Age: 78
End: 2018-10-17

## 2018-10-17 DIAGNOSIS — IMO0001 METASTATIC TUMOR OF BONE: ICD-10-CM

## 2018-10-17 DIAGNOSIS — N18.2 CHRONIC RENAL IMPAIRMENT, STAGE 2 (MILD): ICD-10-CM

## 2018-10-17 DIAGNOSIS — C83.39 DIFFUSE LARGE B-CELL LYMPHOMA OF EXTRANODAL SITE EXCLUDING SPLEEN AND OTHER SOLID ORGANS (HCC): ICD-10-CM

## 2018-10-17 DIAGNOSIS — Z45.2 FITTING AND ADJUSTMENT OF VASCULAR CATHETER: ICD-10-CM

## 2018-10-17 DIAGNOSIS — Z45.2 FITTING AND ADJUSTMENT OF VASCULAR CATHETER: Primary | ICD-10-CM

## 2018-10-17 PROCEDURE — 25010000002 IOPAMIDOL 61 % SOLUTION: Performed by: INTERNAL MEDICINE

## 2018-10-17 PROCEDURE — 96523 IRRIG DRUG DELIVERY DEVICE: CPT | Performed by: INTERNAL MEDICINE

## 2018-10-17 PROCEDURE — 82565 ASSAY OF CREATININE: CPT

## 2018-10-17 PROCEDURE — 0 DIATRIZOATE MEGLUMINE & SODIUM PER 1 ML: Performed by: INTERNAL MEDICINE

## 2018-10-17 PROCEDURE — 74177 CT ABD & PELVIS W/CONTRAST: CPT

## 2018-10-17 PROCEDURE — 71260 CT THORAX DX C+: CPT

## 2018-10-17 RX ORDER — SODIUM CHLORIDE 0.9 % (FLUSH) 0.9 %
10 SYRINGE (ML) INJECTION AS NEEDED
Status: DISCONTINUED | OUTPATIENT
Start: 2018-10-17 | End: 2018-10-17 | Stop reason: HOSPADM

## 2018-10-17 RX ORDER — SODIUM CHLORIDE 0.9 % (FLUSH) 0.9 %
10 SYRINGE (ML) INJECTION AS NEEDED
Status: CANCELLED | OUTPATIENT
Start: 2018-10-17

## 2018-10-17 RX ADMIN — IOPAMIDOL 90 ML: 612 INJECTION, SOLUTION INTRAVENOUS at 09:05

## 2018-10-17 RX ADMIN — DIATRIZOATE MEGLUMINE AND DIATRIZOATE SODIUM 30 ML: 660; 100 LIQUID ORAL; RECTAL at 08:03

## 2018-10-17 RX ADMIN — Medication 10 ML: at 08:15

## 2018-10-17 RX ADMIN — SODIUM CHLORIDE, PRESERVATIVE FREE 500 UNITS: 5 INJECTION INTRAVENOUS at 08:15

## 2018-10-18 LAB — CREAT BLDA-MCNC: 1.2 MG/DL (ref 0.6–1.3)

## 2018-10-19 ENCOUNTER — RESULTS ENCOUNTER (OUTPATIENT)
Dept: ONCOLOGY | Facility: CLINIC | Age: 78
End: 2018-10-19

## 2018-10-19 DIAGNOSIS — N18.2 CHRONIC RENAL IMPAIRMENT, STAGE 2 (MILD): ICD-10-CM

## 2018-10-19 DIAGNOSIS — Z45.2 FITTING AND ADJUSTMENT OF VASCULAR CATHETER: ICD-10-CM

## 2018-10-19 DIAGNOSIS — C83.39 DIFFUSE LARGE B-CELL LYMPHOMA OF EXTRANODAL SITE EXCLUDING SPLEEN AND OTHER SOLID ORGANS (HCC): ICD-10-CM

## 2018-10-19 DIAGNOSIS — IMO0001 METASTATIC TUMOR OF BONE: ICD-10-CM

## 2018-10-23 ENCOUNTER — INFUSION (OUTPATIENT)
Dept: ONCOLOGY | Facility: HOSPITAL | Age: 78
End: 2018-10-23

## 2018-10-23 ENCOUNTER — APPOINTMENT (OUTPATIENT)
Dept: ONCOLOGY | Facility: HOSPITAL | Age: 78
End: 2018-10-23

## 2018-10-23 ENCOUNTER — OFFICE VISIT (OUTPATIENT)
Dept: ONCOLOGY | Facility: CLINIC | Age: 78
End: 2018-10-23

## 2018-10-23 VITALS
DIASTOLIC BLOOD PRESSURE: 70 MMHG | WEIGHT: 163.4 LBS | RESPIRATION RATE: 16 BRPM | HEIGHT: 68 IN | SYSTOLIC BLOOD PRESSURE: 126 MMHG | TEMPERATURE: 97.7 F | HEART RATE: 94 BPM | OXYGEN SATURATION: 98 % | BODY MASS INDEX: 24.77 KG/M2

## 2018-10-23 DIAGNOSIS — C83.39 DIFFUSE LARGE B-CELL LYMPHOMA OF EXTRANODAL SITE EXCLUDING SPLEEN AND OTHER SOLID ORGANS (HCC): Primary | ICD-10-CM

## 2018-10-23 DIAGNOSIS — Z45.2 FITTING AND ADJUSTMENT OF VASCULAR CATHETER: ICD-10-CM

## 2018-10-23 DIAGNOSIS — T45.1X5A ANEMIA ASSOCIATED WITH CHEMOTHERAPY: ICD-10-CM

## 2018-10-23 DIAGNOSIS — N18.2 CHRONIC RENAL IMPAIRMENT, STAGE 2 (MILD): ICD-10-CM

## 2018-10-23 DIAGNOSIS — D64.81 ANEMIA ASSOCIATED WITH CHEMOTHERAPY: ICD-10-CM

## 2018-10-23 DIAGNOSIS — C82.90 FOLLICULAR LYMPHOMA, UNSPECIFIED FOLLICULAR LYMPHOMA TYPE, UNSPECIFIED BODY REGION (HCC): Primary | ICD-10-CM

## 2018-10-23 DIAGNOSIS — C83.39 DIFFUSE LARGE B-CELL LYMPHOMA OF EXTRANODAL SITE EXCLUDING SPLEEN AND OTHER SOLID ORGANS (HCC): ICD-10-CM

## 2018-10-23 LAB
ALBUMIN SERPL-MCNC: 3.7 G/DL (ref 3.5–5.2)
ALBUMIN/GLOB SERPL: 1.4 G/DL
ALP SERPL-CCNC: 63 U/L (ref 39–117)
ALT SERPL W P-5'-P-CCNC: 10 U/L (ref 1–41)
ANION GAP SERPL CALCULATED.3IONS-SCNC: 9.7 MMOL/L
AST SERPL-CCNC: 20 U/L (ref 1–40)
BASOPHILS # BLD AUTO: 0.05 10*3/MM3 (ref 0–0.2)
BASOPHILS NFR BLD AUTO: 0.7 % (ref 0–1.5)
BILIRUB SERPL-MCNC: 0.3 MG/DL (ref 0.1–1.2)
BUN BLD-MCNC: 18 MG/DL (ref 8–23)
BUN/CREAT SERPL: 14.6 (ref 7–25)
CALCIUM SPEC-SCNC: 9.3 MG/DL (ref 8.6–10.5)
CHLORIDE SERPL-SCNC: 106 MMOL/L (ref 98–107)
CO2 SERPL-SCNC: 24.3 MMOL/L (ref 22–29)
CREAT BLD-MCNC: 1.23 MG/DL (ref 0.76–1.27)
DEPRECATED RDW RBC AUTO: 58.9 FL (ref 37–54)
EOSINOPHIL # BLD AUTO: 0.03 10*3/MM3 (ref 0–0.7)
EOSINOPHIL NFR BLD AUTO: 0.4 % (ref 0.3–6.2)
ERYTHROCYTE [DISTWIDTH] IN BLOOD BY AUTOMATED COUNT: 16.5 % (ref 11.5–14.5)
GFR SERPL CREATININE-BSD FRML MDRD: 57 ML/MIN/1.73
GLOBULIN UR ELPH-MCNC: 2.6 GM/DL
GLUCOSE BLD-MCNC: 152 MG/DL (ref 65–99)
HCT VFR BLD AUTO: 30.4 % (ref 40.4–52.2)
HGB BLD-MCNC: 10.2 G/DL (ref 13.7–17.6)
IMM GRANULOCYTES # BLD: 0.03 10*3/MM3 (ref 0–0.03)
IMM GRANULOCYTES NFR BLD: 0.4 % (ref 0–0.5)
LYMPHOCYTES # BLD AUTO: 0.46 10*3/MM3 (ref 0.9–4.8)
LYMPHOCYTES NFR BLD AUTO: 6.2 % (ref 19.6–45.3)
MCH RBC QN AUTO: 32.4 PG (ref 27–32.7)
MCHC RBC AUTO-ENTMCNC: 33.6 G/DL (ref 32.6–36.4)
MCV RBC AUTO: 96.5 FL (ref 79.8–96.2)
MONOCYTES # BLD AUTO: 0.92 10*3/MM3 (ref 0.2–1.2)
MONOCYTES NFR BLD AUTO: 12.3 % (ref 5–12)
NEUTROPHILS # BLD AUTO: 5.98 10*3/MM3 (ref 1.9–8.1)
NEUTROPHILS NFR BLD AUTO: 80 % (ref 42.7–76)
NRBC BLD MANUAL-RTO: 0 /100 WBC (ref 0–0)
PLATELET # BLD AUTO: 214 10*3/MM3 (ref 140–500)
PMV BLD AUTO: 10 FL (ref 6–12)
POTASSIUM BLD-SCNC: 4 MMOL/L (ref 3.5–5.2)
PROT SERPL-MCNC: 6.3 G/DL (ref 6–8.5)
RBC # BLD AUTO: 3.15 10*6/MM3 (ref 4.6–6)
SODIUM BLD-SCNC: 140 MMOL/L (ref 136–145)
WBC NRBC COR # BLD: 7.47 10*3/MM3 (ref 4.5–10.7)

## 2018-10-23 PROCEDURE — 99214 OFFICE O/P EST MOD 30 MIN: CPT | Performed by: INTERNAL MEDICINE

## 2018-10-23 PROCEDURE — 80053 COMPREHEN METABOLIC PANEL: CPT | Performed by: INTERNAL MEDICINE

## 2018-10-23 PROCEDURE — 85025 COMPLETE CBC W/AUTO DIFF WBC: CPT | Performed by: INTERNAL MEDICINE

## 2018-10-23 PROCEDURE — 96523 IRRIG DRUG DELIVERY DEVICE: CPT | Performed by: INTERNAL MEDICINE

## 2018-10-23 RX ORDER — SODIUM CHLORIDE 0.9 % (FLUSH) 0.9 %
10 SYRINGE (ML) INJECTION AS NEEDED
Status: CANCELLED | OUTPATIENT
Start: 2018-10-23

## 2018-10-23 RX ORDER — SODIUM CHLORIDE 0.9 % (FLUSH) 0.9 %
10 SYRINGE (ML) INJECTION AS NEEDED
Status: DISCONTINUED | OUTPATIENT
Start: 2018-10-23 | End: 2018-10-23 | Stop reason: HOSPADM

## 2018-10-23 RX ADMIN — SODIUM CHLORIDE, PRESERVATIVE FREE 500 UNITS: 5 INJECTION INTRAVENOUS at 13:02

## 2018-10-23 RX ADMIN — Medication 10 ML: at 13:03

## 2018-10-23 NOTE — PROGRESS NOTES
Subjective     REASON FOR FOLLOW UP:   1.  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  2.  Maintenance Rituxan every 3 months for 2 years initiated 3/30/2018  3.  Painful pathologic compression fracture at L3 May 2018 due to involvement with diffuse large B-cell non-Hodgkin's lymphoma.   4.  CHOP and Rituxan x 6 cycles completed 9/25/2018.    HISTORY OF PRESENT ILLNESS:  The patient is a 78 y.o. year old male who had  undergone CT scan of the abdomen on 10/11/2017 to evaluate some left lower quadrant abdominal pain.  CT scan findings were worrisome for metastatic malignancy with multiple tumor implants noted on the scan.  The largest mass appeared to be possibly arising from the duodenum or jejunum in the upper abdomen.  There was no evidence of metastases to liver.    CT-guided biopsy was performed on 10/18/2017 but unfortunately pathology was nondiagnostic showing only benign skeletal muscle and fibroadipose tissue.    The patient was seen in consultation on 10/20/2017 and at that time we recommended laparoscopic lymph node sampling and upper and lower GI endoscopy.  He underwent these procedures with Dr. Castañeda on 10/26/2017.  There was no evidence of malignancy from his endoscopic procedures at the laparoscopic specimen returned as follicular center cell B-cell non-Hodgkin's lymphoma.    He received Rituxan treatment initially with 4 weekly doses of Rituxan with good response on follow-up scans.  Unfortunately, a few months after completion of his treatment he developed worsening back pain and has lymphoma involving the L3 vertebral body consistent with diffuse large B-cell non-Hodgkin's lymphoma.     David underwent a PET scan on 8/28/2018 after his fourth chemo cycle which revealed an excellent response with no significant residual hypermetabolic activity.     He received his sixth and final chemotherapy  "treatment on 9/25/2018 and returns today with post treatment baseline CT scans showing stability of the residual lymph node mass within the abdomen no signs of disease progression.  We feel the patient is now in complete remission and will be observed from here.  He still has a Mediport in place which will need to be flushed periodically.    Pain   Pertinent negatives include no chest pain, coughing, fatigue, fever, headaches, nausea, neck pain, rash or vomiting.        Past Medical History:   Diagnosis Date   • Arm fracture     right side humerus - sling and swathe   • Cancer (CMS/HCC) 2017    Basal Cell on Nose and Under Right Eye   • Coronary artery disease    • DDD (degenerative disc disease), cervical    • Gastric mass    • GERD (gastroesophageal reflux disease)    • Hip pain started 5/29/18    onset of acute pain with weight bearing   • Hx of cardiac arrest     Happened after Induction of anesthesia prior  to SURG 10 YRS AGO  \" I FLATLINE BUT W/IN SECONDS HEART WAS OK AFTER TURNED ON BACK...I THINK THEY GAVE ME TO MUCH ANESTHESIA\"   • Hyperlipidemia    • Hypertension    • Kidney stones    • Non Hodgkin's lymphoma (CMS/HCC)     SPINE   • Osteoarthritis    • Pleural effusion     DRAINAGE RT PLEURAL FLUID FOR TESTING WITH EGD ON 1-30-17   NO MALIGNANCY WITH CLEARANCE FROM DR SIMMONS FOR SURGERY   • Rheumatoid arthritis (CMS/HCC)         Past Surgical History:   Procedure Laterality Date   • COLONOSCOPY N/A 10/26/2017    Procedure: COLONOSCOPY;  Surgeon: Louie Castañeda MD;  Location: [facility];  Service:    • CORONARY ANGIOPLASTY WITH STENT PLACEMENT  1999    17 years ago   • CYSTOSCOPY W/ LITHOLAPAXY / EHL      6-7 years ago   • DIAGNOSTIC LAPAROSCOPY N/A 10/26/2017    Procedure: DIAGNOSTIC LAPAROSCOPY WITH RETROPERITONEAL BIOPSIES;  Surgeon: Louie Castañeda MD;  Location: [facility];  Service:    • ENDOSCOPY      ON 1/30/17 WITH DRAINAGE OF RT PLEURAL FLUID FOR BIOPSY    NO MALIGNANCY  " "CLEARANCE FOR SURG PER DR SIMMONS   • ENDOSCOPY N/A 10/26/2017    Procedure: ESOPHAGOGASTRODUODENOSCOPY;  Surgeon: Louie Castañeda MD;  Location: [facility] OR;  Service:    • EYE SURGERY Right 2017    Moh's; reconstruction right lower lid   • KIDNEY STONE SURGERY     • PLEURAL BIOPSY     • POSTERIOR CERVICAL FUSION  2001   • SKIN CANCER EXCISION N/A 2017    Basal Cell Nose & under right eye   • TOTAL HIP ARTHROPLASTY Left 04/2010    9-10 years ago    • TOTAL HIP ARTHROPLASTY Right 2/14/2017    Procedure: TOTAL HIP ARTHROPLASTY;  Surgeon: Gerson Cardoza MD;  Location: [facility] OR;  Service:    • VENOUS ACCESS DEVICE (PORT) INSERTION N/A 6/5/2018    Procedure: INSERTION VENOUS ACCESS DEVICE- RIGHT;  Surgeon: Louie Castañeda MD;  Location: [facility] OR;  Service: General         ALLERGIES:  No Known Allergies     Review of Systems   Constitutional: Positive for appetite change and unexpected weight change. Negative for activity change, fatigue and fever.   HENT: Negative for hearing loss, nosebleeds, trouble swallowing and voice change.    Eyes: Negative for visual disturbance.   Respiratory: Negative for cough, shortness of breath and wheezing.    Cardiovascular: Negative for chest pain and palpitations.   Gastrointestinal: Positive for constipation. Negative for diarrhea, nausea and vomiting.   Genitourinary: Negative for difficulty urinating, frequency, hematuria and urgency.   Musculoskeletal: Positive for back pain. Negative for neck pain.   Skin: Negative for rash.   Neurological: Negative for dizziness, seizures, syncope and headaches.   Hematological: Negative for adenopathy. Does not bruise/bleed easily.   Psychiatric/Behavioral: Negative for behavioral problems. The patient is not nervous/anxious.         Objective     Vitals:    10/23/18 1332   BP: 126/70   Pulse: 94   Resp: 16   Temp: 97.7 °F (36.5 °C)   TempSrc: Oral   SpO2: 98%   Weight: 74.1 kg (163 lb 6.4 oz)   Height: 172 cm (67.72\") "   PainSc: 0-No pain     Current Status 10/23/2018   ECOG score 0       Physical Exam   Constitutional: He is oriented to person, place, and time. He appears well-developed and well-nourished. No distress.   HENT:   Head: Normocephalic.   Eyes: Pupils are equal, round, and reactive to light. Conjunctivae and EOM are normal. No scleral icterus.   Neck: Normal range of motion. Neck supple. No JVD present. No thyromegaly present.   Cardiovascular: Normal rate and regular rhythm.  Exam reveals no gallop and no friction rub.    No murmur heard.  Pulmonary/Chest: Effort normal and breath sounds normal. He has no wheezes. He has no rales.   Right IJ Mediport.   Abdominal: Soft. He exhibits no distension and no mass. There is no tenderness.   Musculoskeletal: Normal range of motion. He exhibits no edema or deformity.   Wearing a back brace.   Lymphadenopathy:     He has no cervical adenopathy.   Neurological: He is alert and oriented to person, place, and time. He has normal reflexes. No cranial nerve deficit.   Skin: Skin is warm and dry. No rash noted. No erythema.   Psychiatric: He has a normal mood and affect. His behavior is normal. Judgment normal.         RECENT LABS:  Hematology WBC   Date Value Ref Range Status   10/23/2018 7.47 4.50 - 10.70 10*3/mm3 Final     RBC   Date Value Ref Range Status   10/23/2018 3.15 (L) 4.60 - 6.00 10*6/mm3 Final     Hemoglobin   Date Value Ref Range Status   10/23/2018 10.2 (L) 13.7 - 17.6 g/dL Final     Hematocrit   Date Value Ref Range Status   10/23/2018 30.4 (L) 40.4 - 52.2 % Final     Platelets   Date Value Ref Range Status   10/23/2018 214 140 - 500 10*3/mm3 Final          Lab Results   Component Value Date    GLUCOSE 73 09/25/2018    BUN 18 09/25/2018    CREATININE 1.20 10/17/2018    EGFRIFNONA 61 09/25/2018    EGFRIFAFRI 61 03/16/2018    BCR 15.5 09/25/2018    K 4.1 09/25/2018    CO2 24.8 09/25/2018    CALCIUM 9.6 09/25/2018    PROTENTOTREF 7.2 03/16/2018    ALBUMIN 3.90  09/25/2018    LABIL2 1.3 03/16/2018    AST 17 09/25/2018    ALT 9 09/25/2018       CT ABDOMEN PELVIS WITH CONTRAST-, CT CHEST WITH CONTRAST- 10/17/2018     CLINICAL: Lymphoma follow-up.     COMPARISON: PET/CT 08/28/2018 and CT scan of the chest, abdomen and  pelvis 07/13/2018.     Radiation dose reduction techniques were utilized, including automated  exposure control and exposure modulation based on body size.     FINDINGS: Few centimeter/subcentimeter size stable mediastinal lymph  nodes reidentified. No hilar adenopathy. The esophagus is satisfactory  in course and caliber and the heart size is normal. No pericardial  abnormality. No axillary adenopathy. Chronic reticular nodular  infiltrate within the right upper and right lower lobe, diminished  within the interim. Left lower lobe basilar infiltrate/atelectasis  resolved. No suspicious pulmonary mass, effusion or acute airspace  disease has developed within the interim.     The spleen is normal in size and shape and attenuation. No pelvic or  inguinal adenopathy. Minimal residual soft tissue within the  retroperitoneum remains stable. Within the central mesentery to the left  of midline (anterior to the aorta and just superior to the third portion  of the duodenum) there is a stable soft tissue mass which is 2 cm AP and  3.3 cm transverse. Size is similar to the PET/CT and CT 07/13/2018.     No new or enlarging lymph nodes.     The liver, gallbladder and pancreas are satisfactory in appearance. The  adrenal glands are typical in appearance. Atrophy of the left kidney  again demonstrated with a left renal cyst. There is a right renal cyst  and nonobstructing calculus. Artifact arising from the bilateral hip  prostheses obscures the pelvic floor contents. There is diverticulosis  of the colon involving the sigmoid to the greatest degree. The stomach  is satisfactory in appearance. The appendix and small bowel within  normal limits.     CONCLUSION: Right lung  infiltrates diminished within the interim. CT  scan of the chest is otherwise stable. No interval change in the CT  abdomen and pelvis, there is again demonstrated a small rind of soft  tissue within the retroperitoneum and a stable masslike area within the  central mesentery as described above.    Images personally reviewed    Assessment/Plan     1.  Diffuse large B-cell non-Hodgkin's lymphoma involving the L3 vertebral body.  As noted above, his PET scan after 4 cycles of CHOP and Rituxan showed essentially a complete metabolic response.  He completed a total of 6 cycles of chemotherapy by 9/25/2018 and is felt to be in remission.  2.  Previous treatment with single agent Rituxan for follicular B-cell lymphoma.  3.  Back pain related to lymphoma involving the spine.   4.  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  5.  Lumbar puncture showing no evidence of lymphoma in the spinal fluid.  Patient did receive 1 dose of intrathecal methotrexate with the procedure.  6.  Right IJ Mediport.    Plan  1.  We discussed the results of the CT scans with the patient and his wife and explained that we feel he is in remission at this time but will require further follow-up with periodic surveillance scans to assess for potential relapse.  2.  We will have the patient return monthly over the next 2 months for lab and port flush.  3.  M.D. follow-up in 3 months.  Patient will undergo follow-up CT scans of the chest abdomen and pelvis one week prior to that visit.

## 2018-11-16 ENCOUNTER — RESULTS ENCOUNTER (OUTPATIENT)
Dept: ONCOLOGY | Facility: CLINIC | Age: 78
End: 2018-11-16

## 2018-11-16 DIAGNOSIS — T45.1X5A ANEMIA ASSOCIATED WITH CHEMOTHERAPY: ICD-10-CM

## 2018-11-16 DIAGNOSIS — D64.81 ANEMIA ASSOCIATED WITH CHEMOTHERAPY: ICD-10-CM

## 2018-11-16 DIAGNOSIS — C82.90 FOLLICULAR LYMPHOMA, UNSPECIFIED FOLLICULAR LYMPHOMA TYPE, UNSPECIFIED BODY REGION (HCC): ICD-10-CM

## 2018-11-16 DIAGNOSIS — Z45.2 FITTING AND ADJUSTMENT OF VASCULAR CATHETER: ICD-10-CM

## 2018-11-16 DIAGNOSIS — C83.39 DIFFUSE LARGE B-CELL LYMPHOMA OF EXTRANODAL SITE EXCLUDING SPLEEN AND OTHER SOLID ORGANS (HCC): ICD-10-CM

## 2018-11-21 ENCOUNTER — INFUSION (OUTPATIENT)
Dept: ONCOLOGY | Facility: HOSPITAL | Age: 78
End: 2018-11-21

## 2018-11-21 DIAGNOSIS — Z45.2 FITTING AND ADJUSTMENT OF VASCULAR CATHETER: Primary | ICD-10-CM

## 2018-11-21 PROCEDURE — 96523 IRRIG DRUG DELIVERY DEVICE: CPT

## 2018-11-21 RX ORDER — SODIUM CHLORIDE 0.9 % (FLUSH) 0.9 %
10 SYRINGE (ML) INJECTION AS NEEDED
Status: DISCONTINUED | OUTPATIENT
Start: 2018-11-21 | End: 2018-11-21 | Stop reason: HOSPADM

## 2018-11-21 RX ORDER — SODIUM CHLORIDE 0.9 % (FLUSH) 0.9 %
10 SYRINGE (ML) INJECTION AS NEEDED
Status: CANCELLED | OUTPATIENT
Start: 2018-11-21

## 2018-11-21 RX ADMIN — SODIUM CHLORIDE, PRESERVATIVE FREE 500 UNITS: 5 INJECTION INTRAVENOUS at 11:00

## 2018-11-21 RX ADMIN — Medication 10 ML: at 11:00

## 2018-12-14 ENCOUNTER — RESULTS ENCOUNTER (OUTPATIENT)
Dept: ONCOLOGY | Facility: CLINIC | Age: 78
End: 2018-12-14

## 2018-12-14 DIAGNOSIS — Z45.2 FITTING AND ADJUSTMENT OF VASCULAR CATHETER: ICD-10-CM

## 2018-12-14 DIAGNOSIS — C83.39 DIFFUSE LARGE B-CELL LYMPHOMA OF EXTRANODAL SITE EXCLUDING SPLEEN AND OTHER SOLID ORGANS (HCC): ICD-10-CM

## 2018-12-14 DIAGNOSIS — C82.90 FOLLICULAR LYMPHOMA, UNSPECIFIED FOLLICULAR LYMPHOMA TYPE, UNSPECIFIED BODY REGION (HCC): ICD-10-CM

## 2018-12-14 DIAGNOSIS — T45.1X5A ANEMIA ASSOCIATED WITH CHEMOTHERAPY: ICD-10-CM

## 2018-12-14 DIAGNOSIS — D64.81 ANEMIA ASSOCIATED WITH CHEMOTHERAPY: ICD-10-CM

## 2018-12-17 ENCOUNTER — TELEPHONE (OUTPATIENT)
Dept: ONCOLOGY | Facility: HOSPITAL | Age: 78
End: 2018-12-17

## 2018-12-17 NOTE — TELEPHONE ENCOUNTER
----- Message from Yanely Cordon sent at 12/17/2018  9:30 AM EST -----  Contact: [phone]  Pt wants to see a nurse on Wednesday when he comes in for port flush

## 2018-12-19 ENCOUNTER — INFUSION (OUTPATIENT)
Dept: ONCOLOGY | Facility: HOSPITAL | Age: 78
End: 2018-12-19

## 2018-12-19 DIAGNOSIS — Z45.2 FITTING AND ADJUSTMENT OF VASCULAR CATHETER: ICD-10-CM

## 2018-12-19 DIAGNOSIS — D64.81 ANEMIA ASSOCIATED WITH CHEMOTHERAPY: ICD-10-CM

## 2018-12-19 DIAGNOSIS — C83.39 DIFFUSE LARGE B-CELL LYMPHOMA OF EXTRANODAL SITE EXCLUDING SPLEEN AND OTHER SOLID ORGANS (HCC): ICD-10-CM

## 2018-12-19 DIAGNOSIS — T45.1X5A ANEMIA ASSOCIATED WITH CHEMOTHERAPY: ICD-10-CM

## 2018-12-19 DIAGNOSIS — C82.90 FOLLICULAR LYMPHOMA, UNSPECIFIED FOLLICULAR LYMPHOMA TYPE, UNSPECIFIED BODY REGION (HCC): Primary | ICD-10-CM

## 2018-12-19 LAB
ALBUMIN SERPL-MCNC: 4.1 G/DL (ref 3.5–5.2)
ALBUMIN/GLOB SERPL: 1.8 G/DL
ALP SERPL-CCNC: 73 U/L (ref 39–117)
ALT SERPL W P-5'-P-CCNC: 11 U/L (ref 1–41)
ANION GAP SERPL CALCULATED.3IONS-SCNC: 9.5 MMOL/L
AST SERPL-CCNC: 22 U/L (ref 1–40)
BASOPHILS # BLD AUTO: 0.02 10*3/MM3 (ref 0–0.2)
BASOPHILS NFR BLD AUTO: 0.5 % (ref 0–1.5)
BILIRUB SERPL-MCNC: 0.4 MG/DL (ref 0.1–1.2)
BUN BLD-MCNC: 18 MG/DL (ref 8–23)
BUN/CREAT SERPL: 15.4 (ref 7–25)
CALCIUM SPEC-SCNC: 9.3 MG/DL (ref 8.6–10.5)
CHLORIDE SERPL-SCNC: 106 MMOL/L (ref 98–107)
CO2 SERPL-SCNC: 24.5 MMOL/L (ref 22–29)
CREAT BLD-MCNC: 1.17 MG/DL (ref 0.76–1.27)
DEPRECATED RDW RBC AUTO: 48.3 FL (ref 37–54)
EOSINOPHIL # BLD AUTO: 0.04 10*3/MM3 (ref 0–0.7)
EOSINOPHIL NFR BLD AUTO: 1.1 % (ref 0.3–6.2)
ERYTHROCYTE [DISTWIDTH] IN BLOOD BY AUTOMATED COUNT: 13.6 % (ref 11.5–14.5)
GFR SERPL CREATININE-BSD FRML MDRD: 60 ML/MIN/1.73
GLOBULIN UR ELPH-MCNC: 2.3 GM/DL
GLUCOSE BLD-MCNC: 129 MG/DL (ref 65–99)
HCT VFR BLD AUTO: 32.4 % (ref 40.4–52.2)
HGB BLD-MCNC: 10.9 G/DL (ref 13.7–17.6)
IMM GRANULOCYTES # BLD: 0 10*3/MM3 (ref 0–0.03)
IMM GRANULOCYTES NFR BLD: 0 % (ref 0–0.5)
LYMPHOCYTES # BLD AUTO: 0.51 10*3/MM3 (ref 0.9–4.8)
LYMPHOCYTES NFR BLD AUTO: 13.9 % (ref 19.6–45.3)
MCH RBC QN AUTO: 32.3 PG (ref 27–32.7)
MCHC RBC AUTO-ENTMCNC: 33.6 G/DL (ref 32.6–36.4)
MCV RBC AUTO: 96.1 FL (ref 79.8–96.2)
MONOCYTES # BLD AUTO: 0.52 10*3/MM3 (ref 0.2–1.2)
MONOCYTES NFR BLD AUTO: 14.2 % (ref 5–12)
NEUTROPHILS # BLD AUTO: 2.58 10*3/MM3 (ref 1.9–8.1)
NEUTROPHILS NFR BLD AUTO: 70.3 % (ref 42.7–76)
NRBC BLD MANUAL-RTO: 0 /100 WBC (ref 0–0)
PLATELET # BLD AUTO: 181 10*3/MM3 (ref 140–500)
PMV BLD AUTO: 9.8 FL (ref 6–12)
POTASSIUM BLD-SCNC: 4.3 MMOL/L (ref 3.5–5.2)
PROT SERPL-MCNC: 6.4 G/DL (ref 6–8.5)
RBC # BLD AUTO: 3.37 10*6/MM3 (ref 4.6–6)
SODIUM BLD-SCNC: 140 MMOL/L (ref 136–145)
WBC NRBC COR # BLD: 3.67 10*3/MM3 (ref 4.5–10.7)

## 2018-12-19 PROCEDURE — 85025 COMPLETE CBC W/AUTO DIFF WBC: CPT | Performed by: INTERNAL MEDICINE

## 2018-12-19 PROCEDURE — 96523 IRRIG DRUG DELIVERY DEVICE: CPT | Performed by: INTERNAL MEDICINE

## 2018-12-19 PROCEDURE — 80053 COMPREHEN METABOLIC PANEL: CPT | Performed by: INTERNAL MEDICINE

## 2018-12-19 RX ORDER — SODIUM CHLORIDE 0.9 % (FLUSH) 0.9 %
10 SYRINGE (ML) INJECTION AS NEEDED
Status: CANCELLED | OUTPATIENT
Start: 2018-12-19

## 2018-12-19 RX ORDER — SODIUM CHLORIDE 0.9 % (FLUSH) 0.9 %
10 SYRINGE (ML) INJECTION AS NEEDED
Status: DISCONTINUED | OUTPATIENT
Start: 2018-12-19 | End: 2018-12-19 | Stop reason: HOSPADM

## 2018-12-19 RX ADMIN — Medication 10 ML: at 10:05

## 2018-12-19 RX ADMIN — SODIUM CHLORIDE, PRESERVATIVE FREE 500 UNITS: 5 INJECTION INTRAVENOUS at 10:05

## 2019-01-03 ENCOUNTER — TELEPHONE (OUTPATIENT)
Dept: ONCOLOGY | Facility: CLINIC | Age: 79
End: 2019-01-03

## 2019-01-03 NOTE — TELEPHONE ENCOUNTER
----- Message from Catalina Mcduffie sent at 1/3/2019  9:23 AM EST -----  [phone]  Flu like symptoms needs to know what to do.

## 2019-01-16 ENCOUNTER — INFUSION (OUTPATIENT)
Dept: ONCOLOGY | Facility: HOSPITAL | Age: 79
End: 2019-01-16

## 2019-01-16 ENCOUNTER — HOSPITAL ENCOUNTER (OUTPATIENT)
Dept: CT IMAGING | Facility: HOSPITAL | Age: 79
Discharge: HOME | End: 2019-01-16
Attending: INTERNAL MEDICINE | Admitting: INTERNAL MEDICINE

## 2019-01-16 DIAGNOSIS — T45.1X5A ANEMIA ASSOCIATED WITH CHEMOTHERAPY: ICD-10-CM

## 2019-01-16 DIAGNOSIS — C83.39 DIFFUSE LARGE B-CELL LYMPHOMA OF EXTRANODAL SITE EXCLUDING SPLEEN AND OTHER SOLID ORGANS (HCC): ICD-10-CM

## 2019-01-16 DIAGNOSIS — Z45.2 FITTING AND ADJUSTMENT OF VASCULAR CATHETER: Primary | ICD-10-CM

## 2019-01-16 DIAGNOSIS — C82.90 FOLLICULAR LYMPHOMA, UNSPECIFIED FOLLICULAR LYMPHOMA TYPE, UNSPECIFIED BODY REGION (HCC): ICD-10-CM

## 2019-01-16 DIAGNOSIS — D64.81 ANEMIA ASSOCIATED WITH CHEMOTHERAPY: ICD-10-CM

## 2019-01-16 DIAGNOSIS — Z45.2 FITTING AND ADJUSTMENT OF VASCULAR CATHETER: ICD-10-CM

## 2019-01-16 PROCEDURE — 71260 CT THORAX DX C+: CPT

## 2019-01-16 PROCEDURE — 74177 CT ABD & PELVIS W/CONTRAST: CPT

## 2019-01-16 PROCEDURE — 25010000002 IOPAMIDOL 61 % SOLUTION: Performed by: INTERNAL MEDICINE

## 2019-01-16 PROCEDURE — 82565 ASSAY OF CREATININE: CPT

## 2019-01-16 PROCEDURE — 0 DIATRIZOATE MEGLUMINE & SODIUM PER 1 ML: Performed by: INTERNAL MEDICINE

## 2019-01-16 RX ORDER — SODIUM CHLORIDE 0.9 % (FLUSH) 0.9 %
10 SYRINGE (ML) INJECTION AS NEEDED
Status: DISCONTINUED | OUTPATIENT
Start: 2019-01-16 | End: 2019-01-16 | Stop reason: HOSPADM

## 2019-01-16 RX ORDER — SODIUM CHLORIDE 0.9 % (FLUSH) 0.9 %
10 SYRINGE (ML) INJECTION AS NEEDED
Status: CANCELLED | OUTPATIENT
Start: 2019-01-16

## 2019-01-16 RX ADMIN — DIATRIZOATE MEGLUMINE AND DIATRIZOATE SODIUM 30 ML: 660; 100 LIQUID ORAL; RECTAL at 09:00

## 2019-01-16 RX ADMIN — Medication 10 ML: at 09:20

## 2019-01-16 RX ADMIN — IOPAMIDOL 90 ML: 612 INJECTION, SOLUTION INTRAVENOUS at 10:19

## 2019-01-17 LAB — CREAT BLDA-MCNC: 1.1 MG/DL (ref 0.6–1.3)

## 2019-01-23 DIAGNOSIS — C83.39 DIFFUSE LARGE B-CELL LYMPHOMA OF EXTRANODAL SITE EXCLUDING SPLEEN AND OTHER SOLID ORGANS (HCC): Primary | ICD-10-CM

## 2019-01-25 ENCOUNTER — APPOINTMENT (OUTPATIENT)
Dept: ONCOLOGY | Facility: HOSPITAL | Age: 79
End: 2019-01-25

## 2019-01-25 ENCOUNTER — OFFICE VISIT (OUTPATIENT)
Dept: ONCOLOGY | Facility: CLINIC | Age: 79
End: 2019-01-25

## 2019-01-25 ENCOUNTER — APPOINTMENT (OUTPATIENT)
Dept: ONCOLOGY | Facility: CLINIC | Age: 79
End: 2019-01-25

## 2019-01-25 ENCOUNTER — INFUSION (OUTPATIENT)
Dept: ONCOLOGY | Facility: HOSPITAL | Age: 79
End: 2019-01-25

## 2019-01-25 VITALS
HEART RATE: 83 BPM | WEIGHT: 168.3 LBS | BODY MASS INDEX: 25.51 KG/M2 | SYSTOLIC BLOOD PRESSURE: 159 MMHG | TEMPERATURE: 98.7 F | DIASTOLIC BLOOD PRESSURE: 72 MMHG | HEIGHT: 68 IN | RESPIRATION RATE: 16 BRPM | OXYGEN SATURATION: 98 %

## 2019-01-25 DIAGNOSIS — C83.39 DIFFUSE LARGE B-CELL LYMPHOMA OF EXTRANODAL SITE EXCLUDING SPLEEN AND OTHER SOLID ORGANS (HCC): ICD-10-CM

## 2019-01-25 DIAGNOSIS — T45.1X5A ANEMIA ASSOCIATED WITH CHEMOTHERAPY: Primary | ICD-10-CM

## 2019-01-25 DIAGNOSIS — T45.1X5A ANEMIA ASSOCIATED WITH CHEMOTHERAPY: ICD-10-CM

## 2019-01-25 DIAGNOSIS — D64.81 ANEMIA ASSOCIATED WITH CHEMOTHERAPY: Primary | ICD-10-CM

## 2019-01-25 DIAGNOSIS — Z45.2 FITTING AND ADJUSTMENT OF VASCULAR CATHETER: ICD-10-CM

## 2019-01-25 DIAGNOSIS — D64.81 ANEMIA ASSOCIATED WITH CHEMOTHERAPY: ICD-10-CM

## 2019-01-25 DIAGNOSIS — C82.90 FOLLICULAR LYMPHOMA, UNSPECIFIED FOLLICULAR LYMPHOMA TYPE, UNSPECIFIED BODY REGION (HCC): Primary | ICD-10-CM

## 2019-01-25 LAB
ALBUMIN SERPL-MCNC: 4 G/DL (ref 3.5–5.2)
ALBUMIN/GLOB SERPL: 1.9 G/DL
ALP SERPL-CCNC: 69 U/L (ref 39–117)
ALT SERPL W P-5'-P-CCNC: 11 U/L (ref 1–41)
ANION GAP SERPL CALCULATED.3IONS-SCNC: 10.3 MMOL/L
AST SERPL-CCNC: 19 U/L (ref 1–40)
BASOPHILS # BLD AUTO: 0.02 10*3/MM3 (ref 0–0.2)
BASOPHILS NFR BLD AUTO: 0.5 % (ref 0–1.5)
BILIRUB SERPL-MCNC: 0.4 MG/DL (ref 0.1–1.2)
BUN BLD-MCNC: 20 MG/DL (ref 8–23)
BUN/CREAT SERPL: 17.9 (ref 7–25)
CALCIUM SPEC-SCNC: 8.8 MG/DL (ref 8.6–10.5)
CHLORIDE SERPL-SCNC: 107 MMOL/L (ref 98–107)
CO2 SERPL-SCNC: 23.7 MMOL/L (ref 22–29)
CREAT BLD-MCNC: 1.12 MG/DL (ref 0.76–1.27)
DEPRECATED RDW RBC AUTO: 47.8 FL (ref 37–54)
EOSINOPHIL # BLD AUTO: 0.06 10*3/MM3 (ref 0–0.7)
EOSINOPHIL NFR BLD AUTO: 1.4 % (ref 0.3–6.2)
ERYTHROCYTE [DISTWIDTH] IN BLOOD BY AUTOMATED COUNT: 13.8 % (ref 11.5–14.5)
GFR SERPL CREATININE-BSD FRML MDRD: 63 ML/MIN/1.73
GLOBULIN UR ELPH-MCNC: 2.1 GM/DL
GLUCOSE BLD-MCNC: 127 MG/DL (ref 65–99)
HCT VFR BLD AUTO: 32.3 % (ref 40.4–52.2)
HGB BLD-MCNC: 11 G/DL (ref 13.7–17.6)
IMM GRANULOCYTES # BLD AUTO: 0.01 10*3/MM3 (ref 0–0.03)
IMM GRANULOCYTES NFR BLD AUTO: 0.2 % (ref 0–0.5)
LYMPHOCYTES # BLD AUTO: 0.5 10*3/MM3 (ref 0.9–4.8)
LYMPHOCYTES NFR BLD AUTO: 11.8 % (ref 19.6–45.3)
MCH RBC QN AUTO: 32.3 PG (ref 27–32.7)
MCHC RBC AUTO-ENTMCNC: 34.1 G/DL (ref 32.6–36.4)
MCV RBC AUTO: 94.7 FL (ref 79.8–96.2)
MONOCYTES # BLD AUTO: 0.57 10*3/MM3 (ref 0.2–1.2)
MONOCYTES NFR BLD AUTO: 13.4 % (ref 5–12)
NEUTROPHILS # BLD AUTO: 3.08 10*3/MM3 (ref 1.9–8.1)
NEUTROPHILS NFR BLD AUTO: 72.7 % (ref 42.7–76)
NRBC BLD AUTO-RTO: 0 /100 WBC (ref 0–0)
PLATELET # BLD AUTO: 162 10*3/MM3 (ref 140–500)
PMV BLD AUTO: 10.1 FL (ref 6–12)
POTASSIUM BLD-SCNC: 4.2 MMOL/L (ref 3.5–5.2)
PROT SERPL-MCNC: 6.1 G/DL (ref 6–8.5)
RBC # BLD AUTO: 3.41 10*6/MM3 (ref 4.6–6)
SODIUM BLD-SCNC: 141 MMOL/L (ref 136–145)
WBC NRBC COR # BLD: 4.24 10*3/MM3 (ref 4.5–10.7)

## 2019-01-25 PROCEDURE — 96523 IRRIG DRUG DELIVERY DEVICE: CPT | Performed by: INTERNAL MEDICINE

## 2019-01-25 PROCEDURE — 80053 COMPREHEN METABOLIC PANEL: CPT | Performed by: INTERNAL MEDICINE

## 2019-01-25 PROCEDURE — 99214 OFFICE O/P EST MOD 30 MIN: CPT | Performed by: INTERNAL MEDICINE

## 2019-01-25 PROCEDURE — 85025 COMPLETE CBC W/AUTO DIFF WBC: CPT | Performed by: INTERNAL MEDICINE

## 2019-01-25 RX ORDER — SODIUM CHLORIDE 0.9 % (FLUSH) 0.9 %
10 SYRINGE (ML) INJECTION AS NEEDED
Status: DISPENSED | OUTPATIENT
Start: 2019-01-25

## 2019-01-25 RX ORDER — SODIUM CHLORIDE 0.9 % (FLUSH) 0.9 %
10 SYRINGE (ML) INJECTION AS NEEDED
Status: CANCELLED | OUTPATIENT
Start: 2019-01-25

## 2019-01-25 RX ADMIN — SODIUM CHLORIDE, PRESERVATIVE FREE 500 UNITS: 5 INJECTION INTRAVENOUS at 10:30

## 2019-01-25 RX ADMIN — Medication 10 ML: at 10:30

## 2019-01-25 NOTE — PROGRESS NOTES
Subjective     REASON FOR FOLLOW UP:   1.  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  2.  Maintenance Rituxan every 3 months for 2 years initiated 3/30/2018  3.  Painful pathologic compression fracture at L3 May 2018 due to involvement with diffuse large B-cell non-Hodgkin's lymphoma.   4.  CHOP and Rituxan x 6 cycles completed 9/25/2018.    HISTORY OF PRESENT ILLNESS:  The patient is a 78 y.o. year old male who was seen in consultation on 10/20/2017 and at that time we recommended laparoscopic lymph node sampling and upper and lower GI endoscopy.  He underwent these procedures with Dr. Castañeda on 10/26/2017.  There was no evidence of malignancy from his endoscopic procedures at the laparoscopic specimen returned as follicular center cell B-cell non-Hodgkin's lymphoma.    He received Rituxan treatment initially with 4 weekly doses of Rituxan with good response on follow-up scans.  Unfortunately, a few months after completion of his treatment he developed worsening back pain and has lymphoma involving the L3 vertebral body consistent with diffuse large B-cell non-Hodgkin's lymphoma.    He underwent port placement and received 6 cycles of CHOP and Rituxan which he completed in late September 2018 with what appear to be a complete response on PET scan.    He returns today for follow-up with CT scan surveillance.  CT scans performed on 1/16/2019 showed stable residual mass in the retroperitoneal area.  The radiologist commented on some tiny reticular nodular infiltrates in the left lower lobe that are most likely inflammatory.  He reports that he was getting over the flu when he had the scan performed he was still having some coughing and congestion at that point but these symptoms have totally resolved.      Pain   Pertinent negatives include no chest pain, coughing, fatigue, fever, headaches, nausea, neck pain, rash  "or vomiting.        Past Medical History:   Diagnosis Date   • Arm fracture     right side humerus - sling and swathe   • Cancer (CMS/HCC) 2017    Basal Cell on Nose and Under Right Eye   • Coronary artery disease    • DDD (degenerative disc disease), cervical    • Gastric mass    • GERD (gastroesophageal reflux disease)    • Hip pain started 5/29/18    onset of acute pain with weight bearing   • Hx of cardiac arrest     Happened after Induction of anesthesia prior  to SURG 10 YRS AGO  \" I FLATLINE BUT W/IN SECONDS HEART WAS OK AFTER TURNED ON BACK...I THINK THEY GAVE ME TO MUCH ANESTHESIA\"   • Hyperlipidemia    • Hypertension    • Kidney stones    • Non Hodgkin's lymphoma (CMS/HCC)     SPINE   • Osteoarthritis    • Pleural effusion     DRAINAGE RT PLEURAL FLUID FOR TESTING WITH EGD ON 1-30-17   NO MALIGNANCY WITH CLEARANCE FROM DR SIMMONS FOR SURGERY   • Rheumatoid arthritis (CMS/HCC)         Past Surgical History:   Procedure Laterality Date   • COLONOSCOPY N/A 10/26/2017    Procedure: COLONOSCOPY;  Surgeon: Louie Castañeda MD;  Location: [facility];  Service:    • CORONARY ANGIOPLASTY WITH STENT PLACEMENT  1999    17 years ago   • CYSTOSCOPY W/ LITHOLAPAXY / EHL      6-7 years ago   • DIAGNOSTIC LAPAROSCOPY N/A 10/26/2017    Procedure: DIAGNOSTIC LAPAROSCOPY WITH RETROPERITONEAL BIOPSIES;  Surgeon: Louie Castañeda MD;  Location: [facility] OR;  Service:    • ENDOSCOPY      ON 1/30/17 WITH DRAINAGE OF RT PLEURAL FLUID FOR BIOPSY    NO MALIGNANCY  CLEARANCE FOR SURG PER DR SIMMONS   • ENDOSCOPY N/A 10/26/2017    Procedure: ESOPHAGOGASTRODUODENOSCOPY;  Surgeon: Louie Castañeda MD;  Location: [facility];  Service:    • EYE SURGERY Right 2017    Moh's; reconstruction right lower lid   • KIDNEY STONE SURGERY     • PLEURAL BIOPSY     • POSTERIOR CERVICAL FUSION  2001   • SKIN CANCER EXCISION N/A 2017    Basal Cell Nose & under right eye   • TOTAL HIP ARTHROPLASTY Left 04/2010    9-10 years ago  " "  • TOTAL HIP ARTHROPLASTY Right 2/14/2017    Procedure: TOTAL HIP ARTHROPLASTY;  Surgeon: Gerson Cardoza MD;  Location: [facility] OR;  Service:    • VENOUS ACCESS DEVICE (PORT) INSERTION N/A 6/5/2018    Procedure: INSERTION VENOUS ACCESS DEVICE- RIGHT;  Surgeon: Louie Castañeda MD;  Location: [facility] OR;  Service: General         ALLERGIES:  No Known Allergies     Review of Systems   Constitutional: Positive for appetite change and unexpected weight change. Negative for activity change, fatigue and fever.   HENT: Negative for hearing loss, nosebleeds, trouble swallowing and voice change.    Eyes: Negative for visual disturbance.   Respiratory: Negative for cough, shortness of breath and wheezing.    Cardiovascular: Negative for chest pain and palpitations.   Gastrointestinal: Positive for constipation. Negative for diarrhea, nausea and vomiting.   Genitourinary: Negative for difficulty urinating, frequency, hematuria and urgency.   Musculoskeletal: Positive for back pain. Negative for neck pain.   Skin: Negative for rash.   Neurological: Negative for dizziness, seizures, syncope and headaches.   Hematological: Negative for adenopathy. Does not bruise/bleed easily.   Psychiatric/Behavioral: Negative for behavioral problems. The patient is not nervous/anxious.         Objective     Vitals:    01/25/19 1035   BP: 159/72   Pulse: 83   Resp: 16   Temp: 98.7 °F (37.1 °C)   SpO2: 98%   Weight: 76.3 kg (168 lb 4.8 oz)   Height: 172 cm (67.72\")   PainSc: 0-No pain     Current Status 1/25/2019   ECOG score 0       Physical Exam   Constitutional: He is oriented to person, place, and time. He appears well-developed and well-nourished. No distress.   HENT:   Head: Normocephalic.   Eyes: Conjunctivae and EOM are normal. Pupils are equal, round, and reactive to light. No scleral icterus.   Neck: Normal range of motion. Neck supple. No JVD present. No thyromegaly present.   Cardiovascular: Normal rate and regular " rhythm. Exam reveals no gallop and no friction rub.   No murmur heard.  Pulmonary/Chest: Effort normal and breath sounds normal. He has no wheezes. He has no rales.   Right IJ Mediport.   Abdominal: Soft. He exhibits no distension and no mass. There is no tenderness.   Musculoskeletal: Normal range of motion. He exhibits no edema or deformity.   Wearing a back brace.   Lymphadenopathy:     He has no cervical adenopathy.   Neurological: He is alert and oriented to person, place, and time. He has normal reflexes. No cranial nerve deficit.   Skin: Skin is warm and dry. No rash noted. No erythema.   Psychiatric: He has a normal mood and affect. His behavior is normal. Judgment normal.         RECENT LABS:  Hematology WBC   Date Value Ref Range Status   01/25/2019 4.24 (L) 4.50 - 10.70 10*3/mm3 Final     RBC   Date Value Ref Range Status   01/25/2019 3.41 (L) 4.60 - 6.00 10*6/mm3 Final     Hemoglobin   Date Value Ref Range Status   01/25/2019 11.0 (L) 13.7 - 17.6 g/dL Final     Hematocrit   Date Value Ref Range Status   01/25/2019 32.3 (L) 40.4 - 52.2 % Final     Platelets   Date Value Ref Range Status   01/25/2019 162 140 - 500 10*3/mm3 Final          Lab Results   Component Value Date    GLUCOSE 129 (H) 12/19/2018    BUN 18 12/19/2018    CREATININE 1.10 01/16/2019    EGFRIFNONA 60 (L) 12/19/2018    EGFRIFAFRI 61 03/16/2018    BCR 15.4 12/19/2018    K 4.3 12/19/2018    CO2 24.5 12/19/2018    CALCIUM 9.3 12/19/2018    PROTENTOTREF 7.2 03/16/2018    ALBUMIN 4.10 12/19/2018    LABIL2 1.3 03/16/2018    AST 22 12/19/2018    ALT 11 12/19/2018       CT ABDOMEN PELVIS WITH CONTRAST-, CT CHEST WITH CONTRAST-1/16/2019  IMPRESSION:  1. Since the prior exam 10/17/2018 there has developed very subtle  reticulonodular infiltrate within the left lower lobe associated with 4  mm and 6 mm noncalcified nodules and these can be followed on subsequent  exam.  2. There has been no other change. Splenic size is normal.  Residual  retroperitoneal soft tissue density and masslike area of soft tissue  density within the central left abdominal mesentery is without change.  3. No interval change in appearance of L3 vertebral body which exhibits  diffuse sclerosis and chronic diminished height.     Images personally reviewed    Assessment/Plan     1.  Diffuse large B-cell non-Hodgkin's lymphoma involving the L3 vertebral body.  As noted above, his PET scan after 4 cycles of CHOP and Rituxan showed essentially a complete metabolic response.  He completed a total of 6 cycles of chemotherapy by 9/25/2018 and is felt to be in remission.  2.  Previous treatment with single agent Rituxan for follicular B-cell lymphoma.  3.  Back pain related to lymphoma involving the spine.   4.  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  5.  Right IJ Mediport.  6.  Minimal reticulonodular infiltrate the left lower lobe on CT scan as noted above.  The patient reports that when he underwent the scan he was getting over the flu and still had some cough symptoms at that time which have since totally resolved.    Plan  1.  We discussed the results of the CT scans with the patient and provided him a copy of the printed report.  2.  He had his port flushed in the office today and will continue to undergo port flushes in our office every 6 weeks.  3.  We will plan to see Alok again in the office in 6 months and he again will undergo CT scans of the chest abdomen pelvis and labs one week prior to that visit.

## 2019-01-28 ENCOUNTER — OFFICE VISIT (OUTPATIENT)
Dept: INTERNAL MEDICINE | Facility: CLINIC | Age: 79
End: 2019-01-28

## 2019-01-28 VITALS
SYSTOLIC BLOOD PRESSURE: 146 MMHG | DIASTOLIC BLOOD PRESSURE: 82 MMHG | WEIGHT: 169 LBS | OXYGEN SATURATION: 98 % | TEMPERATURE: 96.7 F | HEART RATE: 81 BPM | BODY MASS INDEX: 25.91 KG/M2

## 2019-01-28 DIAGNOSIS — C83.39 DIFFUSE LARGE B-CELL LYMPHOMA OF EXTRANODAL SITE EXCLUDING SPLEEN AND OTHER SOLID ORGANS (HCC): ICD-10-CM

## 2019-01-28 DIAGNOSIS — I10 BENIGN ESSENTIAL HTN: ICD-10-CM

## 2019-01-28 DIAGNOSIS — S32.030S CLOSED COMPRESSION FRACTURE OF THIRD LUMBAR VERTEBRA, SEQUELA: ICD-10-CM

## 2019-01-28 DIAGNOSIS — E78.00 ELEVATED CHOLESTEROL: Primary | ICD-10-CM

## 2019-01-28 PROCEDURE — 99214 OFFICE O/P EST MOD 30 MIN: CPT | Performed by: FAMILY MEDICINE

## 2019-01-28 RX ORDER — FELODIPINE 10 MG/1
10 TABLET, EXTENDED RELEASE ORAL DAILY
Qty: 90 TABLET | Refills: 1 | Status: SHIPPED | OUTPATIENT
Start: 2019-01-28 | End: 2019-08-19 | Stop reason: SDUPTHER

## 2019-01-28 RX ORDER — SIMVASTATIN 40 MG
40 TABLET ORAL DAILY
Qty: 90 TABLET | Refills: 1 | Status: SHIPPED | OUTPATIENT
Start: 2019-01-28 | End: 2019-08-18 | Stop reason: SDUPTHER

## 2019-01-28 NOTE — PROGRESS NOTES
Subjective   Louie German is a 78 y.o. male.     Chief Complaint   Patient presents with   • Hyperlipidemia   • Hypertension   Lymphoma vertebral compression  History of Present Illness   Delightful gentleman is recently had extensive chemotherapy for metastatic lymphoma with improvement.  He has had a history of vertebral compressions well.  History of hyperlipidemia hypertension.  We will attend to get a lipid panel today.  He has a port.        The following portions of the patient's history were reviewed and updated as appropriate: allergies, current medications, past social history and problem list.    Review of Systems   Constitutional: Negative.    HENT: Negative.    Eyes: Negative.    Respiratory: Negative.    Cardiovascular: Negative.    Gastrointestinal: Negative.    Endocrine: Negative.    Genitourinary: Negative.    Musculoskeletal: Positive for back pain.   Skin: Negative.    Allergic/Immunologic: Negative.    Neurological: Negative.    Hematological: Negative.    Psychiatric/Behavioral: Negative.        Objective   Vitals:    01/28/19 1258   BP: 146/82   Pulse: 81   Temp: 96.7 °F (35.9 °C)   SpO2: 98%     Physical Exam   Constitutional: He is oriented to person, place, and time. He appears well-developed and well-nourished.   HENT:   Head: Normocephalic and atraumatic.   Right Ear: Tympanic membrane and external ear normal.   Left Ear: Tympanic membrane and external ear normal.   Nose: Nose normal.   Mouth/Throat: Oropharynx is clear and moist.   Eyes: Conjunctivae and EOM are normal. Pupils are equal, round, and reactive to light.   Neck: Normal range of motion. Neck supple. No JVD present. No thyromegaly present.   Cardiovascular: Normal rate, regular rhythm, normal heart sounds and intact distal pulses.   Pulmonary/Chest: Effort normal and breath sounds normal.   Abdominal: Soft. Bowel sounds are normal.   Musculoskeletal: Normal range of motion.        Lumbar back: He exhibits tenderness.    Lymphadenopathy:     He has no cervical adenopathy.   Neurological: He is alert and oriented to person, place, and time. No cranial nerve deficit. Coordination normal.   Skin: Skin is warm and dry. No rash noted.   Psychiatric: He has a normal mood and affect. His behavior is normal. Judgment and thought content normal.   Vitals reviewed.      Assessment/Plan   Problem List Items Addressed This Visit        Cardiovascular and Mediastinum    Benign essential HTN    Relevant Medications    felodipine (PLENDIL) 10 MG 24 hr tablet    Elevated cholesterol - Primary    Relevant Medications    simvastatin (ZOCOR) 40 MG tablet    Other Relevant Orders    Lipid Panel With / Chol / HDL Ratio       Musculoskeletal and Integument    Closed compression fracture of third lumbar vertebra (CMS/HCC)       Hematopoietic and Hemostatic    Diffuse large B-cell lymphoma of extranodal site excluding spleen and other solid organs (CMS/HCC)      Meds continued with pending lipid panel recheck wellness exam in 6 months.

## 2019-01-29 LAB
CHOLEST SERPL-MCNC: 159 MG/DL (ref 0–200)
CHOLEST/HDLC SERPL: 2.21 {RATIO}
HDLC SERPL-MCNC: 72 MG/DL (ref 40–60)
LDLC SERPL CALC-MCNC: 64 MG/DL (ref 0–100)
TRIGL SERPL-MCNC: 117 MG/DL (ref 0–150)
VLDLC SERPL CALC-MCNC: 23.4 MG/DL (ref 5–40)

## 2019-03-08 ENCOUNTER — RESULTS ENCOUNTER (OUTPATIENT)
Dept: ONCOLOGY | Facility: CLINIC | Age: 79
End: 2019-03-08

## 2019-03-08 DIAGNOSIS — T45.1X5A ANEMIA ASSOCIATED WITH CHEMOTHERAPY: ICD-10-CM

## 2019-03-08 DIAGNOSIS — D64.81 ANEMIA ASSOCIATED WITH CHEMOTHERAPY: ICD-10-CM

## 2019-03-08 DIAGNOSIS — C83.39 DIFFUSE LARGE B-CELL LYMPHOMA OF EXTRANODAL SITE EXCLUDING SPLEEN AND OTHER SOLID ORGANS (HCC): ICD-10-CM

## 2019-03-15 ENCOUNTER — INFUSION (OUTPATIENT)
Dept: ONCOLOGY | Facility: HOSPITAL | Age: 79
End: 2019-03-15

## 2019-03-15 VITALS
DIASTOLIC BLOOD PRESSURE: 77 MMHG | TEMPERATURE: 97.5 F | OXYGEN SATURATION: 98 % | SYSTOLIC BLOOD PRESSURE: 155 MMHG | HEART RATE: 76 BPM

## 2019-03-15 DIAGNOSIS — Z45.2 FITTING AND ADJUSTMENT OF VASCULAR CATHETER: Primary | ICD-10-CM

## 2019-03-15 PROCEDURE — 96523 IRRIG DRUG DELIVERY DEVICE: CPT | Performed by: NURSE PRACTITIONER

## 2019-03-15 RX ORDER — SODIUM CHLORIDE 0.9 % (FLUSH) 0.9 %
10 SYRINGE (ML) INJECTION AS NEEDED
Status: DISCONTINUED | OUTPATIENT
Start: 2019-03-15 | End: 2019-03-15 | Stop reason: HOSPADM

## 2019-03-15 RX ORDER — SODIUM CHLORIDE 0.9 % (FLUSH) 0.9 %
10 SYRINGE (ML) INJECTION AS NEEDED
Status: CANCELLED | OUTPATIENT
Start: 2019-03-15

## 2019-03-15 RX ADMIN — Medication 10 ML: at 10:22

## 2019-03-15 RX ADMIN — HEPARIN SODIUM (PORCINE) LOCK FLUSH IV SOLN 100 UNIT/ML 500 UNITS: 100 SOLUTION at 10:23

## 2019-04-19 ENCOUNTER — RESULTS ENCOUNTER (OUTPATIENT)
Dept: ONCOLOGY | Facility: CLINIC | Age: 79
End: 2019-04-19

## 2019-04-19 DIAGNOSIS — D64.81 ANEMIA ASSOCIATED WITH CHEMOTHERAPY: ICD-10-CM

## 2019-04-19 DIAGNOSIS — T45.1X5A ANEMIA ASSOCIATED WITH CHEMOTHERAPY: ICD-10-CM

## 2019-04-19 DIAGNOSIS — C83.39 DIFFUSE LARGE B-CELL LYMPHOMA OF EXTRANODAL SITE EXCLUDING SPLEEN AND OTHER SOLID ORGANS (HCC): ICD-10-CM

## 2019-05-03 ENCOUNTER — INFUSION (OUTPATIENT)
Dept: ONCOLOGY | Facility: HOSPITAL | Age: 79
End: 2019-05-03

## 2019-05-03 VITALS — HEART RATE: 77 BPM | DIASTOLIC BLOOD PRESSURE: 73 MMHG | OXYGEN SATURATION: 99 % | SYSTOLIC BLOOD PRESSURE: 146 MMHG

## 2019-05-03 DIAGNOSIS — Z45.2 FITTING AND ADJUSTMENT OF VASCULAR CATHETER: Primary | ICD-10-CM

## 2019-05-03 PROCEDURE — 96523 IRRIG DRUG DELIVERY DEVICE: CPT | Performed by: NURSE PRACTITIONER

## 2019-05-03 RX ORDER — SODIUM CHLORIDE 0.9 % (FLUSH) 0.9 %
10 SYRINGE (ML) INJECTION AS NEEDED
Status: CANCELLED | OUTPATIENT
Start: 2019-05-03

## 2019-05-03 RX ADMIN — HEPARIN SODIUM (PORCINE) LOCK FLUSH IV SOLN 100 UNIT/ML 500 UNITS: 100 SOLUTION at 09:58

## 2019-05-31 ENCOUNTER — RESULTS ENCOUNTER (OUTPATIENT)
Dept: ONCOLOGY | Facility: CLINIC | Age: 79
End: 2019-05-31

## 2019-05-31 DIAGNOSIS — D64.81 ANEMIA ASSOCIATED WITH CHEMOTHERAPY: ICD-10-CM

## 2019-05-31 DIAGNOSIS — T45.1X5A ANEMIA ASSOCIATED WITH CHEMOTHERAPY: ICD-10-CM

## 2019-05-31 DIAGNOSIS — C83.39 DIFFUSE LARGE B-CELL LYMPHOMA OF EXTRANODAL SITE EXCLUDING SPLEEN AND OTHER SOLID ORGANS (HCC): ICD-10-CM

## 2019-06-12 RX ORDER — SODIUM CHLORIDE 0.9 % (FLUSH) 0.9 %
10 SYRINGE (ML) INJECTION AS NEEDED
Status: CANCELLED | OUTPATIENT
Start: 2019-06-14

## 2019-06-14 ENCOUNTER — INFUSION (OUTPATIENT)
Dept: ONCOLOGY | Facility: HOSPITAL | Age: 79
End: 2019-06-14

## 2019-06-14 VITALS
TEMPERATURE: 98 F | DIASTOLIC BLOOD PRESSURE: 79 MMHG | SYSTOLIC BLOOD PRESSURE: 146 MMHG | OXYGEN SATURATION: 97 % | HEART RATE: 65 BPM

## 2019-06-14 DIAGNOSIS — Z45.2 FITTING AND ADJUSTMENT OF VASCULAR CATHETER: Primary | ICD-10-CM

## 2019-06-14 PROCEDURE — 96523 IRRIG DRUG DELIVERY DEVICE: CPT

## 2019-06-14 RX ORDER — SODIUM CHLORIDE 0.9 % (FLUSH) 0.9 %
10 SYRINGE (ML) INJECTION AS NEEDED
Status: CANCELLED | OUTPATIENT
Start: 2019-06-14

## 2019-06-14 RX ORDER — SODIUM CHLORIDE 0.9 % (FLUSH) 0.9 %
10 SYRINGE (ML) INJECTION AS NEEDED
Status: DISCONTINUED | OUTPATIENT
Start: 2019-06-14 | End: 2019-06-14 | Stop reason: HOSPADM

## 2019-06-14 RX ADMIN — HEPARIN SODIUM (PORCINE) LOCK FLUSH IV SOLN 100 UNIT/ML 500 UNITS: 100 SOLUTION at 10:46

## 2019-07-12 ENCOUNTER — INFUSION (OUTPATIENT)
Dept: ONCOLOGY | Facility: HOSPITAL | Age: 79
End: 2019-07-12

## 2019-07-12 ENCOUNTER — HOSPITAL ENCOUNTER (OUTPATIENT)
Dept: CT IMAGING | Facility: HOSPITAL | Age: 79
Discharge: HOME | End: 2019-07-12
Attending: INTERNAL MEDICINE | Admitting: INTERNAL MEDICINE

## 2019-07-12 DIAGNOSIS — T45.1X5A ANEMIA ASSOCIATED WITH CHEMOTHERAPY: ICD-10-CM

## 2019-07-12 DIAGNOSIS — C83.39 DIFFUSE LARGE B-CELL LYMPHOMA OF EXTRANODAL SITE EXCLUDING SPLEEN AND OTHER SOLID ORGANS (HCC): ICD-10-CM

## 2019-07-12 DIAGNOSIS — D64.81 ANEMIA ASSOCIATED WITH CHEMOTHERAPY: ICD-10-CM

## 2019-07-12 DIAGNOSIS — C82.90 FOLLICULAR LYMPHOMA, UNSPECIFIED FOLLICULAR LYMPHOMA TYPE, UNSPECIFIED BODY REGION (HCC): Primary | ICD-10-CM

## 2019-07-12 DIAGNOSIS — Z45.2 FITTING AND ADJUSTMENT OF VASCULAR CATHETER: ICD-10-CM

## 2019-07-12 LAB
ALBUMIN SERPL-MCNC: 4.3 G/DL (ref 3.5–5.2)
ALBUMIN/GLOB SERPL: 1.9 G/DL
ALP SERPL-CCNC: 62 U/L (ref 39–117)
ALT SERPL W P-5'-P-CCNC: 12 U/L (ref 1–41)
ANION GAP SERPL CALCULATED.3IONS-SCNC: 9.5 MMOL/L (ref 5–15)
AST SERPL-CCNC: 22 U/L (ref 1–40)
BASOPHILS # BLD AUTO: 0.02 10*3/MM3 (ref 0–0.2)
BASOPHILS NFR BLD AUTO: 0.4 % (ref 0–1.5)
BILIRUB SERPL-MCNC: 0.7 MG/DL (ref 0.1–1.2)
BUN BLD-MCNC: 18 MG/DL (ref 8–23)
BUN/CREAT SERPL: 14.1 (ref 7–25)
CALCIUM SPEC-SCNC: 9.1 MG/DL (ref 8.6–10.5)
CHLORIDE SERPL-SCNC: 105 MMOL/L (ref 98–107)
CO2 SERPL-SCNC: 24.5 MMOL/L (ref 22–29)
CREAT BLD-MCNC: 1.28 MG/DL (ref 0.76–1.27)
DEPRECATED RDW RBC AUTO: 47.5 FL (ref 37–54)
EOSINOPHIL # BLD AUTO: 0.1 10*3/MM3 (ref 0–0.4)
EOSINOPHIL NFR BLD AUTO: 1.9 % (ref 0.3–6.2)
ERYTHROCYTE [DISTWIDTH] IN BLOOD BY AUTOMATED COUNT: 13.4 % (ref 12.3–15.4)
GFR SERPL CREATININE-BSD FRML MDRD: 54 ML/MIN/1.73
GLOBULIN UR ELPH-MCNC: 2.3 GM/DL
GLUCOSE BLD-MCNC: 103 MG/DL (ref 65–99)
HCT VFR BLD AUTO: 36.2 % (ref 37.5–51)
HGB BLD-MCNC: 12.3 G/DL (ref 13–17.7)
IMM GRANULOCYTES # BLD AUTO: 0.01 10*3/MM3 (ref 0–0.05)
IMM GRANULOCYTES NFR BLD AUTO: 0.2 % (ref 0–0.5)
LYMPHOCYTES # BLD AUTO: 0.76 10*3/MM3 (ref 0.7–3.1)
LYMPHOCYTES NFR BLD AUTO: 14.5 % (ref 19.6–45.3)
MCH RBC QN AUTO: 32.4 PG (ref 26.6–33)
MCHC RBC AUTO-ENTMCNC: 34 G/DL (ref 31.5–35.7)
MCV RBC AUTO: 95.3 FL (ref 79–97)
MONOCYTES # BLD AUTO: 0.58 10*3/MM3 (ref 0.1–0.9)
MONOCYTES NFR BLD AUTO: 11.1 % (ref 5–12)
NEUTROPHILS # BLD AUTO: 3.76 10*3/MM3 (ref 1.7–7)
NEUTROPHILS NFR BLD AUTO: 71.9 % (ref 42.7–76)
NRBC BLD AUTO-RTO: 0 /100 WBC (ref 0–0.2)
PLATELET # BLD AUTO: 157 10*3/MM3 (ref 140–450)
PMV BLD AUTO: 9.7 FL (ref 6–12)
POTASSIUM BLD-SCNC: 4.1 MMOL/L (ref 3.5–5.2)
PROT SERPL-MCNC: 6.6 G/DL (ref 6–8.5)
RBC # BLD AUTO: 3.8 10*6/MM3 (ref 4.14–5.8)
SODIUM BLD-SCNC: 139 MMOL/L (ref 136–145)
WBC NRBC COR # BLD: 5.23 10*3/MM3 (ref 3.4–10.8)

## 2019-07-12 PROCEDURE — 25010000002 IOPAMIDOL 61 % SOLUTION: Performed by: INTERNAL MEDICINE

## 2019-07-12 PROCEDURE — 80053 COMPREHEN METABOLIC PANEL: CPT | Performed by: INTERNAL MEDICINE

## 2019-07-12 PROCEDURE — 96523 IRRIG DRUG DELIVERY DEVICE: CPT | Performed by: INTERNAL MEDICINE

## 2019-07-12 PROCEDURE — 74177 CT ABD & PELVIS W/CONTRAST: CPT

## 2019-07-12 PROCEDURE — 0 DIATRIZOATE MEGLUMINE & SODIUM PER 1 ML: Performed by: INTERNAL MEDICINE

## 2019-07-12 PROCEDURE — 71260 CT THORAX DX C+: CPT

## 2019-07-12 PROCEDURE — 82565 ASSAY OF CREATININE: CPT

## 2019-07-12 PROCEDURE — 85025 COMPLETE CBC W/AUTO DIFF WBC: CPT | Performed by: INTERNAL MEDICINE

## 2019-07-12 RX ORDER — SODIUM CHLORIDE 0.9 % (FLUSH) 0.9 %
10 SYRINGE (ML) INJECTION AS NEEDED
Status: DISCONTINUED | OUTPATIENT
Start: 2019-07-12 | End: 2019-07-12 | Stop reason: HOSPADM

## 2019-07-12 RX ORDER — SODIUM CHLORIDE 0.9 % (FLUSH) 0.9 %
10 SYRINGE (ML) INJECTION AS NEEDED
Status: CANCELLED | OUTPATIENT
Start: 2019-07-12

## 2019-07-12 RX ADMIN — Medication 10 ML: at 09:52

## 2019-07-12 RX ADMIN — IOPAMIDOL 90 ML: 612 INJECTION, SOLUTION INTRAVENOUS at 10:37

## 2019-07-12 RX ADMIN — DIATRIZOATE MEGLUMINE AND DIATRIZOATE SODIUM 30 ML: 660; 100 LIQUID ORAL; RECTAL at 09:30

## 2019-07-15 LAB — CREAT BLDA-MCNC: 1.2 MG/DL (ref 0.6–1.3)

## 2019-07-23 ENCOUNTER — APPOINTMENT (OUTPATIENT)
Dept: OTHER | Facility: HOSPITAL | Age: 79
End: 2019-07-23

## 2019-07-23 ENCOUNTER — OFFICE VISIT (OUTPATIENT)
Dept: ONCOLOGY | Facility: CLINIC | Age: 79
End: 2019-07-23

## 2019-07-23 VITALS
HEIGHT: 68 IN | BODY MASS INDEX: 26.18 KG/M2 | HEART RATE: 73 BPM | SYSTOLIC BLOOD PRESSURE: 148 MMHG | DIASTOLIC BLOOD PRESSURE: 79 MMHG | TEMPERATURE: 97.8 F | RESPIRATION RATE: 14 BRPM | WEIGHT: 172.7 LBS | OXYGEN SATURATION: 97 %

## 2019-07-23 DIAGNOSIS — C82.90 FOLLICULAR LYMPHOMA, UNSPECIFIED FOLLICULAR LYMPHOMA TYPE, UNSPECIFIED BODY REGION (HCC): Primary | ICD-10-CM

## 2019-07-23 DIAGNOSIS — Z45.2 FITTING AND ADJUSTMENT OF VASCULAR CATHETER: ICD-10-CM

## 2019-07-23 DIAGNOSIS — C83.39 DIFFUSE LARGE B-CELL LYMPHOMA OF EXTRANODAL SITE EXCLUDING SPLEEN AND OTHER SOLID ORGANS (HCC): ICD-10-CM

## 2019-07-23 PROCEDURE — G0463 HOSPITAL OUTPT CLINIC VISIT: HCPCS | Performed by: INTERNAL MEDICINE

## 2019-07-23 PROCEDURE — 99214 OFFICE O/P EST MOD 30 MIN: CPT | Performed by: INTERNAL MEDICINE

## 2019-07-23 NOTE — PROGRESS NOTES
Subjective     REASON FOR FOLLOW UP:   1.  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  2.  Maintenance Rituxan every 3 months for 2 years initiated 3/30/2018  3.  Painful pathologic compression fracture at L3 May 2018 due to involvement with diffuse large B-cell non-Hodgkin's lymphoma.   4.  CHOP and Rituxan x 6 cycles completed 9/25/2018.    HISTORY OF PRESENT ILLNESS:  The patient is a 79 y.o. year old male who was seen in consultation on 10/20/2017 and at that time we recommended laparoscopic lymph node sampling and upper and lower GI endoscopy.  He underwent these procedures with Dr. Castañeda on 10/26/2017.  There was no evidence of malignancy from his endoscopic procedures at the laparoscopic specimen returned as follicular center cell B-cell non-Hodgkin's lymphoma.    He received Rituxan treatment initially with 4 weekly doses of Rituxan with good response on follow-up scans.  Unfortunately, a few months after completion of his treatment he developed worsening back pain and has lymphoma involving the L3 vertebral body consistent with diffuse large B-cell non-Hodgkin's lymphoma.    He underwent port placement and received 6 cycles of CHOP and Rituxan which he completed in late September 2018 with what appear to be a complete response on PET scan.    He returns today for follow-up with CT scan surveillance.  CT scans performed on 7/12/2019 showed stable residual mass in the retroperitoneal area.      Pain   Pertinent negatives include no chest pain, coughing, fatigue, fever, headaches, nausea, neck pain, rash or vomiting.        Past Medical History:   Diagnosis Date   • Arm fracture     right side humerus - sling and swathe   • Cancer (CMS/HCC) 2017    Basal Cell on Nose and Under Right Eye   • Coronary artery disease    • DDD (degenerative disc disease), cervical    • Gastric mass    • GERD (gastroesophageal  "reflux disease)    • Hip pain started 5/29/18    onset of acute pain with weight bearing   • Hx of cardiac arrest     Happened after Induction of anesthesia prior  to SURG 10 YRS AGO  \" I FLATLINE BUT W/IN SECONDS HEART WAS OK AFTER TURNED ON BACK...I THINK THEY GAVE ME TO MUCH ANESTHESIA\"   • Hyperlipidemia    • Hypertension    • Kidney stones    • Non Hodgkin's lymphoma (CMS/HCC)     SPINE   • Osteoarthritis    • Pleural effusion     DRAINAGE RT PLEURAL FLUID FOR TESTING WITH EGD ON 1-30-17   NO MALIGNANCY WITH CLEARANCE FROM DR SIMMONS FOR SURGERY   • Rheumatoid arthritis (CMS/HCC)         Past Surgical History:   Procedure Laterality Date   • COLONOSCOPY N/A 10/26/2017    Procedure: COLONOSCOPY;  Surgeon: Louie Castañeda MD;  Location: [facility];  Service:    • CORONARY ANGIOPLASTY WITH STENT PLACEMENT  1999    17 years ago   • CYSTOSCOPY W/ LITHOLAPAXY / EHL      6-7 years ago   • DIAGNOSTIC LAPAROSCOPY N/A 10/26/2017    Procedure: DIAGNOSTIC LAPAROSCOPY WITH RETROPERITONEAL BIOPSIES;  Surgeon: Louie Castañeda MD;  Location: [facility];  Service:    • ENDOSCOPY      ON 1/30/17 WITH DRAINAGE OF RT PLEURAL FLUID FOR BIOPSY    NO MALIGNANCY  CLEARANCE FOR SURG PER DR SIMMONS   • ENDOSCOPY N/A 10/26/2017    Procedure: ESOPHAGOGASTRODUODENOSCOPY;  Surgeon: Louie Castañeda MD;  Location: [facility];  Service:    • EYE SURGERY Right 2017    Moh's; reconstruction right lower lid   • KIDNEY STONE SURGERY     • PLEURAL BIOPSY     • POSTERIOR CERVICAL FUSION  2001   • SKIN CANCER EXCISION N/A 2017    Basal Cell Nose & under right eye   • TOTAL HIP ARTHROPLASTY Left 04/2010    9-10 years ago    • TOTAL HIP ARTHROPLASTY Right 2/14/2017    Procedure: TOTAL HIP ARTHROPLASTY;  Surgeon: Gerson Cardoza MD;  Location: [facility] OR;  Service:    • VENOUS ACCESS DEVICE (PORT) INSERTION N/A 6/5/2018    Procedure: INSERTION VENOUS ACCESS DEVICE- RIGHT;  Surgeon: Louie Castañeda MD;  Location: [facility]" "MAIN OR;  Service: General         ALLERGIES:  No Known Allergies     Review of Systems   Constitutional: Positive for appetite change and unexpected weight change. Negative for activity change, fatigue and fever.   HENT: Negative for hearing loss, nosebleeds, trouble swallowing and voice change.    Eyes: Negative for visual disturbance.   Respiratory: Negative for cough, shortness of breath and wheezing.    Cardiovascular: Negative for chest pain and palpitations.   Gastrointestinal: Positive for constipation. Negative for diarrhea, nausea and vomiting.   Genitourinary: Negative for difficulty urinating, frequency, hematuria and urgency.   Musculoskeletal: Positive for back pain. Negative for neck pain.   Skin: Negative for rash.   Neurological: Negative for dizziness, seizures, syncope and headaches.   Hematological: Negative for adenopathy. Does not bruise/bleed easily.   Psychiatric/Behavioral: Negative for behavioral problems. The patient is not nervous/anxious.         Objective     Vitals:    07/23/19 1418   BP: 148/79   Pulse: 73   Resp: 14   Temp: 97.8 °F (36.6 °C)   SpO2: 97%   Weight: 78.3 kg (172 lb 11.2 oz)   Height: 172.7 cm (68\")   PainSc: 0-No pain     Current Status 7/23/2019   ECOG score 0       Physical Exam   Constitutional: He is oriented to person, place, and time. He appears well-developed and well-nourished. No distress.   HENT:   Head: Normocephalic.   Eyes: Conjunctivae and EOM are normal. Pupils are equal, round, and reactive to light. No scleral icterus.   Neck: Normal range of motion. Neck supple. No JVD present. No thyromegaly present.   Cardiovascular: Normal rate and regular rhythm. Exam reveals no gallop and no friction rub.   No murmur heard.  Pulmonary/Chest: Effort normal and breath sounds normal. He has no wheezes. He has no rales.   Right IJ Mediport.   Abdominal: Soft. He exhibits no distension and no mass. There is no tenderness.   Musculoskeletal: Normal range of motion. He " exhibits no edema or deformity.   Wearing a back brace.   Lymphadenopathy:     He has no cervical adenopathy.   Neurological: He is alert and oriented to person, place, and time. He has normal reflexes. No cranial nerve deficit.   Skin: Skin is warm and dry. No rash noted. No erythema.   Psychiatric: He has a normal mood and affect. His behavior is normal. Judgment normal.         RECENT LABS:  Hematology WBC   Date Value Ref Range Status   07/12/2019 5.23 3.40 - 10.80 10*3/mm3 Final     RBC   Date Value Ref Range Status   07/12/2019 3.80 (L) 4.14 - 5.80 10*6/mm3 Final     Hemoglobin   Date Value Ref Range Status   07/12/2019 12.3 (L) 13.0 - 17.7 g/dL Final     Hematocrit   Date Value Ref Range Status   07/12/2019 36.2 (L) 37.5 - 51.0 % Final     Platelets   Date Value Ref Range Status   07/12/2019 157 140 - 450 10*3/mm3 Final          Lab Results   Component Value Date    GLUCOSE 103 (H) 07/12/2019    BUN 18 07/12/2019    CREATININE 1.20 07/12/2019    EGFRIFNONA 54 (L) 07/12/2019    EGFRIFAFRI 61 03/16/2018    BCR 14.1 07/12/2019    K 4.1 07/12/2019    CO2 24.5 07/12/2019    CALCIUM 9.1 07/12/2019    PROTENTOTREF 7.2 03/16/2018    ALBUMIN 4.30 07/12/2019    LABIL2 1.3 03/16/2018    AST 22 07/12/2019    ALT 12 07/12/2019       CT ABDOMEN PELVIS WITH CONTRAST-, CT CHEST WITH CONTRAST-7/12/2019  FINDINGS:    CHEST: There is a right IJ Mediport with tip in the SVC. There is no  mediastinal or hilar or axillary omar enlargement. There is direct  origin of the left vertebral artery from the aortic arch.  Atherosclerotic calcifications are present involving the thoracic aorta.  There is no pleural or pericardial effusion.     Within the left upper lobe, there is a calcified granuloma without  change. Previous study demonstrated subtle reticular nodular infiltrate  in the left lower lobe and this is resolved. There is no new or  suspicious pulmonary nodule.     ABDOMEN/PELVIS: Liver, spleen, adrenal glands, pancreas  appear within  normal limits. There is chronic left renal atrophy. A 2 cm left lateral  renal exophytic cyst is present. A right lower pole renal cyst measures  2 cm.  A 5 mm nonobstructing right renal stone is present. There is no  hydronephrosis.     Infiltrative retroperitoneal soft tissue is present near the root of the  mesentery centrally. This extends along the left aspect of the  infrarenal abdominal aorta and measures approximately 3.4 x 2 cm and is  without change. No new omar enlargement is present within the abdomen  or pelvis. Sigmoid diverticulosis is present without evidence for  diverticulitis. Bilateral total hip arthroplasties are present.  L3  compression deformity is present without change.     IMPRESSION:  1.  Left lower lobe reticulonodular infiltrate has resolved since the  previous exam.   2. Infiltrative soft tissue density within the central left abdominal  mesentery is without change. No omar enlargement or new abnormality.     Images personally reviewed    Assessment/Plan     1.  Diffuse large B-cell non-Hodgkin's lymphoma involving the L3 vertebral body.  As noted above, his PET scan after 4 cycles of CHOP and Rituxan showed essentially a complete metabolic response.  He completed a total of 6 cycles of chemotherapy by 9/25/2018 and is felt to be in remission.  2.  Previous treatment with single agent Rituxan for follicular B-cell lymphoma.  3.  Back pain related to lymphoma involving the spine.   4.  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  5.  Right IJ Mediport.  6.  Minimal reticulonodular infiltrate the left lower lobe on prior CT scan which has now resolved    Plan  1.  We discussed the results of the CT scans with the patient and provided him a copy of the printed report.  2.  He had his port flushed in the office today and will continue to undergo port flushes in our office every 6 weeks.  3.  We will plan to see David again in the office in 6 months and he again will undergo CT scans of the  chest abdomen pelvis and labs one week prior to that visit.

## 2019-08-15 ENCOUNTER — RESULTS ENCOUNTER (OUTPATIENT)
Dept: INTERNAL MEDICINE | Facility: CLINIC | Age: 79
End: 2019-08-15

## 2019-08-15 DIAGNOSIS — I10 BENIGN ESSENTIAL HTN: ICD-10-CM

## 2019-08-15 DIAGNOSIS — I10 BENIGN ESSENTIAL HTN: Primary | ICD-10-CM

## 2019-08-15 DIAGNOSIS — D50.0 IRON DEFICIENCY ANEMIA DUE TO CHRONIC BLOOD LOSS: ICD-10-CM

## 2019-08-15 DIAGNOSIS — N18.2 CHRONIC RENAL IMPAIRMENT, STAGE 2 (MILD): ICD-10-CM

## 2019-08-15 DIAGNOSIS — E78.00 ELEVATED CHOLESTEROL: ICD-10-CM

## 2019-08-16 ENCOUNTER — OFFICE VISIT (OUTPATIENT)
Dept: INTERNAL MEDICINE | Facility: CLINIC | Age: 79
End: 2019-08-16

## 2019-08-16 ENCOUNTER — LAB (OUTPATIENT)
Dept: INTERNAL MEDICINE | Facility: CLINIC | Age: 79
End: 2019-08-16

## 2019-08-16 VITALS
WEIGHT: 172.8 LBS | OXYGEN SATURATION: 99 % | BODY MASS INDEX: 26.19 KG/M2 | TEMPERATURE: 97.2 F | RESPIRATION RATE: 16 BRPM | HEART RATE: 75 BPM | HEIGHT: 68 IN | DIASTOLIC BLOOD PRESSURE: 81 MMHG | SYSTOLIC BLOOD PRESSURE: 132 MMHG

## 2019-08-16 DIAGNOSIS — E78.00 ELEVATED CHOLESTEROL: ICD-10-CM

## 2019-08-16 DIAGNOSIS — D50.0 IRON DEFICIENCY ANEMIA DUE TO CHRONIC BLOOD LOSS: ICD-10-CM

## 2019-08-16 DIAGNOSIS — N18.2 CHRONIC RENAL IMPAIRMENT, STAGE 2 (MILD): ICD-10-CM

## 2019-08-16 DIAGNOSIS — C83.39 DIFFUSE LARGE B-CELL LYMPHOMA OF EXTRANODAL SITE EXCLUDING SPLEEN AND OTHER SOLID ORGANS (HCC): ICD-10-CM

## 2019-08-16 DIAGNOSIS — I10 BENIGN ESSENTIAL HTN: ICD-10-CM

## 2019-08-16 DIAGNOSIS — Z00.00 MEDICARE ANNUAL WELLNESS VISIT, SUBSEQUENT: Primary | ICD-10-CM

## 2019-08-16 DIAGNOSIS — Z86.2 HISTORY OF ANEMIA: ICD-10-CM

## 2019-08-16 LAB
ALBUMIN SERPL-MCNC: 4.4 G/DL (ref 3.5–5.2)
ALBUMIN/GLOB SERPL: 2.1 G/DL
ALP SERPL-CCNC: 64 U/L (ref 39–117)
ALT SERPL-CCNC: 12 U/L (ref 1–41)
APPEARANCE UR: CLEAR
AST SERPL-CCNC: 18 U/L (ref 1–40)
BACTERIA #/AREA URNS HPF: NORMAL /HPF
BASOPHILS # BLD AUTO: 0.03 10*3/MM3 (ref 0–0.2)
BASOPHILS NFR BLD AUTO: 0.7 % (ref 0–1.5)
BILIRUB SERPL-MCNC: 0.7 MG/DL (ref 0.2–1.2)
BILIRUB UR QL STRIP: NEGATIVE
BUN SERPL-MCNC: 19 MG/DL (ref 8–23)
BUN/CREAT SERPL: 12.8 (ref 7–25)
CALCIUM SERPL-MCNC: 9 MG/DL (ref 8.6–10.5)
CASTS URNS MICRO: NORMAL
CHLORIDE SERPL-SCNC: 101 MMOL/L (ref 98–107)
CHOLEST SERPL-MCNC: 129 MG/DL (ref 0–200)
CHOLEST/HDLC SERPL: 2.05 {RATIO}
CO2 SERPL-SCNC: 27.3 MMOL/L (ref 22–29)
COLOR UR: YELLOW
CREAT SERPL-MCNC: 1.49 MG/DL (ref 0.76–1.27)
EOSINOPHIL # BLD AUTO: 0.1 10*3/MM3 (ref 0–0.4)
EOSINOPHIL NFR BLD AUTO: 2.4 % (ref 0.3–6.2)
EPI CELLS #/AREA URNS HPF: NORMAL /HPF
ERYTHROCYTE [DISTWIDTH] IN BLOOD BY AUTOMATED COUNT: 13.8 % (ref 12.3–15.4)
GLOBULIN SER CALC-MCNC: 2.1 GM/DL
GLUCOSE SERPL-MCNC: 104 MG/DL (ref 65–99)
GLUCOSE UR QL: NEGATIVE
HCT VFR BLD AUTO: 38.2 % (ref 37.5–51)
HDLC SERPL-MCNC: 63 MG/DL (ref 40–60)
HGB BLD-MCNC: 12.5 G/DL (ref 13–17.7)
HGB UR QL STRIP: NEGATIVE
IMM GRANULOCYTES # BLD AUTO: 0.01 10*3/MM3 (ref 0–0.05)
IMM GRANULOCYTES NFR BLD AUTO: 0.2 % (ref 0–0.5)
KETONES UR QL STRIP: NEGATIVE
LDLC SERPL CALC-MCNC: 48 MG/DL (ref 0–100)
LEUKOCYTE ESTERASE UR QL STRIP: NEGATIVE
LYMPHOCYTES # BLD AUTO: 0.68 10*3/MM3 (ref 0.7–3.1)
LYMPHOCYTES NFR BLD AUTO: 16.6 % (ref 19.6–45.3)
MCH RBC QN AUTO: 32.3 PG (ref 26.6–33)
MCHC RBC AUTO-ENTMCNC: 32.7 G/DL (ref 31.5–35.7)
MCV RBC AUTO: 98.7 FL (ref 79–97)
MONOCYTES # BLD AUTO: 0.51 10*3/MM3 (ref 0.1–0.9)
MONOCYTES NFR BLD AUTO: 12.4 % (ref 5–12)
NEUTROPHILS # BLD AUTO: 2.77 10*3/MM3 (ref 1.7–7)
NEUTROPHILS NFR BLD AUTO: 67.7 % (ref 42.7–76)
NITRITE UR QL STRIP: NEGATIVE
NRBC BLD AUTO-RTO: 0 /100 WBC (ref 0–0.2)
PH UR STRIP: 5.5 [PH] (ref 5–8)
PLATELET # BLD AUTO: 183 10*3/MM3 (ref 140–450)
POTASSIUM SERPL-SCNC: 4.9 MMOL/L (ref 3.5–5.2)
PROT SERPL-MCNC: 6.5 G/DL (ref 6–8.5)
PROT UR QL STRIP: NEGATIVE
RBC # BLD AUTO: 3.87 10*6/MM3 (ref 4.14–5.8)
RBC #/AREA URNS HPF: NORMAL /HPF
SODIUM SERPL-SCNC: 140 MMOL/L (ref 136–145)
SP GR UR: 1.02 (ref 1–1.03)
TRIGL SERPL-MCNC: 89 MG/DL (ref 0–150)
UROBILINOGEN UR STRIP-MCNC: (no result) MG/DL
VLDLC SERPL CALC-MCNC: 17.8 MG/DL
WBC # BLD AUTO: 4.1 10*3/MM3 (ref 3.4–10.8)
WBC #/AREA URNS HPF: NORMAL /HPF

## 2019-08-16 PROCEDURE — G0439 PPPS, SUBSEQ VISIT: HCPCS | Performed by: NURSE PRACTITIONER

## 2019-08-16 PROCEDURE — 99214 OFFICE O/P EST MOD 30 MIN: CPT | Performed by: NURSE PRACTITIONER

## 2019-08-16 NOTE — PATIENT INSTRUCTIONS
Medicare Wellness  Personal Prevention Plan of Service     Date of Office Visit:  2019  Encounter Provider:  NOLAN Thibodeaux  Place of Service:  [facility]  Patient Name: Louie German  :  1940    As part of the Medicare Wellness portion of your visit today, we are providing you with this personalized preventive plan of services (PPPS). This plan is based upon recommendations of the [place] Preventive Services Task Force (USPSTF) and the Advisory Committee on Immunization Practices (ACIP).    This lists the preventive care services that should be considered, and provides dates of when you are due. Items listed as completed are up-to-date and do not require any further intervention.    Health Maintenance   Topic Date Due   • TDAP/TD VACCINES (1 - Tdap) 1958   • PNEUMOCOCCAL VACCINES (65+ LOW/MEDIUM RISK) (1 of 2 - PCV13) 2004   • MEDICARE ANNUAL WELLNESS  2016   • ZOSTER VACCINE (2 of 3) 2017   • INFLUENZA VACCINE  2019   • LIPID PANEL  2020       No orders of the defined types were placed in this encounter.      Return in about 6 months (around 2020).

## 2019-08-16 NOTE — PROGRESS NOTES
The ABCs of the Annual Wellness Visit  Subsequent Medicare Wellness Visit    Chief Complaint   Patient presents with   • Medicare Wellness-subsequent     Pt presents here today for a medicare wellness visit.       Subjective   History of Present Illness:  Louie German is a 79 y.o. male who presents for a Subsequent Medicare Wellness Visit.    HEALTH RISK ASSESSMENT    Recent Hospitalizations:  No hospitalization(s) within the last year.    Current Medical Providers:  Patient Care Team:  Ryan Villarreal Jr., MD as PCP - General (Family Medicine)  Omkar Montalvo MD as Surgeon (Cardiothoracic Surgery)  Grisel Alvarez MA as Medical Assistant  Danny Roberto MD as Consulting Physician (Hematology and Oncology)    Smoking Status:  Social History     Tobacco Use   Smoking Status Never Smoker   Smokeless Tobacco Former User   • Types: Chew       Alcohol Consumption:  Social History     Substance and Sexual Activity   Alcohol Use Yes    Comment: 1-2 beers nightly, sometimes an occasional bourbon       Depression Screen:   PHQ-2/PHQ-9 Depression Screening 8/16/2019   Little interest or pleasure in doing things 0   Feeling down, depressed, or hopeless 0   Trouble falling or staying asleep, or sleeping too much -   Feeling tired or having little energy -   Poor appetite or overeating -   Feeling bad about yourself - or that you are a failure or have let yourself or your family down -   Trouble concentrating on things, such as reading the newspaper or watching television -   Moving or speaking so slowly that other people could have noticed. Or the opposite - being so fidgety or restless that you have been moving around a lot more than usual -   Thoughts that you would be better off dead, or of hurting yourself in some way -   Total Score 0   If you checked off any problems, how difficult have these problems made it for you to do your work, take care of things at home, or get along with other people? -       Fall  Risk Screen:  WHITADI Fall Risk Assessment was completed, and patient is at LOW risk for falls.Assessment completed on:8/16/2019    Health Habits and Functional and Cognitive Screening:  Functional & Cognitive Status 8/16/2019   Do you have difficulty preparing food and eating? No   Do you have difficulty bathing yourself, getting dressed or grooming yourself? No   Do you have difficulty using the toilet? No   Do you have difficulty moving around from place to place? No   Do you have trouble with steps or getting out of a bed or a chair? No   Current Diet Well Balanced Diet   Dental Exam Up to date   Eye Exam Not up to date   Current Exercise Activities Include Aerobics   Do you need help using the phone?  No   Are you deaf or do you have serious difficulty hearing?  No   Do you need help with transportation? No   Do you need help shopping? No   Do you need help preparing meals?  No   Do you need help with housework?  No   Do you need help with laundry? No   Do you need help taking your medications? No   Do you need help managing money? No   Do you ever drive or ride in a car without wearing a seat belt? No   Have you felt unusual stress, anger or loneliness in the last month? No   Who do you live with? Spouse   If you need help, do you have trouble finding someone available to you? No   Have you been bothered in the last four weeks by sexual problems? No   Do you have difficulty concentrating, remembering or making decisions? Yes         Does the patient have evidence of cognitive impairment? No    Asprin use counseling:Does not need ASA (and currently is not on it)    Age-appropriate Screening Schedule:  Refer to the list below for future screening recommendations based on patient's age, sex and/or medical conditions. Orders for these recommended tests are listed in the plan section. The patient has been provided with a written plan.    Health Maintenance   Topic Date Due   • TDAP/TD VACCINES (1 - Tdap) 02[birth year]02/1959    • PNEUMOCOCCAL VACCINES (65+ LOW/MEDIUM RISK) (1 of 2 - PCV13) 02/02/2004   • ZOSTER VACCINE (2 of 3) 05/05/2017   • INFLUENZA VACCINE  08/01/2019   • LIPID PANEL  01/28/2020          The following portions of the patient's history were reviewed and updated as appropriate: allergies, current medications, past family history, past medical history, past social history, past surgical history and problem list.    Outpatient Medications Prior to Visit   Medication Sig Dispense Refill   • felodipine (PLENDIL) 10 MG 24 hr tablet Take 1 tablet by mouth Daily. 90 tablet 1   • Multiple Vitamin (MULTI VITAMIN PO) Take 1 tablet by mouth Daily.     • simvastatin (ZOCOR) 40 MG tablet Take 1 tablet by mouth Daily. 90 tablet 1     Facility-Administered Medications Prior to Visit   Medication Dose Route Frequency Provider Last Rate Last Dose   • heparin flush (porcine) 100 UNIT/ML injection 500 Units  500 Units Intravenous PRN Danny Roberto MD   500 Units at 01/25/19 1030   • sodium chloride 0.9 % flush 10 mL  10 mL Intravenous PRN Danny Roberto MD   10 mL at 01/25/19 1030       Patient Active Problem List   Diagnosis   • Benign essential HTN   • Acid reflux   • Elevated cholesterol   • Calculus of kidney   • Cervical spinal stenosis   • Status post total hip replacement, left   • Status post total replacement of right hip   • Degenerative disc disease, lumbar   • Hydronephrosis of left kidney   • Diffuse large B-cell lymphoma of extranodal site excluding spleen and other solid organs (CMS/HCC)   • Iron deficiency anemia due to chronic blood loss   • Chronic renal impairment, stage 2 (mild)   • Metastatic tumor of bone   • Closed compression fracture of third lumbar vertebra (CMS/HCC)   • Lymphoma (CMS/HCC)   • Fitting and adjustment of vascular catheter   • Anemia associated with chemotherapy       Advanced Care Planning:  Patient has an advance directive - a copy has been provided and is visible in patient  "header    Review of Systems    Compared to one year ago, the patient feels his physical health is better.  Compared to one year ago, the patient feels his mental health is better.    Reviewed chart for potential of high risk medication in the elderly: yes  Reviewed chart for potential of harmful drug interactions in the elderly:yes    Objective         Vitals:    08/16/19 1529   BP: 132/81   BP Location: Left arm   Patient Position: Sitting   Cuff Size: Adult   Pulse: 75   Resp: 16   Temp: 97.2 °F (36.2 °C)   TempSrc: Oral   SpO2: 99%   Weight: 78.4 kg (172 lb 12.8 oz)   Height: 172.7 cm (68\")       Body mass index is 26.27 kg/m².  Discussed the patient's BMI with him. The BMI is in the acceptable range.    Physical Exam          Assessment/Plan   Medicare Risks and Personalized Health Plan  CMS Preventative Services Quick Reference  Advance Directive Discussion  Cardiovascular risk  Fall Risk    The above risks/problems have been discussed with the patient.  Pertinent information has been shared with the patient in the After Visit Summary.  Follow up plans and orders are seen below in the Assessment/Plan Section.    Diagnoses and all orders for this visit:    1. Medicare annual wellness visit, subsequent (Primary)    2. Benign essential HTN    3. Elevated cholesterol    4. Chronic renal impairment, stage 2 (mild)    5. Diffuse large B-cell lymphoma of extranodal site excluding spleen and other solid organs (CMS/HCC)    6. History of anemia      Follow Up:  Return in about 6 months (around 2/16/2020).     An After Visit Summary and PPPS were given to the patient.             "

## 2019-08-16 NOTE — PROGRESS NOTES
The ABCs of the Annual Wellness Visit  Subsequent Medicare Wellness Visit    Chief Complaint   Patient presents with   • Medicare Wellness-subsequent     Pt presents here today for a medicare wellness visit.       Subjective   History of Present Illness:  Louie German is a 79 y.o. male who presents for a Subsequent Medicare Wellness Visit.    HEALTH RISK ASSESSMENT    Recent Hospitalizations:  No hospitalization(s) within the last year.    Current Medical Providers:  Patient Care Team:  Ryan Villarreal Jr., MD as PCP - General (Family Medicine)  Omkar Montalvo MD as Surgeon (Cardiothoracic Surgery)  Grisel Alvarez MA as Medical Assistant  Danny Roberto MD as Consulting Physician (Hematology and Oncology)    Smoking Status:  Social History     Tobacco Use   Smoking Status Never Smoker   Smokeless Tobacco Former User   • Types: Chew       Alcohol Consumption:  Social History     Substance and Sexual Activity   Alcohol Use Yes    Comment: 1-2 beers nightly, sometimes an occasional bourbon       Depression Screen:   PHQ-2/PHQ-9 Depression Screening 8/16/2019   Little interest or pleasure in doing things 0   Feeling down, depressed, or hopeless 0   Trouble falling or staying asleep, or sleeping too much -   Feeling tired or having little energy -   Poor appetite or overeating -   Feeling bad about yourself - or that you are a failure or have let yourself or your family down -   Trouble concentrating on things, such as reading the newspaper or watching television -   Moving or speaking so slowly that other people could have noticed. Or the opposite - being so fidgety or restless that you have been moving around a lot more than usual -   Thoughts that you would be better off dead, or of hurting yourself in some way -   Total Score 0   If you checked off any problems, how difficult have these problems made it for you to do your work, take care of things at home, or get along with other people? -       Fall  Risk Screen:  WHITADI Fall Risk Assessment was completed, and patient is at LOW risk for falls.Assessment completed on:8/16/2019    Health Habits and Functional and Cognitive Screening:  Functional & Cognitive Status 8/16/2019   Do you have difficulty preparing food and eating? No   Do you have difficulty bathing yourself, getting dressed or grooming yourself? No   Do you have difficulty using the toilet? No   Do you have difficulty moving around from place to place? No   Do you have trouble with steps or getting out of a bed or a chair? No   Current Diet Well Balanced Diet   Dental Exam Up to date   Eye Exam Not up to date   Current Exercise Activities Include Aerobics   Do you need help using the phone?  No   Are you deaf or do you have serious difficulty hearing?  No   Do you need help with transportation? No   Do you need help shopping? No   Do you need help preparing meals?  No   Do you need help with housework?  No   Do you need help with laundry? No   Do you need help taking your medications? No   Do you need help managing money? No   Do you ever drive or ride in a car without wearing a seat belt? No   Have you felt unusual stress, anger or loneliness in the last month? No   Who do you live with? Spouse   If you need help, do you have trouble finding someone available to you? No   Have you been bothered in the last four weeks by sexual problems? No   Do you have difficulty concentrating, remembering or making decisions? Yes         Does the patient have evidence of cognitive impairment? No    Asprin use counseling:Does not need ASA (and currently is not on it)    Age-appropriate Screening Schedule:  Refer to the list below for future screening recommendations based on patient's age, sex and/or medical conditions. Orders for these recommended tests are listed in the plan section. The patient has been provided with a written plan.    Health Maintenance   Topic Date Due   • TDAP/TD VACCINES (1 - Tdap) 02/02/1959    • PNEUMOCOCCAL VACCINES (65+ LOW/MEDIUM RISK) (1 of 2 - PCV13) 02/02/2004   • ZOSTER VACCINE (2 of 3) 05/05/2017   • INFLUENZA VACCINE  08/01/2019   • LIPID PANEL  01/28/2020          The following portions of the patient's history were reviewed and updated as appropriate: allergies, current medications, past family history, past medical history, past social history, past surgical history and problem list.    Outpatient Medications Prior to Visit   Medication Sig Dispense Refill   • felodipine (PLENDIL) 10 MG 24 hr tablet Take 1 tablet by mouth Daily. 90 tablet 1   • Multiple Vitamin (MULTI VITAMIN PO) Take 1 tablet by mouth Daily.     • simvastatin (ZOCOR) 40 MG tablet Take 1 tablet by mouth Daily. 90 tablet 1     Facility-Administered Medications Prior to Visit   Medication Dose Route Frequency Provider Last Rate Last Dose   • heparin flush (porcine) 100 UNIT/ML injection 500 Units  500 Units Intravenous PRN Danny Roberto MD   500 Units at 01/25/19 1030   • sodium chloride 0.9 % flush 10 mL  10 mL Intravenous PRN Danny Roberto MD   10 mL at 01/25/19 1030       Patient Active Problem List   Diagnosis   • Benign essential HTN   • Acid reflux   • Elevated cholesterol   • Calculus of kidney   • Cervical spinal stenosis   • Status post total hip replacement, left   • Status post total replacement of right hip   • Degenerative disc disease, lumbar   • Hydronephrosis of left kidney   • Diffuse large B-cell lymphoma of extranodal site excluding spleen and other solid organs (CMS/HCC)   • Iron deficiency anemia due to chronic blood loss   • Chronic renal impairment, stage 2 (mild)   • Metastatic tumor of bone   • Closed compression fracture of third lumbar vertebra (CMS/HCC)   • Lymphoma (CMS/HCC)   • Fitting and adjustment of vascular catheter   • Anemia associated with chemotherapy       Advanced Care Planning:  Patient has an advance directive - a copy has been provided and is visible in patient  "header    Review of Systems    Compared to one year ago, the patient feels his physical health is better.  Compared to one year ago, the patient feels his mental health is better.    Reviewed chart for potential of high risk medication in the elderly: yes  Reviewed chart for potential of harmful drug interactions in the elderly:yes    Objective         Vitals:    08/16/19 1529   BP: 132/81   BP Location: Left arm   Patient Position: Sitting   Cuff Size: Adult   Pulse: 75   Resp: 16   Temp: 97.2 °F (36.2 °C)   TempSrc: Oral   SpO2: 99%   Weight: 78.4 kg (172 lb 12.8 oz)   Height: 172.7 cm (68\")       Body mass index is 26.27 kg/m².  Discussed the patient's BMI with him. The BMI is in the acceptable range.    Physical Exam          Assessment/Plan   Medicare Risks and Personalized Health Plan  CMS Preventative Services Quick Reference  Advance Directive Discussion  Cardiovascular risk  Depression/Dysphoria  Fall Risk    The above risks/problems have been discussed with the patient.  Pertinent information has been shared with the patient in the After Visit Summary.  Follow up plans and orders are seen below in the Assessment/Plan Section.    There are no diagnoses linked to this encounter.  Follow Up:  No Follow-up on file.     An After Visit Summary and PPPS were given to the patient.             "

## 2019-08-16 NOTE — PROGRESS NOTES
Subjective   Louie German is a 79 y.o. male.   CC: Medicare wellness visit, follow-up on hyperlipidemia, hypertension    Patient presents for Medicare wellness visit.  And six-month follow-up on chronic conditions.  This is an 79-year-old male patient of Dr. gant.  This patient is new to me.    He has a history of hypertension.  Blood pressure is well controlled today at 132/81.  He takes felodipine 10 mg daily which controls the condition well.  He does monitor it routinely at home.    For hyperlipidemia he takes simvastatin 40 mg daily.  He reports excellent compliance and toleration with this medication.    He has a history of CKD stage II.  We will check a creatinine level.    He has a history of lymphoma, and states that he has been in remission for about 6 months.  He does still have his port in the right side of his chest.  His oncologist is Dr. Roberto.    He has a history of iron deficiency anemia as well for which he follows with the [facility].    He denies development of any new issues today.         The following portions of the patient's history were reviewed and updated as appropriate: allergies, current medications, past family history, past medical history, past social history, past surgical history and problem list.    Review of Systems   Constitutional: Negative for activity change, chills, fatigue, fever, unexpected weight gain and unexpected weight loss.   HENT: Negative for congestion, hearing loss, postnasal drip, sinus pressure, sneezing, sore throat, swollen glands and tinnitus.    Eyes: Negative for photophobia, pain and visual disturbance.   Respiratory: Negative for cough, chest tightness, shortness of breath and wheezing.    Cardiovascular: Negative for chest pain, palpitations and leg swelling.   Gastrointestinal: Negative for abdominal distention, abdominal pain, constipation, diarrhea, nausea and vomiting.   Endocrine: Negative for polydipsia, polyphagia and polyuria.  "  Genitourinary: Negative for dysuria, frequency, hematuria and urgency.   Neurological: Negative for dizziness, weakness, numbness and headache.   All other systems reviewed and are negative.      Objective    /81 (BP Location: Left arm, Patient Position: Sitting, Cuff Size: Adult)   Pulse 75   Temp 97.2 °F (36.2 °C) (Oral)   Resp 16   Ht 172.7 cm (68\")   Wt 78.4 kg (172 lb 12.8 oz)   SpO2 99%   BMI 26.27 kg/m²     Physical Exam   Constitutional: He is oriented to person, place, and time. He appears well-developed and well-nourished. No distress.   HENT:   Head: Normocephalic and atraumatic.   Right Ear: External ear normal.   Left Ear: External ear normal.   Nose: Nose normal.   Mouth/Throat: Oropharynx is clear and moist.   Eyes: EOM are normal. Pupils are equal, round, and reactive to light.   Neck: Normal range of motion. Neck supple. No JVD present. No tracheal deviation present. No thyromegaly present.   No carotid bruits auscultated   Cardiovascular: Normal rate, regular rhythm, normal heart sounds and intact distal pulses. Exam reveals no gallop and no friction rub.   No murmur heard.  Peripheral edema.  Posterior tib and pedal pulses 2+ and equal bilaterally.   Pulmonary/Chest: Effort normal and breath sounds normal. No stridor. No respiratory distress. He has no wheezes. He has no rales. He exhibits no tenderness.   Lungs are CTA bilaterally   Abdominal: Soft. Bowel sounds are normal. He exhibits no distension. There is no tenderness.   Musculoskeletal: Normal range of motion.   Lymphadenopathy:     He has no cervical adenopathy.   Neurological: He is alert and oriented to person, place, and time.   Skin: Skin is warm and dry. Capillary refill takes less than 2 seconds. He is not diaphoretic.   Psychiatric: He has a normal mood and affect. His behavior is normal. Judgment and thought content normal.   Nursing note and vitals reviewed.    Current outpatient and discharge medications have been " reconciled for the patient.  Reviewed by: NOLAN Thibodeaux      Assessment/Plan   Louie was seen today for medicare wellness-subsequent.    Diagnoses and all orders for this visit:    Medicare annual wellness visit, subsequent    Benign essential HTN    Elevated cholesterol    Chronic renal impairment, stage 2 (mild)    Diffuse large B-cell lymphoma of extranodal site excluding spleen and other solid organs (CMS/HCC)    History of anemia      -Medicare wellness visit subsequent: See attached note.    -Hypertension: Well controlled today at 132/81.  Continue felodipine 10 mg daily and periodic home monitoring.    -Hyperlipidemia: We checked a lipid panel earlier today and results are pending.  Continue simvastatin 40 mill grams daily and we will adjust therapy if needed based on labs.    -CKD stage II: We will check a creatinine level.  CMP drawn earlier today, results pending.    -B-cell lymphoma: In remission as of 6 months ago per patient.  He does still have his port implanted.  Oncology is Dr. Roberto with whom he follows up routinely.    -History of anemia: Follows with oncology for this as well.  CMP results pending from earlier today.    -We will contact patient with the results of his labs and any further recommendations.  Follow-up PRN and in 6 months with PCP, Dr. gant for routine health maintenance and follow-up on chronic conditions.

## 2019-08-19 RX ORDER — SIMVASTATIN 20 MG
20 TABLET ORAL NIGHTLY
Qty: 90 TABLET | Refills: 1 | Status: SHIPPED | OUTPATIENT
Start: 2019-08-19 | End: 2020-01-29

## 2019-08-19 RX ORDER — SIMVASTATIN 40 MG
TABLET ORAL
Qty: 90 TABLET | Refills: 4 | Status: SHIPPED | OUTPATIENT
Start: 2019-08-19 | End: 2019-08-19 | Stop reason: DRUGHIGH

## 2019-08-19 RX ORDER — FELODIPINE 10 MG/1
TABLET, EXTENDED RELEASE ORAL
Qty: 90 TABLET | Refills: 4 | Status: SHIPPED | OUTPATIENT
Start: 2019-08-19 | End: 2020-12-12

## 2019-08-19 NOTE — PROGRESS NOTES
I am okay with going from 40 to 20 mg of simvastatin daily, we just need to make sure to check cholesterol routinely.  Please send in simvastatin 20 mg daily tabs.

## 2019-08-19 NOTE — PROGRESS NOTES
Please notify patient that his blood count looks stable.  Please do continue to follow with oncology for routine port flushes and lab work.  Metabolic panel looks stable as well.  Kidney function has slightly declined.  We will continue to watch this.  I do not believe he sees a nephrologist, please ask him.  If not we will watch his kidney function for now but he needs to make sure he is staying hydrated and avoid NSAIDs.  Cholesterol looks great and urine looks clear.

## 2019-08-23 NOTE — PROGRESS NOTES
Visit Type:  Follow-up Visit  Referring Provider:  Dav Stoner MD  Primary Provider:  Dav Stoner MD    CC:  1 week follow up thorascopy and biopsy.    History of Present Illness:  Patient is seen following diagnostic thoracoscopy and biopsy of pleura and evacuation of pleural fluid along with endoscopy and biopsy of the esophagus.  On all of these procedures and biopsies there is no evidence of malignancy.  All processes appear to   be inflammatory.  A copy of the pathology and cytology report was supplied to the patient.  He is cleared for hip surgery.  Review of systems no head neck chest or abdominal pain  Patient is a nonsmoker    The chest tube suture was removed.  There is no drainage. The wounds are healing up nicely.  A bandage was reapplied.  Lung fields are clear bilaterally. Cardiac exam is regular rate rhythm.  1. Pleural effusion  2.  Esophageal stricture stable  3. Pleural thickening  All processes appear benign  The patient has had an uneventful recovery from thoracoscopy.  No further follow-up is necessary from a thoracic surgical standpoint.        Past Medical History:     Reviewed history and no changes required:        Hypertension    Past Surgical History:     Reviewed history and no changes required:        Left hip replacement        cervical        thoroscopy with BX and EGD  Dr Montalvo      Family History Summary:      Reviewed history and no changes required: 2017      General Comments - FH:  Mother-Stroke  Brother-Cancer      Vital Signs:    Patient Profile:    77 Years Old Male  Height:     68 inches  Weight:     168 pounds  (Measured Weight:  168 lbs.  oz.)  BMI:        25.64  Pulse rate: 86 / minute  O2 Sat:     97 %    BP sittin / 83  (left arm)    Vitals Entered By: Krystyna Mcginnis (2017 4:22 PM)    Medications:  Medications were reviewed with the patient during this visit.    Allergies:   No Known Allergies  Allergies were reviewed with the patient  during this visit.    Current Allergies (reviewed this update):  No known allergies      Risk Factors:     Smoked Tobacco Use:  Never smoker          Assessment   New Problems:  Pleural thickening (ICD-511.0) (MVV30-K21.9)  Benign esophageal stricture (ICD-530.3) (SKI76-I60.2)  Pleural effusion (ICD-511.9) (HYX97-A56)  HTN (ICD-401.9) (CWF48-D72)      Plan   Updated Medication List:   SIMVASTATIN 40 MG ORAL TABS (SIMVASTATIN)     New Orders:   [id number]-Aqw Vst-Est Level IV [[id number]]      Assessment   New Problems:  Pleural thickening (ICD-511.0) (LLE33-Q15.9)  Benign esophageal stricture (ICD-530.3) (PGD60-V89.2)  Pleural effusion (ICD-511.9) (QPX85-R25)  HTN (ICD-401.9) (HKI23-W21)      Plan   Updated Medication List:   SIMVASTATIN 40 MG ORAL TABS (SIMVASTATIN)     New Orders:   [id number] Vst-Est Level IV [[id number]]        Surgery Worksheet         Electronically signed by Omkar Montalvo MD on 02/07/2017 at 4:36 PM  ________________________________________________________________________       Disclaimer: Converted Note message may not contain all data elements that existed in the legacy source system. Please see [employer] System for the original note details.

## 2019-08-30 ENCOUNTER — RESULTS ENCOUNTER (OUTPATIENT)
Dept: ONCOLOGY | Facility: CLINIC | Age: 79
End: 2019-08-30

## 2019-08-30 DIAGNOSIS — C82.90 FOLLICULAR LYMPHOMA, UNSPECIFIED FOLLICULAR LYMPHOMA TYPE, UNSPECIFIED BODY REGION (HCC): ICD-10-CM

## 2019-08-30 DIAGNOSIS — C83.39 DIFFUSE LARGE B-CELL LYMPHOMA OF EXTRANODAL SITE EXCLUDING SPLEEN AND OTHER SOLID ORGANS (HCC): ICD-10-CM

## 2019-08-30 DIAGNOSIS — Z45.2 FITTING AND ADJUSTMENT OF VASCULAR CATHETER: ICD-10-CM

## 2019-08-30 NOTE — LETTER
_      [facility]      [address]  [address]     [phone]  Fax: [phone]   Louie German  January 19, 2017    SURGERY: ___Right Thorascopy with Pleural Biopsy with EGD______    You are scheduled as:    OUTPATIENT  X                                   POST-OP ADMISSION    PRE-OP ADMISSION                                    23 HR OBSERVATION      Your pre-anesthesia testing is scheduled for: ______________________________  At [facility]; Their hours are from 7am to 4pm, Monday through Friday.       Surgery Date: __Monday, January 30th, 2017_________________________    Arrive at [facility] at: ___830am____________  [address], [place] [address]    Surgery Time: _____1100am____________    ***SPECIAL INSTRUCTIONS:  STOP ASPIRIN, ASPIRIN PRODUCTS & ALL DRUGS CONTAINING IBUPROFEN 5 DAYS BEFORE YOUR SURGERY. TYLENOL IS OKAY TO TAKE UP TO THE DAY OF YOUR PROCEDURE.    ***NOTHING TO EAT OR DRINK AFTER MIDNIGHT THE NIGHT BEFORE SURGERY***    ***YOU CAN NOT DRIVE YOURSELF HOME, YOU WILL NEED SOMEONE TO DRIVE YOU HOME! Please make sure to make arrangements for someone to drive you.     IF YOU HAVE ANY QUESTIONS , NEED TO CANCEL OR RESCHEDULE YOUR SURGERY; PLEASE CALL OUR OFFICE [phone].      Electronically signed by Leena Mcdermott on 01/19/2017 at 1:55 PM  ________________________________________________________________________       Disclaimer: Converted Note message may not contain all data elements that existed in the [employer] source system. Please see [email or web address] System for the original note details.

## 2019-09-03 ENCOUNTER — INFUSION (OUTPATIENT)
Dept: ONCOLOGY | Facility: HOSPITAL | Age: 79
End: 2019-09-03

## 2019-09-03 VITALS
HEART RATE: 80 BPM | TEMPERATURE: 97.7 F | SYSTOLIC BLOOD PRESSURE: 163 MMHG | OXYGEN SATURATION: 98 % | DIASTOLIC BLOOD PRESSURE: 80 MMHG

## 2019-09-03 DIAGNOSIS — Z45.2 FITTING AND ADJUSTMENT OF VASCULAR CATHETER: Primary | ICD-10-CM

## 2019-09-03 PROCEDURE — 96523 IRRIG DRUG DELIVERY DEVICE: CPT | Performed by: NURSE PRACTITIONER

## 2019-09-03 RX ORDER — SODIUM CHLORIDE 0.9 % (FLUSH) 0.9 %
10 SYRINGE (ML) INJECTION AS NEEDED
Status: DISCONTINUED | OUTPATIENT
Start: 2019-09-03 | End: 2019-09-03 | Stop reason: HOSPADM

## 2019-09-03 RX ORDER — SODIUM CHLORIDE 0.9 % (FLUSH) 0.9 %
10 SYRINGE (ML) INJECTION AS NEEDED
Status: CANCELLED | OUTPATIENT
Start: 2019-09-03

## 2019-09-03 RX ADMIN — HEPARIN SODIUM (PORCINE) LOCK FLUSH IV SOLN 100 UNIT/ML 500 UNITS: 100 SOLUTION at 09:31

## 2019-09-10 NOTE — PROGRESS NOTES
"    Outpatient Physical Therapy Ortho Treatment Note   Kaylen Mcclendon     Patient Name: Louie German  : 1940  MRN: [id number]  Today's Date: 3/23/2017      Visit Date: 2017    Visit Dx:  No diagnosis found.    Patient Active Problem List   Diagnosis   • Benign essential HTN   • Acid reflux   • Elevated cholesterol   • Calculus of kidney   • Cervical spinal stenosis   • Status post total hip replacement, left        Past Medical History:   Diagnosis Date   • Coronary artery disease    • DDD (degenerative disc disease), cervical    • Hx of cardiac arrest     Happened after Induction of anesthesia prior  to SURG 10 YRS AGO  \" I FLATLINE BUT W/IN SECONDS HEART WAS OK AFTER TURNED ON BACK...I THINK THEY GAVE ME TO MUCH ANESTHESIA\"   • Hyperlipidemia    • Hypertension    • Kidney stones    • Osteoarthritis    • Pleural effusion     DRAINAGE RT PLEURAL FLUID FOR TESTING WITH EGD ON 17   NO MALIGNANCY WITH CLEARANCE FROM DR SIMMONS FOR SURGERY   • Rheumatoid arthritis         Past Surgical History:   Procedure Laterality Date   • CORONARY ANGIOPLASTY WITH STENT PLACEMENT      17 years ago   • CYSTOSCOPY W/ LITHOLAPAXY / EHL      6-7 years ago   • ENDOSCOPY      ON 17 WITH DRAINAGE OF RT PLEURAL FLUID FOR BIOPSY    NO MALIGNANCY  CLEARANCE FOR SURG PER DR SIMMONS   • PLEURAL BIOPSY     • POSTERIOR CERVICAL FUSION     • TOTAL HIP ARTHROPLASTY Left     9-10 years ago    • TOTAL HIP ARTHROPLASTY Right 2017    Procedure: TOTAL HIP ARTHROPLASTY;  Surgeon: Gerson Cardoza MD;  Location: [facility];  Service:                              PT Assessment/Plan       17 1147       PT Assessment    Assessment Comments Pt continues to have weakness in distal quads -showing extension lag with SLR and supine abduction but making gains. Use of blue TB for hooklying ABD but pt had some discomfort so will continue with green for HEP. Reinforced pt keeping posterior JOEY precautions and discussed " communicate decisions for yourself anymore. If you become able to make decisions again, you can accept or refuse any treatment, no matter what you wrote in your living will. · Your state may offer an online registry. This is a place where you can store your living will online so the doctors and nurses who need to treat you can find it right away. What should you think about when creating a living will? Talk about your end-of-life wishes with your family members and your doctor. Let them know what you want. That way the people making decisions for you won't be surprised by your choices. Think about these questions as you make your living will:  · Do you know enough about life support methods that might be used? If not, talk to your doctor so you know what might be done if you can't breathe on your own, your heart stops, or you're unable to swallow. · What things would you still want to be able to do after you receive life-support methods? Would you want to be able to walk? To speak? To eat on your own? To live without the help of machines? · If you have a choice, where do you want to be cared for? In your home? At a hospital or nursing home? · Do you want certain Rastafari practices performed if you become very ill? · If you have a choice at the end of your life, where would you prefer to die? At home? In a hospital or nursing home? Somewhere else? · Would you prefer to be buried or cremated? · Do you want your organs to be donated after you die? What should you do with your living will? · Make sure that your family members and your health care agent have copies of your living will. · Give your doctor a copy of your living will to keep in your medical record. If you have more than one doctor, make sure that each one has a copy. · You may want to put a copy of your living will where it can be easily found. Where can you learn more? Go to https://[facility] and sign in to your lifting of firewood put pt at risk with trunk /hip flexion beyond limits. Also reinforced precautions in front of pt's spouse for addtional reinforcement at home. Pt making progress toward STG but may benefit from additional PT visits after MD f/u.                                                                                                                                                                                                   -CC     PT Plan    PT Plan Comments Continue per POC R JOEY-pt has one more appointment next week then has MD f/U 3-30-17. Will await if pt to continue PT program  -CC       User Key  (r) = Recorded By, (t) = Taken By, (c) = Cosigned By    Initials Name Provider Type    NASEEM Shah, PT Physical Therapist                Modalities       03/23/17 1100          Subjective Comments    Subjective Comments Pt states he was able to carry some firewood into home yesterday without much diffiiculty  -CC      Subjective Pain    Able to rate subjective pain? yes  -CC      Pre-Treatment Pain Level 1  -CC      Subjective Pain Comment Pt  -CC      Moist Heat    Location R hip/buttock-supine with knee roll  -CC      Rx Minutes 10 mins  -CC      MH Prior to Rx Yes  -CC      Ice    Location R posterior hip and R anterior thigh-use of knee roll  -CC      Rx Minutes 10 mins  -CC      Ice S/P Rx Yes  -CC        User Key  (r) = Recorded By, (t) = Taken By, (c) = Cosigned By    Initials Name Provider Type    NASEEM Shah, PT Physical Therapist                Exercises       03/23/17 1100          Subjective Comments    Subjective Comments Pt states he was able to carry some firewood into home yesterday without much diffiiculty  -CC      Subjective Pain    Able to rate subjective pain? yes  -CC      Pre-Treatment Pain Level 1  -CC      Subjective Pain Comment Pt  -CC      Exercise 1    Exercise Name 1 Hams stretch with sheet  -CC      Cueing 1 Verbal;Tactile  -CC      Equipment  "1 --   sheet  -CC      Reps 1 4   < 90 degrees hip  -CC      Time (Seconds) 1 15 sec  -CC      Exercise 2    Exercise Name 2 Heelslides  -CC      Cueing 2 Verbal  -CC      Sets 2 3  -CC      Reps 2 10  -CC      Exercise 3    Exercise Name 3 Quad sets  -CC      Cueing 3 Verbal  -CC      Reps 3 25  -CC      Time (Seconds) 3 5 sec  -CC      Exercise 4    Exercise Name 4 SAQ   -CC      Cueing 4 Verbal  -CC      Equipment 4 Cuff Weight   bolster  -CC      Weights/Plates 4 --   2.5#  -CC      Sets 4 2  -CC      Reps 4 25  -CC      Time (Seconds) 4 5 sec  -CC      Exercise 5    Exercise Name 5 Supine abduction  -CC      Cueing 5 Verbal  -CC      Sets 5 2  -CC      Reps 5 10  -CC      Exercise 6    Exercise Name 6 SLR  -CC      Cueing 6 Tactile  -CC      Sets 6 2  -CC      Reps 6 5   active SLR   -CC      Exercise 7    Exercise Name 7 Standing Abduction  -CC      Cueing 7 Verbal  -CC      Equipment 7 Cuff Weight  -CC      Weights/Plates 7 2  -CC      Sets 7 2  -CC      Reps 7 10  -CC      Exercise 8    Exercise Name 8 Heel raises  -CC      Cueing 8 Verbal  -CC      Sets 8 2  -CC      Reps 8 10  -CC      Exercise 9    Exercise Name 9 Standing marches  -CC      Cueing 9 Verbal  -CC      Equipment 9 Cuff Weight  -CC      Weights/Plates 9 2  -CC      Sets 9 2  -CC      Reps 9 10  -CC      Exercise 10    Exercise Name 10 Standing hamstring curls  -CC      Cueing 10 Verbal  -CC      Equipment 10 Cuff Weight  -CC      Weights/Plates 10 2   1.5#  -CC      Sets 10 2  -CC      Reps 10 10  -CC      Exercise 12    Exercise Name 12 Step Matrix  -CC      Cueing 12 Demo  -CC      Reps 12 --   -SBA  -CC      Exercise 13    Exercise Name 13 Airdyne  -CC      Time (Minutes) 13 5 min   seat at 5  -CC      Exercise 14    Exercise Name 14 Foward step up  -CC      Cueing 14 Demo  -CC      Reps 14 10   2 \" step  -CC      Exercise 15    Exercise Name 15 Lateral step up R  -CC      Cueing 15 Demo  -CC      Reps 15 10   2 \"step  -CC        User Key  " (r) = Recorded By, (t) = Taken By, (c) = Cosigned By    Initials Name Provider Type    NASEEM Shah PT Physical Therapist                                   Therapy Education       03/23/17 1144          Therapy Education    Given --   Continue HEP-reinforced quad strengthening especially avoid lag with SLR. Also reinforced following post JOEY precautions  -CC        User Key  (r) = Recorded By, (t) = Taken By, (c) = Cosigned By    Initials Name Provider Type    NASEEM Shah PT Physical Therapist                Time Calculation:   Start Time: 1015  Stop Time: 1115  Time Calculation (min): 60 min    Therapy Charges for Today     Code Description Service Date Service Provider Modifiers Qty    84942369318 HC PT HOT OR COLD PACK TREAT MCARE 3/23/2017 Nica Shah, PT GP 2    75160636716 HC PT THER PROC EA 15 MIN 3/23/2017 Nica Shah, PT GP 2                    Nica Shah, PT  3/23/2017

## 2019-10-11 ENCOUNTER — RESULTS ENCOUNTER (OUTPATIENT)
Dept: ONCOLOGY | Facility: CLINIC | Age: 79
End: 2019-10-11

## 2019-10-11 DIAGNOSIS — Z45.2 FITTING AND ADJUSTMENT OF VASCULAR CATHETER: ICD-10-CM

## 2019-10-11 DIAGNOSIS — C82.90 FOLLICULAR LYMPHOMA, UNSPECIFIED FOLLICULAR LYMPHOMA TYPE, UNSPECIFIED BODY REGION (HCC): ICD-10-CM

## 2019-10-11 DIAGNOSIS — C83.39 DIFFUSE LARGE B-CELL LYMPHOMA OF EXTRANODAL SITE EXCLUDING SPLEEN AND OTHER SOLID ORGANS (HCC): ICD-10-CM

## 2019-10-15 ENCOUNTER — INFUSION (OUTPATIENT)
Dept: ONCOLOGY | Facility: HOSPITAL | Age: 79
End: 2019-10-15

## 2019-10-15 VITALS — SYSTOLIC BLOOD PRESSURE: 152 MMHG | HEART RATE: 75 BPM | DIASTOLIC BLOOD PRESSURE: 78 MMHG

## 2019-10-15 DIAGNOSIS — Z45.2 FITTING AND ADJUSTMENT OF VASCULAR CATHETER: Primary | ICD-10-CM

## 2019-10-15 PROCEDURE — 96523 IRRIG DRUG DELIVERY DEVICE: CPT | Performed by: INTERNAL MEDICINE

## 2019-10-15 RX ORDER — SODIUM CHLORIDE 0.9 % (FLUSH) 0.9 %
10 SYRINGE (ML) INJECTION AS NEEDED
Status: DISCONTINUED | OUTPATIENT
Start: 2019-10-15 | End: 2019-10-15 | Stop reason: HOSPADM

## 2019-10-15 RX ORDER — SODIUM CHLORIDE 0.9 % (FLUSH) 0.9 %
10 SYRINGE (ML) INJECTION AS NEEDED
Status: CANCELLED | OUTPATIENT
Start: 2019-10-15

## 2019-10-15 RX ADMIN — HEPARIN SODIUM (PORCINE) LOCK FLUSH IV SOLN 100 UNIT/ML 500 UNITS: 100 SOLUTION at 09:39

## 2019-10-15 RX ADMIN — Medication 10 ML: at 09:38

## 2019-10-18 ENCOUNTER — TRANSCRIBE ORDERS (OUTPATIENT)
Dept: ADMINISTRATIVE | Facility: HOSPITAL | Age: 79
End: 2019-10-18

## 2019-10-18 DIAGNOSIS — N20.0 URIC ACID NEPHROLITHIASIS: Primary | ICD-10-CM

## 2019-10-23 ENCOUNTER — CLINICAL SUPPORT (OUTPATIENT)
Dept: INTERNAL MEDICINE | Facility: CLINIC | Age: 79
End: 2019-10-23

## 2019-10-23 DIAGNOSIS — Z23 FLU VACCINE NEED: Primary | ICD-10-CM

## 2019-10-23 PROCEDURE — G0008 ADMIN INFLUENZA VIRUS VAC: HCPCS | Performed by: FAMILY MEDICINE

## 2019-10-23 PROCEDURE — 90653 IIV ADJUVANT VACCINE IM: CPT | Performed by: FAMILY MEDICINE

## 2019-11-14 NOTE — PROGRESS NOTES
Subjective     REASON FOR FOLLOW UP:   1.  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  2.  Maintenance Rituxan every 3 months for 2 years initiated 3/30/2018  3.  Painful pathologic compression fracture at L3 May 2018 due to involvement with diffuse large B-cell non-Hodgkin's lymphoma.   4.  Plan to initiate further chemotherapy with CHOP and Rituxan with or without intrathecal methotrexate depending on his initial lumbar puncture.    HISTORY OF PRESENT ILLNESS:  The patient is a 78 y.o. year old male who had  undergone CT scan of the abdomen on 10/11/2017 to evaluate some left lower quadrant abdominal pain.  CT scan findings were worrisome for metastatic malignancy with multiple tumor implants noted on the scan.  The largest mass appeared to be possibly arising from the duodenum or jejunum in the upper abdomen.  There was no evidence of metastases to liver.    CT-guided biopsy was performed on 10/18/2017 but unfortunately pathology was nondiagnostic showing only benign skeletal muscle and fibroadipose tissue.    The patient was seen in consultation on 10/20/2017 and at that time we recommended laparoscopic lymph node sampling and upper and lower GI endoscopy.  He underwent these procedures with Dr. Castañeda on 10/26/2017.  There was no evidence of malignancy from his endoscopic procedures at the laparoscopic specimen returned as follicular center cell B-cell non-Hodgkin's lymphoma.    He received Rituxan treatment initially with 4 weekly doses of Rituxan with good response on follow-up scans.  Unfortunately, a few months after completion of his treatment he developed worsening back pain and has lymphoma involving the L3 vertebral body consistent with diffuse large B-cell non-Hodgkin's lymphoma.    He is here today to start his first cycle of CHOP and Rituxan for diffuse large B-cell non-Hodgkin's lymphoma involving  "the L3 vertebral body.  He also underwent a lumbar puncture with administration of intrathecal methotrexate and sampling of the CSF.  Thankfully, the spinal fluid did not show any evidence of lymphoma.    He still having significant pain and is wearing a back brace today.  We put him on Decadron last week and he reports that did help his symptoms somewhat.  He'll be stopping his Decadron when his prednisone starts with chemotherapy.    He also underwent placement of a MediPort earlier this week which seems to be healing well.  He and his wife are anxious to get started on the chemotherapy and he will receive his first dose of CHOP Rituxan in the office today.   Pain   Pertinent negatives include no chest pain, coughing, fatigue, fever, headaches, nausea, neck pain, rash or vomiting.        Past Medical History:   Diagnosis Date   • Arm fracture     right side humerus - sling and swathe   • Cancer 2017    Basal Cell on Nose and Under Right Eye   • Coronary artery disease    • DDD (degenerative disc disease), cervical    • Gastric mass    • GERD (gastroesophageal reflux disease)    • Hip pain started 5/29/18    onset of acute pain with weight bearing   • Hx of cardiac arrest     Happened after Induction of anesthesia prior  to SURG 10 YRS AGO  \" I FLATLINE BUT W/IN SECONDS HEART WAS OK AFTER TURNED ON BACK...I THINK THEY GAVE ME TO MUCH ANESTHESIA\"   • Hyperlipidemia    • Hypertension    • Kidney stones    • Non Hodgkin's lymphoma     SPINE   • Osteoarthritis    • Pleural effusion     DRAINAGE RT PLEURAL FLUID FOR TESTING WITH EGD ON 1-30-17   NO MALIGNANCY WITH CLEARANCE FROM DR SIMMONS FOR SURGERY   • Rheumatoid arthritis         Past Surgical History:   Procedure Laterality Date   • COLONOSCOPY N/A 10/26/2017    Procedure: COLONOSCOPY;  Surgeon: Louie Castañeda MD;  Location: [facility];  Service:    • CORONARY ANGIOPLASTY WITH STENT PLACEMENT  1999    17 years ago   • CYSTOSCOPY W/ LITHOLAPAXY / EHL      " 6-7 years ago   • DIAGNOSTIC LAPAROSCOPY N/A 10/26/2017    Procedure: DIAGNOSTIC LAPAROSCOPY WITH RETROPERITONEAL BIOPSIES;  Surgeon: Louie Castañeda MD;  Location: [facility] OR;  Service:    • ENDOSCOPY      ON 1/30/17 WITH DRAINAGE OF RT PLEURAL FLUID FOR BIOPSY    NO MALIGNANCY  CLEARANCE FOR SURG PER DR SIMMONS   • ENDOSCOPY N/A 10/26/2017    Procedure: ESOPHAGOGASTRODUODENOSCOPY;  Surgeon: Louie Castañeda MD;  Location: [facility] OR;  Service:    • EYE SURGERY Right 2017    Moh's; reconstruction right lower lid   • KIDNEY STONE SURGERY     • PLEURAL BIOPSY     • POSTERIOR CERVICAL FUSION  2001   • SKIN CANCER EXCISION N/A 2017    Basal Cell Nose & under right eye   • TOTAL HIP ARTHROPLASTY Left 04/2010    9-10 years ago    • TOTAL HIP ARTHROPLASTY Right 2/14/2017    Procedure: TOTAL HIP ARTHROPLASTY;  Surgeon: Gerson Cardoza MD;  Location: [facility] OR;  Service:    • VENOUS ACCESS DEVICE (PORT) INSERTION N/A 6/5/2018    Procedure: INSERTION VENOUS ACCESS DEVICE- RIGHT;  Surgeon: Louie Castañeda MD;  Location: [facility] OR;  Service: General         ALLERGIES:  No Known Allergies     Review of Systems   Constitutional: Negative for activity change, appetite change, fatigue, fever and unexpected weight change.   HENT: Negative for hearing loss, nosebleeds, trouble swallowing and voice change.    Eyes: Negative for visual disturbance.   Respiratory: Negative for cough, shortness of breath and wheezing.    Cardiovascular: Negative for chest pain and palpitations.   Gastrointestinal: Negative for diarrhea, nausea and vomiting.   Genitourinary: Negative for difficulty urinating, frequency, hematuria and urgency.   Musculoskeletal: Positive for back pain. Negative for neck pain.   Skin: Negative for rash.   Neurological: Negative for dizziness, seizures, syncope and headaches.   Hematological: Negative for adenopathy. Does not bruise/bleed easily.   Psychiatric/Behavioral: Negative for  "behavioral problems. The patient is not nervous/anxious.         Objective     Vitals:    06/08/18 0820   BP: 148/88   Pulse: 81   Resp: 16   Temp: 97.4 °F (36.3 °C)   TempSrc: Oral   SpO2: 99%   Weight: 78.2 kg (172 lb 6.4 oz)   Height: 172.7 cm (67.99\")   PainSc: 0-No pain     Current Status 6/8/2018   ECOG score 0       Physical Exam   Constitutional: He is oriented to person, place, and time. He appears well-developed and well-nourished. No distress.   HENT:   Head: Normocephalic.   Eyes: Conjunctivae and EOM are normal. Pupils are equal, round, and reactive to light. No scleral icterus.   Neck: Normal range of motion. Neck supple. No JVD present. No thyromegaly present.   Cardiovascular: Normal rate and regular rhythm.  Exam reveals no gallop and no friction rub.    No murmur heard.  Pulmonary/Chest: Effort normal and breath sounds normal. He has no wheezes. He has no rales.   Right IJ Mediport.   Abdominal: Soft. He exhibits no distension and no mass. There is no tenderness.   Musculoskeletal: Normal range of motion. He exhibits no edema or deformity.   Wearing a back brace.   Lymphadenopathy:     He has no cervical adenopathy.   Neurological: He is alert and oriented to person, place, and time. He has normal reflexes. No cranial nerve deficit.   Skin: Skin is warm and dry. No rash noted. No erythema.   Psychiatric: He has a normal mood and affect. His behavior is normal. Judgment normal.         RECENT LABS:  Hematology WBC   Date Value Ref Range Status   06/08/2018 10.80 (H) 4.50 - 10.70 10*3/mm3 Final     RBC   Date Value Ref Range Status   06/08/2018 3.77 (L) 4.60 - 6.00 10*6/mm3 Final     Hemoglobin   Date Value Ref Range Status   06/08/2018 11.6 (L) 13.7 - 17.6 g/dL Final     Hematocrit   Date Value Ref Range Status   06/08/2018 34.0 (L) 40.4 - 52.2 % Final     Platelets   Date Value Ref Range Status   06/08/2018 233 140 - 500 10*3/mm3 Final        Lab Results   Component Value Date    IRON 68 03/30/2018 "    TIBC 373 03/30/2018    FERRITIN 76.30 03/30/2018     Final Diagnosis   CEREBROSPINAL FLUID (CYTOSPIN PREPARATION):                HYPOCELLULAR FLUID WITH RARE LYMPHOCYTES AND NEUTROPHILS.                 NO ATYPICAL CELLS SEEN.     FLOW NEGATIVE    F-18 FDG PET FROM SKULL BASE TO MID THIGH WITH PET/CT FUSION  5/21/2018     HISTORY: 78-year-old male with non-Hodgkin's lymphoma. Restaging.     TECHNIQUE: Radiation dose reduction techniques were utilized, including  automated exposure control and exposure modulation based on body size.   Blood glucose level at time of injection was 96 mg/dL.  6.0 mCi of F-18  FDG were injected and PET was performed from skull base to mid thigh. CT  was obtained for localization and attenuation correction. Time at  injection 7:30 AM. PET start time 9:09 AM. Compared with previous PET/CT  from 11/01/2017.     FINDINGS: There has been marked interval decrease in the size of the  hypermetabolic small bowel lesions at the left midabdomen and the  mesenteric lymphadenopathy has markedly decreased as well. Some of the  mesenteric lymphadenopathy has resolved. The bowel lesions and some of  the adjacent lymphadenopathy were confluent on the previous examination  and difficult to compare in size. The intensity of the metabolic  activity at the remaining bowel lesions has decreased with a current  maximal SUV of 24.1 and was previously 34. A discrete 1.2 x 1.0 cm right  mesenteric node was less discrete previously, but measured approximately  2.5 x 1.8 cm. The current maximal SUV is 14.5 and was previously 11.5.  There has been resolution of many of the scattered hypermetabolic  lesions throughout the abdomen including the hypermetabolic lesion at  the pancreatic tail. There has been marked interval decrease in the left  periaortic infiltrative lymphadenopathy and the infiltrative  lymphadenopathy along the left pelvic sidewall with essential resolution  of the previously seen left  hydroureteronephrosis. There has, however,  been interval development of intense hypermetabolic activity throughout  the L3 vertebral body with a maximal SUV of 24.4. There are pathological  fractures and loss of height at the L3 vertebral body. There has been  resolution of the hypermetabolic retrodiaphragmatic lymphadenopathy and  the mediastinal hypermetabolic lymphadenopathy has resolved as well.  There is a hypermetabolic 5 mm left supraclavicular node which has a  maximal SUV of 6.8, previously measuring up to 4 mm. There was  significant brown fat activity at the thorax and supraclavicular regions  which limits comparison of the hypermetabolic activity. There is a tiny  right pleural effusion which is smaller than previously.     IMPRESSION:  1. There has been a mixed response. There has been marked interval  decrease or resolution of hypermetabolic lymphadenopathy throughout the  abdomen and pelvis as well as marked decrease in the hypermetabolic  bowel lesions. There has also been resolution of the hypermetabolic  retrodiaphragmatic and mediastinal lymphadenopathy. There has, however,  been interval development of an intensely hypermetabolic lesion  throughout the L3 vertebral body. There is pathologic compression with  significant loss in height of L3.  2. The 5 mm hypermetabolic left supraclavicular node was slightly  smaller previously. The brown fat activity in this region on the  previous examination precludes comparison of the metabolic activity.  3. Resolution of the left hydronephrosis.    IMAGES PERSONALLY REVIEWED      Assessment/Plan     1.  Diffuse large B-cell non-Hodgkin's lymphoma involving the L3 vertebral body.  2.  Previous treatment with single agent Rituxan for follicular B-cell lymphoma.  3.  Back pain related to lymphoma involving the spine.  4.  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  5.  Lumbar puncture showing no evidence of lymphoma in the spinal fluid.  Patient did receive 1 dose of intrathecal  methotrexate with the procedure.  6.  Right IJ Mediport.    Plan  1.  David will proceed with his first cycle of CHOP Rituxan in the office today with Neulasta support via and on body injector  2.  He will be scheduled for nurse practitioner toxicity assessment and lab in 1 week.  3.  CBC and RN review in 2 weeks  4.  M.D. and second cycle of CHOP Rituxan will be scheduled in 3 weeks.  5.  We will plan to repeat scans after 2 cycles of treatment.               Albendazole Counseling:  I discussed with the patient the risks of albendazole including but not limited to cytopenia, kidney damage, nausea/vomiting and severe allergy.  The patient understands that this medication is being used in an off-label manner.

## 2019-11-21 ENCOUNTER — INFUSION (OUTPATIENT)
Dept: ONCOLOGY | Facility: HOSPITAL | Age: 79
End: 2019-11-21

## 2019-11-21 ENCOUNTER — APPOINTMENT (OUTPATIENT)
Dept: ONCOLOGY | Facility: HOSPITAL | Age: 79
End: 2019-11-21

## 2019-11-21 VITALS — SYSTOLIC BLOOD PRESSURE: 162 MMHG | DIASTOLIC BLOOD PRESSURE: 78 MMHG | HEART RATE: 80 BPM

## 2019-11-21 DIAGNOSIS — Z45.2 FITTING AND ADJUSTMENT OF VASCULAR CATHETER: Primary | ICD-10-CM

## 2019-11-21 PROCEDURE — 25010000003 HEPARIN LOCK FLUCH PER 10 UNITS: Performed by: INTERNAL MEDICINE

## 2019-11-21 PROCEDURE — 96523 IRRIG DRUG DELIVERY DEVICE: CPT | Performed by: NURSE PRACTITIONER

## 2019-11-21 RX ORDER — SODIUM CHLORIDE 0.9 % (FLUSH) 0.9 %
10 SYRINGE (ML) INJECTION AS NEEDED
Status: CANCELLED | OUTPATIENT
Start: 2019-11-21

## 2019-11-21 RX ORDER — SODIUM CHLORIDE 0.9 % (FLUSH) 0.9 %
10 SYRINGE (ML) INJECTION AS NEEDED
Status: DISCONTINUED | OUTPATIENT
Start: 2019-11-21 | End: 2019-11-21 | Stop reason: HOSPADM

## 2019-11-21 RX ADMIN — HEPARIN SODIUM (PORCINE) LOCK FLUSH IV SOLN 100 UNIT/ML 500 UNITS: 100 SOLUTION at 10:05

## 2019-11-21 RX ADMIN — Medication 10 ML: at 10:05

## 2019-11-22 ENCOUNTER — RESULTS ENCOUNTER (OUTPATIENT)
Dept: ONCOLOGY | Facility: CLINIC | Age: 79
End: 2019-11-22

## 2019-11-22 DIAGNOSIS — C83.39 DIFFUSE LARGE B-CELL LYMPHOMA OF EXTRANODAL SITE EXCLUDING SPLEEN AND OTHER SOLID ORGANS (HCC): ICD-10-CM

## 2019-11-22 DIAGNOSIS — C82.90 FOLLICULAR LYMPHOMA, UNSPECIFIED FOLLICULAR LYMPHOMA TYPE, UNSPECIFIED BODY REGION (HCC): ICD-10-CM

## 2019-11-22 DIAGNOSIS — Z45.2 FITTING AND ADJUSTMENT OF VASCULAR CATHETER: ICD-10-CM

## 2020-01-02 ENCOUNTER — TELEPHONE (OUTPATIENT)
Dept: ONCOLOGY | Facility: CLINIC | Age: 80
End: 2020-01-02

## 2020-01-02 NOTE — TELEPHONE ENCOUNTER
PT CALLING C/O PAIN TO MID BACK. PT STATES THAT PAIN HAS BEEN GOING ON FOR A COUPLE MONTHS AND HAS OCCURRED FEW TIMES A MONTH. PAST WK HE IS FEELING IT Q DAY. PAIN COMES AND GOES THROUGHOUT THE DAY AND ONLY LASTS FOR ABOUT 10-15 MIN. PT TAKING ALEVE AND ASA AND STATES THAT IT HELPS. PT HAS SCANS ORDERED TUES. PER DR. PARIS (DOC #2) PT TO JUST KEEP TREATING PAIN AND PROCEED W/ SCANS TUES AND GO FROM THERE. HE THINKS SCANS WOULD SHOW SOMETHING IF THERE WAS SOMETHING GOING ON. PT SEEING DR. DENNEY 1/14 AND TO MENTION THEN AS WELL.

## 2020-01-03 ENCOUNTER — RESULTS ENCOUNTER (OUTPATIENT)
Dept: ONCOLOGY | Facility: CLINIC | Age: 80
End: 2020-01-03

## 2020-01-03 DIAGNOSIS — C83.39 DIFFUSE LARGE B-CELL LYMPHOMA OF EXTRANODAL SITE EXCLUDING SPLEEN AND OTHER SOLID ORGANS (HCC): ICD-10-CM

## 2020-01-03 DIAGNOSIS — C82.90 FOLLICULAR LYMPHOMA, UNSPECIFIED FOLLICULAR LYMPHOMA TYPE, UNSPECIFIED BODY REGION (HCC): ICD-10-CM

## 2020-01-03 DIAGNOSIS — Z45.2 FITTING AND ADJUSTMENT OF VASCULAR CATHETER: ICD-10-CM

## 2020-01-07 ENCOUNTER — HOSPITAL ENCOUNTER (OUTPATIENT)
Dept: CT IMAGING | Facility: HOSPITAL | Age: 80
Discharge: HOME | End: 2020-01-07
Admitting: INTERNAL MEDICINE

## 2020-01-07 ENCOUNTER — INFUSION (OUTPATIENT)
Dept: ONCOLOGY | Facility: HOSPITAL | Age: 80
End: 2020-01-07

## 2020-01-07 DIAGNOSIS — C82.90 FOLLICULAR LYMPHOMA, UNSPECIFIED FOLLICULAR LYMPHOMA TYPE, UNSPECIFIED BODY REGION (HCC): Primary | ICD-10-CM

## 2020-01-07 DIAGNOSIS — C82.90 FOLLICULAR LYMPHOMA, UNSPECIFIED FOLLICULAR LYMPHOMA TYPE, UNSPECIFIED BODY REGION (HCC): ICD-10-CM

## 2020-01-07 DIAGNOSIS — Z45.2 FITTING AND ADJUSTMENT OF VASCULAR CATHETER: ICD-10-CM

## 2020-01-07 DIAGNOSIS — C83.39 DIFFUSE LARGE B-CELL LYMPHOMA OF EXTRANODAL SITE EXCLUDING SPLEEN AND OTHER SOLID ORGANS (HCC): ICD-10-CM

## 2020-01-07 LAB
ALBUMIN SERPL-MCNC: 4.2 G/DL (ref 3.5–5.2)
ALBUMIN/GLOB SERPL: 1.4 G/DL
ALP SERPL-CCNC: 59 U/L (ref 39–117)
ALT SERPL W P-5'-P-CCNC: 13 U/L (ref 1–41)
ANION GAP SERPL CALCULATED.3IONS-SCNC: 10 MMOL/L (ref 5–15)
AST SERPL-CCNC: 23 U/L (ref 1–40)
BILIRUB SERPL-MCNC: 0.5 MG/DL (ref 0.1–1.2)
BUN BLD-MCNC: 21 MG/DL (ref 8–23)
BUN/CREAT SERPL: 15.9 (ref 7–25)
CALCIUM SPEC-SCNC: 9.2 MG/DL (ref 8.6–10.5)
CHLORIDE SERPL-SCNC: 105 MMOL/L (ref 98–107)
CO2 SERPL-SCNC: 24 MMOL/L (ref 22–29)
CREAT BLD-MCNC: 1.32 MG/DL (ref 0.76–1.27)
DEPRECATED RDW RBC AUTO: 47.7 FL (ref 37–54)
ERYTHROCYTE [DISTWIDTH] IN BLOOD BY AUTOMATED COUNT: 13.3 % (ref 12.3–15.4)
GFR SERPL CREATININE-BSD FRML MDRD: 52 ML/MIN/1.73
GLOBULIN UR ELPH-MCNC: 2.9 GM/DL
GLUCOSE BLD-MCNC: 101 MG/DL (ref 65–99)
HCT VFR BLD AUTO: 38.9 % (ref 37.5–51)
HGB BLD-MCNC: 13 G/DL (ref 13–17.7)
LDH SERPL-CCNC: 196 U/L (ref 135–225)
MCH RBC QN AUTO: 32.3 PG (ref 26.6–33)
MCHC RBC AUTO-ENTMCNC: 33.4 G/DL (ref 31.5–35.7)
MCV RBC AUTO: 96.5 FL (ref 79–97)
PLATELET # BLD AUTO: 176 10*3/MM3 (ref 140–450)
PMV BLD AUTO: 11.2 FL (ref 6–12)
POTASSIUM BLD-SCNC: 4 MMOL/L (ref 3.5–5.2)
PROT SERPL-MCNC: 7.1 G/DL (ref 6–8.5)
RBC # BLD AUTO: 4.03 10*6/MM3 (ref 4.14–5.8)
SODIUM BLD-SCNC: 139 MMOL/L (ref 136–145)
WBC NRBC COR # BLD: 6 10*3/MM3 (ref 3.4–10.8)

## 2020-01-07 PROCEDURE — 96523 IRRIG DRUG DELIVERY DEVICE: CPT | Performed by: INTERNAL MEDICINE

## 2020-01-07 PROCEDURE — 0 DIATRIZOATE MEGLUMINE & SODIUM PER 1 ML: Performed by: INTERNAL MEDICINE

## 2020-01-07 PROCEDURE — 83615 LACTATE (LD) (LDH) ENZYME: CPT | Performed by: INTERNAL MEDICINE

## 2020-01-07 PROCEDURE — 80053 COMPREHEN METABOLIC PANEL: CPT | Performed by: INTERNAL MEDICINE

## 2020-01-07 PROCEDURE — 25010000002 IOPAMIDOL 61 % SOLUTION: Performed by: INTERNAL MEDICINE

## 2020-01-07 PROCEDURE — 74177 CT ABD & PELVIS W/CONTRAST: CPT

## 2020-01-07 PROCEDURE — 85027 COMPLETE CBC AUTOMATED: CPT | Performed by: INTERNAL MEDICINE

## 2020-01-07 PROCEDURE — 71260 CT THORAX DX C+: CPT

## 2020-01-07 PROCEDURE — 82565 ASSAY OF CREATININE: CPT

## 2020-01-07 RX ORDER — SODIUM CHLORIDE 0.9 % (FLUSH) 0.9 %
10 SYRINGE (ML) INJECTION AS NEEDED
Status: DISCONTINUED | OUTPATIENT
Start: 2020-01-07 | End: 2020-01-07 | Stop reason: HOSPADM

## 2020-01-07 RX ORDER — HEPARIN SODIUM (PORCINE) LOCK FLUSH IV SOLN 100 UNIT/ML 100 UNIT/ML
500 SOLUTION INTRAVENOUS AS NEEDED
Status: CANCELLED | OUTPATIENT
Start: 2020-01-07

## 2020-01-07 RX ORDER — SODIUM CHLORIDE 0.9 % (FLUSH) 0.9 %
10 SYRINGE (ML) INJECTION AS NEEDED
Status: CANCELLED | OUTPATIENT
Start: 2020-01-07

## 2020-01-07 RX ADMIN — IOPAMIDOL 90 ML: 612 INJECTION, SOLUTION INTRAVENOUS at 10:08

## 2020-01-07 RX ADMIN — Medication 10 ML: at 09:21

## 2020-01-07 RX ADMIN — DIATRIZOATE MEGLUMINE AND DIATRIZOATE SODIUM 30 ML: 660; 100 LIQUID ORAL; RECTAL at 09:00

## 2020-01-08 LAB — CREAT BLDA-MCNC: 1.2 MG/DL (ref 0.6–1.3)

## 2020-01-14 ENCOUNTER — APPOINTMENT (OUTPATIENT)
Dept: OTHER | Facility: HOSPITAL | Age: 80
End: 2020-01-14

## 2020-01-14 ENCOUNTER — OFFICE VISIT (OUTPATIENT)
Dept: ONCOLOGY | Facility: CLINIC | Age: 80
End: 2020-01-14

## 2020-01-14 VITALS
RESPIRATION RATE: 16 BRPM | HEIGHT: 68 IN | SYSTOLIC BLOOD PRESSURE: 135 MMHG | TEMPERATURE: 97.8 F | DIASTOLIC BLOOD PRESSURE: 79 MMHG | OXYGEN SATURATION: 99 % | WEIGHT: 176.3 LBS | HEART RATE: 75 BPM | BODY MASS INDEX: 26.72 KG/M2

## 2020-01-14 DIAGNOSIS — C82.90 FOLLICULAR LYMPHOMA, UNSPECIFIED FOLLICULAR LYMPHOMA TYPE, UNSPECIFIED BODY REGION (HCC): Primary | ICD-10-CM

## 2020-01-14 DIAGNOSIS — C83.39 DIFFUSE LARGE B-CELL LYMPHOMA OF EXTRANODAL SITE EXCLUDING SPLEEN AND OTHER SOLID ORGANS (HCC): ICD-10-CM

## 2020-01-14 DIAGNOSIS — S32.030S CLOSED COMPRESSION FRACTURE OF THIRD LUMBAR VERTEBRA, SEQUELA: ICD-10-CM

## 2020-01-14 DIAGNOSIS — D50.0 IRON DEFICIENCY ANEMIA DUE TO CHRONIC BLOOD LOSS: ICD-10-CM

## 2020-01-14 DIAGNOSIS — Z45.2 FITTING AND ADJUSTMENT OF VASCULAR CATHETER: ICD-10-CM

## 2020-01-14 PROCEDURE — 99214 OFFICE O/P EST MOD 30 MIN: CPT | Performed by: INTERNAL MEDICINE

## 2020-01-14 NOTE — PROGRESS NOTES
Subjective     REASON FOR FOLLOW UP:   1.  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  2.  Maintenance Rituxan every 3 months for 2 years initiated 3/30/2018  3.  Painful pathologic compression fracture at L3 May 2018 due to involvement with diffuse large B-cell non-Hodgkin's lymphoma.   4.  CHOP and Rituxan x 6 cycles completed 9/25/2018.    HISTORY OF PRESENT ILLNESS:  The patient is a 79 y.o. year old male who was seen in consultation on 10/20/2017 and at that time we recommended laparoscopic lymph node sampling and upper and lower GI endoscopy.  He underwent these procedures with Dr. Castañeda on 10/26/2017.  There was no evidence of malignancy from his endoscopic procedures at the laparoscopic specimen returned as follicular center cell B-cell non-Hodgkin's lymphoma.    He received Rituxan treatment initially with 4 weekly doses of Rituxan with good response on follow-up scans.  Unfortunately, a few months after completion of his treatment he developed worsening back pain and has lymphoma involving the L3 vertebral body consistent with diffuse large B-cell non-Hodgkin's lymphoma.    He underwent port placement and received 6 cycles of CHOP and Rituxan which he completed in late September 2018 with what appear to be a complete response on PET scan.    He returns today for follow-up with CT scan surveillance.  CT scans performed on 1/7/2020 showed stable residual mass in the retroperitoneal area.  He was noted to have stones in the right kidney    David and his wife are enjoying traveling and he made several trips to [place] and are actually working on dual citizenship.    Pain   Pertinent negatives include no chest pain, coughing, fatigue, fever, headaches, nausea, neck pain, rash or vomiting.        Past Medical History:   Diagnosis Date   • Arm fracture     right side humerus - sling and swathe   • Cancer  "(CMS/HCC) 2017    Basal Cell on Nose and Under Right Eye   • Coronary artery disease    • DDD (degenerative disc disease), cervical    • Gastric mass    • GERD (gastroesophageal reflux disease)    • Hip pain started 5/29/18    onset of acute pain with weight bearing   • Hx of cardiac arrest     Happened after Induction of anesthesia prior  to SURG 10 YRS AGO  \" I FLATLINE BUT W/IN SECONDS HEART WAS OK AFTER TURNED ON BACK...I THINK THEY GAVE ME TO MUCH ANESTHESIA\"   • Hyperlipidemia    • Hypertension    • Kidney stones    • Non Hodgkin's lymphoma (CMS/HCC)     SPINE   • Osteoarthritis    • Pleural effusion     DRAINAGE RT PLEURAL FLUID FOR TESTING WITH EGD ON 1-30-17   NO MALIGNANCY WITH CLEARANCE FROM DR SIMMONS FOR SURGERY   • Rheumatoid arthritis (CMS/HCC)         Past Surgical History:   Procedure Laterality Date   • COLONOSCOPY N/A 10/26/2017    Procedure: COLONOSCOPY;  Surgeon: Louie Castañeda MD;  Location: [facility];  Service:    • CORONARY ANGIOPLASTY WITH STENT PLACEMENT  1999    17 years ago   • CYSTOSCOPY W/ LITHOLAPAXY / EHL      6-7 years ago   • DIAGNOSTIC LAPAROSCOPY N/A 10/26/2017    Procedure: DIAGNOSTIC LAPAROSCOPY WITH RETROPERITONEAL BIOPSIES;  Surgeon: Louie Castañeda MD;  Location: [facility];  Service:    • ENDOSCOPY      ON 1/30/17 WITH DRAINAGE OF RT PLEURAL FLUID FOR BIOPSY    NO MALIGNANCY  CLEARANCE FOR SURG PER DR SIMMONS   • ENDOSCOPY N/A 10/26/2017    Procedure: ESOPHAGOGASTRODUODENOSCOPY;  Surgeon: Louie Castañeda MD;  Location: [facility];  Service:    • EYE SURGERY Right 2017    Moh's; reconstruction right lower lid   • KIDNEY STONE SURGERY     • PLEURAL BIOPSY     • POSTERIOR CERVICAL FUSION  2001   • SKIN CANCER EXCISION N/A 2017    Basal Cell Nose & under right eye   • TOTAL HIP ARTHROPLASTY Left 04/2010    9-10 years ago    • TOTAL HIP ARTHROPLASTY Right 2/14/2017    Procedure: TOTAL HIP ARTHROPLASTY;  Surgeon: Gerson Cardoza MD;  Location: [facility]" "MAIN OR;  Service:    • VENOUS ACCESS DEVICE (PORT) INSERTION N/A 6/5/2018    Procedure: INSERTION VENOUS ACCESS DEVICE- RIGHT;  Surgeon: Louie Castañeda MD;  Location: [facility] MAIN OR;  Service: General         ALLERGIES:  No Known Allergies     Review of Systems   Constitutional: Positive for appetite change and unexpected weight change. Negative for activity change, fatigue and fever.   HENT: Negative for hearing loss, nosebleeds, trouble swallowing and voice change.    Eyes: Negative for visual disturbance.   Respiratory: Negative for cough, shortness of breath and wheezing.    Cardiovascular: Negative for chest pain and palpitations.   Gastrointestinal: Positive for constipation. Negative for diarrhea, nausea and vomiting.   Genitourinary: Negative for difficulty urinating, frequency, hematuria and urgency.   Musculoskeletal: Positive for back pain. Negative for neck pain.   Skin: Negative for rash.   Neurological: Negative for dizziness, seizures, syncope and headaches.   Hematological: Negative for adenopathy. Does not bruise/bleed easily.   Psychiatric/Behavioral: Negative for behavioral problems. The patient is not nervous/anxious.         Objective     Vitals:    01/14/20 1057   BP: 135/79   Pulse: 75   Resp: 16   Temp: 97.8 °F (36.6 °C)   SpO2: 99%   Weight: 80 kg (176 lb 4.8 oz)   Height: 172.7 cm (67.99\")   PainSc: 0-No pain     Current Status 1/14/2020   ECOG score 0       Physical Exam   Constitutional: He is oriented to person, place, and time. He appears well-developed and well-nourished. No distress.   HENT:   Head: Normocephalic.   Eyes: Pupils are equal, round, and reactive to light. Conjunctivae and EOM are normal. No scleral icterus.   Neck: Normal range of motion. Neck supple. No JVD present. No thyromegaly present.   Cardiovascular: Normal rate and regular rhythm. Exam reveals no gallop and no friction rub.   No murmur heard.  Pulmonary/Chest: Effort normal and breath sounds normal. He has " no wheezes. He has no rales.   Right IJ Mediport.   Abdominal: Soft. He exhibits no distension and no mass. There is no tenderness.   Musculoskeletal: Normal range of motion. He exhibits no edema or deformity.   Wearing a back brace.   Lymphadenopathy:     He has no cervical adenopathy.   Neurological: He is alert and oriented to person, place, and time. He has normal reflexes. No cranial nerve deficit.   Skin: Skin is warm and dry. No rash noted. No erythema.   Psychiatric: He has a normal mood and affect. His behavior is normal. Judgment normal.         RECENT LABS:  Hematology WBC   Date Value Ref Range Status   01/07/2020 6.00 3.40 - 10.80 10*3/mm3 Final   08/16/2019 4.10 3.40 - 10.80 10*3/mm3 Final     RBC   Date Value Ref Range Status   01/07/2020 4.03 (L) 4.14 - 5.80 10*6/mm3 Final   08/16/2019 3.87 (L) 4.14 - 5.80 10*6/mm3 Final     Hemoglobin   Date Value Ref Range Status   01/07/2020 13.0 13.0 - 17.7 g/dL Final     Hematocrit   Date Value Ref Range Status   01/07/2020 38.9 37.5 - 51.0 % Final     Platelets   Date Value Ref Range Status   01/07/2020 176 140 - 450 10*3/mm3 Final          Lab Results   Component Value Date    GLUCOSE 101 (H) 01/07/2020    BUN 21 01/07/2020    CREATININE 1.20 01/07/2020    EGFRIFNONA 52 (L) 01/07/2020    EGFRIFAFRI 55 (L) 08/16/2019    BCR 15.9 01/07/2020    K 4.0 01/07/2020    CO2 24.0 01/07/2020    CALCIUM 9.2 01/07/2020    PROTENTOTREF 6.5 08/16/2019    ALBUMIN 4.20 01/07/2020    LABIL2 2.1 08/16/2019    AST 23 01/07/2020    ALT 13 01/07/2020       CT ABDOMEN PELVIS WITH CONTRAST-, CT CHEST WITH CONTRAST-1/7/2020  CHEST:      There is no hilar, mediastinal or axillary adenopathy by size criteria.     The heart is normal in size. Moderate to marked coronary artery  calcification is present.     There is no pulmonary consolidation, pleural effusion or pneumothorax.  No new suspicious pulmonary nodule is visualized.     ABDOMEN AND PELVIS:      The bladder is not well  evaluated due to significant streak artifact.     There are no findings of small bowel obstruction.     The appendix is unremarkable.     The liver, gallbladder, pancreas, spleen and adrenal glands have an  unremarkable postcontrast CT appearance. Parapelvic renal cysts are  present. Two hypodense renal lesions bilaterally demonstrate cystic  density and likely represent simple cysts. Right nephrolithiasis is  present and measures up to 0.5 to 0.6 cm. Severe atrophy of the left  kidney is present, as before. There is no hydronephrosis. Please note  that the distal ureters bilaterally cannot be evaluated due to  significant streak artifact.     There is no abdominopelvic adenopathy by size criteria. Irregular soft  tissue density mass centered within the mesentery of the epigastrium  just to the left of midline measures 3.6 x 1.8 cm and is grossly  unchanged since 10/17/2018. The lesion abuts multiple loops of small  bowel, as well as small mesenteric vasculature, as before. More  ill-defined soft tissue thickening courses along the left aspect of the  abdominal aorta and ends proximal to its bifurcation and appears grossly  stable.     There is moderate-to-severe calcified and noncalcified atherosclerosis  involving the abdominal aorta. 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 The  superior mesenteric artery origin is patent.     Colonic diverticulosis is present. There is no free intraperitoneal air  or fluid.     Bone windows: No suspicious lytic or blastic osseous lesions are  present. Streak artifact from bilateral total hip arthroplasties limits  evaluation of the pelvis. Pars defects are present at L5-S1 with mild  anterolisthesis at this level. Burst compression deformity of the L3  vertebral body is present and grossly unchanged since 10/17/2018.     IMPRESSION:  Impression:  1.  Irregular soft tissue density mass centered within the  epigastric  mesentery with ill-defined soft tissue thickening extending along the  left aspect of the abdominal aorta, grossly unchanged since 10/17/2018.  No findings of new adenopathy within the chest, abdomen or pelvis by  size criteria.  2.  Right nephrolithiasis.  3.  Other findings as above.     Images personally reviewed    Assessment/Plan     1.  Diffuse large B-cell non-Hodgkin's lymphoma involving the L3 vertebral body.  As noted above, his PET scan after 4 cycles of CHOP and Rituxan showed essentially a complete metabolic response.  He completed a total of 6 cycles of chemotherapy by 9/25/2018 and is felt to be in remission.  2.  Previous treatment with single agent Rituxan for follicular B-cell lymphoma.  3.  Back pain related to lymphoma involving the spine.   4.  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  5.  Right IJ Mediport.      Plan  1.  We discussed the results of the CT scans with the patient and provided him a copy of the printed report.  2.  He will be scheduled for port flushes every 6 weeks.  3.  We will plan to see David again in the office in 6 months and he again will undergo CT scans of the chest abdomen pelvis and labs one week prior to that visit.

## 2020-01-29 RX ORDER — SIMVASTATIN 20 MG
TABLET ORAL
Qty: 90 TABLET | Refills: 1 | Status: SHIPPED | OUTPATIENT
Start: 2020-01-29 | End: 2020-07-15 | Stop reason: SDUPTHER

## 2020-02-17 ENCOUNTER — OFFICE VISIT (OUTPATIENT)
Dept: INTERNAL MEDICINE | Facility: CLINIC | Age: 80
End: 2020-02-17

## 2020-02-17 VITALS
OXYGEN SATURATION: 98 % | BODY MASS INDEX: 27.07 KG/M2 | SYSTOLIC BLOOD PRESSURE: 146 MMHG | HEART RATE: 94 BPM | TEMPERATURE: 98.1 F | WEIGHT: 178 LBS | DIASTOLIC BLOOD PRESSURE: 80 MMHG

## 2020-02-17 DIAGNOSIS — R35.1 NOCTURIA: ICD-10-CM

## 2020-02-17 DIAGNOSIS — G89.29 CHRONIC MIDLINE THORACIC BACK PAIN: ICD-10-CM

## 2020-02-17 DIAGNOSIS — C83.39 DIFFUSE LARGE B-CELL LYMPHOMA OF EXTRANODAL SITE EXCLUDING SPLEEN AND OTHER SOLID ORGANS (HCC): ICD-10-CM

## 2020-02-17 DIAGNOSIS — I10 BENIGN ESSENTIAL HTN: Primary | ICD-10-CM

## 2020-02-17 DIAGNOSIS — M54.6 CHRONIC MIDLINE THORACIC BACK PAIN: ICD-10-CM

## 2020-02-17 DIAGNOSIS — E78.00 ELEVATED CHOLESTEROL: ICD-10-CM

## 2020-02-17 DIAGNOSIS — M48.02 CERVICAL SPINAL STENOSIS: ICD-10-CM

## 2020-02-17 PROCEDURE — 99214 OFFICE O/P EST MOD 30 MIN: CPT | Performed by: FAMILY MEDICINE

## 2020-02-17 NOTE — PROGRESS NOTES
Subjective   Louie German is a 80 y.o. male.     Chief Complaint   Patient presents with   • Hypertension   • Hyperlipidemia   • Back Pain         History of Present Illness   Very nice gentleman who is quite resilient and exercises regularly he is very active.  He has gone through significant therapy for lymphoma as noted by Dr. Roberto.  Imaging studies been stable recently.    He has had some fleeting transient pain between his shoulder blades every day but is not last long as not severe.  He is a lipomatous area to the left of midline in the upper thoracic back area.    Otherwise treatment of hypertension hyperlipidemia.  Adequate of the blood pressure is marginally elevated today.  He will keep an eye on his blood pressure with his home cuff.  He will continue exercising.      The following portions of the patient's history were reviewed and updated as appropriate: allergies, current medications, past social history and problem list.    Review of Systems   Constitutional: Negative.    HENT: Negative.    Eyes: Negative.    Respiratory: Negative.    Cardiovascular: Negative.    Gastrointestinal: Negative.    Endocrine: Negative.    Genitourinary: Negative.    Musculoskeletal: Positive for arthralgias and back pain.   Skin: Negative.    Allergic/Immunologic: Negative.    Neurological: Negative.    Hematological: Negative.    Psychiatric/Behavioral: Negative.        Objective   Vitals:    02/17/20 1418   BP: 146/80   Pulse: 94   Temp: 98.1 °F (36.7 °C)   SpO2: 98%     Physical Exam   Constitutional: He is oriented to person, place, and time. He appears well-developed and well-nourished.   HENT:   Head: Normocephalic and atraumatic.   Right Ear: Tympanic membrane and external ear normal.   Left Ear: Tympanic membrane and external ear normal.   Nose: Nose normal.   Mouth/Throat: Oropharynx is clear and moist.   Eyes: Pupils are equal, round, and reactive to light. Conjunctivae and EOM are normal.   Neck: Normal  range of motion. Neck supple. No JVD present. No thyromegaly present.       Cardiovascular: Normal rate, regular rhythm, normal heart sounds and intact distal pulses.   Pulmonary/Chest: Effort normal and breath sounds normal.   Abdominal: Soft. Bowel sounds are normal.   Musculoskeletal: Normal range of motion.        Back:    Lymphadenopathy:     He has no cervical adenopathy.   Neurological: He is alert and oriented to person, place, and time. No cranial nerve deficit. Coordination normal.   Skin: Skin is warm and dry. No rash noted.   Psychiatric: He has a normal mood and affect. His behavior is normal. Judgment and thought content normal.   Vitals reviewed.      Assessment/Plan   Problem List Items Addressed This Visit        Cardiovascular and Mediastinum    Benign essential HTN - Primary    Elevated cholesterol       Hematopoietic and Hemostatic    Diffuse large B-cell lymphoma of extranodal site excluding spleen and other solid organs (CMS/HCC)       Other    Cervical spinal stenosis      Other Visit Diagnoses     Chronic midline thoracic back pain        Nocturia        Relevant Orders    Urinalysis With Culture If Indicated - Urine, Clean Catch      Plan: Continue medicine for hyperlipidemia hypercholesterolemia hypertension.  Routine follow-up with Dr. Roberto oncology.  If the pain for initial visits worse we will consider getting an MRI of the thoracic spine.  We will check urinalysis with culture he has follow-up with urology Dr. Amaro in the fall.

## 2020-02-18 LAB
APPEARANCE UR: CLEAR
BACTERIA #/AREA URNS HPF: NORMAL /HPF
BILIRUB UR QL STRIP: NEGATIVE
COLOR UR: YELLOW
EPI CELLS #/AREA URNS HPF: NORMAL /HPF (ref 0–10)
GLUCOSE UR QL: NEGATIVE
HGB UR QL STRIP: NEGATIVE
KETONES UR QL STRIP: NEGATIVE
LEUKOCYTE ESTERASE UR QL STRIP: NEGATIVE
MICRO URNS: NORMAL
MICRO URNS: NORMAL
MUCOUS THREADS URNS QL MICRO: PRESENT /HPF
NITRITE UR QL STRIP: NEGATIVE
PH UR STRIP: 5.5 [PH] (ref 5–7.5)
PROT UR QL STRIP: NEGATIVE
RBC #/AREA URNS HPF: NORMAL /HPF (ref 0–2)
SP GR UR: 1.02 (ref 1–1.03)
URINALYSIS REFLEX: NORMAL
UROBILINOGEN UR STRIP-MCNC: 0.2 MG/DL (ref 0.2–1)
WBC #/AREA URNS HPF: NORMAL /HPF (ref 0–5)

## 2020-02-20 DIAGNOSIS — E78.00 ELEVATED CHOLESTEROL: Primary | ICD-10-CM

## 2020-02-20 DIAGNOSIS — D50.0 IRON DEFICIENCY ANEMIA DUE TO CHRONIC BLOOD LOSS: ICD-10-CM

## 2020-02-20 DIAGNOSIS — N18.2 CHRONIC RENAL IMPAIRMENT, STAGE 2 (MILD): ICD-10-CM

## 2020-02-20 LAB
ALBUMIN SERPL-MCNC: 4.2 G/DL (ref 3.5–5.2)
ALBUMIN/GLOB SERPL: 1.8 G/DL
ALP SERPL-CCNC: 62 U/L (ref 39–117)
ALT SERPL-CCNC: 9 U/L (ref 1–41)
AST SERPL-CCNC: 16 U/L (ref 1–40)
BASOPHILS # BLD AUTO: 0.02 10*3/MM3 (ref 0–0.2)
BASOPHILS NFR BLD AUTO: 0.4 % (ref 0–1.5)
BILIRUB SERPL-MCNC: 0.6 MG/DL (ref 0.2–1.2)
BUN SERPL-MCNC: 19 MG/DL (ref 8–23)
BUN/CREAT SERPL: 14.5 (ref 7–25)
CALCIUM SERPL-MCNC: 9 MG/DL (ref 8.6–10.5)
CHLORIDE SERPL-SCNC: 102 MMOL/L (ref 98–107)
CHOLEST SERPL-MCNC: 155 MG/DL (ref 0–200)
CHOLEST/HDLC SERPL: 2.21 {RATIO}
CO2 SERPL-SCNC: 26.1 MMOL/L (ref 22–29)
CREAT SERPL-MCNC: 1.31 MG/DL (ref 0.76–1.27)
EOSINOPHIL # BLD AUTO: 0.1 10*3/MM3 (ref 0–0.4)
EOSINOPHIL NFR BLD AUTO: 1.9 % (ref 0.3–6.2)
ERYTHROCYTE [DISTWIDTH] IN BLOOD BY AUTOMATED COUNT: 12.6 % (ref 12.3–15.4)
GLOBULIN SER CALC-MCNC: 2.3 GM/DL
GLUCOSE SERPL-MCNC: 97 MG/DL (ref 65–99)
HCT VFR BLD AUTO: 38.5 % (ref 37.5–51)
HDLC SERPL-MCNC: 70 MG/DL (ref 40–60)
HGB BLD-MCNC: 12.9 G/DL (ref 13–17.7)
IMM GRANULOCYTES # BLD AUTO: 0.01 10*3/MM3 (ref 0–0.05)
IMM GRANULOCYTES NFR BLD AUTO: 0.2 % (ref 0–0.5)
LDLC SERPL CALC-MCNC: 62 MG/DL (ref 0–100)
LYMPHOCYTES # BLD AUTO: 0.99 10*3/MM3 (ref 0.7–3.1)
LYMPHOCYTES NFR BLD AUTO: 18.5 % (ref 19.6–45.3)
MCH RBC QN AUTO: 31.9 PG (ref 26.6–33)
MCHC RBC AUTO-ENTMCNC: 33.5 G/DL (ref 31.5–35.7)
MCV RBC AUTO: 95.3 FL (ref 79–97)
MONOCYTES # BLD AUTO: 0.53 10*3/MM3 (ref 0.1–0.9)
MONOCYTES NFR BLD AUTO: 9.9 % (ref 5–12)
NEUTROPHILS # BLD AUTO: 3.71 10*3/MM3 (ref 1.7–7)
NEUTROPHILS NFR BLD AUTO: 69.1 % (ref 42.7–76)
NRBC BLD AUTO-RTO: 0 /100 WBC (ref 0–0.2)
PLATELET # BLD AUTO: 182 10*3/MM3 (ref 140–450)
POTASSIUM SERPL-SCNC: 4.3 MMOL/L (ref 3.5–5.2)
PROT SERPL-MCNC: 6.5 G/DL (ref 6–8.5)
RBC # BLD AUTO: 4.04 10*6/MM3 (ref 4.14–5.8)
SODIUM SERPL-SCNC: 140 MMOL/L (ref 136–145)
TRIGL SERPL-MCNC: 115 MG/DL (ref 0–150)
VLDLC SERPL CALC-MCNC: 23 MG/DL
WBC # BLD AUTO: 5.36 10*3/MM3 (ref 3.4–10.8)

## 2020-02-21 ENCOUNTER — RESULTS ENCOUNTER (OUTPATIENT)
Dept: ONCOLOGY | Facility: CLINIC | Age: 80
End: 2020-02-21

## 2020-02-21 DIAGNOSIS — C82.90 FOLLICULAR LYMPHOMA, UNSPECIFIED FOLLICULAR LYMPHOMA TYPE, UNSPECIFIED BODY REGION (HCC): ICD-10-CM

## 2020-02-21 DIAGNOSIS — Z45.2 FITTING AND ADJUSTMENT OF VASCULAR CATHETER: ICD-10-CM

## 2020-02-21 DIAGNOSIS — C83.39 DIFFUSE LARGE B-CELL LYMPHOMA OF EXTRANODAL SITE EXCLUDING SPLEEN AND OTHER SOLID ORGANS (HCC): ICD-10-CM

## 2020-02-21 DIAGNOSIS — D50.0 IRON DEFICIENCY ANEMIA DUE TO CHRONIC BLOOD LOSS: ICD-10-CM

## 2020-02-21 DIAGNOSIS — S32.030S CLOSED COMPRESSION FRACTURE OF THIRD LUMBAR VERTEBRA, SEQUELA: ICD-10-CM

## 2020-02-25 ENCOUNTER — INFUSION (OUTPATIENT)
Dept: ONCOLOGY | Facility: HOSPITAL | Age: 80
End: 2020-02-25

## 2020-02-25 VITALS — SYSTOLIC BLOOD PRESSURE: 146 MMHG | DIASTOLIC BLOOD PRESSURE: 68 MMHG | HEART RATE: 81 BPM | OXYGEN SATURATION: 98 %

## 2020-02-25 DIAGNOSIS — Z45.2 FITTING AND ADJUSTMENT OF VASCULAR CATHETER: Primary | ICD-10-CM

## 2020-02-25 PROCEDURE — 96523 IRRIG DRUG DELIVERY DEVICE: CPT

## 2020-02-25 PROCEDURE — 25010000003 HEPARIN LOCK FLUCH PER 10 UNITS: Performed by: INTERNAL MEDICINE

## 2020-02-25 RX ORDER — HEPARIN SODIUM (PORCINE) LOCK FLUSH IV SOLN 100 UNIT/ML 100 UNIT/ML
500 SOLUTION INTRAVENOUS AS NEEDED
Status: DISCONTINUED | OUTPATIENT
Start: 2020-02-25 | End: 2020-02-25 | Stop reason: HOSPADM

## 2020-02-25 RX ORDER — SODIUM CHLORIDE 0.9 % (FLUSH) 0.9 %
10 SYRINGE (ML) INJECTION AS NEEDED
Status: CANCELLED | OUTPATIENT
Start: 2020-02-25

## 2020-02-25 RX ORDER — SODIUM CHLORIDE 0.9 % (FLUSH) 0.9 %
10 SYRINGE (ML) INJECTION AS NEEDED
Status: DISCONTINUED | OUTPATIENT
Start: 2020-02-25 | End: 2020-02-25 | Stop reason: HOSPADM

## 2020-02-25 RX ORDER — HEPARIN SODIUM (PORCINE) LOCK FLUSH IV SOLN 100 UNIT/ML 100 UNIT/ML
500 SOLUTION INTRAVENOUS AS NEEDED
Status: CANCELLED | OUTPATIENT
Start: 2020-02-25

## 2020-02-25 RX ADMIN — HEPARIN SODIUM (PORCINE) LOCK FLUSH IV SOLN 100 UNIT/ML 500 UNITS: 100 SOLUTION at 09:13

## 2020-02-25 RX ADMIN — SODIUM CHLORIDE, PRESERVATIVE FREE 10 ML: 5 INJECTION INTRAVENOUS at 09:13

## 2020-02-28 ENCOUNTER — TELEPHONE (OUTPATIENT)
Dept: INTERNAL MEDICINE | Facility: CLINIC | Age: 80
End: 2020-02-28

## 2020-02-28 NOTE — TELEPHONE ENCOUNTER
PT CALLED IN TO REQUEST HIS LAB RESULTS FROM A WEEK AGO. PT STATES THAT HE HAS ANOTHER APPOINTMENT TODAY AND MAY MISS THE OFFICE'S CALL BUT IT IS OK TO LEAVE A MESSAGE. PLEASE ADVISE.      PT CALL BACK [phone]

## 2020-03-02 ENCOUNTER — TELEPHONE (OUTPATIENT)
Dept: ONCOLOGY | Facility: HOSPITAL | Age: 80
End: 2020-03-02

## 2020-03-02 ENCOUNTER — TELEPHONE (OUTPATIENT)
Dept: ONCOLOGY | Facility: CLINIC | Age: 80
End: 2020-03-02

## 2020-03-02 NOTE — TELEPHONE ENCOUNTER
Pt asking if he needs to take antibiotic prior to dental cleaning tomorrow since he has been done with treatment since sept 2018. msg sent to . Pt aware we will call back once MD responds.

## 2020-03-02 NOTE — TELEPHONE ENCOUNTER
PT CALLED ASKING IF HE NEEDS TO CONTINUE TAKING ANTI-BIOTIC PRESCRIBED BY DR. DENNEY.     PT HAS DENTAL APPT TOMORROW 3/3/2020 AND IS ASKING JUST IN CASE HE NEEDS TO TAKE MEDS BEFORE GETTING DENTAL WORK DONE.    HE'D LIKE TO SPEAK WITH A NURSE IF POSSIBLE.    CONTACT NUMBER [phone]

## 2020-03-02 NOTE — TELEPHONE ENCOUNTER
Patient asking again about antibiotic for his hip replacement. Told him to contact his ortho doc to get a for sure answer on this.

## 2020-03-02 NOTE — TELEPHONE ENCOUNTER
His current dose is working well.  If you would like to take less medicine he can cut it in half and we will check it again on next visit.

## 2020-03-02 NOTE — TELEPHONE ENCOUNTER
Called and left pt v/m.    ----- Message from Danny Roberto MD sent at 3/2/2020  2:01 PM EST -----  Probably should get antibiotic prophylaxis then.  ----- Message -----  From: Radha Vázquez RN  Sent: 3/2/2020  11:28 AM EST  To: Danny Roberto MD    He has both hips done.   ----- Message -----  From: Danny Roberto MD  Sent: 3/2/2020  11:14 AM EST  To: Radha Vázquez RN    Not from our standpoint. Does he have any artificial joints?  ----- Message -----  From: Radha Vázquez RN  Sent: 3/2/2020  10:01 AM EST  To: Danny Roberto MD    Patient is due for a dental cleaning tomorrow and is wondering if he still needs to take an antibiotic prior to it since he finished his treatment in sept 2018?

## 2020-04-03 ENCOUNTER — RESULTS ENCOUNTER (OUTPATIENT)
Dept: ONCOLOGY | Facility: CLINIC | Age: 80
End: 2020-04-03

## 2020-04-03 DIAGNOSIS — C82.90 FOLLICULAR LYMPHOMA, UNSPECIFIED FOLLICULAR LYMPHOMA TYPE, UNSPECIFIED BODY REGION (HCC): ICD-10-CM

## 2020-04-03 DIAGNOSIS — D50.0 IRON DEFICIENCY ANEMIA DUE TO CHRONIC BLOOD LOSS: ICD-10-CM

## 2020-04-03 DIAGNOSIS — Z45.2 FITTING AND ADJUSTMENT OF VASCULAR CATHETER: ICD-10-CM

## 2020-04-03 DIAGNOSIS — C83.39 DIFFUSE LARGE B-CELL LYMPHOMA OF EXTRANODAL SITE EXCLUDING SPLEEN AND OTHER SOLID ORGANS (HCC): ICD-10-CM

## 2020-04-03 DIAGNOSIS — S32.030S CLOSED COMPRESSION FRACTURE OF THIRD LUMBAR VERTEBRA, SEQUELA: ICD-10-CM

## 2020-04-07 ENCOUNTER — INFUSION (OUTPATIENT)
Dept: ONCOLOGY | Facility: HOSPITAL | Age: 80
End: 2020-04-07

## 2020-04-07 VITALS — TEMPERATURE: 97.9 F

## 2020-04-07 DIAGNOSIS — Z45.2 FITTING AND ADJUSTMENT OF VASCULAR CATHETER: Primary | ICD-10-CM

## 2020-04-07 PROCEDURE — 25010000003 HEPARIN LOCK FLUCH PER 10 UNITS: Performed by: INTERNAL MEDICINE

## 2020-04-07 PROCEDURE — 96523 IRRIG DRUG DELIVERY DEVICE: CPT

## 2020-04-07 RX ORDER — SODIUM CHLORIDE 0.9 % (FLUSH) 0.9 %
10 SYRINGE (ML) INJECTION AS NEEDED
Status: DISCONTINUED | OUTPATIENT
Start: 2020-04-07 | End: 2020-04-07 | Stop reason: HOSPADM

## 2020-04-07 RX ORDER — SODIUM CHLORIDE 0.9 % (FLUSH) 0.9 %
10 SYRINGE (ML) INJECTION AS NEEDED
Status: CANCELLED | OUTPATIENT
Start: 2020-04-07

## 2020-04-07 RX ORDER — HEPARIN SODIUM (PORCINE) LOCK FLUSH IV SOLN 100 UNIT/ML 100 UNIT/ML
500 SOLUTION INTRAVENOUS AS NEEDED
Status: DISCONTINUED | OUTPATIENT
Start: 2020-04-07 | End: 2020-04-07 | Stop reason: HOSPADM

## 2020-04-07 RX ORDER — HEPARIN SODIUM (PORCINE) LOCK FLUSH IV SOLN 100 UNIT/ML 100 UNIT/ML
500 SOLUTION INTRAVENOUS AS NEEDED
Status: CANCELLED | OUTPATIENT
Start: 2020-04-07

## 2020-04-07 RX ADMIN — SODIUM CHLORIDE, PRESERVATIVE FREE 10 ML: 5 INJECTION INTRAVENOUS at 09:47

## 2020-04-07 RX ADMIN — HEPARIN SODIUM (PORCINE) LOCK FLUSH IV SOLN 100 UNIT/ML 500 UNITS: 100 SOLUTION at 09:47

## 2020-05-15 ENCOUNTER — RESULTS ENCOUNTER (OUTPATIENT)
Dept: ONCOLOGY | Facility: CLINIC | Age: 80
End: 2020-05-15

## 2020-05-15 DIAGNOSIS — S32.030S CLOSED COMPRESSION FRACTURE OF THIRD LUMBAR VERTEBRA, SEQUELA: ICD-10-CM

## 2020-05-15 DIAGNOSIS — C83.39 DIFFUSE LARGE B-CELL LYMPHOMA OF EXTRANODAL SITE EXCLUDING SPLEEN AND OTHER SOLID ORGANS (HCC): ICD-10-CM

## 2020-05-15 DIAGNOSIS — C82.90 FOLLICULAR LYMPHOMA, UNSPECIFIED FOLLICULAR LYMPHOMA TYPE, UNSPECIFIED BODY REGION (HCC): ICD-10-CM

## 2020-05-15 DIAGNOSIS — D50.0 IRON DEFICIENCY ANEMIA DUE TO CHRONIC BLOOD LOSS: ICD-10-CM

## 2020-05-15 DIAGNOSIS — Z45.2 FITTING AND ADJUSTMENT OF VASCULAR CATHETER: ICD-10-CM

## 2020-05-19 ENCOUNTER — INFUSION (OUTPATIENT)
Dept: ONCOLOGY | Facility: HOSPITAL | Age: 80
End: 2020-05-19

## 2020-05-19 VITALS — HEART RATE: 78 BPM | DIASTOLIC BLOOD PRESSURE: 74 MMHG | SYSTOLIC BLOOD PRESSURE: 136 MMHG | OXYGEN SATURATION: 98 %

## 2020-05-19 DIAGNOSIS — Z45.2 FITTING AND ADJUSTMENT OF VASCULAR CATHETER: Primary | ICD-10-CM

## 2020-05-19 PROCEDURE — 96523 IRRIG DRUG DELIVERY DEVICE: CPT

## 2020-05-19 PROCEDURE — 25010000003 HEPARIN LOCK FLUSH PER 10 UNITS: Performed by: INTERNAL MEDICINE

## 2020-05-19 RX ORDER — HEPARIN SODIUM (PORCINE) LOCK FLUSH IV SOLN 100 UNIT/ML 100 UNIT/ML
500 SOLUTION INTRAVENOUS AS NEEDED
Status: CANCELLED | OUTPATIENT
Start: 2020-05-19

## 2020-05-19 RX ORDER — SODIUM CHLORIDE 0.9 % (FLUSH) 0.9 %
10 SYRINGE (ML) INJECTION AS NEEDED
Status: DISCONTINUED | OUTPATIENT
Start: 2020-05-19 | End: 2020-05-19 | Stop reason: HOSPADM

## 2020-05-19 RX ORDER — HEPARIN SODIUM (PORCINE) LOCK FLUSH IV SOLN 100 UNIT/ML 100 UNIT/ML
500 SOLUTION INTRAVENOUS AS NEEDED
Status: DISCONTINUED | OUTPATIENT
Start: 2020-05-19 | End: 2020-05-19 | Stop reason: HOSPADM

## 2020-05-19 RX ORDER — SODIUM CHLORIDE 0.9 % (FLUSH) 0.9 %
10 SYRINGE (ML) INJECTION AS NEEDED
Status: CANCELLED | OUTPATIENT
Start: 2020-05-19

## 2020-05-19 RX ADMIN — HEPARIN SODIUM (PORCINE) LOCK FLUSH IV SOLN 100 UNIT/ML 500 UNITS: 100 SOLUTION at 09:51

## 2020-05-19 RX ADMIN — Medication 10 ML: at 09:50

## 2020-06-26 ENCOUNTER — RESULTS ENCOUNTER (OUTPATIENT)
Dept: ONCOLOGY | Facility: CLINIC | Age: 80
End: 2020-06-26

## 2020-06-26 DIAGNOSIS — C83.39 DIFFUSE LARGE B-CELL LYMPHOMA OF EXTRANODAL SITE EXCLUDING SPLEEN AND OTHER SOLID ORGANS (HCC): ICD-10-CM

## 2020-06-26 DIAGNOSIS — S32.030S CLOSED COMPRESSION FRACTURE OF THIRD LUMBAR VERTEBRA, SEQUELA: ICD-10-CM

## 2020-06-26 DIAGNOSIS — D50.0 IRON DEFICIENCY ANEMIA DUE TO CHRONIC BLOOD LOSS: ICD-10-CM

## 2020-06-26 DIAGNOSIS — C82.90 FOLLICULAR LYMPHOMA, UNSPECIFIED FOLLICULAR LYMPHOMA TYPE, UNSPECIFIED BODY REGION (HCC): ICD-10-CM

## 2020-06-26 DIAGNOSIS — Z45.2 FITTING AND ADJUSTMENT OF VASCULAR CATHETER: ICD-10-CM

## 2020-06-30 ENCOUNTER — INFUSION (OUTPATIENT)
Dept: ONCOLOGY | Facility: HOSPITAL | Age: 80
End: 2020-06-30

## 2020-06-30 ENCOUNTER — HOSPITAL ENCOUNTER (OUTPATIENT)
Dept: CT IMAGING | Facility: HOSPITAL | Age: 80
Discharge: HOME | End: 2020-06-30
Admitting: INTERNAL MEDICINE

## 2020-06-30 DIAGNOSIS — D50.0 IRON DEFICIENCY ANEMIA DUE TO CHRONIC BLOOD LOSS: ICD-10-CM

## 2020-06-30 DIAGNOSIS — Z45.2 FITTING AND ADJUSTMENT OF VASCULAR CATHETER: ICD-10-CM

## 2020-06-30 DIAGNOSIS — C82.90 FOLLICULAR LYMPHOMA, UNSPECIFIED FOLLICULAR LYMPHOMA TYPE, UNSPECIFIED BODY REGION (HCC): ICD-10-CM

## 2020-06-30 DIAGNOSIS — C83.39 DIFFUSE LARGE B-CELL LYMPHOMA OF EXTRANODAL SITE EXCLUDING SPLEEN AND OTHER SOLID ORGANS (HCC): ICD-10-CM

## 2020-06-30 DIAGNOSIS — Z45.2 FITTING AND ADJUSTMENT OF VASCULAR CATHETER: Primary | ICD-10-CM

## 2020-06-30 DIAGNOSIS — S32.030S CLOSED COMPRESSION FRACTURE OF THIRD LUMBAR VERTEBRA, SEQUELA: ICD-10-CM

## 2020-06-30 LAB
ALBUMIN SERPL-MCNC: 4 G/DL (ref 3.5–5.2)
ALBUMIN/GLOB SERPL: 1.5 G/DL
ALP SERPL-CCNC: 61 U/L (ref 39–117)
ALT SERPL W P-5'-P-CCNC: 9 U/L (ref 1–41)
ANION GAP SERPL CALCULATED.3IONS-SCNC: 9.8 MMOL/L (ref 5–15)
AST SERPL-CCNC: 17 U/L (ref 1–40)
BILIRUB SERPL-MCNC: 0.4 MG/DL (ref 0.1–1.2)
BUN SERPL-MCNC: 20 MG/DL (ref 8–23)
BUN/CREAT SERPL: 14.6 (ref 7–25)
CALCIUM SPEC-SCNC: 9.3 MG/DL (ref 8.6–10.5)
CHLORIDE SERPL-SCNC: 104 MMOL/L (ref 98–107)
CO2 SERPL-SCNC: 25.2 MMOL/L (ref 22–29)
CREAT BLDA-MCNC: 1.3 MG/DL (ref 0.6–1.3)
CREAT SERPL-MCNC: 1.37 MG/DL (ref 0.76–1.27)
DEPRECATED RDW RBC AUTO: 44.6 FL (ref 37–54)
ERYTHROCYTE [DISTWIDTH] IN BLOOD BY AUTOMATED COUNT: 12.9 % (ref 12.3–15.4)
GFR SERPL CREATININE-BSD FRML MDRD: 50 ML/MIN/1.73
GLOBULIN UR ELPH-MCNC: 2.7 GM/DL
GLUCOSE SERPL-MCNC: 99 MG/DL (ref 65–99)
HCT VFR BLD AUTO: 37.5 % (ref 37.5–51)
HGB BLD-MCNC: 12.8 G/DL (ref 13–17.7)
LDH SERPL-CCNC: 163 U/L (ref 135–225)
MCH RBC QN AUTO: 32.2 PG (ref 26.6–33)
MCHC RBC AUTO-ENTMCNC: 34.1 G/DL (ref 31.5–35.7)
MCV RBC AUTO: 94.5 FL (ref 79–97)
PLATELET # BLD AUTO: 193 10*3/MM3 (ref 140–450)
PMV BLD AUTO: 10.8 FL (ref 6–12)
POTASSIUM SERPL-SCNC: 3.8 MMOL/L (ref 3.5–5.2)
PROT SERPL-MCNC: 6.7 G/DL (ref 6–8.5)
RBC # BLD AUTO: 3.97 10*6/MM3 (ref 4.14–5.8)
SODIUM SERPL-SCNC: 139 MMOL/L (ref 136–145)
WBC # BLD AUTO: 5.49 10*3/MM3 (ref 3.4–10.8)

## 2020-06-30 PROCEDURE — 82565 ASSAY OF CREATININE: CPT

## 2020-06-30 PROCEDURE — 0 DIATRIZOATE MEGLUMINE & SODIUM PER 1 ML: Performed by: INTERNAL MEDICINE

## 2020-06-30 PROCEDURE — 71260 CT THORAX DX C+: CPT

## 2020-06-30 PROCEDURE — 74177 CT ABD & PELVIS W/CONTRAST: CPT

## 2020-06-30 PROCEDURE — 83615 LACTATE (LD) (LDH) ENZYME: CPT | Performed by: INTERNAL MEDICINE

## 2020-06-30 PROCEDURE — 36591 DRAW BLOOD OFF VENOUS DEVICE: CPT

## 2020-06-30 PROCEDURE — 85027 COMPLETE CBC AUTOMATED: CPT | Performed by: INTERNAL MEDICINE

## 2020-06-30 PROCEDURE — 80053 COMPREHEN METABOLIC PANEL: CPT | Performed by: INTERNAL MEDICINE

## 2020-06-30 PROCEDURE — 25010000002 IOPAMIDOL 61 % SOLUTION: Performed by: INTERNAL MEDICINE

## 2020-06-30 RX ORDER — HEPARIN SODIUM (PORCINE) LOCK FLUSH IV SOLN 100 UNIT/ML 100 UNIT/ML
500 SOLUTION INTRAVENOUS AS NEEDED
Status: CANCELLED | OUTPATIENT
Start: 2020-06-30

## 2020-06-30 RX ORDER — SODIUM CHLORIDE 0.9 % (FLUSH) 0.9 %
10 SYRINGE (ML) INJECTION AS NEEDED
Status: CANCELLED | OUTPATIENT
Start: 2020-06-30

## 2020-06-30 RX ORDER — HEPARIN SODIUM (PORCINE) LOCK FLUSH IV SOLN 100 UNIT/ML 100 UNIT/ML
500 SOLUTION INTRAVENOUS AS NEEDED
Status: DISCONTINUED | OUTPATIENT
Start: 2020-06-30 | End: 2020-06-30 | Stop reason: HOSPADM

## 2020-06-30 RX ORDER — SODIUM CHLORIDE 0.9 % (FLUSH) 0.9 %
10 SYRINGE (ML) INJECTION AS NEEDED
Status: DISCONTINUED | OUTPATIENT
Start: 2020-06-30 | End: 2020-06-30 | Stop reason: HOSPADM

## 2020-06-30 RX ADMIN — DIATRIZOATE MEGLUMINE AND DIATRIZOATE SODIUM 30 ML: 660; 100 LIQUID ORAL; RECTAL at 07:35

## 2020-06-30 RX ADMIN — IOPAMIDOL 90 ML: 612 INJECTION, SOLUTION INTRAVENOUS at 08:45

## 2020-07-07 ENCOUNTER — OFFICE VISIT (OUTPATIENT)
Dept: ONCOLOGY | Facility: CLINIC | Age: 80
End: 2020-07-07

## 2020-07-07 ENCOUNTER — APPOINTMENT (OUTPATIENT)
Dept: OTHER | Facility: HOSPITAL | Age: 80
End: 2020-07-07

## 2020-07-07 VITALS
HEIGHT: 68 IN | WEIGHT: 174.3 LBS | OXYGEN SATURATION: 97 % | RESPIRATION RATE: 14 BRPM | BODY MASS INDEX: 26.42 KG/M2 | HEART RATE: 76 BPM | DIASTOLIC BLOOD PRESSURE: 80 MMHG | SYSTOLIC BLOOD PRESSURE: 146 MMHG | TEMPERATURE: 97.7 F

## 2020-07-07 DIAGNOSIS — C82.90 FOLLICULAR LYMPHOMA, UNSPECIFIED FOLLICULAR LYMPHOMA TYPE, UNSPECIFIED BODY REGION (HCC): Primary | ICD-10-CM

## 2020-07-07 DIAGNOSIS — Z45.2 FITTING AND ADJUSTMENT OF VASCULAR CATHETER: ICD-10-CM

## 2020-07-07 PROCEDURE — 99214 OFFICE O/P EST MOD 30 MIN: CPT | Performed by: INTERNAL MEDICINE

## 2020-07-07 NOTE — PROGRESS NOTES
Subjective     REASON FOR FOLLOW UP:   1.  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  2.  Maintenance Rituxan every 3 months for 2 years initiated 3/30/2018  3.  Painful pathologic compression fracture at L3 May 2018 due to involvement with diffuse large B-cell non-Hodgkin's lymphoma.   4.  CHOP and Rituxan x 6 cycles completed 9/25/2018.    HISTORY OF PRESENT ILLNESS:  The patient is a 80 y.o. year old male who was seen in consultation on 10/20/2017 and at that time we recommended laparoscopic lymph node sampling and upper and lower GI endoscopy.  He underwent these procedures with Dr. Castañeda on 10/26/2017.  There was no evidence of malignancy from his endoscopic procedures at the laparoscopic specimen returned as follicular center cell B-cell non-Hodgkin's lymphoma.    He received Rituxan treatment initially with 4 weekly doses of Rituxan with good response on follow-up scans.  Unfortunately, a few months after completion of his treatment he developed worsening back pain and has lymphoma involving the L3 vertebral body consistent with diffuse large B-cell non-Hodgkin's lymphoma.    He underwent port placement and received 6 cycles of CHOP and Rituxan which he completed in late September 2018 with what appear to be a complete response on PET scan.    He returns today for follow-up with CT scan surveillance.  CT scans performed on 6/30/2020 showed no evidence of recurrent lymphoma    David and his wife are enjoying traveling and he made several trips to [place] and are actually working on dual citizenship.    Pain   Pertinent negatives include no chest pain, coughing, fatigue, fever, headaches, nausea, neck pain, rash or vomiting.        Past Medical History:   Diagnosis Date   • Arm fracture     right side humerus - sling and swathe   • Cancer (CMS/HCC) 2017    Basal Cell on Nose and Under Right Eye   •  "Coronary artery disease    • DDD (degenerative disc disease), cervical    • Gastric mass    • GERD (gastroesophageal reflux disease)    • Hip pain started 5/29/18    onset of acute pain with weight bearing   • Hx of cardiac arrest     Happened after Induction of anesthesia prior  to SURG 10 YRS AGO  \" I FLATLINE BUT W/IN SECONDS HEART WAS OK AFTER TURNED ON BACK...I THINK THEY GAVE ME TO MUCH ANESTHESIA\"   • Hyperlipidemia    • Hypertension    • Kidney stones    • Non Hodgkin's lymphoma (CMS/HCC)     SPINE   • Osteoarthritis    • Pleural effusion     DRAINAGE RT PLEURAL FLUID FOR TESTING WITH EGD ON 1-30-17   NO MALIGNANCY WITH CLEARANCE FROM DR SIMMONS FOR SURGERY   • Rheumatoid arthritis (CMS/HCC)         Past Surgical History:   Procedure Laterality Date   • COLONOSCOPY N/A 10/26/2017    Procedure: COLONOSCOPY;  Surgeon: Louie Castañeda MD;  Location: [facility];  Service:    • CORONARY ANGIOPLASTY WITH STENT PLACEMENT  1999    17 years ago   • CYSTOSCOPY W/ LITHOLAPAXY / EHL      6-7 years ago   • DIAGNOSTIC LAPAROSCOPY N/A 10/26/2017    Procedure: DIAGNOSTIC LAPAROSCOPY WITH RETROPERITONEAL BIOPSIES;  Surgeon: Louie Castañeda MD;  Location: [facility] OR;  Service:    • ENDOSCOPY      ON 1/30/17 WITH DRAINAGE OF RT PLEURAL FLUID FOR BIOPSY    NO MALIGNANCY  CLEARANCE FOR SURG PER DR SIMMONS   • ENDOSCOPY N/A 10/26/2017    Procedure: ESOPHAGOGASTRODUODENOSCOPY;  Surgeon: Louie Castañeda MD;  Location: [facility];  Service:    • EYE SURGERY Right 2017    Moh's; reconstruction right lower lid   • KIDNEY STONE SURGERY     • PLEURAL BIOPSY     • POSTERIOR CERVICAL FUSION  2001   • SKIN CANCER EXCISION N/A 2017    Basal Cell Nose & under right eye   • TOTAL HIP ARTHROPLASTY Left 04/2010    9-10 years ago    • TOTAL HIP ARTHROPLASTY Right 2/14/2017    Procedure: TOTAL HIP ARTHROPLASTY;  Surgeon: Gerson Cardoza MD;  Location: [facility] OR;  Service:    • VENOUS ACCESS DEVICE (PORT) INSERTION " "N/A 6/5/2018    Procedure: INSERTION VENOUS ACCESS DEVICE- RIGHT;  Surgeon: Louie Castañeda MD;  Location: [facility] MAIN OR;  Service: General         ALLERGIES:  No Known Allergies     Review of Systems   Constitutional: Positive for appetite change and unexpected weight change. Negative for activity change, fatigue and fever.   HENT: Negative for hearing loss, nosebleeds, trouble swallowing and voice change.    Eyes: Negative for visual disturbance.   Respiratory: Negative for cough, shortness of breath and wheezing.    Cardiovascular: Negative for chest pain and palpitations.   Gastrointestinal: Positive for constipation. Negative for diarrhea, nausea and vomiting.   Genitourinary: Negative for difficulty urinating, frequency, hematuria and urgency.   Musculoskeletal: Positive for back pain. Negative for neck pain.   Skin: Negative for rash.   Neurological: Negative for dizziness, seizures, syncope and headaches.   Hematological: Negative for adenopathy. Does not bruise/bleed easily.   Psychiatric/Behavioral: Negative for behavioral problems. The patient is not nervous/anxious.         Objective     Vitals:    07/07/20 0956   BP: 146/80   Pulse: 76   Resp: 14   Temp: 97.7 °F (36.5 °C)   TempSrc: Tympanic   SpO2: 97%   Weight: 79.1 kg (174 lb 4.8 oz)   Height: 172.7 cm (67.99\")   PainSc: 0-No pain     Current Status 7/7/2020   ECOG score 0       Physical Exam   Constitutional: He is oriented to person, place, and time. He appears well-developed and well-nourished. No distress.   HENT:   Head: Normocephalic.   Eyes: Pupils are equal, round, and reactive to light. Conjunctivae and EOM are normal. No scleral icterus.   Neck: Normal range of motion. Neck supple. No JVD present. No thyromegaly present.   Cardiovascular: Normal rate and regular rhythm. Exam reveals no gallop and no friction rub.   No murmur heard.  Pulmonary/Chest: Effort normal and breath sounds normal. He has no wheezes. He has no rales.   Right IJ " Mediport.   Abdominal: Soft. He exhibits no distension and no mass. There is no tenderness.   Musculoskeletal: Normal range of motion. He exhibits no edema or deformity.   Wearing a back brace.   Lymphadenopathy:     He has no cervical adenopathy.   Neurological: He is alert and oriented to person, place, and time. He has normal reflexes. No cranial nerve deficit.   Skin: Skin is warm and dry. No rash noted. No erythema.   Psychiatric: He has a normal mood and affect. His behavior is normal. Judgment normal.         RECENT LABS:  Hematology WBC   Date Value Ref Range Status   06/30/2020 5.49 3.40 - 10.80 10*3/mm3 Final   02/20/2020 5.36 3.40 - 10.80 10*3/mm3 Final     RBC   Date Value Ref Range Status   06/30/2020 3.97 (L) 4.14 - 5.80 10*6/mm3 Final   02/20/2020 4.04 (L) 4.14 - 5.80 10*6/mm3 Final     Hemoglobin   Date Value Ref Range Status   06/30/2020 12.8 (L) 13.0 - 17.7 g/dL Final     Hematocrit   Date Value Ref Range Status   06/30/2020 37.5 37.5 - 51.0 % Final     Platelets   Date Value Ref Range Status   06/30/2020 193 140 - 450 10*3/mm3 Final          Lab Results   Component Value Date    GLUCOSE 99 06/30/2020    BUN 20 06/30/2020    CREATININE 1.30 06/30/2020    EGFRIFNONA 50 (L) 06/30/2020    EGFRIFAFRI 64 02/20/2020    BCR 14.6 06/30/2020    K 3.8 06/30/2020    CO2 25.2 06/30/2020    CALCIUM 9.3 06/30/2020    PROTENTOTREF 6.5 02/20/2020    ALBUMIN 4.00 06/30/2020    LABIL2 1.8 02/20/2020    AST 17 06/30/2020    ALT 9 06/30/2020       CT ABDOMEN PELVIS WITH CONTRAST-, CT CHEST WITH CONTRAST-6/30/2020  IMPRESSION:  Stable examination. There continues to be long-term  stability of the residual infiltrative soft tissue within the mesentery.  There is no lymphadenopathy within the chest and there is no new  Abnormality.      Images personally reviewed    Assessment/Plan     1.  Diffuse large B-cell non-Hodgkin's lymphoma involving the L3 vertebral body.  As noted above, his PET scan after 4 cycles of CHOP and  Rituxan showed essentially a complete metabolic response.  He completed a total of 6 cycles of chemotherapy by 9/25/2018 and is felt to be in remission.  As noted above, his latest scans from 6/30/2020 do not show any evidence of recurrent lymphoma.  2.  Previous treatment with single agent Rituxan for follicular B-cell lymphoma.  3.  Back pain related to lymphoma involving the spine.  Improved  4.  Right IJ Mediport.  We will keep the port for at least another 6 months and continue to flush it every 6 weeks.      Plan  1.  We discussed the results of the CT scans with the patient and provided him a copy of the printed report.  2.  He will be scheduled for port flushes every 6 weeks.  3.  We will plan to see David again in the office in 6 months and he again will undergo CT scans of the chest abdomen pelvis and labs one week prior to that visit.

## 2020-07-15 RX ORDER — SIMVASTATIN 20 MG
20 TABLET ORAL NIGHTLY
Qty: 90 TABLET | Refills: 1 | Status: SHIPPED | OUTPATIENT
Start: 2020-07-15 | End: 2020-08-03 | Stop reason: SDUPTHER

## 2020-08-03 NOTE — TELEPHONE ENCOUNTER
PT IS REQUESTING A REFILL ON simvastatin (ZOCOR) 20 MG tablet BUT HE WANTS 10MG INSTEAD OF 20MG TABLET.     [facility] CONFIRMED.

## 2020-08-04 RX ORDER — SIMVASTATIN 20 MG
20 TABLET ORAL NIGHTLY
Qty: 90 TABLET | Refills: 1 | Status: SHIPPED | OUTPATIENT
Start: 2020-08-04 | End: 2020-08-05 | Stop reason: DRUGHIGH

## 2020-08-05 ENCOUNTER — TELEPHONE (OUTPATIENT)
Dept: INTERNAL MEDICINE | Facility: CLINIC | Age: 80
End: 2020-08-05

## 2020-08-05 DIAGNOSIS — E78.00 ELEVATED CHOLESTEROL: Primary | ICD-10-CM

## 2020-08-05 RX ORDER — SIMVASTATIN 10 MG
10 TABLET ORAL NIGHTLY
Qty: 90 TABLET | Refills: 3 | Status: SHIPPED | OUTPATIENT
Start: 2020-08-05 | End: 2021-05-24 | Stop reason: SDUPTHER

## 2020-08-05 NOTE — TELEPHONE ENCOUNTER
PATIENT CALLED AND IS REQUESTING A NEW PRESCRIPTION FOR   simvastatin (ZOCOR)   HE WOULD LIKE TO GET 10 MG  PER DR. TOBIAS.    PATIENT HAS BEEN GETTING 20 MG AND CUTTING THEM IN HALF.   HE WANTS 10 MG PRESCRIPTION      [facility] - [address] - [phone]  - [phone]   [phone]    PATIENT CALL BACK NUMBER [phone]

## 2020-08-14 ENCOUNTER — RESULTS ENCOUNTER (OUTPATIENT)
Dept: ONCOLOGY | Facility: CLINIC | Age: 80
End: 2020-08-14

## 2020-08-14 DIAGNOSIS — C82.90 FOLLICULAR LYMPHOMA, UNSPECIFIED FOLLICULAR LYMPHOMA TYPE, UNSPECIFIED BODY REGION (HCC): ICD-10-CM

## 2020-08-14 DIAGNOSIS — Z45.2 FITTING AND ADJUSTMENT OF VASCULAR CATHETER: ICD-10-CM

## 2020-08-18 ENCOUNTER — INFUSION (OUTPATIENT)
Dept: ONCOLOGY | Facility: HOSPITAL | Age: 80
End: 2020-08-18

## 2020-08-18 VITALS — TEMPERATURE: 97.4 F

## 2020-08-18 DIAGNOSIS — Z45.2 FITTING AND ADJUSTMENT OF VASCULAR CATHETER: Primary | ICD-10-CM

## 2020-08-18 PROCEDURE — 25010000003 HEPARIN LOCK FLUSH PER 10 UNITS: Performed by: INTERNAL MEDICINE

## 2020-08-18 PROCEDURE — 96523 IRRIG DRUG DELIVERY DEVICE: CPT

## 2020-08-18 RX ORDER — HEPARIN SODIUM (PORCINE) LOCK FLUSH IV SOLN 100 UNIT/ML 100 UNIT/ML
500 SOLUTION INTRAVENOUS AS NEEDED
Status: DISCONTINUED | OUTPATIENT
Start: 2020-08-18 | End: 2020-08-18 | Stop reason: HOSPADM

## 2020-08-18 RX ORDER — HEPARIN SODIUM (PORCINE) LOCK FLUSH IV SOLN 100 UNIT/ML 100 UNIT/ML
500 SOLUTION INTRAVENOUS AS NEEDED
Status: CANCELLED | OUTPATIENT
Start: 2020-08-18

## 2020-08-18 RX ORDER — SODIUM CHLORIDE 0.9 % (FLUSH) 0.9 %
10 SYRINGE (ML) INJECTION AS NEEDED
Status: CANCELLED | OUTPATIENT
Start: 2020-08-18

## 2020-08-18 RX ORDER — SODIUM CHLORIDE 0.9 % (FLUSH) 0.9 %
10 SYRINGE (ML) INJECTION AS NEEDED
Status: DISCONTINUED | OUTPATIENT
Start: 2020-08-18 | End: 2020-08-18 | Stop reason: HOSPADM

## 2020-08-18 RX ADMIN — HEPARIN SODIUM (PORCINE) LOCK FLUSH IV SOLN 100 UNIT/ML 500 UNITS: 100 SOLUTION at 10:04

## 2020-08-18 RX ADMIN — Medication 10 ML: at 10:04

## 2020-08-28 ENCOUNTER — OFFICE VISIT (OUTPATIENT)
Dept: INTERNAL MEDICINE | Facility: CLINIC | Age: 80
End: 2020-08-28

## 2020-08-28 VITALS
BODY MASS INDEX: 26.98 KG/M2 | SYSTOLIC BLOOD PRESSURE: 132 MMHG | HEART RATE: 70 BPM | RESPIRATION RATE: 16 BRPM | WEIGHT: 178 LBS | TEMPERATURE: 98.1 F | OXYGEN SATURATION: 98 % | DIASTOLIC BLOOD PRESSURE: 78 MMHG | HEIGHT: 68 IN

## 2020-08-28 DIAGNOSIS — Z00.00 MEDICARE ANNUAL WELLNESS VISIT, SUBSEQUENT: Primary | ICD-10-CM

## 2020-08-28 DIAGNOSIS — I10 BENIGN ESSENTIAL HTN: ICD-10-CM

## 2020-08-28 DIAGNOSIS — C83.39 DIFFUSE LARGE B-CELL LYMPHOMA OF EXTRANODAL SITE EXCLUDING SPLEEN AND OTHER SOLID ORGANS (HCC): ICD-10-CM

## 2020-08-28 DIAGNOSIS — E78.00 ELEVATED CHOLESTEROL: ICD-10-CM

## 2020-08-28 PROCEDURE — G0439 PPPS, SUBSEQ VISIT: HCPCS | Performed by: NURSE PRACTITIONER

## 2020-08-28 NOTE — PROGRESS NOTES
The ABCs of the Annual Wellness Visit  Subsequent Medicare Wellness Visit    No chief complaint on file.      Subjective   History of Present Illness:  Louie German is a 80 y.o. male who presents for a Subsequent Medicare Wellness Visit.  Patient is doing very well and has no current complaints.    HEALTH RISK ASSESSMENT    Recent Hospitalizations:  No hospitalization(s) within the last year.    Current Medical Providers:  Patient Care Team:  Ryan Villarreal Jr., MD as PCP - General (Family Medicine)  Omkar Montalvo MD as Surgeon (Cardiothoracic Surgery)  Grisel Alvarez MA as Medical Assistant  Danny Roberto MD as Consulting Physician (Hematology and Oncology)    Smoking Status:  Social History     Tobacco Use   Smoking Status Never Smoker   Smokeless Tobacco Former User   • Types: Chew       Alcohol Consumption:  Social History     Substance and Sexual Activity   Alcohol Use Yes    Comment: 1-2 beers nightly, sometimes an occasional bourbon       Depression Screen:   PHQ-2/PHQ-9 Depression Screening 8/28/2020   Little interest or pleasure in doing things 0   Feeling down, depressed, or hopeless 0   Trouble falling or staying asleep, or sleeping too much -   Feeling tired or having little energy -   Poor appetite or overeating -   Feeling bad about yourself - or that you are a failure or have let yourself or your family down -   Trouble concentrating on things, such as reading the newspaper or watching television -   Moving or speaking so slowly that other people could have noticed. Or the opposite - being so fidgety or restless that you have been moving around a lot more than usual -   Thoughts that you would be better off dead, or of hurting yourself in some way -   Total Score 0   If you checked off any problems, how difficult have these problems made it for you to do your work, take care of things at home, or get along with other people? -       Fall Risk Screen:  CANDY Fall Risk Assessment was  completed, and patient is at LOW risk for falls.Assessment completed on:8/28/2020    Health Habits and Functional and Cognitive Screening:  Functional & Cognitive Status 8/28/2020   Do you have difficulty preparing food and eating? No   Do you have difficulty bathing yourself, getting dressed or grooming yourself? No   Do you have difficulty using the toilet? No   Do you have difficulty moving around from place to place? No   Do you have trouble with steps or getting out of a bed or a chair? No   Current Diet Well Balanced Diet   Dental Exam Up to date   Eye Exam Up to date   Exercise (times per week) 3 times per week   Current Exercise Activities Include Gardening   Do you need help using the phone?  No   Are you deaf or do you have serious difficulty hearing?  No   Do you need help with transportation? No   Do you need help shopping? No   Do you need help preparing meals?  No   Do you need help with housework?  No   Do you need help with laundry? No   Do you need help taking your medications? No   Do you need help managing money? No   Do you ever drive or ride in a car without wearing a seat belt? No   Have you felt unusual stress, anger or loneliness in the last month? No   Who do you live with? Spouse   If you need help, do you have trouble finding someone available to you? No   Have you been bothered in the last four weeks by sexual problems? No   Do you have difficulty concentrating, remembering or making decisions? No         Does the patient have evidence of cognitive impairment? No    Asprin use counseling:Does not need ASA (and currently is not on it)    Age-appropriate Screening Schedule:  Refer to the list below for future screening recommendations based on patient's age, sex and/or medical conditions. Orders for these recommended tests are listed in the plan section. The patient has been provided with a written plan.    Health Maintenance   Topic Date Due   • TDAP/TD VACCINES (1 - Tdap) 02/02/1951   •  INFLUENZA VACCINE  08/01/2020   • ZOSTER VACCINE (2 of 3) 08/28/2021 (Originally 5/5/2017)   • LIPID PANEL  02/20/2021          The following portions of the patient's history were reviewed and updated as appropriate: allergies, current medications, past family history, past medical history, past social history, past surgical history and problem list.    Outpatient Medications Prior to Visit   Medication Sig Dispense Refill   • CBD (cannabidiol) oral oil Take  by mouth Daily.     • felodipine (PLENDIL) 10 MG 24 hr tablet TAKE 1 TABLET DAILY 90 tablet 4   • Multiple Vitamin (MULTI VITAMIN PO) Take 1 tablet by mouth Daily.     • simvastatin (Zocor) 10 MG tablet Take 1 tablet by mouth Every Night. 90 tablet 3     Facility-Administered Medications Prior to Visit   Medication Dose Route Frequency Provider Last Rate Last Dose   • heparin flush (porcine) 100 UNIT/ML injection 500 Units  500 Units Intravenous PRN Danny Roberto MD   500 Units at 01/25/19 1030   • sodium chloride 0.9 % flush 10 mL  10 mL Intravenous PRN Danny Roberto MD   10 mL at 01/25/19 1030       Patient Active Problem List   Diagnosis   • Benign essential HTN   • Acid reflux   • Elevated cholesterol   • Calculus of kidney   • Cervical spinal stenosis   • Status post total hip replacement, left   • Status post total replacement of right hip   • Degenerative disc disease, lumbar   • Hydronephrosis of left kidney   • Diffuse large B-cell lymphoma of extranodal site excluding spleen and other solid organs (CMS/HCC)   • Iron deficiency anemia due to chronic blood loss   • Chronic renal impairment, stage 2 (mild)   • Metastatic tumor of bone   • Closed compression fracture of third lumbar vertebra (CMS/HCC)   • Lymphoma (CMS/HCC)   • Fitting and adjustment of vascular catheter   • Anemia associated with chemotherapy       Advanced Care Planning:  ACP discussion was held with the patient during this visit. Patient has an advance directive in EMR which  "is still valid.     Review of Systems   Constitutional: Negative for fatigue and fever.   HENT: Negative for congestion, hearing loss and trouble swallowing.    Eyes: Negative for visual disturbance.   Respiratory: Negative for apnea, cough, chest tightness, shortness of breath and wheezing.    Cardiovascular: Negative for chest pain, palpitations and leg swelling.   Gastrointestinal: Negative for abdominal distention, abdominal pain, constipation, diarrhea, nausea and vomiting.   Endocrine: Negative for cold intolerance, heat intolerance, polydipsia, polyphagia and polyuria.   Genitourinary: Negative for difficulty urinating, dysuria, frequency and urgency.   Musculoskeletal: Negative for gait problem.   Skin: Negative for pallor and rash.   Neurological: Negative for speech difficulty, weakness and headaches.   Psychiatric/Behavioral: Negative for agitation, behavioral problems, confusion, dysphoric mood and sleep disturbance. The patient is not nervous/anxious.        Compared to one year ago, the patient feels his physical health is better.  Compared to one year ago, the patient feels his mental health is the same.    Reviewed chart for potential of high risk medication in the elderly: yes  Reviewed chart for potential of harmful drug interactions in the elderly:yes    Objective         Vitals:    08/28/20 1102   BP: 141/80   BP Location: Right arm   Patient Position: Sitting   Cuff Size: Adult   Pulse: 70   Resp: 16   Temp: 98.1 °F (36.7 °C)   TempSrc: Oral   SpO2: 98%   Weight: 80.7 kg (178 lb)   Height: 172.7 cm (67.99\")       Body mass index is 27.07 kg/m².  Discussed the patient's BMI with him. The BMI is in the acceptable range.    Physical Exam   Constitutional: He is oriented to person, place, and time. He appears well-developed and well-nourished.   HENT:   Head: Normocephalic and atraumatic.   Right Ear: Hearing and tympanic membrane normal.   Left Ear: Hearing and tympanic membrane normal.   Nose: " Nose normal.   Mouth/Throat: Uvula is midline, oropharynx is clear and moist and mucous membranes are normal. No tonsillar exudate.   Eyes: Pupils are equal, round, and reactive to light. Conjunctivae, EOM and lids are normal.   Neck: Normal range of motion. Carotid bruit is not present. No thyromegaly present.   Cardiovascular: Normal rate, regular rhythm, normal heart sounds and intact distal pulses.   No murmur heard.  Pulses:       Carotid pulses are 2+ on the right side, and 2+ on the left side.       Dorsalis pedis pulses are 2+ on the right side, and 2+ on the left side.   Pulmonary/Chest: Effort normal and breath sounds normal. No respiratory distress. He has no decreased breath sounds. He has no wheezes. He has no rhonchi.   Abdominal: Soft. Bowel sounds are normal. He exhibits no distension. There is no hepatosplenomegaly. There is no tenderness. There is no rebound and no guarding.   Musculoskeletal: Normal range of motion. He exhibits no edema, tenderness or deformity.   Lymphadenopathy:        Head (right side): No submental, no submandibular and no tonsillar adenopathy present.        Head (left side): No submental, no submandibular and no tonsillar adenopathy present.   Neurological: He is alert and oriented to person, place, and time. He has normal strength and normal reflexes. No cranial nerve deficit or sensory deficit. Coordination and gait normal.   Skin: Skin is warm, dry and intact.   Psychiatric: He has a normal mood and affect. His speech is normal and behavior is normal. Judgment and thought content normal. He is attentive.   Vitals reviewed.            Assessment/Plan   Medicare Risks and Personalized Health Plan  CMS Preventative Services Quick Reference  Cardiovascular risk  Immunizations Discussed/Encouraged (specific immunizations; adacel Tdap, Influenza, Pneumococcal 23, Prevnar and Shingrix )    The above risks/problems have been discussed with the patient.  Pertinent information has  been shared with the patient in the After Visit Summary.  Follow up plans and orders are seen below in the Assessment/Plan Section.    Diagnoses and all orders for this visit:    1. Medicare annual wellness visit, subsequent (Primary)    2. Benign essential HTN  Comments:  well-controlled with diet and exercise    3. Elevated cholesterol  Comments:  well controlled, continue statin    4. Diffuse large B-cell lymphoma of extranodal site excluding spleen and other solid organs (CMS/HCC)  Comments:  stable, followed by oncology      Follow Up:  Return in about 6 months (around 2/28/2021) for Next scheduled follow up.     An After Visit Summary and PPPS were given to the patient.     Check with pharmacy for tdap, tetanus, and check records for pneumonia vaccine.    Hold Shringrix during the pandemic.    Flu shot highly encouraged this fall.    He is doing very well.  Patient is new to me, with Dr. Villarreal patient.    No labs today as he recently had labs within the last 2 months that were mostly normal.  Cholesterol 6 months ago was good.

## 2020-09-25 ENCOUNTER — RESULTS ENCOUNTER (OUTPATIENT)
Dept: ONCOLOGY | Facility: CLINIC | Age: 80
End: 2020-09-25

## 2020-09-25 DIAGNOSIS — C82.90 FOLLICULAR LYMPHOMA, UNSPECIFIED FOLLICULAR LYMPHOMA TYPE, UNSPECIFIED BODY REGION (HCC): ICD-10-CM

## 2020-09-25 DIAGNOSIS — Z45.2 FITTING AND ADJUSTMENT OF VASCULAR CATHETER: ICD-10-CM

## 2020-09-29 ENCOUNTER — INFUSION (OUTPATIENT)
Dept: ONCOLOGY | Facility: HOSPITAL | Age: 80
End: 2020-09-29

## 2020-09-29 VITALS — TEMPERATURE: 98.2 F

## 2020-09-29 DIAGNOSIS — Z45.2 FITTING AND ADJUSTMENT OF VASCULAR CATHETER: Primary | ICD-10-CM

## 2020-09-29 PROCEDURE — 96523 IRRIG DRUG DELIVERY DEVICE: CPT

## 2020-09-29 PROCEDURE — 25010000003 HEPARIN LOCK FLUSH PER 10 UNITS: Performed by: INTERNAL MEDICINE

## 2020-09-29 RX ORDER — HEPARIN SODIUM (PORCINE) LOCK FLUSH IV SOLN 100 UNIT/ML 100 UNIT/ML
500 SOLUTION INTRAVENOUS AS NEEDED
Status: DISCONTINUED | OUTPATIENT
Start: 2020-09-29 | End: 2020-09-29 | Stop reason: HOSPADM

## 2020-09-29 RX ORDER — SODIUM CHLORIDE 0.9 % (FLUSH) 0.9 %
10 SYRINGE (ML) INJECTION AS NEEDED
Status: DISCONTINUED | OUTPATIENT
Start: 2020-09-29 | End: 2020-09-29 | Stop reason: HOSPADM

## 2020-09-29 RX ORDER — SODIUM CHLORIDE 0.9 % (FLUSH) 0.9 %
10 SYRINGE (ML) INJECTION AS NEEDED
Status: CANCELLED | OUTPATIENT
Start: 2020-09-29

## 2020-09-29 RX ORDER — HEPARIN SODIUM (PORCINE) LOCK FLUSH IV SOLN 100 UNIT/ML 100 UNIT/ML
500 SOLUTION INTRAVENOUS AS NEEDED
Status: CANCELLED | OUTPATIENT
Start: 2020-09-29

## 2020-09-29 RX ADMIN — HEPARIN SODIUM (PORCINE) LOCK FLUSH IV SOLN 100 UNIT/ML 500 UNITS: 100 SOLUTION at 10:35

## 2020-09-29 RX ADMIN — SODIUM CHLORIDE, PRESERVATIVE FREE 10 ML: 5 INJECTION INTRAVENOUS at 10:34

## 2020-10-01 ENCOUNTER — HOSPITAL ENCOUNTER (OUTPATIENT)
Dept: ULTRASOUND IMAGING | Facility: HOSPITAL | Age: 80
Discharge: HOME | End: 2020-10-01
Admitting: UROLOGY

## 2020-10-01 DIAGNOSIS — N20.0 URIC ACID NEPHROLITHIASIS: ICD-10-CM

## 2020-10-01 PROCEDURE — 76775 US EXAM ABDO BACK WALL LIM: CPT

## 2020-11-06 ENCOUNTER — RESULTS ENCOUNTER (OUTPATIENT)
Dept: ONCOLOGY | Facility: CLINIC | Age: 80
End: 2020-11-06

## 2020-11-06 DIAGNOSIS — Z45.2 FITTING AND ADJUSTMENT OF VASCULAR CATHETER: ICD-10-CM

## 2020-11-06 DIAGNOSIS — C82.90 FOLLICULAR LYMPHOMA, UNSPECIFIED FOLLICULAR LYMPHOMA TYPE, UNSPECIFIED BODY REGION (HCC): ICD-10-CM

## 2020-11-16 ENCOUNTER — TELEPHONE (OUTPATIENT)
Dept: ONCOLOGY | Facility: CLINIC | Age: 80
End: 2020-11-16

## 2020-11-16 NOTE — TELEPHONE ENCOUNTER
Caller: Louie    Relationship to patient: Patient    Best call back number: [phone]    Type of visit: Port Flush    Requested date: Week of 11/16, prefers morning    If rescheduling, when is the original appointment: 11/10

## 2020-11-19 ENCOUNTER — INFUSION (OUTPATIENT)
Dept: ONCOLOGY | Facility: HOSPITAL | Age: 80
End: 2020-11-19

## 2020-11-19 VITALS — TEMPERATURE: 98.2 F

## 2020-11-19 DIAGNOSIS — Z45.2 FITTING AND ADJUSTMENT OF VASCULAR CATHETER: Primary | ICD-10-CM

## 2020-11-19 PROCEDURE — 25010000003 HEPARIN LOCK FLUSH PER 10 UNITS: Performed by: INTERNAL MEDICINE

## 2020-11-19 PROCEDURE — 96523 IRRIG DRUG DELIVERY DEVICE: CPT

## 2020-11-19 RX ORDER — SODIUM CHLORIDE 0.9 % (FLUSH) 0.9 %
10 SYRINGE (ML) INJECTION AS NEEDED
Status: DISCONTINUED | OUTPATIENT
Start: 2020-11-19 | End: 2020-11-19 | Stop reason: HOSPADM

## 2020-11-19 RX ORDER — SODIUM CHLORIDE 0.9 % (FLUSH) 0.9 %
10 SYRINGE (ML) INJECTION AS NEEDED
Status: CANCELLED | OUTPATIENT
Start: 2020-11-19

## 2020-11-19 RX ORDER — HEPARIN SODIUM (PORCINE) LOCK FLUSH IV SOLN 100 UNIT/ML 100 UNIT/ML
500 SOLUTION INTRAVENOUS AS NEEDED
Status: DISCONTINUED | OUTPATIENT
Start: 2020-11-19 | End: 2020-11-19 | Stop reason: HOSPADM

## 2020-11-19 RX ORDER — HEPARIN SODIUM (PORCINE) LOCK FLUSH IV SOLN 100 UNIT/ML 100 UNIT/ML
500 SOLUTION INTRAVENOUS AS NEEDED
Status: CANCELLED | OUTPATIENT
Start: 2020-11-19

## 2020-11-19 RX ADMIN — SODIUM CHLORIDE, PRESERVATIVE FREE 10 ML: 5 INJECTION INTRAVENOUS at 09:17

## 2020-11-19 RX ADMIN — HEPARIN SODIUM (PORCINE) LOCK FLUSH IV SOLN 100 UNIT/ML 500 UNITS: 100 SOLUTION at 09:17

## 2020-12-12 RX ORDER — FELODIPINE 10 MG/1
TABLET, EXTENDED RELEASE ORAL
Qty: 90 TABLET | Refills: 3 | Status: SHIPPED | OUTPATIENT
Start: 2020-12-12 | End: 2021-09-01 | Stop reason: SDUPTHER

## 2020-12-18 ENCOUNTER — RESULTS ENCOUNTER (OUTPATIENT)
Dept: ONCOLOGY | Facility: CLINIC | Age: 80
End: 2020-12-18

## 2020-12-18 DIAGNOSIS — C82.90 FOLLICULAR LYMPHOMA, UNSPECIFIED FOLLICULAR LYMPHOMA TYPE, UNSPECIFIED BODY REGION (HCC): ICD-10-CM

## 2020-12-18 DIAGNOSIS — Z45.2 FITTING AND ADJUSTMENT OF VASCULAR CATHETER: ICD-10-CM

## 2020-12-22 ENCOUNTER — INFUSION (OUTPATIENT)
Dept: ONCOLOGY | Facility: HOSPITAL | Age: 80
End: 2020-12-22

## 2020-12-22 VITALS — TEMPERATURE: 98.2 F

## 2020-12-22 DIAGNOSIS — C82.90 FOLLICULAR LYMPHOMA, UNSPECIFIED FOLLICULAR LYMPHOMA TYPE, UNSPECIFIED BODY REGION (HCC): Primary | ICD-10-CM

## 2020-12-22 DIAGNOSIS — Z45.2 FITTING AND ADJUSTMENT OF VASCULAR CATHETER: ICD-10-CM

## 2020-12-22 LAB
ALBUMIN SERPL-MCNC: 4.3 G/DL (ref 3.5–5.2)
ALBUMIN/GLOB SERPL: 1.9 G/DL
ALP SERPL-CCNC: 61 U/L (ref 39–117)
ALT SERPL W P-5'-P-CCNC: 9 U/L (ref 1–41)
ANION GAP SERPL CALCULATED.3IONS-SCNC: 7.7 MMOL/L (ref 5–15)
AST SERPL-CCNC: 18 U/L (ref 1–40)
BILIRUB SERPL-MCNC: 0.5 MG/DL (ref 0–1.2)
BUN SERPL-MCNC: 19 MG/DL (ref 8–23)
BUN/CREAT SERPL: 14.5 (ref 7–25)
CALCIUM SPEC-SCNC: 8.6 MG/DL (ref 8.6–10.5)
CHLORIDE SERPL-SCNC: 106 MMOL/L (ref 98–107)
CO2 SERPL-SCNC: 24.3 MMOL/L (ref 22–29)
CREAT SERPL-MCNC: 1.31 MG/DL (ref 0.76–1.27)
DEPRECATED RDW RBC AUTO: 46.3 FL (ref 37–54)
ERYTHROCYTE [DISTWIDTH] IN BLOOD BY AUTOMATED COUNT: 12.8 % (ref 12.3–15.4)
FERRITIN SERPL-MCNC: 66 NG/ML (ref 30–400)
GFR SERPL CREATININE-BSD FRML MDRD: 53 ML/MIN/1.73
GLOBULIN UR ELPH-MCNC: 2.3 GM/DL
GLUCOSE SERPL-MCNC: 131 MG/DL (ref 65–99)
HCT VFR BLD AUTO: 39.1 % (ref 37.5–51)
HGB BLD-MCNC: 12.7 G/DL (ref 13–17.7)
IRON 24H UR-MRATE: 103 MCG/DL (ref 59–158)
IRON SATN MFR SERPL: 39 % (ref 20–50)
MCH RBC QN AUTO: 31.5 PG (ref 26.6–33)
MCHC RBC AUTO-ENTMCNC: 32.5 G/DL (ref 31.5–35.7)
MCV RBC AUTO: 97 FL (ref 79–97)
PLATELET # BLD AUTO: 178 10*3/MM3 (ref 140–450)
PMV BLD AUTO: 10.6 FL (ref 6–12)
POTASSIUM SERPL-SCNC: 4.1 MMOL/L (ref 3.5–5.2)
PROT SERPL-MCNC: 6.6 G/DL (ref 6–8.5)
RBC # BLD AUTO: 4.03 10*6/MM3 (ref 4.14–5.8)
SODIUM SERPL-SCNC: 138 MMOL/L (ref 136–145)
TIBC SERPL-MCNC: 264 MCG/DL (ref 298–536)
UIBC SERPL-MCNC: 161 MCG/DL (ref 112–346)
VIT B12 BLD-MCNC: 430 PG/ML (ref 211–946)
WBC # BLD AUTO: 5.55 10*3/MM3 (ref 3.4–10.8)

## 2020-12-22 PROCEDURE — 80053 COMPREHEN METABOLIC PANEL: CPT

## 2020-12-22 PROCEDURE — 82728 ASSAY OF FERRITIN: CPT

## 2020-12-22 PROCEDURE — 85027 COMPLETE CBC AUTOMATED: CPT

## 2020-12-22 PROCEDURE — 36415 COLL VENOUS BLD VENIPUNCTURE: CPT

## 2020-12-22 PROCEDURE — 36591 DRAW BLOOD OFF VENOUS DEVICE: CPT

## 2020-12-22 PROCEDURE — 82607 VITAMIN B-12: CPT

## 2020-12-22 PROCEDURE — 83540 ASSAY OF IRON: CPT

## 2020-12-22 PROCEDURE — 83550 IRON BINDING TEST: CPT

## 2020-12-22 PROCEDURE — 25010000003 HEPARIN LOCK FLUSH PER 10 UNITS: Performed by: INTERNAL MEDICINE

## 2020-12-22 RX ORDER — HEPARIN SODIUM (PORCINE) LOCK FLUSH IV SOLN 100 UNIT/ML 100 UNIT/ML
500 SOLUTION INTRAVENOUS AS NEEDED
Status: DISCONTINUED | OUTPATIENT
Start: 2020-12-22 | End: 2020-12-22 | Stop reason: HOSPADM

## 2020-12-22 RX ORDER — SODIUM CHLORIDE 0.9 % (FLUSH) 0.9 %
10 SYRINGE (ML) INJECTION AS NEEDED
Status: CANCELLED | OUTPATIENT
Start: 2020-12-22

## 2020-12-22 RX ORDER — SODIUM CHLORIDE 0.9 % (FLUSH) 0.9 %
10 SYRINGE (ML) INJECTION AS NEEDED
Status: DISCONTINUED | OUTPATIENT
Start: 2020-12-22 | End: 2020-12-22 | Stop reason: HOSPADM

## 2020-12-22 RX ORDER — HEPARIN SODIUM (PORCINE) LOCK FLUSH IV SOLN 100 UNIT/ML 100 UNIT/ML
500 SOLUTION INTRAVENOUS AS NEEDED
Status: CANCELLED | OUTPATIENT
Start: 2020-12-22

## 2020-12-22 RX ADMIN — HEPARIN SODIUM (PORCINE) LOCK FLUSH IV SOLN 100 UNIT/ML 500 UNITS: 100 SOLUTION at 10:19

## 2020-12-22 RX ADMIN — SODIUM CHLORIDE, PRESERVATIVE FREE 10 ML: 5 INJECTION INTRAVENOUS at 10:18

## 2021-01-05 ENCOUNTER — HOSPITAL ENCOUNTER (OUTPATIENT)
Dept: CT IMAGING | Facility: HOSPITAL | Age: 81
Discharge: HOME | End: 2021-01-05
Admitting: INTERNAL MEDICINE

## 2021-01-05 DIAGNOSIS — C82.90 FOLLICULAR LYMPHOMA, UNSPECIFIED FOLLICULAR LYMPHOMA TYPE, UNSPECIFIED BODY REGION (HCC): ICD-10-CM

## 2021-01-05 DIAGNOSIS — Z45.2 FITTING AND ADJUSTMENT OF VASCULAR CATHETER: ICD-10-CM

## 2021-01-05 PROCEDURE — 71250 CT THORAX DX C-: CPT

## 2021-01-05 PROCEDURE — 74176 CT ABD & PELVIS W/O CONTRAST: CPT

## 2021-01-05 PROCEDURE — 0 DIATRIZOATE MEGLUMINE & SODIUM PER 1 ML: Performed by: INTERNAL MEDICINE

## 2021-01-05 RX ADMIN — DIATRIZOATE MEGLUMINE AND DIATRIZOATE SODIUM 30 ML: 660; 100 LIQUID ORAL; RECTAL at 08:50

## 2021-01-08 ENCOUNTER — TELEPHONE (OUTPATIENT)
Dept: ONCOLOGY | Facility: CLINIC | Age: 81
End: 2021-01-08

## 2021-01-08 DIAGNOSIS — D50.0 IRON DEFICIENCY ANEMIA DUE TO CHRONIC BLOOD LOSS: Primary | ICD-10-CM

## 2021-01-08 NOTE — TELEPHONE ENCOUNTER
----- Message from Parul Valle RN sent at 1/8/2021  9:06 AM EST -----  Regarding: paredes  Can we please change the vitals only appointment to a lab appointment on 1/12? He needs a cbc and cmp. Thank you

## 2021-01-12 ENCOUNTER — APPOINTMENT (OUTPATIENT)
Dept: OTHER | Facility: HOSPITAL | Age: 81
End: 2021-01-12

## 2021-01-12 ENCOUNTER — OFFICE VISIT (OUTPATIENT)
Dept: ONCOLOGY | Facility: CLINIC | Age: 81
End: 2021-01-12

## 2021-01-12 ENCOUNTER — LAB (OUTPATIENT)
Dept: OTHER | Facility: HOSPITAL | Age: 81
End: 2021-01-12

## 2021-01-12 VITALS
HEART RATE: 81 BPM | HEIGHT: 68 IN | TEMPERATURE: 98.4 F | OXYGEN SATURATION: 99 % | SYSTOLIC BLOOD PRESSURE: 169 MMHG | DIASTOLIC BLOOD PRESSURE: 85 MMHG | BODY MASS INDEX: 27.04 KG/M2 | RESPIRATION RATE: 16 BRPM | WEIGHT: 178.4 LBS

## 2021-01-12 DIAGNOSIS — N18.2 CHRONIC RENAL IMPAIRMENT, STAGE 2 (MILD): ICD-10-CM

## 2021-01-12 DIAGNOSIS — D50.0 IRON DEFICIENCY ANEMIA DUE TO CHRONIC BLOOD LOSS: ICD-10-CM

## 2021-01-12 DIAGNOSIS — Z45.2 FITTING AND ADJUSTMENT OF VASCULAR CATHETER: ICD-10-CM

## 2021-01-12 DIAGNOSIS — C83.39 DIFFUSE LARGE B-CELL LYMPHOMA OF EXTRANODAL SITE EXCLUDING SPLEEN AND OTHER SOLID ORGANS (HCC): Primary | ICD-10-CM

## 2021-01-12 DIAGNOSIS — C82.90 FOLLICULAR LYMPHOMA, UNSPECIFIED FOLLICULAR LYMPHOMA TYPE, UNSPECIFIED BODY REGION (HCC): ICD-10-CM

## 2021-01-12 PROCEDURE — 99214 OFFICE O/P EST MOD 30 MIN: CPT | Performed by: INTERNAL MEDICINE

## 2021-01-12 RX ORDER — INFLUENZA A VIRUS A/MICHIGAN/45/2015 X-275 (H1N1) ANTIGEN (FORMALDEHYDE INACTIVATED), INFLUENZA A VIRUS A/SINGAPORE/INFIMH-16-0019/2016 IVR-186 (H3N2) ANTIGEN (FORMALDEHYDE INACTIVATED), INFLUENZA B VIRUS B/PHUKET/3073/2013 ANTIGEN (FORMALDEHYDE INACTIVATED), AND INFLUENZA B VIRUS B/MARYLAND/15/2016 BX-69A ANTIGEN (FORMALDEHYDE INACTIVATED) 60; 60; 60; 60 UG/.7ML; UG/.7ML; UG/.7ML; UG/.7ML
INJECTION, SUSPENSION INTRAMUSCULAR
COMMUNITY
Start: 2020-10-07 | End: 2021-03-01

## 2021-01-12 NOTE — PROGRESS NOTES
Subjective     REASON FOR FOLLOW UP:   1.  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  2.  Maintenance Rituxan every 3 months for 2 years initiated 3/30/2018  3.  Painful pathologic compression fracture at L3 May 2018 due to involvement with diffuse large B-cell non-Hodgkin's lymphoma.   4.  CHOP and Rituxan x 6 cycles completed 9/25/2018.    HISTORY OF PRESENT ILLNESS:  The patient is a 80 y.o. year old male who was seen in consultation on 10/20/2017 and at that time we recommended laparoscopic lymph node sampling and upper and lower GI endoscopy.  He underwent these procedures with Dr. Castañeda on 10/26/2017.  There was no evidence of malignancy from his endoscopic procedures at the laparoscopic specimen returned as follicular center cell B-cell non-Hodgkin's lymphoma.    He received Rituxan treatment initially with 4 weekly doses of Rituxan with good response on follow-up scans.  Unfortunately, a few months after completion of his treatment he developed worsening back pain and has lymphoma involving the L3 vertebral body consistent with diffuse large B-cell non-Hodgkin's lymphoma.    He underwent port placement and received 6 cycles of CHOP and Rituxan which he completed in late September 2018 with what appear to be a complete response on PET scan.    He returns today for follow-up with CT scan surveillance.  CT scans performed on 6/30/2020 showed no evidence of recurrent lymphoma    David and his wife are enjoying traveling and he made several trips to [place] and are actually working on dual citizenship.    Pain  Pertinent negatives include no chest pain, coughing, fatigue, fever, headaches, nausea, neck pain, rash or vomiting.        Past Medical History:   Diagnosis Date   • Arm fracture     right side humerus - sling and swathe   • Cancer (CMS/HCC) 2017    Basal Cell on Nose and Under Right Eye   •  "Coronary artery disease    • DDD (degenerative disc disease), cervical    • Gastric mass    • GERD (gastroesophageal reflux disease)    • Hip pain started 5/29/18    onset of acute pain with weight bearing   • Hx of cardiac arrest     Happened after Induction of anesthesia prior  to SURG 10 YRS AGO  \" I FLATLINE BUT W/IN SECONDS HEART WAS OK AFTER TURNED ON BACK...I THINK THEY GAVE ME TO MUCH ANESTHESIA\"   • Hyperlipidemia    • Hypertension    • Kidney stones    • Non Hodgkin's lymphoma (CMS/HCC)     SPINE   • Osteoarthritis    • Pleural effusion     DRAINAGE RT PLEURAL FLUID FOR TESTING WITH EGD ON 1-30-17   NO MALIGNANCY WITH CLEARANCE FROM DR SIMMONS FOR SURGERY   • Rheumatoid arthritis (CMS/HCC)         Past Surgical History:   Procedure Laterality Date   • COLONOSCOPY N/A 10/26/2017    Procedure: COLONOSCOPY;  Surgeon: Louie Castañeda MD;  Location: [facility];  Service:    • CORONARY ANGIOPLASTY WITH STENT PLACEMENT  1999    17 years ago   • CYSTOSCOPY W/ LITHOLAPAXY / EHL      6-7 years ago   • DIAGNOSTIC LAPAROSCOPY N/A 10/26/2017    Procedure: DIAGNOSTIC LAPAROSCOPY WITH RETROPERITONEAL BIOPSIES;  Surgeon: Louie Castañeda MD;  Location: [facility] OR;  Service:    • ENDOSCOPY      ON 1/30/17 WITH DRAINAGE OF RT PLEURAL FLUID FOR BIOPSY    NO MALIGNANCY  CLEARANCE FOR SURG PER DR SIMMONS   • ENDOSCOPY N/A 10/26/2017    Procedure: ESOPHAGOGASTRODUODENOSCOPY;  Surgeon: Louie Castañeda MD;  Location: [facility];  Service:    • EYE SURGERY Right 2017    Moh's; reconstruction right lower lid   • KIDNEY STONE SURGERY     • PLEURAL BIOPSY     • POSTERIOR CERVICAL FUSION  2001   • SKIN CANCER EXCISION N/A 2017    Basal Cell Nose & under right eye   • TOTAL HIP ARTHROPLASTY Left 04/2010    9-10 years ago    • TOTAL HIP ARTHROPLASTY Right 2/14/2017    Procedure: TOTAL HIP ARTHROPLASTY;  Surgeon: Gerson Cardoza MD;  Location: [facility] OR;  Service:    • VENOUS ACCESS DEVICE (PORT) INSERTION " "N/A 6/5/2018    Procedure: INSERTION VENOUS ACCESS DEVICE- RIGHT;  Surgeon: Louie Castañeda MD;  Location: [facility] MAIN OR;  Service: General         ALLERGIES:  No Known Allergies     Review of Systems   Constitutional: Positive for appetite change and unexpected weight change. Negative for activity change, fatigue and fever.   HENT: Negative for hearing loss, nosebleeds, trouble swallowing and voice change.    Eyes: Negative for visual disturbance.   Respiratory: Negative for cough, shortness of breath and wheezing.    Cardiovascular: Negative for chest pain and palpitations.   Gastrointestinal: Positive for constipation. Negative for diarrhea, nausea and vomiting.   Genitourinary: Negative for difficulty urinating, frequency, hematuria and urgency.   Musculoskeletal: Positive for back pain. Negative for neck pain.   Skin: Negative for rash.   Neurological: Negative for dizziness, seizures, syncope and headaches.   Hematological: Negative for adenopathy. Does not bruise/bleed easily.   Psychiatric/Behavioral: Negative for behavioral problems. The patient is not nervous/anxious.         Objective     Vitals:    01/12/21 1526   Height: 172.7 cm (67.99\")     Current Status 9/29/2020   ECOG score 0       Physical Exam   Constitutional: He is oriented to person, place, and time. He appears well-developed. No distress.   HENT:   Head: Normocephalic.   Eyes: Pupils are equal, round, and reactive to light. Conjunctivae are normal. No scleral icterus.   Neck: Normal range of motion. Neck supple. No JVD present. No thyromegaly present.   Cardiovascular: Normal rate and regular rhythm. Exam reveals no gallop and no friction rub.   No murmur heard.  Pulmonary/Chest: Effort normal and breath sounds normal. He has no wheezes. He has no rales.   Right IJ Mediport.   Abdominal: Soft. He exhibits no distension and no mass. There is no abdominal tenderness.   Musculoskeletal: Normal range of motion. No deformity. "   Lymphadenopathy:     He has no cervical adenopathy.   Neurological: He is alert and oriented to person, place, and time. He has normal reflexes. No cranial nerve deficit.   Skin: Skin is warm and dry. No rash noted. No erythema.   Psychiatric: His behavior is normal. Judgment normal.         RECENT LABS:  Hematology WBC   Date Value Ref Range Status   12/22/2020 5.55 3.40 - 10.80 10*3/mm3 Final   02/20/2020 5.36 3.40 - 10.80 10*3/mm3 Final     RBC   Date Value Ref Range Status   12/22/2020 4.03 (L) 4.14 - 5.80 10*6/mm3 Final   02/20/2020 4.04 (L) 4.14 - 5.80 10*6/mm3 Final     Hemoglobin   Date Value Ref Range Status   12/22/2020 12.7 (L) 13.0 - 17.7 g/dL Final     Hematocrit   Date Value Ref Range Status   12/22/2020 39.1 37.5 - 51.0 % Final     Platelets   Date Value Ref Range Status   12/22/2020 178 140 - 450 10*3/mm3 Final          Lab Results   Component Value Date    GLUCOSE 131 (H) 12/22/2020    BUN 19 12/22/2020    CREATININE 1.31 (H) 12/22/2020    EGFRIFNONA 53 (L) 12/22/2020    EGFRIFAFRI 64 02/20/2020    BCR 14.5 12/22/2020    K 4.1 12/22/2020    CO2 24.3 12/22/2020    CALCIUM 8.6 12/22/2020    PROTENTOTREF 6.5 02/20/2020    ALBUMIN 4.30 12/22/2020    LABIL2 1.8 02/20/2020    AST 18 12/22/2020    ALT 9 12/22/2020       CT CHEST, ABDOMEN AND PELVIS WITHOUT CONTRAST  1/5/2021  IMPRESSION:  Stable examination. Infiltrative soft tissue in the left  para-aortic region and the mesentery remains unchanged from prior  examination. No new or enlarging lymphadenopathy.         Images personally reviewed    Assessment/Plan     1.  Diffuse large B-cell non-Hodgkin's lymphoma involving the L3 vertebral body.  PET scan after 4 cycles of CHOP and Rituxan showed essentially a complete metabolic response.  He completed a total of 6 cycles of chemotherapy by 9/25/2018 and is felt to be in remission.  As noted above, his latest scans from 1/5/2021 did not show any evidence of recurrent lymphoma.  2.  Previous treatment  with single agent Rituxan for follicular B-cell lymphoma.  3.  Back pain related to lymphoma involving the spine.  Improved  4.  Right IJ Mediport.        Plan  1.  We discussed the results of the CT scans with the patient and provided him a copy of the printed report.  2.  He will be scheduled for port flushes every 6 weeks at the [place] office.  3.  We will plan to see David again in the office in 6 months and he again will undergo CT scans of the chest abdomen pelvis and labs one week prior to that visit.  4.  We discussed whether or not to have the port removed by Dr. Castañeda but the patient wanted to wait another 6 months in the hopes that maybe the Covid crisis will have abated by then or he may have been immunized at that point.

## 2021-02-23 ENCOUNTER — INFUSION (OUTPATIENT)
Dept: ONCOLOGY | Facility: HOSPITAL | Age: 81
End: 2021-02-23

## 2021-02-23 ENCOUNTER — APPOINTMENT (OUTPATIENT)
Dept: LAB | Facility: HOSPITAL | Age: 81
End: 2021-02-23

## 2021-02-23 DIAGNOSIS — Z45.2 FITTING AND ADJUSTMENT OF VASCULAR CATHETER: Primary | ICD-10-CM

## 2021-02-23 PROCEDURE — 96523 IRRIG DRUG DELIVERY DEVICE: CPT

## 2021-02-23 PROCEDURE — 25010000003 HEPARIN LOCK FLUSH PER 10 UNITS: Performed by: INTERNAL MEDICINE

## 2021-02-23 RX ORDER — SODIUM CHLORIDE 0.9 % (FLUSH) 0.9 %
10 SYRINGE (ML) INJECTION AS NEEDED
Status: DISCONTINUED | OUTPATIENT
Start: 2021-02-23 | End: 2021-02-23 | Stop reason: HOSPADM

## 2021-02-23 RX ORDER — HEPARIN SODIUM (PORCINE) LOCK FLUSH IV SOLN 100 UNIT/ML 100 UNIT/ML
500 SOLUTION INTRAVENOUS AS NEEDED
Status: DISCONTINUED | OUTPATIENT
Start: 2021-02-23 | End: 2021-02-23 | Stop reason: HOSPADM

## 2021-02-23 RX ORDER — SODIUM CHLORIDE 0.9 % (FLUSH) 0.9 %
10 SYRINGE (ML) INJECTION AS NEEDED
Status: CANCELLED | OUTPATIENT
Start: 2021-02-23

## 2021-02-23 RX ORDER — HEPARIN SODIUM (PORCINE) LOCK FLUSH IV SOLN 100 UNIT/ML 100 UNIT/ML
500 SOLUTION INTRAVENOUS AS NEEDED
Status: CANCELLED | OUTPATIENT
Start: 2021-02-23

## 2021-02-23 RX ADMIN — SODIUM CHLORIDE, PRESERVATIVE FREE 10 ML: 5 INJECTION INTRAVENOUS at 14:27

## 2021-02-23 RX ADMIN — HEPARIN SODIUM (PORCINE) LOCK FLUSH IV SOLN 100 UNIT/ML 500 UNITS: 100 SOLUTION at 14:27

## 2021-03-01 ENCOUNTER — OFFICE VISIT (OUTPATIENT)
Dept: INTERNAL MEDICINE | Facility: CLINIC | Age: 81
End: 2021-03-01

## 2021-03-01 VITALS
TEMPERATURE: 98.4 F | DIASTOLIC BLOOD PRESSURE: 82 MMHG | SYSTOLIC BLOOD PRESSURE: 144 MMHG | HEART RATE: 98 BPM | BODY MASS INDEX: 27.07 KG/M2 | OXYGEN SATURATION: 98 % | WEIGHT: 178 LBS

## 2021-03-01 DIAGNOSIS — M54.50 CHRONIC MIDLINE LOW BACK PAIN WITHOUT SCIATICA: ICD-10-CM

## 2021-03-01 DIAGNOSIS — E78.00 ELEVATED CHOLESTEROL: ICD-10-CM

## 2021-03-01 DIAGNOSIS — C82.90 FOLLICULAR LYMPHOMA, UNSPECIFIED FOLLICULAR LYMPHOMA TYPE, UNSPECIFIED BODY REGION (HCC): ICD-10-CM

## 2021-03-01 DIAGNOSIS — I10 BENIGN ESSENTIAL HTN: Primary | ICD-10-CM

## 2021-03-01 DIAGNOSIS — G89.29 CHRONIC MIDLINE LOW BACK PAIN WITHOUT SCIATICA: ICD-10-CM

## 2021-03-01 PROCEDURE — 99213 OFFICE O/P EST LOW 20 MIN: CPT | Performed by: FAMILY MEDICINE

## 2021-03-01 NOTE — PROGRESS NOTES
Chief Complaint  Hyperlipidemia    Subjective          Louie German presents to [facility]  Delightful gentleman who is very active.  He is done well with treatment and surveillance of diffuse B cell lymphoma with metastasis and also bony metastasis.  He has occasional back pain this appears to be mechanical and not related to prior metastasis.  He also had a previous compression fracture of the third lumbar vertebrae.  He uses a back brace sometimes but also splits wood.  History includes cervical spine stenosis surgery which is gone well also.  He has evidence of chronic renal impairment stage II and labs were reviewed from oncology.  He had CT chest and abdomen showing no obvious recurrence.    Prior current chemotherapy regimen is reviewed.         Objective   Vital Signs:   /82 (BP Location: Left arm, Patient Position: Sitting, Cuff Size: Adult)   Pulse 98   Temp 98.4 °F (36.9 °C) (Tympanic)   Wt 80.7 kg (178 lb)   SpO2 98%   BMI 27.07 kg/m²     Physical Exam  Vitals signs reviewed.   Constitutional:       Appearance: He is well-developed.   HENT:      Head: Normocephalic and atraumatic.      Right Ear: Tympanic membrane and external ear normal.      Left Ear: Tympanic membrane and external ear normal.   Eyes:      Conjunctiva/sclera: Conjunctivae normal.      Pupils: Pupils are equal, round, and reactive to light.   Neck:      Musculoskeletal: Neck supple. Decreased range of motion.      Thyroid: No thyromegaly.      Vascular: No JVD.     Cardiovascular:      Rate and Rhythm: Normal rate and regular rhythm.      Heart sounds: Normal heart sounds.   Pulmonary:      Effort: Pulmonary effort is normal.      Breath sounds: Normal breath sounds.   Abdominal:      General: Bowel sounds are normal.      Palpations: Abdomen is soft.   Musculoskeletal:      Lumbar back: He exhibits decreased range of motion.   Lymphadenopathy:      Cervical: No cervical adenopathy.    Skin:     General: Skin is warm and dry.      Findings: No rash.   Neurological:      Mental Status: He is alert and oriented to person, place, and time.      Cranial Nerves: No cranial nerve deficit.      Coordination: Coordination normal.   Psychiatric:         Behavior: Behavior normal.         Thought Content: Thought content normal.         Judgment: Judgment normal.        Result Review :   The following data was reviewed by: Ryan Villarreal Jr., MD on 03/01/2021:  Common labs    Common Labsle 6/30/20 6/30/20 6/30/20 12/22/20 12/22/20    0837 0838 0842 1003 1003   Glucose 99    131 (A)   BUN 20    19   Creatinine 1.37 (A)  1.30  1.31 (A)   eGFR Non African Am 50 (A)    53 (A)   Sodium 139    138   Potassium 3.8    4.1   Chloride 104    106   Calcium 9.3    8.6   Albumin 4.00    4.30   Total Bilirubin 0.4    0.5   Alkaline Phosphatase 61    61   AST (SGOT) 17    18   ALT (SGPT) 9    9   WBC  5.49  5.55    Hemoglobin  12.8 (A)  12.7 (A)    Hematocrit  37.5  39.1    Platelets  193  178    (A) Abnormal value       Comments are available for some flowsheets but are not being displayed.                     Assessment and Plan    Diagnoses and all orders for this visit:    1. Benign essential HTN (Primary)    2. Chronic midline low back pain without sciatica    3. Follicular lymphoma, unspecified follicular lymphoma type, unspecified body region (CMS/HCC)    4. Elevated cholesterol    Plan: No changes in current medications.  Of his back pain escalates will get repeat MRI of the lumbar spine to compare to prior MRI in 2018.        Follow Up   Return in about 6 months (around 9/1/2021) for Medicare Wellness.  Patient was given instructions and counseling regarding his condition or for health maintenance advice. Please see specific information pulled into the AVS if appropriate.

## 2021-03-20 ENCOUNTER — APPOINTMENT (OUTPATIENT)
Dept: GENERAL RADIOLOGY | Facility: HOSPITAL | Age: 81
End: 2021-03-20

## 2021-03-20 ENCOUNTER — HOSPITAL ENCOUNTER (EMERGENCY)
Facility: HOSPITAL | Age: 81
Discharge: HOME | End: 2021-03-20
Attending: EMERGENCY MEDICINE | Admitting: EMERGENCY MEDICINE

## 2021-03-20 ENCOUNTER — APPOINTMENT (OUTPATIENT)
Dept: CT IMAGING | Facility: HOSPITAL | Age: 81
End: 2021-03-20

## 2021-03-20 VITALS
WEIGHT: 172 LBS | HEART RATE: 70 BPM | TEMPERATURE: 98 F | OXYGEN SATURATION: 98 % | DIASTOLIC BLOOD PRESSURE: 78 MMHG | BODY MASS INDEX: 26.07 KG/M2 | RESPIRATION RATE: 16 BRPM | SYSTOLIC BLOOD PRESSURE: 142 MMHG | HEIGHT: 68 IN

## 2021-03-20 DIAGNOSIS — R07.89 ATYPICAL CHEST PAIN: Primary | ICD-10-CM

## 2021-03-20 LAB
ALBUMIN SERPL-MCNC: 4.3 G/DL (ref 3.5–5.2)
ALBUMIN/GLOB SERPL: 1.4 G/DL
ALP SERPL-CCNC: 67 U/L (ref 39–117)
ALT SERPL W P-5'-P-CCNC: 13 U/L (ref 1–41)
ANION GAP SERPL CALCULATED.3IONS-SCNC: 9.1 MMOL/L (ref 5–15)
AST SERPL-CCNC: 18 U/L (ref 1–40)
BASOPHILS # BLD AUTO: 0.03 10*3/MM3 (ref 0–0.2)
BASOPHILS NFR BLD AUTO: 0.5 % (ref 0–1.5)
BILIRUB SERPL-MCNC: 0.2 MG/DL (ref 0–1.2)
BUN SERPL-MCNC: 20 MG/DL (ref 8–23)
BUN/CREAT SERPL: 16.1 (ref 7–25)
CALCIUM SPEC-SCNC: 9.9 MG/DL (ref 8.6–10.5)
CHLORIDE SERPL-SCNC: 105 MMOL/L (ref 98–107)
CO2 SERPL-SCNC: 27.9 MMOL/L (ref 22–29)
CREAT SERPL-MCNC: 1.24 MG/DL (ref 0.76–1.27)
D DIMER PPP FEU-MCNC: 0.61 MCGFEU/ML (ref 0–0.46)
DEPRECATED RDW RBC AUTO: 47 FL (ref 37–54)
EOSINOPHIL # BLD AUTO: 0.14 10*3/MM3 (ref 0–0.4)
EOSINOPHIL NFR BLD AUTO: 2.1 % (ref 0.3–6.2)
ERYTHROCYTE [DISTWIDTH] IN BLOOD BY AUTOMATED COUNT: 13.1 % (ref 12.3–15.4)
GFR SERPL CREATININE-BSD FRML MDRD: 56 ML/MIN/1.73
GLOBULIN UR ELPH-MCNC: 3 GM/DL
GLUCOSE SERPL-MCNC: 117 MG/DL (ref 65–99)
HCT VFR BLD AUTO: 42.3 % (ref 37.5–51)
HGB BLD-MCNC: 13.8 G/DL (ref 13–17.7)
IMM GRANULOCYTES # BLD AUTO: 0.01 10*3/MM3 (ref 0–0.05)
IMM GRANULOCYTES NFR BLD AUTO: 0.2 % (ref 0–0.5)
LYMPHOCYTES # BLD AUTO: 1.81 10*3/MM3 (ref 0.7–3.1)
LYMPHOCYTES NFR BLD AUTO: 27.7 % (ref 19.6–45.3)
MCH RBC QN AUTO: 31.4 PG (ref 26.6–33)
MCHC RBC AUTO-ENTMCNC: 32.6 G/DL (ref 31.5–35.7)
MCV RBC AUTO: 96.4 FL (ref 79–97)
MONOCYTES # BLD AUTO: 0.69 10*3/MM3 (ref 0.1–0.9)
MONOCYTES NFR BLD AUTO: 10.6 % (ref 5–12)
NEUTROPHILS NFR BLD AUTO: 3.85 10*3/MM3 (ref 1.7–7)
NEUTROPHILS NFR BLD AUTO: 58.9 % (ref 42.7–76)
PLATELET # BLD AUTO: 175 10*3/MM3 (ref 140–450)
PMV BLD AUTO: 10.6 FL (ref 6–12)
POTASSIUM SERPL-SCNC: 3.7 MMOL/L (ref 3.5–5.2)
PROT SERPL-MCNC: 7.3 G/DL (ref 6–8.5)
QT INTERVAL: 383 MS
QT INTERVAL: 422 MS
RBC # BLD AUTO: 4.39 10*6/MM3 (ref 4.14–5.8)
SODIUM SERPL-SCNC: 142 MMOL/L (ref 136–145)
TROPONIN T SERPL-MCNC: <0.01 NG/ML (ref 0–0.03)
TROPONIN T SERPL-MCNC: <0.01 NG/ML (ref 0–0.03)
WBC # BLD AUTO: 6.53 10*3/MM3 (ref 3.4–10.8)

## 2021-03-20 PROCEDURE — 71275 CT ANGIOGRAPHY CHEST: CPT

## 2021-03-20 PROCEDURE — 93010 ELECTROCARDIOGRAM REPORT: CPT | Performed by: INTERNAL MEDICINE

## 2021-03-20 PROCEDURE — 85025 COMPLETE CBC W/AUTO DIFF WBC: CPT | Performed by: EMERGENCY MEDICINE

## 2021-03-20 PROCEDURE — 80053 COMPREHEN METABOLIC PANEL: CPT | Performed by: EMERGENCY MEDICINE

## 2021-03-20 PROCEDURE — 0 IOPAMIDOL PER 1 ML: Performed by: EMERGENCY MEDICINE

## 2021-03-20 PROCEDURE — 99285 EMERGENCY DEPT VISIT HI MDM: CPT | Performed by: EMERGENCY MEDICINE

## 2021-03-20 PROCEDURE — 99284 EMERGENCY DEPT VISIT MOD MDM: CPT

## 2021-03-20 PROCEDURE — 71046 X-RAY EXAM CHEST 2 VIEWS: CPT

## 2021-03-20 PROCEDURE — 84484 ASSAY OF TROPONIN QUANT: CPT | Performed by: EMERGENCY MEDICINE

## 2021-03-20 PROCEDURE — 85379 FIBRIN DEGRADATION QUANT: CPT | Performed by: EMERGENCY MEDICINE

## 2021-03-20 PROCEDURE — 93005 ELECTROCARDIOGRAM TRACING: CPT | Performed by: EMERGENCY MEDICINE

## 2021-03-20 RX ORDER — TRAMADOL HYDROCHLORIDE 50 MG/1
50 TABLET ORAL ONCE
Status: COMPLETED | OUTPATIENT
Start: 2021-03-20 | End: 2021-03-20

## 2021-03-20 RX ORDER — ASPIRIN 81 MG/1
81 TABLET ORAL DAILY
Qty: 100 TABLET | Refills: 0 | Status: SHIPPED | OUTPATIENT
Start: 2021-03-20 | End: 2021-03-20 | Stop reason: SDUPTHER

## 2021-03-20 RX ORDER — ASPIRIN 81 MG/1
81 TABLET ORAL DAILY
Qty: 100 TABLET | Refills: 0 | Status: SHIPPED | OUTPATIENT
Start: 2021-03-20 | End: 2022-03-02

## 2021-03-20 RX ORDER — TRAMADOL HYDROCHLORIDE 50 MG/1
TABLET ORAL
Qty: 20 TABLET | Refills: 0 | Status: SHIPPED | OUTPATIENT
Start: 2021-03-20 | End: 2021-08-31

## 2021-03-20 RX ORDER — TRAMADOL HYDROCHLORIDE 50 MG/1
TABLET ORAL
Qty: 20 TABLET | Refills: 0 | Status: SHIPPED | OUTPATIENT
Start: 2021-03-20 | End: 2021-03-20 | Stop reason: SDUPTHER

## 2021-03-20 RX ADMIN — TRAMADOL HYDROCHLORIDE 50 MG: 50 TABLET, FILM COATED ORAL at 04:12

## 2021-03-20 RX ADMIN — TRAMADOL HYDROCHLORIDE 50 MG: 50 TABLET, FILM COATED ORAL at 02:35

## 2021-03-20 RX ADMIN — IOPAMIDOL 100 ML: 755 INJECTION, SOLUTION INTRAVENOUS at 02:40

## 2021-03-20 NOTE — ED PROVIDER NOTES
Subjective     History provided by:  Patient    History of Present Illness    · Chief complaint: Chest pain    · Location: Across both sides of the chest and in the upper back.    · Quality/Severity: The pain is described as constant and sharp.    · Timing/Onset: Started abruptly an hour ago while reading a book in bed.    · Modifying Factors: No aggravating or relieving factors.  Breathing and movement does not affect the pain.    · Associated symptoms: Denies associated shortness of breath, diaphoresis, nausea or vomiting, abdominal pain, or pain in his arms or neck or jaw.    · Narrative: The patient is an 81-year-old white male who developed chest pain an hour ago while reading in bed.  He states the pain is sharp in character and radiates across both sides of his chest and to a lesser extent in his upper back.  He has no aggravating or relieving factors.  The patient took 2 aspirins without relief.  His past medical history is significant for prior MI in 1999 for which she had a stent placed.  He states this pain is completely different from the pain he had with his heart attack.  He has not seen a cardiologist since.  He had an echocardiogram performed 5/25/2018 that showed a normal ejection fraction of 61%.  He has a history of large B cell lymphoma that is currently in remission.  He has a history of hypertension.  He does not smoke.    Review of Systems   Constitutional: Negative for activity change, appetite change, chills, diaphoresis, fatigue and fever.   HENT: Negative for congestion, dental problem, ear pain, hearing loss, mouth sores, postnasal drip, rhinorrhea, sinus pressure, sore throat and voice change.    Eyes: Negative for photophobia, pain, discharge, redness and visual disturbance.   Respiratory: Negative for cough, chest tightness, shortness of breath, wheezing and stridor.    Cardiovascular: Positive for chest pain. Negative for palpitations and leg swelling.   Gastrointestinal: Negative for  "abdominal pain, diarrhea, nausea and vomiting.   Genitourinary: Negative for difficulty urinating, dysuria, flank pain, frequency, hematuria and urgency.   Musculoskeletal: Positive for back pain. Negative for arthralgias, gait problem, joint swelling, myalgias, neck pain and neck stiffness.   Skin: Negative for color change and rash.   Neurological: Negative for dizziness, tremors, seizures, syncope, facial asymmetry, speech difficulty, weakness, light-headedness, numbness and headaches.   Hematological: Negative for adenopathy.   Psychiatric/Behavioral: Negative.  Negative for confusion and decreased concentration. The patient is not nervous/anxious.      Past Medical History:   Diagnosis Date   • Arm fracture     right side humerus - sling and swathe   • Cancer (CMS/HCC) 2017    Basal Cell on Nose and Under Right Eye   • Coronary artery disease    • DDD (degenerative disc disease), cervical    • Gastric mass    • GERD (gastroesophageal reflux disease)    • Hip pain started 5/29/18    onset of acute pain with weight bearing   • Hx of cardiac arrest     Happened after Induction of anesthesia prior  to SURG 10 YRS AGO  \" I FLATLINE BUT W/IN SECONDS HEART WAS OK AFTER TURNED ON BACK...I THINK THEY GAVE ME TO MUCH ANESTHESIA\"   • Hyperlipidemia    • Hypertension    • Kidney stones    • Non Hodgkin's lymphoma (CMS/HCC)     SPINE   • Osteoarthritis    • Pleural effusion     DRAINAGE RT PLEURAL FLUID FOR TESTING WITH EGD ON 1-30-17   NO MALIGNANCY WITH CLEARANCE FROM DR SIMMONS FOR SURGERY   • Rheumatoid arthritis (CMS/HCC)      /78   Pulse 70   Temp 98 °F (36.7 °C)   Resp 16   Ht 172.7 cm (68\")   Wt 78 kg (172 lb)   SpO2 98%   BMI 26.15 kg/m²     Past Medical History:   Diagnosis Date   • Arm fracture     right side humerus - sling and swathe   • Cancer (CMS/HCC) 2017    Basal Cell on Nose and Under Right Eye   • Coronary artery disease    • DDD (degenerative disc disease), cervical    • Gastric mass    • " "GERD (gastroesophageal reflux disease)    • Hip pain started 5/29/18    onset of acute pain with weight bearing   • Hx of cardiac arrest     Happened after Induction of anesthesia prior  to SURG 10 YRS AGO  \" I FLATLINE BUT W/IN SECONDS HEART WAS OK AFTER TURNED ON BACK...I THINK THEY GAVE ME TO MUCH ANESTHESIA\"   • Hyperlipidemia    • Hypertension    • Kidney stones    • Non Hodgkin's lymphoma (CMS/HCC)     SPINE   • Osteoarthritis    • Pleural effusion     DRAINAGE RT PLEURAL FLUID FOR TESTING WITH EGD ON 1-30-17   NO MALIGNANCY WITH CLEARANCE FROM DR SIMMONS FOR SURGERY   • Rheumatoid arthritis (CMS/HCC)        No Known Allergies    Past Surgical History:   Procedure Laterality Date   • COLONOSCOPY N/A 10/26/2017    Procedure: COLONOSCOPY;  Surgeon: Louie Castañeda MD;  Location: [facility];  Service:    • CORONARY ANGIOPLASTY WITH STENT PLACEMENT  1999    17 years ago   • CYSTOSCOPY W/ LITHOLAPAXY / EHL      6-7 years ago   • DIAGNOSTIC LAPAROSCOPY N/A 10/26/2017    Procedure: DIAGNOSTIC LAPAROSCOPY WITH RETROPERITONEAL BIOPSIES;  Surgeon: Louie Castañeda MD;  Location: [facility] OR;  Service:    • ENDOSCOPY      ON 1/30/17 WITH DRAINAGE OF RT PLEURAL FLUID FOR BIOPSY    NO MALIGNANCY  CLEARANCE FOR SURG PER DR SIMMONS   • ENDOSCOPY N/A 10/26/2017    Procedure: ESOPHAGOGASTRODUODENOSCOPY;  Surgeon: Louie Castañeda MD;  Location: [facility] OR;  Service:    • EYE SURGERY Right 2017    Moh's; reconstruction right lower lid   • KIDNEY STONE SURGERY     • PLEURAL BIOPSY     • POSTERIOR CERVICAL FUSION  2001   • SKIN CANCER EXCISION N/A 2017    Basal Cell Nose & under right eye   • TOTAL HIP ARTHROPLASTY Left 04/2010    9-10 years ago    • TOTAL HIP ARTHROPLASTY Right 2/14/2017    Procedure: TOTAL HIP ARTHROPLASTY;  Surgeon: Gerson Cardoza MD;  Location: [facility] OR;  Service:    • VENOUS ACCESS DEVICE (PORT) INSERTION N/A 6/5/2018    Procedure: INSERTION VENOUS ACCESS DEVICE- RIGHT;  " Surgeon: Louie Castañeda MD;  Location: [facility] OR;  Service: General       Family History   Problem Relation Age of Onset   • Hypertension Mother         Lived to 95 years old - Had numerous medical problems & medications   • Hypertension Father         Lived to 98 years old - Numerous medical problems and Medications   • Cancer Brother         History of Agent Orange   • Heart disease Maternal Grandmother    • Osteoarthritis Paternal Grandfather         possible RA   • Malig Hyperthermia Neg Hx        Social History     Socioeconomic History   • Marital status:      Spouse name: Genie   • Number of children: Not on file   • Years of education: college-Masters degree   • Highest education level: Not on file   Tobacco Use   • Smoking status: Never Smoker   • Smokeless tobacco: Former User     Types: Chew   Substance and Sexual Activity   • Alcohol use: Yes     Comment: 1-2 beers nightly, sometimes an occasional bourbon   • Drug use: No   • Sexual activity: Defer           Objective   Physical Exam  Vitals and nursing note reviewed.   Constitutional:       General: He is not in acute distress.     Appearance: He is well-developed and normal weight. He is not ill-appearing, toxic-appearing or diaphoretic.      Comments: The patient appears healthy in no acute distress.  Review of his vital signs: His blood pressures elevated 176/97, heart rate is normal 81, respirations are normal 16 with a normal room air oxygen saturation of 99%.   HENT:      Head: Normocephalic and atraumatic.      Nose: Nose normal.      Mouth/Throat:      Mouth: Mucous membranes are moist.      Pharynx: Oropharynx is clear. No oropharyngeal exudate.   Eyes:      General: No scleral icterus.        Right eye: No discharge.         Left eye: No discharge.      Pupils: Pupils are equal, round, and reactive to light.   Neck:      Thyroid: No thyromegaly.      Vascular: No JVD.   Cardiovascular:      Rate and Rhythm: Normal rate and  regular rhythm.      Heart sounds: Normal heart sounds. No murmur heard.        Comments: Pressure applied to the chest wall does not affect the discomfort.  Pulmonary:      Effort: Pulmonary effort is normal.      Breath sounds: Normal breath sounds. No wheezing or rales.   Chest:      Chest wall: No tenderness.   Abdominal:      General: Bowel sounds are normal. There is no distension.      Palpations: Abdomen is soft.      Tenderness: There is no abdominal tenderness.   Musculoskeletal:         General: No tenderness or deformity. Normal range of motion.      Cervical back: Normal range of motion and neck supple.   Lymphadenopathy:      Cervical: No cervical adenopathy.   Skin:     General: Skin is warm and dry.      Capillary Refill: Capillary refill takes less than 2 seconds.      Findings: No rash.   Neurological:      General: No focal deficit present.      Mental Status: He is alert and oriented to person, place, and time.      Cranial Nerves: No cranial nerve deficit.      Coordination: Coordination normal.      Comments: No focal motor sensory deficit   Psychiatric:         Mood and Affect: Mood normal.         Behavior: Behavior normal.         Thought Content: Thought content normal.         Judgment: Judgment normal.         Procedures           ED Course  ED Course as of Mar 20 0442   Sat Mar 20, 2021   0133 My interpretation of the patient's EKG tracing performed 01:04 is normal sinus rhythm with a rate of 92, no acute ST segment elevation or depression consistent with ischemia, Q waves in leads III and aVF suggesting old inferior MI, no ectopy, left axis deviation, normal R wave transition, no change in comparison to tracing 6/5/2018.    [TP]   0206 View of the patient's laboratory studies: His cardiac troponin was negative.  CMP had essentially normal electrolytes and liver function test.  His BUN was upper limits normal 20 and a creatinine of upper limits normal at 1.24 with a slightly diminished  GFR of 56.  D-dimer was slightly elevated at 0.61.  CBC had a normal white count of 6.5 with a normal differential.  Hemoglobin, hematocrit and platelets within normal limits.    [TP]   0314 The patient's chest x-ray was interpreted by me and the radiologist as no acute disease.  A right-sided Port-A-Cath was in the superior vena cava.    [TP]   0314 The patient CT angiogram of the chest was negative for pulmonary embolus or dissection.  Negative for pneumonia.  Calcification of coronary artery disease was noted.    [TP]   0334 My interpretation of the patient's delta EKG performed 03:23 is normal sinus rhythm with a rate of 74, no acute ST segment elevation or depression consistent with ischemia, left axis deviation, Q waves in leads III and aVF consistent with old inferior MI, no ectopy, no change in comparison to tracing performed 01:04 this morning.    [TP]   0403 The patient's delta troponin was negative.    [TP]   0403 The patient's description of his chest pain is very atypical for coronary ischemia.  His 2-hour EKG and troponin are negative.  He was administered an Ultram for pain which had minimal effect.  He will be discharged with a prescription for Ultram and aspirin 81 mg.  He was instructed to make an appointment to follow-up with [facility] here this coming week.    [TP]      ED Course User Index  [TP] Edgar Dominguez MD                                           MDM  Number of Diagnoses or Management Options  Atypical chest pain: new and requires workup     Amount and/or Complexity of Data Reviewed  Clinical lab tests: ordered and reviewed  Tests in the radiology section of CPT®: ordered and reviewed  Tests in the medicine section of CPT®: ordered and reviewed    Risk of Complications, Morbidity, and/or Mortality  Presenting problems: high  Diagnostic procedures: high  Management options: high  General comments: My differential diagnosis for chest pain includes but is not limited  to:  Muscle strain, costochondritis, myositis, pleurisy, rib fracture, intercostal neuritis, herpes zoster, tumor, myocardial infarction, coronary syndrome, unstable angina, angina, aortic dissection, mitral valve prolapse, pericarditis, palpitations, pulmonary embolus, pneumonia, pneumothorax, lung cancer, GERD, esophagitis, esophageal spasm    Patient Progress  Patient progress: stable      Final diagnoses:   Atypical chest pain       ED Disposition  ED Disposition     ED Disposition Condition Comment    Discharge Stable           Sivakumar Dykes III, MD  [address] [facility] [address]  [facility] [id number] [phone]    Schedule an appointment as soon as possible for a visit in 3 days  next available         Medication List      New Prescriptions    aspirin 81 MG EC tablet  Take 1 tablet by mouth Daily.     traMADol 50 MG tablet  Commonly known as: ULTRAM  1 to 2 tablets p.o. every 6 hours as needed pain           Where to Get Your Medications      These medications were sent to [facility] - [address] [place] [address] - [phone]  - [phone]   [address] [address]    Phone: [phone]   · aspirin 81 MG EC tablet  · traMADol 50 MG tablet         Labs Reviewed   COMPREHENSIVE METABOLIC PANEL - Abnormal; Notable for the following components:       Result Value    Glucose 117 (*)     eGFR Non  Amer 56 (*)     All other components within normal limits    Narrative:     GFR Normal >60  Chronic Kidney Disease <60  Kidney Failure <15     D-DIMER, QUANTITATIVE - Abnormal; Notable for the following components:    D-Dimer, Quantitative 0.61 (*)     All other components within normal limits    Narrative:     Can be elevated in, but is not diagnostic for deep vein thrombosis (DVT) or pulmonary embolis (PE).  It is also elevated in other medical conditions.  Clinical correlation is required.  The negative cut-off value for the D-Dimer is 0.50 mcg FEU/mL for DVT and PE.     CBC WITH  AUTO DIFFERENTIAL - Normal   TROPONIN (IN-HOUSE) - Normal    Narrative:     Troponin T Reference Range:  <= 0.03 ng/mL-   Negative for AMI  >0.03 ng/mL-     Abnormal for myocardial necrosis.  Clinicians would have to utilize clinical acumen, EKG, Troponin and serial changes to determine if it is an Acute Myocardial Infarction or myocardial injury due to an underlying chronic condition.       Results may be falsely decreased if patient taking Biotin.     TROPONIN (IN-HOUSE) - Normal    Narrative:     Troponin T Reference Range:  <= 0.03 ng/mL-   Negative for AMI  >0.03 ng/mL-     Abnormal for myocardial necrosis.  Clinicians would have to utilize clinical acumen, EKG, Troponin and serial changes to determine if it is an Acute Myocardial Infarction or myocardial injury due to an underlying chronic condition.       Results may be falsely decreased if patient taking Biotin.     CBC AND DIFFERENTIAL    Narrative:     The following orders were created for panel order CBC & Differential.  Procedure                               Abnormality         Status                     ---------                               -----------         ------                     CBC Auto Differential[[id number]]        Normal              Final result                 Please view results for these tests on the individual orders.     CT Angiogram Chest   Final Result      1. No PE or aortic dissection.   2. Negative for pneumonia.   3. Progressive left renal atrophy.   4. Atherosclerotic disease and coronary artery disease.      Signer Name: Louie Suarez MD    Signed: 3/20/2021 3:08 AM    Workstation Name: [facility]     [facility]      XR Chest 2 View   Final Result   No acute cardiopulmonary findings.      Signer Name: Louie Suarez MD    Signed: 3/20/2021 1:57 AM    [facility]             Medication List      New Prescriptions    aspirin 81 MG EC  tablet  Take 1 tablet by mouth Daily.     traMADol 50 MG tablet  Commonly known as: ULTRAM  1 to 2 tablets p.o. every 6 hours as needed pain           Where to Get Your Medications      [facility] [place] [facility] [address] [facility] [address] - [phone]  - [phone]   [address] [address]    Phone: [phone]   · aspirin 81 MG EC tablet  · traMADol 50 MG tablet              Edgar Dominguez MD  03/20/21 1445

## 2021-04-06 ENCOUNTER — INFUSION (OUTPATIENT)
Dept: ONCOLOGY | Facility: HOSPITAL | Age: 81
End: 2021-04-06

## 2021-04-06 VITALS — TEMPERATURE: 98.8 F

## 2021-04-06 DIAGNOSIS — Z45.2 FITTING AND ADJUSTMENT OF VASCULAR CATHETER: Primary | ICD-10-CM

## 2021-04-06 PROCEDURE — 96523 IRRIG DRUG DELIVERY DEVICE: CPT

## 2021-04-06 PROCEDURE — 25010000003 HEPARIN LOCK FLUSH PER 10 UNITS: Performed by: INTERNAL MEDICINE

## 2021-04-06 RX ORDER — SODIUM CHLORIDE 0.9 % (FLUSH) 0.9 %
10 SYRINGE (ML) INJECTION AS NEEDED
Status: DISCONTINUED | OUTPATIENT
Start: 2021-04-06 | End: 2021-04-06 | Stop reason: HOSPADM

## 2021-04-06 RX ORDER — HEPARIN SODIUM (PORCINE) LOCK FLUSH IV SOLN 100 UNIT/ML 100 UNIT/ML
500 SOLUTION INTRAVENOUS AS NEEDED
Status: CANCELLED | OUTPATIENT
Start: 2021-04-06

## 2021-04-06 RX ORDER — SODIUM CHLORIDE 0.9 % (FLUSH) 0.9 %
10 SYRINGE (ML) INJECTION AS NEEDED
Status: CANCELLED | OUTPATIENT
Start: 2021-04-06

## 2021-04-06 RX ORDER — HEPARIN SODIUM (PORCINE) LOCK FLUSH IV SOLN 100 UNIT/ML 100 UNIT/ML
500 SOLUTION INTRAVENOUS AS NEEDED
Status: DISCONTINUED | OUTPATIENT
Start: 2021-04-06 | End: 2021-04-06 | Stop reason: HOSPADM

## 2021-04-06 RX ADMIN — Medication 10 ML: at 14:07

## 2021-04-06 RX ADMIN — HEPARIN SODIUM (PORCINE) LOCK FLUSH IV SOLN 100 UNIT/ML 500 UNITS: 100 SOLUTION at 14:07

## 2021-05-18 ENCOUNTER — INFUSION (OUTPATIENT)
Dept: ONCOLOGY | Facility: HOSPITAL | Age: 81
End: 2021-05-18

## 2021-05-18 VITALS — TEMPERATURE: 97.8 F

## 2021-05-18 DIAGNOSIS — Z45.2 FITTING AND ADJUSTMENT OF VASCULAR CATHETER: Primary | ICD-10-CM

## 2021-05-18 PROCEDURE — 96523 IRRIG DRUG DELIVERY DEVICE: CPT

## 2021-05-18 PROCEDURE — 25010000002 HEPARIN LOCK FLUSH PER 10 UNITS: Performed by: INTERNAL MEDICINE

## 2021-05-18 RX ORDER — SODIUM CHLORIDE 0.9 % (FLUSH) 0.9 %
10 SYRINGE (ML) INJECTION AS NEEDED
Status: DISCONTINUED | OUTPATIENT
Start: 2021-05-18 | End: 2021-05-18 | Stop reason: HOSPADM

## 2021-05-18 RX ORDER — SODIUM CHLORIDE 0.9 % (FLUSH) 0.9 %
10 SYRINGE (ML) INJECTION AS NEEDED
Status: CANCELLED | OUTPATIENT
Start: 2021-05-18

## 2021-05-18 RX ORDER — HEPARIN SODIUM (PORCINE) LOCK FLUSH IV SOLN 100 UNIT/ML 100 UNIT/ML
500 SOLUTION INTRAVENOUS AS NEEDED
Status: DISCONTINUED | OUTPATIENT
Start: 2021-05-18 | End: 2021-05-18 | Stop reason: HOSPADM

## 2021-05-18 RX ORDER — HEPARIN SODIUM (PORCINE) LOCK FLUSH IV SOLN 100 UNIT/ML 100 UNIT/ML
500 SOLUTION INTRAVENOUS AS NEEDED
Status: CANCELLED | OUTPATIENT
Start: 2021-05-18

## 2021-05-18 RX ADMIN — SODIUM CHLORIDE, PRESERVATIVE FREE 10 ML: 5 INJECTION INTRAVENOUS at 13:59

## 2021-05-18 RX ADMIN — HEPARIN 500 UNITS: 100 SYRINGE at 13:59

## 2021-05-24 DIAGNOSIS — E78.00 ELEVATED CHOLESTEROL: ICD-10-CM

## 2021-05-24 RX ORDER — SIMVASTATIN 10 MG
10 TABLET ORAL NIGHTLY
Qty: 90 TABLET | Refills: 3 | Status: SHIPPED | OUTPATIENT
Start: 2021-05-24 | End: 2021-05-25

## 2021-05-24 NOTE — TELEPHONE ENCOUNTER
Caller: Louie German    Relationship: Self    Best call back number:[phone]    Medication needed:   Requested Prescriptions     Pending Prescriptions Disp Refills   • simvastatin (Zocor) 10 MG tablet 90 tablet 3     Sig: Take 1 tablet by mouth Every Night.       When do you need the refill by: ASAP  What additional details did the patient provide when requesting the medication:     Does the patient have less than a 3 day supply:  [x] Yes  [] No    What is the patient's preferred pharmacy:    [facility] - [address] [phone] [facility] [phone]       ADDITIONAL NOTES:  PATIENT WOULD LIKE A 90 PRESCRIPTION AT 10MG

## 2021-05-25 DIAGNOSIS — E78.00 ELEVATED CHOLESTEROL: ICD-10-CM

## 2021-05-25 RX ORDER — SIMVASTATIN 10 MG
TABLET ORAL
Qty: 90 TABLET | Refills: 3 | Status: SHIPPED | OUTPATIENT
Start: 2021-05-25 | End: 2021-09-01 | Stop reason: SDUPTHER

## 2021-06-28 DIAGNOSIS — D50.0 IRON DEFICIENCY ANEMIA DUE TO CHRONIC BLOOD LOSS: ICD-10-CM

## 2021-06-28 DIAGNOSIS — C83.39 DIFFUSE LARGE B-CELL LYMPHOMA OF EXTRANODAL SITE EXCLUDING SPLEEN AND OTHER SOLID ORGANS (HCC): Primary | ICD-10-CM

## 2021-07-01 ENCOUNTER — INFUSION (OUTPATIENT)
Dept: ONCOLOGY | Facility: HOSPITAL | Age: 81
End: 2021-07-01

## 2021-07-01 ENCOUNTER — HOSPITAL ENCOUNTER (OUTPATIENT)
Dept: CT IMAGING | Facility: HOSPITAL | Age: 81
Discharge: HOME | End: 2021-07-01
Admitting: INTERNAL MEDICINE

## 2021-07-01 VITALS — TEMPERATURE: 98.4 F

## 2021-07-01 DIAGNOSIS — C82.90 FOLLICULAR LYMPHOMA, UNSPECIFIED FOLLICULAR LYMPHOMA TYPE, UNSPECIFIED BODY REGION (HCC): ICD-10-CM

## 2021-07-01 DIAGNOSIS — C83.39 DIFFUSE LARGE B-CELL LYMPHOMA OF EXTRANODAL SITE EXCLUDING SPLEEN AND OTHER SOLID ORGANS (HCC): ICD-10-CM

## 2021-07-01 DIAGNOSIS — D50.0 IRON DEFICIENCY ANEMIA DUE TO CHRONIC BLOOD LOSS: Primary | ICD-10-CM

## 2021-07-01 DIAGNOSIS — Z45.2 FITTING AND ADJUSTMENT OF VASCULAR CATHETER: ICD-10-CM

## 2021-07-01 LAB
ALBUMIN SERPL-MCNC: 4.2 G/DL (ref 3.5–5.2)
ALBUMIN/GLOB SERPL: 1.6 G/DL
ALP SERPL-CCNC: 56 U/L (ref 39–117)
ALT SERPL W P-5'-P-CCNC: 11 U/L (ref 1–41)
ANION GAP SERPL CALCULATED.3IONS-SCNC: 10.2 MMOL/L (ref 5–15)
AST SERPL-CCNC: 20 U/L (ref 1–40)
BASOPHILS # BLD AUTO: 0.04 10*3/MM3 (ref 0–0.2)
BASOPHILS NFR BLD AUTO: 0.6 % (ref 0–1.5)
BILIRUB SERPL-MCNC: 0.5 MG/DL (ref 0–1.2)
BUN SERPL-MCNC: 24 MG/DL (ref 8–23)
BUN/CREAT SERPL: 19.7 (ref 7–25)
CALCIUM SPEC-SCNC: 8.7 MG/DL (ref 8.6–10.5)
CHLORIDE SERPL-SCNC: 103 MMOL/L (ref 98–107)
CO2 SERPL-SCNC: 24.8 MMOL/L (ref 22–29)
CREAT SERPL-MCNC: 1.22 MG/DL (ref 0.76–1.27)
DEPRECATED RDW RBC AUTO: 47.5 FL (ref 37–54)
EOSINOPHIL # BLD AUTO: 0.13 10*3/MM3 (ref 0–0.4)
EOSINOPHIL NFR BLD AUTO: 2 % (ref 0.3–6.2)
ERYTHROCYTE [DISTWIDTH] IN BLOOD BY AUTOMATED COUNT: 13.1 % (ref 12.3–15.4)
FERRITIN SERPL-MCNC: 65 NG/ML (ref 30–400)
GFR SERPL CREATININE-BSD FRML MDRD: 57 ML/MIN/1.73
GLOBULIN UR ELPH-MCNC: 2.7 GM/DL
GLUCOSE SERPL-MCNC: 92 MG/DL (ref 65–99)
HCT VFR BLD AUTO: 38.6 % (ref 37.5–51)
HGB BLD-MCNC: 12.7 G/DL (ref 13–17.7)
IMM GRANULOCYTES # BLD AUTO: 0 10*3/MM3 (ref 0–0.05)
IMM GRANULOCYTES NFR BLD AUTO: 0 % (ref 0–0.5)
IRON 24H UR-MRATE: 140 MCG/DL (ref 59–158)
IRON SATN MFR SERPL: 52 % (ref 20–50)
LYMPHOCYTES # BLD AUTO: 1.47 10*3/MM3 (ref 0.7–3.1)
LYMPHOCYTES NFR BLD AUTO: 22.6 % (ref 19.6–45.3)
MCH RBC QN AUTO: 31.4 PG (ref 26.6–33)
MCHC RBC AUTO-ENTMCNC: 32.9 G/DL (ref 31.5–35.7)
MCV RBC AUTO: 95.3 FL (ref 79–97)
MONOCYTES # BLD AUTO: 0.54 10*3/MM3 (ref 0.1–0.9)
MONOCYTES NFR BLD AUTO: 8.3 % (ref 5–12)
NEUTROPHILS NFR BLD AUTO: 4.33 10*3/MM3 (ref 1.7–7)
NEUTROPHILS NFR BLD AUTO: 66.5 % (ref 42.7–76)
PLATELET # BLD AUTO: 172 10*3/MM3 (ref 140–450)
PMV BLD AUTO: 10.7 FL (ref 6–12)
POTASSIUM SERPL-SCNC: 4.1 MMOL/L (ref 3.5–5.2)
PROT SERPL-MCNC: 6.9 G/DL (ref 6–8.5)
RBC # BLD AUTO: 4.05 10*6/MM3 (ref 4.14–5.8)
SODIUM SERPL-SCNC: 138 MMOL/L (ref 136–145)
TIBC SERPL-MCNC: 270 MCG/DL (ref 298–536)
UIBC SERPL-MCNC: 130 MCG/DL (ref 112–346)
VIT B12 BLD-MCNC: 486 PG/ML (ref 211–946)
WBC # BLD AUTO: 6.51 10*3/MM3 (ref 3.4–10.8)

## 2021-07-01 PROCEDURE — 83540 ASSAY OF IRON: CPT

## 2021-07-01 PROCEDURE — 36591 DRAW BLOOD OFF VENOUS DEVICE: CPT

## 2021-07-01 PROCEDURE — 71250 CT THORAX DX C-: CPT

## 2021-07-01 PROCEDURE — 80053 COMPREHEN METABOLIC PANEL: CPT

## 2021-07-01 PROCEDURE — 74176 CT ABD & PELVIS W/O CONTRAST: CPT

## 2021-07-01 PROCEDURE — 85025 COMPLETE CBC W/AUTO DIFF WBC: CPT

## 2021-07-01 PROCEDURE — 83550 IRON BINDING TEST: CPT

## 2021-07-01 PROCEDURE — 82728 ASSAY OF FERRITIN: CPT

## 2021-07-01 PROCEDURE — 82607 VITAMIN B-12: CPT

## 2021-07-01 PROCEDURE — 25010000002 HEPARIN LOCK FLUSH PER 10 UNITS: Performed by: INTERNAL MEDICINE

## 2021-07-01 RX ORDER — SODIUM CHLORIDE 0.9 % (FLUSH) 0.9 %
10 SYRINGE (ML) INJECTION AS NEEDED
Status: DISCONTINUED | OUTPATIENT
Start: 2021-07-01 | End: 2021-07-01 | Stop reason: HOSPADM

## 2021-07-01 RX ORDER — HEPARIN SODIUM (PORCINE) LOCK FLUSH IV SOLN 100 UNIT/ML 100 UNIT/ML
500 SOLUTION INTRAVENOUS AS NEEDED
Status: CANCELLED | OUTPATIENT
Start: 2021-07-01

## 2021-07-01 RX ORDER — SODIUM CHLORIDE 0.9 % (FLUSH) 0.9 %
10 SYRINGE (ML) INJECTION AS NEEDED
Status: CANCELLED | OUTPATIENT
Start: 2021-07-01

## 2021-07-01 RX ORDER — HEPARIN SODIUM (PORCINE) LOCK FLUSH IV SOLN 100 UNIT/ML 100 UNIT/ML
500 SOLUTION INTRAVENOUS AS NEEDED
Status: DISCONTINUED | OUTPATIENT
Start: 2021-07-01 | End: 2021-07-01 | Stop reason: HOSPADM

## 2021-07-01 RX ADMIN — SODIUM CHLORIDE, PRESERVATIVE FREE 10 ML: 5 INJECTION INTRAVENOUS at 12:51

## 2021-07-01 RX ADMIN — HEPARIN SODIUM (PORCINE) LOCK FLUSH IV SOLN 100 UNIT/ML 500 UNITS: 100 SOLUTION at 12:51

## 2021-07-09 ENCOUNTER — OFFICE VISIT (OUTPATIENT)
Dept: ONCOLOGY | Facility: CLINIC | Age: 81
End: 2021-07-09

## 2021-07-09 ENCOUNTER — APPOINTMENT (OUTPATIENT)
Dept: OTHER | Facility: HOSPITAL | Age: 81
End: 2021-07-09

## 2021-07-09 VITALS
WEIGHT: 177.2 LBS | OXYGEN SATURATION: 96 % | RESPIRATION RATE: 16 BRPM | SYSTOLIC BLOOD PRESSURE: 152 MMHG | HEIGHT: 68 IN | TEMPERATURE: 97.1 F | HEART RATE: 75 BPM | DIASTOLIC BLOOD PRESSURE: 83 MMHG | BODY MASS INDEX: 26.86 KG/M2

## 2021-07-09 DIAGNOSIS — Z45.2 FITTING AND ADJUSTMENT OF VASCULAR CATHETER: ICD-10-CM

## 2021-07-09 DIAGNOSIS — C83.39 DIFFUSE LARGE B-CELL LYMPHOMA OF EXTRANODAL SITE EXCLUDING SPLEEN AND OTHER SOLID ORGANS (HCC): ICD-10-CM

## 2021-07-09 DIAGNOSIS — S32.030S CLOSED COMPRESSION FRACTURE OF L3 VERTEBRA, SEQUELA: ICD-10-CM

## 2021-07-09 DIAGNOSIS — D50.0 IRON DEFICIENCY ANEMIA DUE TO CHRONIC BLOOD LOSS: ICD-10-CM

## 2021-07-09 DIAGNOSIS — C82.90 FOLLICULAR LYMPHOMA, UNSPECIFIED FOLLICULAR LYMPHOMA TYPE, UNSPECIFIED BODY REGION (HCC): Primary | ICD-10-CM

## 2021-07-09 PROBLEM — D64.81 ANEMIA ASSOCIATED WITH CHEMOTHERAPY: Status: RESOLVED | Noted: 2018-08-14 | Resolved: 2021-07-09

## 2021-07-09 PROBLEM — T45.1X5A ANEMIA ASSOCIATED WITH CHEMOTHERAPY: Status: RESOLVED | Noted: 2018-08-14 | Resolved: 2021-07-09

## 2021-07-09 PROCEDURE — 99214 OFFICE O/P EST MOD 30 MIN: CPT | Performed by: INTERNAL MEDICINE

## 2021-07-09 NOTE — PROGRESS NOTES
Subjective     REASON FOR FOLLOW UP:   1.  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  2.  Maintenance Rituxan every 3 months for 2 years initiated 3/30/2018  3.  Painful pathologic compression fracture at L3 May 2018 due to involvement with diffuse large B-cell non-Hodgkin's lymphoma.   4.  CHOP and Rituxan x 6 cycles completed 9/25/2018.    HISTORY OF PRESENT ILLNESS:  The patient is a 81 y.o. year old male who was seen in consultation on 10/20/2017 and at that time we recommended laparoscopic lymph node sampling and upper and lower GI endoscopy.  He underwent these procedures with Dr. Castañeda on 10/26/2017.  There was no evidence of malignancy from his endoscopic procedures at the laparoscopic specimen returned as follicular center cell B-cell non-Hodgkin's lymphoma.    He received Rituxan treatment initially with 4 weekly doses of Rituxan with good response on follow-up scans.  Unfortunately, a few months after completion of his treatment he developed worsening back pain and has lymphoma involving the L3 vertebral body consistent with diffuse large B-cell non-Hodgkin's lymphoma.    He underwent port placement and received 6 cycles of CHOP and Rituxan which he completed in late September 2018 with what appear to be a complete response on PET scan.    He returns today for follow-up with CT scan surveillance.  CT scans performed on 7/1/2021 showed no definite evidence of recurrent disease although the radiologist did comment on stranding in the area of his previous lymphadenopathy as well as a masslike area in the region of the duodenal jejunal junction.    He seems to be feeling well with no abdominal pain.  He occasionally has back discomfort which is usually after he has been more active.    Pain  Pertinent negatives include no chest pain, coughing, fatigue, fever, headaches, nausea, neck pain, rash or  "vomiting.        Past Medical History:   Diagnosis Date   • Arm fracture     right side humerus - sling and swathe   • Cancer (CMS/HCC) 2017    Basal Cell on Nose and Under Right Eye   • Coronary artery disease    • DDD (degenerative disc disease), cervical    • Gastric mass    • GERD (gastroesophageal reflux disease)    • Hip pain started 5/29/18    onset of acute pain with weight bearing   • Hx of cardiac arrest     Happened after Induction of anesthesia prior  to SURG 10 YRS AGO  \" I FLATLINE BUT W/IN SECONDS HEART WAS OK AFTER TURNED ON BACK...I THINK THEY GAVE ME TO MUCH ANESTHESIA\"   • Hyperlipidemia    • Hypertension    • Kidney stones    • Non Hodgkin's lymphoma (CMS/HCC)     SPINE   • Osteoarthritis    • Pleural effusion     DRAINAGE RT PLEURAL FLUID FOR TESTING WITH EGD ON 1-30-17   NO MALIGNANCY WITH CLEARANCE FROM DR SIMMONS FOR SURGERY   • Rheumatoid arthritis (CMS/HCC)         Past Surgical History:   Procedure Laterality Date   • COLONOSCOPY N/A 10/26/2017    Procedure: COLONOSCOPY;  Surgeon: Louei Castañeda MD;  Location: [facility];  Service:    • CORONARY ANGIOPLASTY WITH STENT PLACEMENT  1999    17 years ago   • CYSTOSCOPY W/ LITHOLAPAXY / EHL      6-7 years ago   • DIAGNOSTIC LAPAROSCOPY N/A 10/26/2017    Procedure: DIAGNOSTIC LAPAROSCOPY WITH RETROPERITONEAL BIOPSIES;  Surgeon: Louie Castañeda MD;  Location: [facility] OR;  Service:    • ENDOSCOPY      ON 1/30/17 WITH DRAINAGE OF RT PLEURAL FLUID FOR BIOPSY    NO MALIGNANCY  CLEARANCE FOR SURG PER DR SIMMONS   • ENDOSCOPY N/A 10/26/2017    Procedure: ESOPHAGOGASTRODUODENOSCOPY;  Surgeon: Louie Castañeda MD;  Location: [facility];  Service:    • EYE SURGERY Right 2017    Moh's; reconstruction right lower lid   • KIDNEY STONE SURGERY     • PLEURAL BIOPSY     • POSTERIOR CERVICAL FUSION  2001   • SKIN CANCER EXCISION N/A 2017    Basal Cell Nose & under right eye   • TOTAL HIP ARTHROPLASTY Left 04/2010    9-10 years ago    • " "TOTAL HIP ARTHROPLASTY Right 2/14/2017    Procedure: TOTAL HIP ARTHROPLASTY;  Surgeon: Gerson Cardoza MD;  Location: [facility] OR;  Service:    • VENOUS ACCESS DEVICE (PORT) INSERTION N/A 6/5/2018    Procedure: INSERTION VENOUS ACCESS DEVICE- RIGHT;  Surgeon: Louie Castañeda MD;  Location: [facility] OR;  Service: General         ALLERGIES:  No Known Allergies     Review of Systems   Constitutional: Positive for appetite change and unexpected weight change. Negative for activity change, fatigue and fever.   HENT: Negative for hearing loss, nosebleeds, trouble swallowing and voice change.    Eyes: Negative for visual disturbance.   Respiratory: Negative for cough, shortness of breath and wheezing.    Cardiovascular: Negative for chest pain and palpitations.   Gastrointestinal: Positive for constipation. Negative for diarrhea, nausea and vomiting.   Genitourinary: Negative for difficulty urinating, frequency, hematuria and urgency.   Musculoskeletal: Positive for back pain. Negative for neck pain.   Skin: Negative for rash.   Neurological: Negative for dizziness, seizures, syncope and headaches.   Hematological: Negative for adenopathy. Does not bruise/bleed easily.   Psychiatric/Behavioral: Negative for behavioral problems. The patient is not nervous/anxious.         Objective     Vitals:    07/09/21 1502   BP: 152/83   Pulse: 75   Resp: 16   Temp: 97.1 °F (36.2 °C)   TempSrc: Skin   SpO2: 96%   Weight: 80.4 kg (177 lb 3.2 oz)   Height: 172.7 cm (67.99\")   PainSc: 0-No pain     Current Status 4/6/2021   ECOG score 0       Physical Exam   Constitutional: He is oriented to person, place, and time. He appears well-developed. No distress.   HENT:   Head: Normocephalic.   Eyes: Pupils are equal, round, and reactive to light. Conjunctivae are normal. No scleral icterus.   Neck: No JVD present. No thyromegaly present.   Cardiovascular: Normal rate and regular rhythm. Exam reveals no gallop and no friction rub.   No " murmur heard.  Pulmonary/Chest: Effort normal and breath sounds normal. He has no wheezes. He has no rales.   Right IJ Mediport.   Abdominal: Soft. He exhibits no distension and no mass. There is no abdominal tenderness.   Musculoskeletal: Normal range of motion. No deformity.   Lymphadenopathy:     He has no cervical adenopathy.   Neurological: He is alert and oriented to person, place, and time. He has normal reflexes. No cranial nerve deficit.   Skin: Skin is warm and dry. No rash noted. No erythema.   Psychiatric: His behavior is normal. Judgment normal.         RECENT LABS:  Hematology WBC   Date Value Ref Range Status   07/01/2021 6.51 3.40 - 10.80 10*3/mm3 Final   02/20/2020 5.36 3.40 - 10.80 10*3/mm3 Final     RBC   Date Value Ref Range Status   07/01/2021 4.05 (L) 4.14 - 5.80 10*6/mm3 Final   02/20/2020 4.04 (L) 4.14 - 5.80 10*6/mm3 Final     Hemoglobin   Date Value Ref Range Status   07/01/2021 12.7 (L) 13.0 - 17.7 g/dL Final     Hematocrit   Date Value Ref Range Status   07/01/2021 38.6 37.5 - 51.0 % Final     Platelets   Date Value Ref Range Status   07/01/2021 172 140 - 450 10*3/mm3 Final          Lab Results   Component Value Date    GLUCOSE 92 07/01/2021    BUN 24 (H) 07/01/2021    CREATININE 1.22 07/01/2021    EGFRIFNONA 57 (L) 07/01/2021    EGFRIFAFRI 64 02/20/2020    BCR 19.7 07/01/2021    K 4.1 07/01/2021    CO2 24.8 07/01/2021    CALCIUM 8.7 07/01/2021    PROTENTOTREF 6.5 02/20/2020    ALBUMIN 4.20 07/01/2021    LABIL2 1.8 02/20/2020    AST 20 07/01/2021    ALT 11 07/01/2021       CT CHEST, ABDOMEN AND PELVIS WITHOUT CONTRAST  7/1/2021  IMPRESSION:  1. Some mild stranding an area of previous left para-aortic lymphadenopathy. There is questionable masslike area in the region of the duodenal jejunal junction that is adjacent to the stranding. Unfortunately Limited evaluation without oral or IV  contrast. Consider follow-up CT scan of the abdomen with oral and IV contrast.  2. Otherwise no discrete  lymph nodes seen.     3 Nonobstructing right renal calculi.  4.   Moderate coronary artery calcification present. These can be predictive of future all cause mortality and cardiovascular events and coronary artery calcification scoring may be clinically useful.        Images personally reviewed    Assessment/Plan     1.  Diffuse large B-cell non-Hodgkin's lymphoma involving the L3 vertebral body.  PET scan after 4 cycles of CHOP and Rituxan showed essentially a complete metabolic response.  He completed a total of 6 cycles of chemotherapy by 9/25/2018 and is felt to be in remission.  As noted above, his latest scans from 7/1/2021 showed some subtle findings but no obvious evidence of recurrent lymphoma.  2.  Previous treatment with single agent Rituxan for follicular B-cell lymphoma.  3.  Back pain related to lymphoma involving the spine.  Improved  4.  Right IJ Mediport.        Plan  1.  We discussed the results of the CT scans with the patient and provided him a copy of the printed report.  The radiologist was concerned about some vague abnormalities in the region of the previous lymphoma.  Because of this we discussed ordering a PET scan.  2.  We will schedule PET scan early next week if possible and I will try to call the patient with the result once I have reviewed the report.  3.  Assuming the PET scan looks good we will continue his follow-up in 6 months with repeat surveillance CT scans 1 week prior to the visit.  4.  We also discussed if the PET scan looks good we might refer him back to Dr. Castañeda for port removal.

## 2021-07-14 ENCOUNTER — APPOINTMENT (OUTPATIENT)
Dept: PET IMAGING | Facility: HOSPITAL | Age: 81
End: 2021-07-14

## 2021-07-20 ENCOUNTER — APPOINTMENT (OUTPATIENT)
Dept: PET IMAGING | Facility: HOSPITAL | Age: 81
End: 2021-07-20

## 2021-07-20 ENCOUNTER — HOSPITAL ENCOUNTER (OUTPATIENT)
Dept: PET IMAGING | Facility: HOSPITAL | Age: 81
End: 2021-07-20

## 2021-07-21 ENCOUNTER — HOSPITAL ENCOUNTER (OUTPATIENT)
Dept: PET IMAGING | Facility: HOSPITAL | Age: 81
End: 2021-07-21

## 2021-07-21 ENCOUNTER — APPOINTMENT (OUTPATIENT)
Dept: PET IMAGING | Facility: HOSPITAL | Age: 81
End: 2021-07-21

## 2021-07-27 ENCOUNTER — HOSPITAL ENCOUNTER (OUTPATIENT)
Dept: PET IMAGING | Facility: HOSPITAL | Age: 81
Discharge: HOME | End: 2021-07-27

## 2021-07-27 DIAGNOSIS — D50.0 IRON DEFICIENCY ANEMIA DUE TO CHRONIC BLOOD LOSS: ICD-10-CM

## 2021-07-27 DIAGNOSIS — C83.39 DIFFUSE LARGE B-CELL LYMPHOMA OF EXTRANODAL SITE EXCLUDING SPLEEN AND OTHER SOLID ORGANS (HCC): ICD-10-CM

## 2021-07-27 DIAGNOSIS — C82.90 FOLLICULAR LYMPHOMA, UNSPECIFIED FOLLICULAR LYMPHOMA TYPE, UNSPECIFIED BODY REGION (HCC): ICD-10-CM

## 2021-07-27 DIAGNOSIS — S32.030S CLOSED COMPRESSION FRACTURE OF L3 VERTEBRA, SEQUELA: ICD-10-CM

## 2021-07-27 DIAGNOSIS — Z45.2 FITTING AND ADJUSTMENT OF VASCULAR CATHETER: ICD-10-CM

## 2021-07-27 LAB — GLUCOSE BLDC GLUCOMTR-MCNC: 97 MG/DL (ref 70–130)

## 2021-07-27 PROCEDURE — 78815 PET IMAGE W/CT SKULL-THIGH: CPT

## 2021-07-27 PROCEDURE — 0 FLUDEOXYGLUCOSE F18 SOLUTION: Performed by: INTERNAL MEDICINE

## 2021-07-27 PROCEDURE — 82962 GLUCOSE BLOOD TEST: CPT

## 2021-07-27 PROCEDURE — A9552 F18 FDG: HCPCS | Performed by: INTERNAL MEDICINE

## 2021-07-27 RX ADMIN — FLUDEOXYGLUCOSE F18 1 DOSE: 300 INJECTION INTRAVENOUS at 08:52

## 2021-08-03 ENCOUNTER — TELEPHONE (OUTPATIENT)
Dept: ONCOLOGY | Facility: CLINIC | Age: 81
End: 2021-08-03

## 2021-08-03 NOTE — TELEPHONE ENCOUNTER
Caller: PT    Relationship: SELF    Best call back number: [phone] [place] [facility] [place]er requesting test results: YES    What test was performed: PET SCAN    When was the test performed: 7/27    Where was the test performed:      Additional notes: PT WOULD LIKE PET SCAN RESULTS PLZ CL BK

## 2021-08-09 ENCOUNTER — TELEPHONE (OUTPATIENT)
Dept: ONCOLOGY | Facility: CLINIC | Age: 81
End: 2021-08-09

## 2021-08-09 ENCOUNTER — TELEPHONE (OUTPATIENT)
Dept: SURGERY | Facility: CLINIC | Age: 81
End: 2021-08-09

## 2021-08-09 NOTE — TELEPHONE ENCOUNTER
I can remove the PowerPort in the office. Please ensure that he is the last patient scheduled for the day.

## 2021-08-09 NOTE — TELEPHONE ENCOUNTER
Patient is ready to have port removed. Do you need to see him in the office(placed 3 years ago) or ok to just place orders and schedule?

## 2021-08-09 NOTE — TELEPHONE ENCOUNTER
Called the patient to let him know his PET was great and that we will keep his 6 month f/u as is. Dr. Roberto did mention that if he did want his port removed he could call Dr. Castañeda. Patient v/u.

## 2021-08-09 NOTE — TELEPHONE ENCOUNTER
Caller: COLLIN    Relationship to patient: SELF    Best call back number: [phone]    Patient is needing: PET SCAN RESULTS FROM 7- SCAN.

## 2021-08-11 ENCOUNTER — TELEPHONE (OUTPATIENT)
Dept: ONCOLOGY | Facility: CLINIC | Age: 81
End: 2021-08-11

## 2021-08-19 ENCOUNTER — APPOINTMENT (OUTPATIENT)
Dept: ONCOLOGY | Facility: HOSPITAL | Age: 81
End: 2021-08-19

## 2021-08-31 ENCOUNTER — PROCEDURE VISIT (OUTPATIENT)
Dept: SURGERY | Facility: CLINIC | Age: 81
End: 2021-08-31

## 2021-08-31 VITALS — HEIGHT: 69 IN | BODY MASS INDEX: 25.62 KG/M2 | WEIGHT: 173 LBS

## 2021-08-31 DIAGNOSIS — C82.90 FOLLICULAR LYMPHOMA, UNSPECIFIED FOLLICULAR LYMPHOMA TYPE, UNSPECIFIED BODY REGION (HCC): ICD-10-CM

## 2021-08-31 PROCEDURE — 36590 REMOVAL TUNNELED CV CATH: CPT | Performed by: SURGERY

## 2021-09-01 ENCOUNTER — OFFICE VISIT (OUTPATIENT)
Dept: INTERNAL MEDICINE | Facility: CLINIC | Age: 81
End: 2021-09-01

## 2021-09-01 DIAGNOSIS — I10 BENIGN ESSENTIAL HTN: Primary | ICD-10-CM

## 2021-09-01 DIAGNOSIS — C83.39 DIFFUSE LARGE B-CELL LYMPHOMA OF EXTRANODAL SITE EXCLUDING SPLEEN AND OTHER SOLID ORGANS (HCC): ICD-10-CM

## 2021-09-01 DIAGNOSIS — Z00.00 MEDICARE ANNUAL WELLNESS VISIT, SUBSEQUENT: ICD-10-CM

## 2021-09-01 DIAGNOSIS — E78.00 ELEVATED CHOLESTEROL: ICD-10-CM

## 2021-09-01 PROCEDURE — 1170F FXNL STATUS ASSESSED: CPT | Performed by: FAMILY MEDICINE

## 2021-09-01 PROCEDURE — 99214 OFFICE O/P EST MOD 30 MIN: CPT | Performed by: FAMILY MEDICINE

## 2021-09-01 PROCEDURE — 1159F MED LIST DOCD IN RCRD: CPT | Performed by: FAMILY MEDICINE

## 2021-09-01 PROCEDURE — G0439 PPPS, SUBSEQ VISIT: HCPCS | Performed by: FAMILY MEDICINE

## 2021-09-01 RX ORDER — FELODIPINE 10 MG/1
10 TABLET, EXTENDED RELEASE ORAL DAILY
Qty: 90 TABLET | Refills: 3 | Status: SHIPPED | OUTPATIENT
Start: 2021-09-01 | End: 2022-06-10

## 2021-09-01 RX ORDER — SIMVASTATIN 10 MG
10 TABLET ORAL NIGHTLY
Qty: 90 TABLET | Refills: 3 | Status: SHIPPED | OUTPATIENT
Start: 2021-09-01 | End: 2022-06-10

## 2021-09-01 NOTE — PROGRESS NOTES
The ABCs of the Annual Wellness Visit  Subsequent Medicare Wellness Visit    Chief Complaint   Patient presents with   • Hypertension   • Hyperlipidemia      Subjective    History of Present Illness:  Louie German is a 81 y.o. male who presents for a Subsequent Medicare Wellness Visit.    The following portions of the patient's history were reviewed and   updated as appropriate: allergies, current medications, past family history, past medical history, past social history, past surgical history and problem list.     Compared to one year ago, the patient feels his physical   health is the same.    Compared to one year ago, the patient feels his mental   health is the same.    Recent Hospitalizations:  He was not admitted to the hospital during the last year.       Current Medical Providers:  Patient Care Team:  Ryan Villarreal Jr., MD as PCP - General (Family Medicine)  Omkar Montalvo MD as Surgeon (Cardiothoracic Surgery)  Grisel Alvarez MA as Medical Assistant  Dav Stoner Jr., MD (Inactive) as Referring Physician (Internal Medicine)  Danny Roberto MD as Consulting Physician (Hematology and Oncology)    Outpatient Medications Prior to Visit   Medication Sig Dispense Refill   • aspirin (aspirin) 81 MG EC tablet Take 1 tablet by mouth Daily. 100 tablet 0   • CBD (cannabidiol) oral oil Take 1 mL by mouth Daily.     • Multiple Vitamin (MULTI VITAMIN PO) Take 1 tablet by mouth Daily.     • felodipine (PLENDIL) 10 MG 24 hr tablet TAKE 1 TABLET DAILY 90 tablet 3   • simvastatin (ZOCOR) 10 MG tablet TAKE 1 TABLET EVERY NIGHT 90 tablet 3     Facility-Administered Medications Prior to Visit   Medication Dose Route Frequency Provider Last Rate Last Admin   • heparin flush (porcine) 100 UNIT/ML injection 500 Units  500 Units Intravenous PRN Danny Roberto MD   500 Units at 01/25/19 1030   • sodium chloride 0.9 % flush 10 mL  10 mL Intravenous PRN Danny Roberto MD   10 mL at 01/25/19 1030       No  opioid medication identified on active medication list. I have reviewed chart for other potential  high risk medication/s and harmful drug interactions in the elderly.          Aspirin is on active medication list. Aspirin use is indicated based on review of current medical condition/s. Pros and cons of this therapy have been discussed today. Benefits of this medication outweigh potential harm.  Patient has been encouraged to continue taking this medication.  .      Patient Active Problem List   Diagnosis   • Benign essential HTN   • Acid reflux   • Elevated cholesterol   • Calculus of kidney   • Cervical spinal stenosis   • Status post total hip replacement, left   • Status post total replacement of right hip   • Degenerative disc disease, lumbar   • Hydronephrosis of left kidney   • Diffuse large B-cell lymphoma of extranodal site excluding spleen and other solid organs (CMS/HCC)   • Iron deficiency anemia due to chronic blood loss   • Chronic renal impairment, stage 2 (mild)   • Metastatic tumor of bone   • Closed compression fracture of third lumbar vertebra (CMS/HCC)   • Lymphoma (CMS/HCC)   • Fitting and adjustment of vascular catheter     Advance Care Planning   Advance Directive is on file.  ACP discussion was held with the patient during this visit. Patient has an advance directive in EMR which is still valid.           Objective     There were no vitals filed for this visit.  BMI Readings from Last 1 Encounters:   09/01/21 26.22 kg/m²   BMI is above normal parameters. Recommendations include: referral to primary care    Does the patient have evidence of cognitive impairment? No    Physical Exam            HEALTH RISK ASSESSMENT    Smoking Status:  Social History     Tobacco Use   Smoking Status Never Smoker   Smokeless Tobacco Former User   • Types: Chew     Alcohol Consumption:  Social History     Substance and Sexual Activity   Alcohol Use Yes    Comment: 1-2 beers nightly, sometimes an occasional  maryon     Fall Risk Screen:    WHITADI Fall Risk Assessment was completed, and patient is at LOW risk for falls.Assessment completed on:9/1/2021    Depression Screening:  PHQ-2/PHQ-9 Depression Screening 9/1/2021   Little interest or pleasure in doing things 0   Feeling down, depressed, or hopeless 0   Trouble falling or staying asleep, or sleeping too much -   Feeling tired or having little energy -   Poor appetite or overeating -   Feeling bad about yourself - or that you are a failure or have let yourself or your family down -   Trouble concentrating on things, such as reading the newspaper or watching television -   Moving or speaking so slowly that other people could have noticed. Or the opposite - being so fidgety or restless that you have been moving around a lot more than usual -   Thoughts that you would be better off dead, or of hurting yourself in some way -   Total Score 0   If you checked off any problems, how difficult have these problems made it for you to do your work, take care of things at home, or get along with other people? -       Health Habits and Functional and Cognitive Screening:  Functional & Cognitive Status 9/1/2021   Do you have difficulty preparing food and eating? No   Do you have difficulty bathing yourself, getting dressed or grooming yourself? No   Do you have difficulty using the toilet? No   Do you have difficulty moving around from place to place? No   Do you have trouble with steps or getting out of a bed or a chair? No   Current Diet Well Balanced Diet   Dental Exam Up to date   Eye Exam Up to date   Exercise (times per week) 2 times per week   Current Exercises Include Treadmill;Light Weights   Current Exercise Activities Include -   Do you need help using the phone?  No   Are you deaf or do you have serious difficulty hearing?  No   Do you need help with transportation? No   Do you need help shopping? No   Do you need help preparing meals?  No   Do you need help with  housework?  No   Do you need help with laundry? No   Do you need help taking your medications? No   Do you need help managing money? No   Do you ever drive or ride in a car without wearing a seat belt? No   Have you felt unusual stress, anger or loneliness in the last month? No   Who do you live with? Spouse   If you need help, do you have trouble finding someone available to you? No   Have you been bothered in the last four weeks by sexual problems? No   Do you have difficulty concentrating, remembering or making decisions? No       Age-appropriate Screening Schedule:  Refer to the list below for future screening recommendations based on patient's age, sex and/or medical conditions. Orders for these recommended tests are listed in the plan section. The patient has been provided with a written plan.    Health Maintenance   Topic Date Due   • TDAP/TD VACCINES (1 - Tdap) Never done   • ZOSTER VACCINE (2 of 3) 05/05/2017   • LIPID PANEL  02/20/2021   • INFLUENZA VACCINE  10/01/2021              Assessment/Plan     CMS Preventative Services Quick Reference  Risk Factors Identified During Encounter  Cardiovascular Disease  Fall Risk-High or Moderate  The above risks/problems have been discussed with the patient.  Follow up actions/plans if indicated are seen below in the Assessment/Plan Section.  Pertinent information has been shared with the patient in the After Visit Summary.    Diagnoses and all orders for this visit:    1. Elevated cholesterol  -     simvastatin (ZOCOR) 10 MG tablet; Take 1 tablet by mouth Every Night.  Dispense: 90 tablet; Refill: 3    Other orders  -     felodipine (PLENDIL) 10 MG 24 hr tablet; Take 1 tablet by mouth Daily.  Dispense: 90 tablet; Refill: 3        Follow Up:   Return in about 6 months (around 3/1/2022) for Recheck.     An After Visit Summary and PPPS were given to the patient.

## 2021-09-01 NOTE — PROGRESS NOTES
Chief Complaint  Hypertension and Hyperlipidemia Medicare wellness    Subjective          Louie German presents to [facility]  Very pleasant gentleman who has had successful treatment of large B-cell lymphoma to remission.  He has had close follow-up with Dr. Roberto.  He has had his port removed yesterday by general surgery.    Recent labs reviewed which appears stable.  His previous lipid panel looked excellent.  He has active management of hypertension and hyperlipidemia.      Objective   Vital Signs:   There were no vitals taken for this visit.    Physical Exam  Vitals reviewed.   Constitutional:       Appearance: He is well-developed.   HENT:      Head: Normocephalic and atraumatic.      Right Ear: Tympanic membrane and external ear normal.      Left Ear: Tympanic membrane and external ear normal.   Eyes:      Conjunctiva/sclera: Conjunctivae normal.      Pupils: Pupils are equal, round, and reactive to light.   Neck:      Thyroid: No thyromegaly.      Vascular: No JVD.   Cardiovascular:      Rate and Rhythm: Normal rate and regular rhythm.      Heart sounds: Normal heart sounds.   Pulmonary:      Effort: Pulmonary effort is normal.      Breath sounds: Normal breath sounds.   Chest:       Abdominal:      General: Bowel sounds are normal.      Palpations: Abdomen is soft.   Musculoskeletal:         General: Normal range of motion.      Cervical back: Normal range of motion and neck supple.   Lymphadenopathy:      Cervical: No cervical adenopathy.   Skin:     General: Skin is warm and dry.      Findings: No rash.   Neurological:      Mental Status: He is alert and oriented to person, place, and time.      Cranial Nerves: No cranial nerve deficit.      Coordination: Coordination normal.   Psychiatric:         Behavior: Behavior normal.         Thought Content: Thought content normal.         Judgment: Judgment normal.        Result Review :                 Assessment and Plan     Diagnoses and all orders for this visit:    1. Benign essential HTN (Primary)  Comments:  Felodipine 10 mg daily    2. Elevated cholesterol  Comments:  Simvastatin 10 mg daily  Orders:  -     simvastatin (ZOCOR) 10 MG tablet; Take 1 tablet by mouth Every Night.  Dispense: 90 tablet; Refill: 3    3. Diffuse large B-cell lymphoma of extranodal site excluding spleen and other solid organs (CMS/HCC)  Comments:  Intermission with current follow-up with oncology.    4. Medicare annual wellness visit, subsequent    Other orders  -     felodipine (PLENDIL) 10 MG 24 hr tablet; Take 1 tablet by mouth Daily.  Dispense: 90 tablet; Refill: 3        Follow Up   Return in about 6 months (around 3/1/2022) for Recheck.  Patient was given instructions and counseling regarding his condition or for health maintenance advice. Please see specific information pulled into the AVS if appropriate.

## 2021-09-15 NOTE — PROGRESS NOTES
CHIEF COMPLAINT:    PowerPort removal    HISTORY OF PRESENT ILLNESS:    Louie Geramn is a 81 y.o. male who underwent insertion of a right internal jugular vein PowerPort for management of non-Hodgkin's lymphoma.  The insertion procedure took place on 6/5/2018.  His chemotherapy has been completed.  He presents to have the PowerPort removed.    EXAM:    Alert. Oriented.  Lungs: Clear.  There is a venous access device implanted in the right internal jugular vein with the access port located in the right upper chest wall.  Heart: Regular.  Extremities: Warm.  No edema.    ASSESSMENT:    Undesired PowerPort   Non-Hodgkin's lymphoma  PowerPort insertion into the right internal jugular vein on 6/5/2018    PROCEDURE:    A surgical pause was performed.  The skin of the left upper chest was prepped with Hibiclens.  Sterile drapes were arranged.  The previously used incision was anesthetized with half percent Marcaine with epinephrine.  The skin and subcutaneous tissues were opened with a 15 blade scalpel.  The PowerPort was encountered in the subcutaneous tissues.  The capsule was opened.  The device was explanted.  The catheter tract was closed with a 3-0 Vicryl figure-of-eight stitch.  The subcutaneous tissues were closed with 3-0 Vicryl running stitches, and the skin was closed with a 4-0 Vicryl subcuticular stitch.  Skin Affix was applied to the skin surface.    PLAN:    Port was removed as described above.    He will follow-up with me as needed.

## 2021-12-03 ENCOUNTER — HOSPITAL ENCOUNTER (OUTPATIENT)
Dept: CT IMAGING | Facility: HOSPITAL | Age: 81
Discharge: HOME | End: 2021-12-03

## 2021-12-03 ENCOUNTER — LAB (OUTPATIENT)
Dept: LAB | Facility: HOSPITAL | Age: 81
End: 2021-12-03

## 2021-12-03 DIAGNOSIS — S32.030S CLOSED COMPRESSION FRACTURE OF L3 VERTEBRA, SEQUELA: ICD-10-CM

## 2021-12-03 DIAGNOSIS — C82.90 FOLLICULAR LYMPHOMA, UNSPECIFIED FOLLICULAR LYMPHOMA TYPE, UNSPECIFIED BODY REGION (HCC): ICD-10-CM

## 2021-12-03 DIAGNOSIS — C83.39 DIFFUSE LARGE B-CELL LYMPHOMA OF EXTRANODAL SITE EXCLUDING SPLEEN AND OTHER SOLID ORGANS (HCC): ICD-10-CM

## 2021-12-03 DIAGNOSIS — Z45.2 FITTING AND ADJUSTMENT OF VASCULAR CATHETER: ICD-10-CM

## 2021-12-03 DIAGNOSIS — D50.0 IRON DEFICIENCY ANEMIA DUE TO CHRONIC BLOOD LOSS: ICD-10-CM

## 2021-12-03 LAB
ALBUMIN SERPL-MCNC: 4.2 G/DL (ref 3.5–5.2)
ALBUMIN/GLOB SERPL: 1.4 G/DL
ALP SERPL-CCNC: 64 U/L (ref 39–117)
ALT SERPL W P-5'-P-CCNC: 11 U/L (ref 1–41)
ANION GAP SERPL CALCULATED.3IONS-SCNC: 10.4 MMOL/L (ref 5–15)
AST SERPL-CCNC: 18 U/L (ref 1–40)
BASOPHILS # BLD AUTO: 0.02 10*3/MM3 (ref 0–0.2)
BASOPHILS NFR BLD AUTO: 0.4 % (ref 0–1.5)
BILIRUB SERPL-MCNC: 0.6 MG/DL (ref 0–1.2)
BUN SERPL-MCNC: 19 MG/DL (ref 8–23)
BUN/CREAT SERPL: 14.1 (ref 7–25)
CALCIUM SPEC-SCNC: 9 MG/DL (ref 8.6–10.5)
CHLORIDE SERPL-SCNC: 107 MMOL/L (ref 98–107)
CO2 SERPL-SCNC: 25.6 MMOL/L (ref 22–29)
CREAT SERPL-MCNC: 1.35 MG/DL (ref 0.76–1.27)
DEPRECATED RDW RBC AUTO: 47.7 FL (ref 37–54)
EOSINOPHIL # BLD AUTO: 0.17 10*3/MM3 (ref 0–0.4)
EOSINOPHIL NFR BLD AUTO: 3.4 % (ref 0.3–6.2)
ERYTHROCYTE [DISTWIDTH] IN BLOOD BY AUTOMATED COUNT: 13.4 % (ref 12.3–15.4)
GFR SERPL CREATININE-BSD FRML MDRD: 51 ML/MIN/1.73
GLOBULIN UR ELPH-MCNC: 3 GM/DL
GLUCOSE SERPL-MCNC: 108 MG/DL (ref 65–99)
HCT VFR BLD AUTO: 39.1 % (ref 37.5–51)
HGB BLD-MCNC: 13 G/DL (ref 13–17.7)
IMM GRANULOCYTES # BLD AUTO: 0.02 10*3/MM3 (ref 0–0.05)
IMM GRANULOCYTES NFR BLD AUTO: 0.4 % (ref 0–0.5)
LYMPHOCYTES # BLD AUTO: 1.18 10*3/MM3 (ref 0.7–3.1)
LYMPHOCYTES NFR BLD AUTO: 23.5 % (ref 19.6–45.3)
MCH RBC QN AUTO: 31.9 PG (ref 26.6–33)
MCHC RBC AUTO-ENTMCNC: 33.2 G/DL (ref 31.5–35.7)
MCV RBC AUTO: 96.1 FL (ref 79–97)
MONOCYTES # BLD AUTO: 0.58 10*3/MM3 (ref 0.1–0.9)
MONOCYTES NFR BLD AUTO: 11.6 % (ref 5–12)
NEUTROPHILS NFR BLD AUTO: 3.05 10*3/MM3 (ref 1.7–7)
NEUTROPHILS NFR BLD AUTO: 60.7 % (ref 42.7–76)
NRBC BLD AUTO-RTO: 0 /100 WBC (ref 0–0.2)
PLATELET # BLD AUTO: 166 10*3/MM3 (ref 140–450)
PMV BLD AUTO: 10 FL (ref 6–12)
POTASSIUM SERPL-SCNC: 4.6 MMOL/L (ref 3.5–5.2)
PROT SERPL-MCNC: 7.2 G/DL (ref 6–8.5)
RBC # BLD AUTO: 4.07 10*6/MM3 (ref 4.14–5.8)
SODIUM SERPL-SCNC: 143 MMOL/L (ref 136–145)
WBC NRBC COR # BLD: 5.02 10*3/MM3 (ref 3.4–10.8)

## 2021-12-03 PROCEDURE — 71250 CT THORAX DX C-: CPT

## 2021-12-03 PROCEDURE — 80053 COMPREHEN METABOLIC PANEL: CPT

## 2021-12-03 PROCEDURE — 36415 COLL VENOUS BLD VENIPUNCTURE: CPT

## 2021-12-03 PROCEDURE — 0 DIATRIZOATE MEGLUMINE & SODIUM PER 1 ML: Performed by: INTERNAL MEDICINE

## 2021-12-03 PROCEDURE — 85025 COMPLETE CBC W/AUTO DIFF WBC: CPT

## 2021-12-03 PROCEDURE — 74176 CT ABD & PELVIS W/O CONTRAST: CPT

## 2021-12-03 RX ADMIN — DIATRIZOATE MEGLUMINE AND DIATRIZOATE SODIUM 30 ML: 660; 100 LIQUID ORAL; RECTAL at 08:30

## 2021-12-10 ENCOUNTER — OFFICE VISIT (OUTPATIENT)
Dept: ONCOLOGY | Facility: CLINIC | Age: 81
End: 2021-12-10

## 2021-12-10 ENCOUNTER — APPOINTMENT (OUTPATIENT)
Dept: OTHER | Facility: HOSPITAL | Age: 81
End: 2021-12-10

## 2021-12-10 VITALS
HEIGHT: 68 IN | SYSTOLIC BLOOD PRESSURE: 142 MMHG | TEMPERATURE: 97.8 F | BODY MASS INDEX: 26.58 KG/M2 | RESPIRATION RATE: 16 BRPM | HEART RATE: 75 BPM | OXYGEN SATURATION: 98 % | WEIGHT: 175.4 LBS | DIASTOLIC BLOOD PRESSURE: 91 MMHG

## 2021-12-10 DIAGNOSIS — C83.39 DIFFUSE LARGE B-CELL LYMPHOMA OF EXTRANODAL SITE EXCLUDING SPLEEN AND OTHER SOLID ORGANS (HCC): Primary | ICD-10-CM

## 2021-12-10 PROCEDURE — 99213 OFFICE O/P EST LOW 20 MIN: CPT | Performed by: INTERNAL MEDICINE

## 2021-12-10 NOTE — PROGRESS NOTES
Subjective     REASON FOR FOLLOW UP:   1.  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  2.  Maintenance Rituxan every 3 months for 2 years initiated 3/30/2018  3.  Painful pathologic compression fracture at L3 May 2018 due to involvement with diffuse large B-cell non-Hodgkin's lymphoma.   4.  CHOP and Rituxan x 6 cycles completed 9/25/2018.    HISTORY OF PRESENT ILLNESS:  The patient is a 81 y.o. year old male who was seen in consultation on 10/20/2017 and at that time we recommended laparoscopic lymph node sampling and upper and lower GI endoscopy.  He underwent these procedures with Dr. Castañeda on 10/26/2017.  There was no evidence of malignancy from his endoscopic procedures at the laparoscopic specimen returned as follicular center cell B-cell non-Hodgkin's lymphoma.    He received Rituxan treatment initially with 4 weekly doses of Rituxan with good response on follow-up scans.  Unfortunately, a few months after completion of his treatment he developed worsening back pain and has lymphoma involving the L3 vertebral body consistent with diffuse large B-cell non-Hodgkin's lymphoma.    He underwent port placement and received 6 cycles of CHOP and Rituxan which he completed in late September 2018 with what appear to be a complete response on PET scan.    He returns today for follow-up with CT scan surveillance.  CT scans performed on 12/3/2021 showed no evidence of recurrent disease.    He seems to be feeling well with no abdominal pain.  He occasionally has back discomfort which is usually after he has been more active.    He was able to take another trip to [place] since his last visit here.    Pain  Pertinent negatives include no chest pain, coughing, fatigue, fever, headaches, nausea, neck pain, rash or vomiting.        Past Medical History:   Diagnosis Date   • Arm fracture     right side humerus - sling and  "lico   • Cancer (HCC) 2017    Basal Cell on Nose and Under Right Eye   • Coronary artery disease    • DDD (degenerative disc disease), cervical    • Gastric mass    • GERD (gastroesophageal reflux disease)    • Hip pain started 5/29/18    onset of acute pain with weight bearing   • Hx of cardiac arrest     Happened after Induction of anesthesia prior  to SURG 10 YRS AGO  \" I FLATLINE BUT W/IN SECONDS HEART WAS OK AFTER TURNED ON BACK...I THINK THEY GAVE ME TO MUCH ANESTHESIA\"   • Hyperlipidemia    • Hypertension    • Kidney stones    • Non Hodgkin's lymphoma (HCC)     SPINE   • Osteoarthritis    • Pleural effusion     DRAINAGE RT PLEURAL FLUID FOR TESTING WITH EGD ON 1-30-17   NO MALIGNANCY WITH CLEARANCE FROM DR SIMMONS FOR SURGERY   • Rheumatoid arthritis (HCC)         Past Surgical History:   Procedure Laterality Date   • COLONOSCOPY N/A 10/26/2017    Procedure: COLONOSCOPY;  Surgeon: Louie Castañeda MD;  Location: [facility];  Service:    • CORONARY ANGIOPLASTY WITH STENT PLACEMENT  1999    17 years ago   • CYSTOSCOPY W/ LITHOLAPAXY / EHL      6-7 years ago   • DIAGNOSTIC LAPAROSCOPY N/A 10/26/2017    Procedure: DIAGNOSTIC LAPAROSCOPY WITH RETROPERITONEAL BIOPSIES;  Surgeon: Louie Castañeda MD;  Location: [facility];  Service:    • ENDOSCOPY      ON 1/30/17 WITH DRAINAGE OF RT PLEURAL FLUID FOR BIOPSY    NO MALIGNANCY  CLEARANCE FOR SURG PER DR SIMMONS   • ENDOSCOPY N/A 10/26/2017    Procedure: ESOPHAGOGASTRODUODENOSCOPY;  Surgeon: Louie Castañeda MD;  Location: [facility];  Service:    • EYE SURGERY Right 2017    Moh's; reconstruction right lower lid   • KIDNEY STONE SURGERY     • PLEURAL BIOPSY     • POSTERIOR CERVICAL FUSION  2001   • SKIN CANCER EXCISION N/A 2017    Basal Cell Nose & under right eye   • TOTAL HIP ARTHROPLASTY Left 04/2010    9-10 years ago    • TOTAL HIP ARTHROPLASTY Right 2/14/2017    Procedure: TOTAL HIP ARTHROPLASTY;  Surgeon: Gerson Cardoza MD;  Location: [facility]" [facility];  Service:    • VENOUS ACCESS DEVICE (PORT) INSERTION N/A 6/5/2018    Procedure: INSERTION VENOUS ACCESS DEVICE- RIGHT;  Surgeon: Louie Castañeda MD;  Location[facility] MAIN OR;  Service: General   • VENOUS ACCESS DEVICE (PORT) REMOVAL Right 08/31/2021    In-office Procedure-Dr. Louie Castañeda, [facility]         ALLERGIES:  No Known Allergies     Review of Systems   Constitutional: Positive for appetite change and unexpected weight change. Negative for activity change, fatigue and fever.   HENT: Negative for hearing loss, nosebleeds, trouble swallowing and voice change.    Eyes: Negative for visual disturbance.   Respiratory: Negative for cough, shortness of breath and wheezing.    Cardiovascular: Negative for chest pain and palpitations.   Gastrointestinal: Positive for constipation. Negative for diarrhea, nausea and vomiting.   Genitourinary: Negative for difficulty urinating, frequency, hematuria and urgency.   Musculoskeletal: Positive for back pain. Negative for neck pain.   Skin: Negative for rash.   Neurological: Negative for dizziness, seizures, syncope and headaches.   Hematological: Negative for adenopathy. Does not bruise/bleed easily.   Psychiatric/Behavioral: Negative for behavioral problems. The patient is not nervous/anxious.         Objective     Vitals:    12/10/21 1440   BP: 142/91   Pulse: 75   Resp: 16   Temp: 97.8 °F (36.6 °C)   TempSrc: Temporal   SpO2: 98%   Weight: 79.6 kg (175 lb 6.4 oz)   Height: 172.7 cm (67.99\")   PainSc: 0-No pain     Current Status 12/10/2021   ECOG score 1       Physical Exam   Constitutional: He is oriented to person, place, and time. He appears well-developed. No distress.   HENT:   Head: Normocephalic.   Eyes: Pupils are equal, round, and reactive to light. Conjunctivae are normal. No scleral icterus.   Neck: No JVD present. No thyromegaly present.   Cardiovascular: Normal rate and regular rhythm. Exam reveals no gallop and no friction rub.   No murmur " heard.  Pulmonary/Chest: Effort normal and breath sounds normal. He has no wheezes. He has no rales.   Right IJ Mediport.   Abdominal: Soft. He exhibits no distension and no mass. There is no abdominal tenderness.   Musculoskeletal: Normal range of motion. No deformity.   Lymphadenopathy:     He has no cervical adenopathy.   Neurological: He is alert and oriented to person, place, and time. He has normal reflexes. No cranial nerve deficit.   Skin: Skin is warm and dry. No rash noted. No erythema.   Psychiatric: His behavior is normal. Judgment normal.         RECENT LABS:  Hematology WBC   Date Value Ref Range Status   12/03/2021 5.02 3.40 - 10.80 10*3/mm3 Final   02/20/2020 5.36 3.40 - 10.80 10*3/mm3 Final     RBC   Date Value Ref Range Status   12/03/2021 4.07 (L) 4.14 - 5.80 10*6/mm3 Final   02/20/2020 4.04 (L) 4.14 - 5.80 10*6/mm3 Final     Hemoglobin   Date Value Ref Range Status   12/03/2021 13.0 13.0 - 17.7 g/dL Final     Hematocrit   Date Value Ref Range Status   12/03/2021 39.1 37.5 - 51.0 % Final     Platelets   Date Value Ref Range Status   12/03/2021 166 140 - 450 10*3/mm3 Final          Lab Results   Component Value Date    GLUCOSE 108 (H) 12/03/2021    BUN 19 12/03/2021    CREATININE 1.35 (H) 12/03/2021    EGFRIFNONA 51 (L) 12/03/2021    EGFRIFAFRI 64 02/20/2020    BCR 14.1 12/03/2021    K 4.6 12/03/2021    CO2 25.6 12/03/2021    CALCIUM 9.0 12/03/2021    PROTENTOTREF 6.5 02/20/2020    ALBUMIN 4.20 12/03/2021    LABIL2 1.8 02/20/2020    AST 18 12/03/2021    ALT 11 12/03/2021       CT CHEST, ABDOMEN AND PELVIS WITHOUT CONTRAST  12/3/2021  IMPRESSION:  1. Stable appearance of the chest, abdomen and pelvis since the previous  study of 01/05/2021.  2. Ill-defined soft tissue in the left paraaortic region at the level of  the superior mesenteric artery is again seen.  3. Repeat CT of the chest, abdomen and pelvis in 1 year is suggested.    Images personally reviewed    Assessment/Plan     1.  Diffuse large  B-cell non-Hodgkin's lymphoma involving the L3 vertebral body.  PET scan after 4 cycles of CHOP and Rituxan showed essentially a complete metabolic response.  He completed a total of 6 cycles of chemotherapy by 9/25/2018 and remains in remission now over 3 years out from completion of his treatment.  2.  Previous treatment with single agent Rituxan for follicular B-cell lymphoma.  3.  Back pain related to lymphoma involving the spine.  Improved  4.  Right IJ Mediport removed in August 2021.        Plan  1.  We discussed the results of the CT scans with the patient and provided him a copy of the printed report.   2.  Patient will be scheduled for follow-up in the office in 6 months with lab.  3.  We will wait a year prior to ordering additional CT scans as long as he is feeling well.

## 2022-03-02 ENCOUNTER — OFFICE VISIT (OUTPATIENT)
Dept: INTERNAL MEDICINE | Facility: CLINIC | Age: 82
End: 2022-03-02

## 2022-03-02 VITALS
HEIGHT: 68 IN | WEIGHT: 175.2 LBS | SYSTOLIC BLOOD PRESSURE: 144 MMHG | DIASTOLIC BLOOD PRESSURE: 79 MMHG | BODY MASS INDEX: 26.55 KG/M2 | TEMPERATURE: 98.4 F

## 2022-03-02 DIAGNOSIS — M25.559 HIP PAIN: ICD-10-CM

## 2022-03-02 DIAGNOSIS — E78.00 ELEVATED CHOLESTEROL: Primary | ICD-10-CM

## 2022-03-02 DIAGNOSIS — C83.39 DIFFUSE LARGE B-CELL LYMPHOMA OF EXTRANODAL SITE EXCLUDING SPLEEN AND OTHER SOLID ORGANS: ICD-10-CM

## 2022-03-02 DIAGNOSIS — I10 BENIGN ESSENTIAL HTN: ICD-10-CM

## 2022-03-02 DIAGNOSIS — N18.2 CHRONIC RENAL IMPAIRMENT, STAGE 2 (MILD): ICD-10-CM

## 2022-03-02 PROCEDURE — 99214 OFFICE O/P EST MOD 30 MIN: CPT | Performed by: FAMILY MEDICINE

## 2022-03-02 NOTE — PROGRESS NOTES
"Chief Complaint  Follow-up (6 month follow up.) and Hip Pain (pain in right hip.)    Subjective          Louie German presents to [facility]  Very pleasant gentleman with a history of bilateral hip replacement with right hip replacement 2017 by Dr. Cardoza after which she was diagnosed with B-cell lymphoma.  He has had some pain in the lateral part of the hip area on and off since that time he is concerned about the hip but historically it sounds like his replacements probably intact he may have some iliotibial band irritation over the greater trochanter.    Otherwise distant history of coronary stent in 1980 is reviewed as a single coronary stent placement artery uncertain at [facility].  We discussed this simvastatin and I would like for him to stay on it and will get a cholesterol panel today.  Otherwise he has active management of hypertension with felodipine 10 mg daily.      Objective   Vital Signs:   /79 (BP Location: Right arm, Patient Position: Sitting, Cuff Size: Adult)   Temp 98.4 °F (36.9 °C) (Temporal)   Ht 172.7 cm (67.99\")   Wt 79.5 kg (175 lb 3.2 oz)   BMI 26.65 kg/m²     Physical Exam  Vitals reviewed.   Constitutional:       Appearance: He is well-developed.   HENT:      Head: Normocephalic and atraumatic.      Right Ear: Tympanic membrane and external ear normal.      Left Ear: Tympanic membrane and external ear normal.      Nose: Nose normal.   Eyes:      Conjunctiva/sclera: Conjunctivae normal.      Pupils: Pupils are equal, round, and reactive to light.   Neck:      Thyroid: No thyromegaly.      Vascular: No JVD.   Cardiovascular:      Rate and Rhythm: Normal rate and regular rhythm.      Heart sounds: Normal heart sounds.   Pulmonary:      Effort: Pulmonary effort is normal.      Breath sounds: Normal breath sounds.   Abdominal:      General: Bowel sounds are normal.      Palpations: Abdomen is soft.   Musculoskeletal:      Cervical back: " Normal range of motion and neck supple.      Right hip: No tenderness. Decreased range of motion.        Legs:    Lymphadenopathy:      Cervical: No cervical adenopathy.   Skin:     General: Skin is warm and dry.      Findings: No rash.   Neurological:      Mental Status: He is alert and oriented to person, place, and time.      Cranial Nerves: No cranial nerve deficit.      Coordination: Coordination normal.   Psychiatric:         Behavior: Behavior normal.         Thought Content: Thought content normal.         Judgment: Judgment normal.        Result Review :   The following data was reviewed by: Ryan Villarreal MD on 03/02/2022:  Common labs    Common Labsle 3/20/21 3/20/21 7/1/21 7/1/21 12/3/21 12/3/21    0107 0107 1246 1246 0807 0807   Glucose  117 (A)  92  108 (A)   BUN  20  24 (A)  19   Creatinine  1.24  1.22  1.35 (A)   eGFR Non African Am  56 (A)  57 (A)  51 (A)   Sodium  142  138  143   Potassium  3.7  4.1  4.6   Chloride  105  103  107   Calcium  9.9  8.7  9.0   Albumin  4.30  4.20  4.20   Total Bilirubin  0.2  0.5  0.6   Alkaline Phosphatase  67  56  64   AST (SGOT)  18  20  18   ALT (SGPT)  13  11  11   WBC 6.53  6.51  5.02    Hemoglobin 13.8  12.7 (A)  13.0    Hematocrit 42.3  38.6  39.1    Platelets 175  172  166    (A) Abnormal value                 I reviewed prior x-ray of the right hip post replacement.     Assessment and Plan    Diagnoses and all orders for this visit:    1. Elevated cholesterol (Primary)  -     Comprehensive Metabolic Panel  -     Lipid Panel With / Chol / HDL Ratio  -     TSH    2. Benign essential HTN  -     Comprehensive Metabolic Panel  -     Lipid Panel With / Chol / HDL Ratio  -     TSH    3. Chronic renal impairment, stage 2 (mild)  -     Comprehensive Metabolic Panel  -     Lipid Panel With / Chol / HDL Ratio  -     TSH    4. Diffuse large B-cell lymphoma of extranodal site excluding spleen and other solid organs (HCC)  -     Comprehensive Metabolic Panel  -     Lipid  Panel With / Chol / HDL Ratio  -     TSH    5. Hip pain        Follow Up   No follow-ups on file.  Patient was given instructions and counseling regarding his condition or for health maintenance advice. Please see specific information pulled into the AVS if appropriate.

## 2022-03-03 LAB
ALBUMIN SERPL-MCNC: 4.1 G/DL (ref 3.6–4.6)
ALBUMIN/GLOB SERPL: 1.7 {RATIO} (ref 1.2–2.2)
ALP SERPL-CCNC: 67 IU/L (ref 44–121)
ALT SERPL-CCNC: 8 IU/L (ref 0–44)
AST SERPL-CCNC: 16 IU/L (ref 0–40)
BILIRUB SERPL-MCNC: 0.6 MG/DL (ref 0–1.2)
BUN SERPL-MCNC: 19 MG/DL (ref 8–27)
BUN/CREAT SERPL: 14 (ref 10–24)
CALCIUM SERPL-MCNC: 9 MG/DL (ref 8.6–10.2)
CHLORIDE SERPL-SCNC: 105 MMOL/L (ref 96–106)
CHOLEST SERPL-MCNC: 178 MG/DL (ref 100–199)
CHOLEST/HDLC SERPL: 2.7 RATIO (ref 0–5)
CO2 SERPL-SCNC: 24 MMOL/L (ref 20–29)
CREAT SERPL-MCNC: 1.32 MG/DL (ref 0.76–1.27)
EGFR GENE MUT ANL BLD/T: 54 ML/MIN/1.73
GLOBULIN SER CALC-MCNC: 2.4 G/DL (ref 1.5–4.5)
GLUCOSE SERPL-MCNC: 88 MG/DL (ref 65–99)
HDLC SERPL-MCNC: 67 MG/DL
LDLC SERPL CALC-MCNC: 92 MG/DL (ref 0–99)
POTASSIUM SERPL-SCNC: 4.5 MMOL/L (ref 3.5–5.2)
PROT SERPL-MCNC: 6.5 G/DL (ref 6–8.5)
SODIUM SERPL-SCNC: 142 MMOL/L (ref 134–144)
TRIGL SERPL-MCNC: 104 MG/DL (ref 0–149)
TSH SERPL DL<=0.005 MIU/L-ACNC: 1.5 UIU/ML (ref 0.45–4.5)
VLDLC SERPL CALC-MCNC: 19 MG/DL (ref 5–40)

## 2022-03-08 ENCOUNTER — TELEPHONE (OUTPATIENT)
Dept: INTERNAL MEDICINE | Facility: CLINIC | Age: 82
End: 2022-03-08

## 2022-03-08 NOTE — TELEPHONE ENCOUNTER
Pt is calling for his labs results, please review and advise   Ok to leave the pt a msg on his voicemail

## 2022-03-14 ENCOUNTER — OFFICE VISIT (OUTPATIENT)
Dept: ORTHOPEDIC SURGERY | Facility: CLINIC | Age: 82
End: 2022-03-14

## 2022-03-14 VITALS — TEMPERATURE: 96.2 F | RESPIRATION RATE: 18 BRPM | WEIGHT: 173 LBS | HEIGHT: 69 IN | BODY MASS INDEX: 25.62 KG/M2

## 2022-03-14 DIAGNOSIS — M70.61 TROCHANTERIC BURSITIS OF RIGHT HIP: Primary | ICD-10-CM

## 2022-03-14 DIAGNOSIS — M25.551 HIP PAIN, RIGHT: ICD-10-CM

## 2022-03-14 PROCEDURE — 99203 OFFICE O/P NEW LOW 30 MIN: CPT | Performed by: ORTHOPAEDIC SURGERY

## 2022-03-14 PROCEDURE — 73502 X-RAY EXAM HIP UNI 2-3 VIEWS: CPT | Performed by: ORTHOPAEDIC SURGERY

## 2022-03-15 NOTE — PROGRESS NOTES
General Exam    Patient: Louie German    YOB: 1940    Medical Record Number: [id number]    Chief Complaints: Right hip pain    History of Present Illness:     82 y.o. male patient who presents for voice treatment right hip pain.  Patient has a history of rectal hip arthroplasty done 2014 by Dr. Cardoza.  Patient states she also was in remission for non-Hodgkin's lymphoma.  Patient states he has some lateral hip pain at times.  Denies any groin pain or pain with weightbearing.  States mainly the pain bothers him when he wakes up is laterally based sharp but leaves quickly.  States he does sleep on that side as well tenderness in the morning.  No acute trauma.    Stated he also had a left hip replacement done sometime ago by Dr. Shelton      Denies any numbness or tingling.  Denies any fevers, cough or shortness of breath.    Allergies: No Known Allergies    Home Medications:      Current Outpatient Medications:   •  CBD (cannabidiol) oral oil, Take 1 mL by mouth Daily., Disp: , Rfl:   •  felodipine (PLENDIL) 10 MG 24 hr tablet, Take 1 tablet by mouth Daily., Disp: 90 tablet, Rfl: 3  •  Multiple Vitamin (MULTI VITAMIN PO), Take 1 tablet by mouth Daily., Disp: , Rfl:   •  simvastatin (ZOCOR) 10 MG tablet, Take 1 tablet by mouth Every Night., Disp: 90 tablet, Rfl: 3  No current facility-administered medications for this visit.    Facility-Administered Medications Ordered in Other Visits:   •  heparin flush (porcine) 100 UNIT/ML injection 500 Units, 500 Units, Intravenous, PRN, Danny Roberto MD, 500 Units at 01/25/19 1030  •  sodium chloride 0.9 % flush 10 mL, 10 mL, Intravenous, PRN, Danny Roberto MD, 10 mL at 01/25/19 1030    Past Medical History:   Diagnosis Date   • Arm fracture     right side humerus - sling and swathe   • Cancer (HCC) 2017    Basal Cell on Nose and Under Right Eye   • Coronary artery disease    • DDD (degenerative disc disease), cervical    • Gastric mass    • GERD  "(gastroesophageal reflux disease)    • Hip pain started 5/29/18    onset of acute pain with weight bearing   • Hx of cardiac arrest     Happened after Induction of anesthesia prior  to SURG 10 YRS AGO  \" I FLATLINE BUT W/IN SECONDS HEART WAS OK AFTER TURNED ON BACK...I THINK THEY GAVE ME TO MUCH ANESTHESIA\"   • Hyperlipidemia    • Hypertension    • Kidney stones    • Non Hodgkin's lymphoma (HCC)     SPINE   • Osteoarthritis    • Pleural effusion     DRAINAGE RT PLEURAL FLUID FOR TESTING WITH EGD ON 1-30-17   NO MALIGNANCY WITH CLEARANCE FROM DR SIMMONS FOR SURGERY   • Rheumatoid arthritis (HCC)        Past Surgical History:   Procedure Laterality Date   • COLONOSCOPY N/A 10/26/2017    Procedure: COLONOSCOPY;  Surgeon: Louie Castañeda MD;  Location: [facility];  Service:    • CORONARY ANGIOPLASTY WITH STENT PLACEMENT  1999    17 years ago   • CYSTOSCOPY W/ LITHOLAPAXY / EHL      6-7 years ago   • DIAGNOSTIC LAPAROSCOPY N/A 10/26/2017    Procedure: DIAGNOSTIC LAPAROSCOPY WITH RETROPERITONEAL BIOPSIES;  Surgeon: Louie Casatñeda MD;  Location: [facility] OR;  Service:    • ENDOSCOPY      ON 1/30/17 WITH DRAINAGE OF RT PLEURAL FLUID FOR BIOPSY    NO MALIGNANCY  CLEARANCE FOR SURG PER DR SIMMONS   • ENDOSCOPY N/A 10/26/2017    Procedure: ESOPHAGOGASTRODUODENOSCOPY;  Surgeon: Louie Castañeda MD;  Location: [facility] OR;  Service:    • EYE SURGERY Right 2017    Moh's; reconstruction right lower lid   • KIDNEY STONE SURGERY     • PLEURAL BIOPSY     • POSTERIOR CERVICAL FUSION  2001   • SKIN CANCER EXCISION N/A 2017    Basal Cell Nose & under right eye   • TOTAL HIP ARTHROPLASTY Left 04/2010    9-10 years ago    • TOTAL HIP ARTHROPLASTY Right 2/14/2017    Procedure: TOTAL HIP ARTHROPLASTY;  Surgeon: Gerson Cardoza MD;  Location: [facility] OR;  Service:    • VENOUS ACCESS DEVICE (PORT) INSERTION N/A 6/5/2018    Procedure: INSERTION VENOUS ACCESS DEVICE- RIGHT;  Surgeon: Louie Castañeda MD;  " "Location: [facility] OR;  Service: General   • VENOUS ACCESS DEVICE (PORT) REMOVAL Right 08/31/2021    In-office Procedure-Dr. Louie Castañeda, [facility]       Social History     Occupational History   • Occupation: Teacher     Employer: RETIRED   • Occupation: volunteer   Tobacco Use   • Smoking status: Never Smoker   • Smokeless tobacco: Former User     Types: Chew     Quit date: 5/14/1998   Vaping Use   • Vaping Use: Never used   Substance and Sexual Activity   • Alcohol use: Yes     Comment: 1-2 beers nightly, sometimes an occasional bourbon   • Drug use: No   • Sexual activity: Defer      Social History     Social History Narrative   • Not on file       Family History   Problem Relation Age of Onset   • Hypertension Mother         Lived to 95 years old - Had numerous medical problems & medications   • Hypertension Father         Lived to 98 years old - Numerous medical problems and Medications   • Cancer Brother         History of Agent Orange   • Heart disease Maternal Grandmother    • Osteoarthritis Paternal Grandfather         possible RA   • Malig Hyperthermia Neg Hx        Review of Systems:      Constitutional: Denies fever, shaking or chills         All other pertinent positives and negatives as noted above in HPI.    Physical Exam: 82 y.o. male    Vitals:    03/14/22 1114   Resp: 18   Temp: 96.2 °F (35.7 °C)   Weight: 78.5 kg (173 lb)   Height: 174 cm (68.5\")       General:  Patient is awake and alert.  Appears in no acute distress or discomfort.        Musculoskeletal/Extremities:    Right lower extremity; normal tenderness about the hip or laterally over the greater troches.  Hip range of motion full painless.  Negative Stinchfield straight leg raise.  Strength and sensation intact distally.  Patient stand from seated position has normal gait unassisted.         Radiology:       3 views right hip AP pelvis and AP and lateral taken reviewed to evaluate the patient's complaint/s.    Ridging demonstrates " bilateral total hip replacements.  The right replacement appears in satisfactory position no acute evidence of loosening or subsidence.  No acute fractures noted.  Soft tissues appears normal limits.  Left hip implant on AP view.  Satisfactory position no evidence of loosening or subsidence.  Fractures noted.     No imaging for comparison.    Assessment: Greater trochanteric bursitis right      Plan:      Diallo findings with the patient leg is more soft tissue nature per specifically some greater enteric bursitis.  Told patient he could try some anti-inflammatories symptom management does not want any therapy at this time.  Symptoms worsen or change patient can follow-up as needed.           We will plan for follow up as needed.    All questions were answered.  Patient understands and agrees with the plan.    Edgar Pop MD    03/14/2022    CC to Ryan Villarreal MD

## 2022-03-23 NOTE — TELEPHONE ENCOUNTER
Caller: Louie German    Relationship: Self    Best call back number: [phone]    What test was performed: LABS    When was the test performed: 3/2    Where was the test performed: IN OFFICE    Additional notes: PATIENT HAS NOT HEARD ANYTHING REGARDING HIS TEST RESULTS, HE WOULD LIKE TO KNOW IF HE CAN STOP TAKING HIS STATIN.     PATIENT WOULD ALSO LIKE A COPY MAILED TO HIS HOME. PLEASE CALL PATIENT TO REVIEW TEST RESULTS.

## 2022-06-10 ENCOUNTER — OFFICE VISIT (OUTPATIENT)
Dept: ONCOLOGY | Facility: CLINIC | Age: 82
End: 2022-06-10

## 2022-06-10 ENCOUNTER — LAB (OUTPATIENT)
Dept: OTHER | Facility: HOSPITAL | Age: 82
End: 2022-06-10

## 2022-06-10 VITALS
OXYGEN SATURATION: 97 % | HEART RATE: 64 BPM | WEIGHT: 171.6 LBS | DIASTOLIC BLOOD PRESSURE: 77 MMHG | RESPIRATION RATE: 18 BRPM | SYSTOLIC BLOOD PRESSURE: 143 MMHG | HEIGHT: 69 IN | TEMPERATURE: 97.3 F | BODY MASS INDEX: 25.42 KG/M2

## 2022-06-10 DIAGNOSIS — C83.39 DIFFUSE LARGE B-CELL LYMPHOMA OF EXTRANODAL SITE EXCLUDING SPLEEN AND OTHER SOLID ORGANS: ICD-10-CM

## 2022-06-10 DIAGNOSIS — C83.39 DIFFUSE LARGE B-CELL LYMPHOMA OF EXTRANODAL SITE EXCLUDING SPLEEN AND OTHER SOLID ORGANS: Primary | ICD-10-CM

## 2022-06-10 LAB
ALBUMIN SERPL-MCNC: 3.8 G/DL (ref 3.5–5.2)
ALBUMIN/GLOB SERPL: 1.4 G/DL
ALP SERPL-CCNC: 60 U/L (ref 39–117)
ALT SERPL W P-5'-P-CCNC: 8 U/L (ref 1–41)
ANION GAP SERPL CALCULATED.3IONS-SCNC: 6.1 MMOL/L (ref 5–15)
AST SERPL-CCNC: 15 U/L (ref 1–40)
BASOPHILS # BLD AUTO: 0.04 10*3/MM3 (ref 0–0.2)
BASOPHILS NFR BLD AUTO: 0.7 % (ref 0–1.5)
BILIRUB SERPL-MCNC: 0.5 MG/DL (ref 0–1.2)
BUN SERPL-MCNC: 22 MG/DL (ref 8–23)
BUN/CREAT SERPL: 16.5 (ref 7–25)
CALCIUM SPEC-SCNC: 9 MG/DL (ref 8.6–10.5)
CHLORIDE SERPL-SCNC: 106 MMOL/L (ref 98–107)
CO2 SERPL-SCNC: 26.9 MMOL/L (ref 22–29)
CREAT SERPL-MCNC: 1.33 MG/DL (ref 0.76–1.27)
DEPRECATED RDW RBC AUTO: 45.6 FL (ref 37–54)
EGFRCR SERPLBLD CKD-EPI 2021: 53 ML/MIN/1.73
EOSINOPHIL # BLD AUTO: 0.19 10*3/MM3 (ref 0–0.4)
EOSINOPHIL NFR BLD AUTO: 3.4 % (ref 0.3–6.2)
ERYTHROCYTE [DISTWIDTH] IN BLOOD BY AUTOMATED COUNT: 13.2 % (ref 12.3–15.4)
GLOBULIN UR ELPH-MCNC: 2.7 GM/DL
GLUCOSE SERPL-MCNC: 100 MG/DL (ref 65–99)
HCT VFR BLD AUTO: 37.8 % (ref 37.5–51)
HGB BLD-MCNC: 12.4 G/DL (ref 13–17.7)
IMM GRANULOCYTES # BLD AUTO: 0.06 10*3/MM3 (ref 0–0.05)
IMM GRANULOCYTES NFR BLD AUTO: 1.1 % (ref 0–0.5)
LDH SERPL-CCNC: 174 U/L (ref 135–225)
LYMPHOCYTES # BLD AUTO: 1.38 10*3/MM3 (ref 0.7–3.1)
LYMPHOCYTES NFR BLD AUTO: 24.4 % (ref 19.6–45.3)
MCH RBC QN AUTO: 31 PG (ref 26.6–33)
MCHC RBC AUTO-ENTMCNC: 32.8 G/DL (ref 31.5–35.7)
MCV RBC AUTO: 94.5 FL (ref 79–97)
MONOCYTES # BLD AUTO: 0.59 10*3/MM3 (ref 0.1–0.9)
MONOCYTES NFR BLD AUTO: 10.4 % (ref 5–12)
NEUTROPHILS NFR BLD AUTO: 3.4 10*3/MM3 (ref 1.7–7)
NEUTROPHILS NFR BLD AUTO: 60 % (ref 42.7–76)
NRBC BLD AUTO-RTO: 0 /100 WBC (ref 0–0.2)
PLATELET # BLD AUTO: 171 10*3/MM3 (ref 140–450)
PMV BLD AUTO: 10.5 FL (ref 6–12)
POTASSIUM SERPL-SCNC: 4.8 MMOL/L (ref 3.5–5.2)
PROT SERPL-MCNC: 6.5 G/DL (ref 6–8.5)
RBC # BLD AUTO: 4 10*6/MM3 (ref 4.14–5.8)
SODIUM SERPL-SCNC: 139 MMOL/L (ref 136–145)
WBC NRBC COR # BLD: 5.66 10*3/MM3 (ref 3.4–10.8)

## 2022-06-10 PROCEDURE — 80053 COMPREHEN METABOLIC PANEL: CPT | Performed by: INTERNAL MEDICINE

## 2022-06-10 PROCEDURE — 85025 COMPLETE CBC W/AUTO DIFF WBC: CPT | Performed by: INTERNAL MEDICINE

## 2022-06-10 PROCEDURE — 99213 OFFICE O/P EST LOW 20 MIN: CPT | Performed by: INTERNAL MEDICINE

## 2022-06-10 PROCEDURE — 36415 COLL VENOUS BLD VENIPUNCTURE: CPT

## 2022-06-10 PROCEDURE — 83615 LACTATE (LD) (LDH) ENZYME: CPT | Performed by: INTERNAL MEDICINE

## 2022-06-10 RX ORDER — FELODIPINE 10 MG/1
10 TABLET, EXTENDED RELEASE ORAL DAILY
COMMUNITY
End: 2022-08-23

## 2022-07-08 ENCOUNTER — TELEPHONE (OUTPATIENT)
Dept: ONCOLOGY | Facility: CLINIC | Age: 82
End: 2022-07-08

## 2022-07-08 NOTE — TELEPHONE ENCOUNTER
Caller: APOLLO    Relationship: Self    Best call back number: [phone]    What is the best time to reach you: ASAP    Who are you requesting to speak with (clinical staff, provider,  specific staff member): DR. DENNEY'S NURSE    Do you know the name of the person who called: APOLLO    What was the call regarding: PATIENT STATES HE FORGOT TO TELL DR. DENNEY WHEN HE WAS IN ON 6/10/2022 THAT HE IS HAVING CHEST PAINS PERIODICALLY, WENT TO ER LAST YEAR FOR THEM & THEY STATED IT WAS NOT HIS HEART AFTER MANY TEST BUT TWO WEEKS AGO HE STATED IT HAPPENED AGAIN & IT WAS A 10 OUT OF 10 PAIN, HE TOOK ASPIRIN, SAT UP & USED HEATING PAD & IT WENT AWAY BUT WANTS TO DISCUSS.    Do you require a callback: YES, PLEASE

## 2022-07-08 NOTE — TELEPHONE ENCOUNTER
Called back and spoke with patient to discuss his symptoms. He feels better now, explained that if he feels symptoms worsens and not relief to proceed to ER. Also to reach out to his Primary care, since his Internist has retired. Will inform Dr Roberto as well. Patient v/u.

## 2022-07-08 NOTE — TELEPHONE ENCOUNTER
Patient was seen by Dr. Roberto on 6/10/2022.  He forgot to tell him that he has been having intermittent chest pain.  He went to the ER last year for this problem and he was told that is was not his heart causing the problem.  Recently he has been  Having increased chest pain the past two weeks.  The pain is a 10/10 when he experiences the pain.  He took aspirin, sat up, and used a heating pad to the area and the pain went away.  He wants to discuss.

## 2022-08-03 ENCOUNTER — TELEPHONE (OUTPATIENT)
Dept: CARDIOLOGY | Facility: CLINIC | Age: 82
End: 2022-08-03

## 2022-08-03 ENCOUNTER — OFFICE VISIT (OUTPATIENT)
Dept: INTERNAL MEDICINE | Facility: CLINIC | Age: 82
End: 2022-08-03

## 2022-08-03 VITALS
SYSTOLIC BLOOD PRESSURE: 152 MMHG | HEART RATE: 64 BPM | OXYGEN SATURATION: 100 % | WEIGHT: 171 LBS | BODY MASS INDEX: 25.33 KG/M2 | HEIGHT: 69 IN | TEMPERATURE: 98.4 F | DIASTOLIC BLOOD PRESSURE: 80 MMHG

## 2022-08-03 DIAGNOSIS — Z95.5 S/P PRIMARY ANGIOPLASTY WITH CORONARY STENT: ICD-10-CM

## 2022-08-03 DIAGNOSIS — R07.2 PRECORDIAL CHEST PAIN: Primary | ICD-10-CM

## 2022-08-03 PROCEDURE — 99214 OFFICE O/P EST MOD 30 MIN: CPT | Performed by: FAMILY MEDICINE

## 2022-08-03 PROCEDURE — 93000 ELECTROCARDIOGRAM COMPLETE: CPT | Performed by: FAMILY MEDICINE

## 2022-08-03 NOTE — ASSESSMENT & PLAN NOTE
Patient is having no pain today but he is quite stoic.  EKG shows ST changes with some elevation ST V2 and V3.  There is appears to be flattening of ST segments 3 and aVF with inverted T waves lead III.  We will confer with cardiology today as far as work-up.

## 2022-08-03 NOTE — PROGRESS NOTES
"Chief Complaint  Chest Pain    Subjective        Louie German presents to [facility]  Pleasant gentleman with a history of diffuse B-cell lymphoma in remission close follow-up with oncology.  He is a very robust and active gentleman but had history of angioplasty and coronary stent placement 1999 after stress test.  He does not have a cardiologist at this time.  He had a recurrence of severe chest pain 10 of 10 over the right chest wall about 4 weeks ago without evaluation.  He is currently asymptomatic today    Chest Pain   This is a new problem. The current episode started 1 to 4 weeks ago. The onset quality is sudden. The problem occurs rarely. The problem has been resolved. The pain is present in the lateral region. The pain is at a severity of 10/10. The pain is severe. The quality of the pain is described as sharp and squeezing. The pain radiates to the precordial region. The pain is aggravated by nothing. He has tried rest for the symptoms. The treatment provided mild relief. Risk factors include male gender.   His past medical history is significant for CAD.       Objective   Vital Signs:  /80   Pulse 64   Temp 98.4 °F (36.9 °C)   Ht 174 cm (68.5\")   Wt 77.6 kg (171 lb)   SpO2 100%   BMI 25.62 kg/m²   Estimated body mass index is 25.62 kg/m² as calculated from the following:    Height as of this encounter: 174 cm (68.5\").    Weight as of this encounter: 77.6 kg (171 lb).    \plain      Physical Exam   Result Review :           ECG 12 Lead    Date/Time: 8/3/2022 9:39 AM  Performed by: Ryan Villarreal MD  Authorized by: Ryan Villarreal MD   Comparison: compared with previous ECG from 3/20/2021  Rhythm: sinus rhythm  Rate: normal  Conduction: conduction normal  ST Elevation: V2, V3 and V1  ST Flattening: III and aVF  T inversion: III  T flattening: aVF  QRS axis: normal    Clinical impression: abnormal EKG  Comments: ST changes V1 through V3.  ST flattening with " flipped T wave lead III.              Assessment and Plan   Diagnoses and all orders for this visit:    1. Precordial chest pain (Primary)  Assessment & Plan:  Patient is having no pain today but he is quite stoic.  EKG shows ST changes with some elevation ST V2 and V3.  There is appears to be flattening of ST segments 3 and aVF with inverted T waves lead III.  We will confer with cardiology today as far as work-up.      2. S/P primary angioplasty with coronary stent    Other orders  -     ECG 12 Lead           Follow Up   No follow-ups on file.  Patient was given instructions and counseling regarding his condition or for health maintenance advice. Please see specific information pulled into the AVS if appropriate.

## 2022-08-03 NOTE — TELEPHONE ENCOUNTER
Meredith from Dr JAIR Villarreal's office called [facility] [place], [place] to refer a patient for EKG's changes and right sided chest pain at the end of May. Lasted approximately 30 mins while at rest. Asymptomatic every since and at today's visit as well.  Informed Dr Villarreal that I would be showing EKG to Dr Phipps to verify if any changes noted or patient was needing to be seen today in [facility].  Patient showed up in [facility] per Dr Villarreal's office prior to receiving copy of EKG and returning call to verify if patient should come or not    Previous EKG and today's EKG reviewed by Dr Phipps who did not see any need for patient to come to [facility] today. Scheduling to call patient and schedule new patient appt Friday or Monday at the latest. Spoke with patient and instructed that scheduling would be calling to schedule. Instructed if symptoms return and/or worsen to go to ED.Patient verbalized understanding and was escorted back to Dr Villarreal's office building to where his car was parked.    Malika Coughlin RN  [facility]

## 2022-08-04 ENCOUNTER — TELEPHONE (OUTPATIENT)
Dept: INTERNAL MEDICINE | Facility: CLINIC | Age: 82
End: 2022-08-04

## 2022-08-04 NOTE — TELEPHONE ENCOUNTER
Caller: Louie German    Relationship: Self    Best call back number: [phone]    What is the best time to reach you: ANYTIME    Who are you requesting to speak with (clinical staff, provider,  specific staff member): CLINICAL    What was the call regarding: PATIENT STATES HE DID NOT RECEIVE HIS AFTER VISIT SUMMARY BEFORE LEAVING AFTER HIS APPOINTMENT YESTERDAY, 08/03/2022.     PATIENT IS REQUESTING HIS AFTER VISIT SUMMARY BE MAILED TO HIS HOUSE.     PLEASE CALL PATIENT BACK TO LET HIM KNOW IF HIS AFTER VISIT SUMMARY CAN BE MAILED.

## 2022-08-04 NOTE — TELEPHONE ENCOUNTER
Caller: Louie German    Relationship: Self    Best call back number: [phone]    What is the best time to reach you: ANYTIME    Who are you requesting to speak with (clinical staff, provider,  specific staff member): DR. TOBIAS     What was the call regarding: PATIENT STATES HE WAS SUPPOSED TO GET AN APPOINTMENT WITH DR. DUARTE ON 08/08/2022 BUT IS NOT ABLE TO BE SEEN BY HIM FOR ANOTHER MONTH OR TWO.     PATIENT WANTED TO LET DR. TOBIAS KNOW THAT.

## 2022-08-05 ENCOUNTER — OFFICE VISIT (OUTPATIENT)
Dept: CARDIOLOGY | Facility: CLINIC | Age: 82
End: 2022-08-05

## 2022-08-05 VITALS
DIASTOLIC BLOOD PRESSURE: 70 MMHG | SYSTOLIC BLOOD PRESSURE: 130 MMHG | HEART RATE: 70 BPM | HEIGHT: 69 IN | BODY MASS INDEX: 25.48 KG/M2 | WEIGHT: 172 LBS

## 2022-08-05 DIAGNOSIS — R07.89 CHEST PAIN, ATYPICAL: Primary | ICD-10-CM

## 2022-08-05 DIAGNOSIS — I10 PRIMARY HYPERTENSION: ICD-10-CM

## 2022-08-05 DIAGNOSIS — I25.118 CORONARY ARTERY DISEASE OF NATIVE ARTERY OF NATIVE HEART WITH STABLE ANGINA PECTORIS: ICD-10-CM

## 2022-08-05 PROCEDURE — 93000 ELECTROCARDIOGRAM COMPLETE: CPT | Performed by: INTERNAL MEDICINE

## 2022-08-05 PROCEDURE — 99204 OFFICE O/P NEW MOD 45 MIN: CPT | Performed by: INTERNAL MEDICINE

## 2022-08-05 NOTE — PROGRESS NOTES
[facility]      Patient Name: Louie German  :1940  Age: 82 y.o.  Encounter Provider:  Shiv Phipps Jr, MD      Chief Complaint:   Chief Complaint   Patient presents with   • Chest Pain         HPI  Louie German is a 82 y.o. male with past medical history of CAD status post PCI , hypertension who presents for initial evaluation of chest pain.  Per the patient in  he had chest discomfort radiating down the left arm and was brought to the hospital.  Although he did not suffer myocardial infarction a stress study showed abnormal perfusion imaging and he was taken for cardiac catheterization where a stent was placed.  He does not have any further details than that.  He has not followed with a cardiologist since then.  Currently not on aspirin or statin.  In 2021 he woke up with 10 at 10 chest pain which she has trouble qualifying further.  He would presented to the ER and the work-up was benign with spontaneous resolution of the pain and no further medical investigation was undertaken.  In May 2022 he had a similar episode but this time it was substernal pain radiating around the right flank to the back.  He did not seek medical attention for this and has not had any episodes since then.  For his age he is very active with no chest pain or shortness of air during activity.  Although I stays active he really cannot do a lot in the way of aerobic activity due to gait and strength issues however he does a significant amount of walking around the property and gardening.  No orthopnea, PND or edema.  No palpitations, dizziness or syncope.  He is a lifelong non-smoker who drinks at least 1 alcoholic beverage daily and denies illicit drug use.  Family history was reviewed and is not pertinent to this clinic visit.      The following portions of the patient's history were reviewed and updated as appropriate: allergies, current medications, past family history, past  "medical history, past social history, past surgical history and problem list.      Review of Systems   Constitutional: Negative for chills and fever.   HENT: Negative for hoarse voice and sore throat.    Eyes: Negative for double vision and photophobia.   Cardiovascular: Positive for chest pain. Negative for leg swelling, near-syncope, orthopnea, palpitations, paroxysmal nocturnal dyspnea and syncope.   Respiratory: Negative for cough and wheezing.    Skin: Negative for poor wound healing and rash.   Musculoskeletal: Negative for arthritis and joint swelling.   Gastrointestinal: Negative for bloating, abdominal pain, hematemesis and hematochezia.   Neurological: Negative for dizziness and focal weakness.   Psychiatric/Behavioral: Negative for depression and suicidal ideas.       OBJECTIVE:   Vital Signs  Vitals:    08/05/22 0851   BP: 130/70   Pulse: 70     Estimated body mass index is 25.77 kg/m² as calculated from the following:    Height as of this encounter: 174 cm (68.5\").    Weight as of this encounter: 78 kg (172 lb).    Vitals reviewed.   Constitutional:       Appearance: Healthy appearance. Not in distress.   Neck:      Vascular: No JVR. JVD normal.   Pulmonary:      Effort: Pulmonary effort is normal.      Breath sounds: Normal breath sounds. No wheezing. No rhonchi. No rales.   Chest:      Chest wall: Not tender to palpatation.   Cardiovascular:      PMI at left midclavicular line. Normal rate. Regular rhythm. Normal S1. Normal S2.      Murmurs: There is no murmur.      No gallop. No click. No rub.   Pulses:     Intact distal pulses.   Edema:     Peripheral edema absent.   Abdominal:      General: Bowel sounds are normal.      Palpations: Abdomen is soft.      Tenderness: There is no abdominal tenderness.   Musculoskeletal: Normal range of motion.         General: No tenderness. Skin:     General: Skin is warm and dry.   Neurological:      General: No focal deficit present.      Mental Status: Alert and " oriented to person, place and time.           ECG 12 Lead    Date/Time: 8/5/2022 9:42 AM  Performed by: Shiv Phipps Jr., MD  Authorized by: Shiv Phipps Jr., MD   Comparison: compared with previous ECG from 8/3/2022  Similar to previous ECG  Rhythm: sinus rhythm  Other findings: non-specific ST-T wave changes    Clinical impression: non-specific ECG                  ASSESSMENT:     Atypical chest pain  Hypertension    PLAN OF CARE:     1. Atypical chest pain -CT chest without contrast performed for low lung cancer screening shows significant coronary calcifications.  Although infrequent the chest pain is recurrent we will need to plan for stress study in this patient who has known coronary artery disease.  He cannot perform at least 5 METS and so we will plan for pharmacological nuclear stress study.  Restart aspirin.  Check lipid profile.  2. B-cell lymphoma -he is undergone low cardiotoxicity regimens.  No heart failure symptoms.  We will monitor clinical progress.  3. Hypertension -seemingly well controlled at this time.  Continue felodipine.  4. Coronary artery disease -as discussed above.    Return to clinic 3 months           Discharge Medications          Accurate as of August 5, 2022  9:12 AM. If you have any questions, ask your nurse or doctor.            Continue These Medications      Instructions Start Date   CBD oral oil  Commonly known as: cannabidiol   1 mL, Oral, Daily      felodipine 10 MG 24 hr tablet  Commonly known as: PLENDIL   10 mg, Oral, Daily      multivitamin tablet tablet  Commonly known as: THERAGRAN   1 tablet, Oral, Daily             Thank you for allowing me to participate in the care of your patient,      Sincerely,   Shiv Phipps Jr, MD  [facility]  08/05/22  09:12 EDT

## 2022-08-05 NOTE — TELEPHONE ENCOUNTER
Looks like he has appointment with cardiologist on 8/5/2022.  Please confirm and let him know and send him our after visit summary.

## 2022-08-05 NOTE — TELEPHONE ENCOUNTER
Yes he he did see cardiologist today and will be doing a stress test, states if all goes well with heart they will be sending him back here

## 2022-08-18 ENCOUNTER — HOSPITAL ENCOUNTER (OUTPATIENT)
Dept: CARDIOLOGY | Facility: HOSPITAL | Age: 82
Discharge: HOME | End: 2022-08-18
Admitting: INTERNAL MEDICINE

## 2022-08-18 VITALS — BODY MASS INDEX: 25.47 KG/M2 | HEIGHT: 69 IN | WEIGHT: 171.96 LBS

## 2022-08-18 DIAGNOSIS — R07.89 CHEST PAIN, ATYPICAL: ICD-10-CM

## 2022-08-18 LAB
BH CV NUCLEAR PRIOR STUDY: 3
BH CV REST NUCLEAR ISOTOPE DOSE: 11.2 MCI
BH CV STRESS BP STAGE 1: NORMAL
BH CV STRESS COMMENTS STAGE 1: NORMAL
BH CV STRESS DOSE REGADENOSON STAGE 1: 0.4
BH CV STRESS DURATION MIN STAGE 1: 0
BH CV STRESS DURATION SEC STAGE 1: 10
BH CV STRESS HR STAGE 1: 115
BH CV STRESS NUCLEAR ISOTOPE DOSE: 34.2 MCI
BH CV STRESS PROTOCOL 1: NORMAL
BH CV STRESS RECOVERY BP: NORMAL MMHG
BH CV STRESS RECOVERY HR: 86 BPM
BH CV STRESS STAGE 1: 1
LV EF NUC BP: 55 %
MAXIMAL PREDICTED HEART RATE: 137 BPM
PERCENT MAX PREDICTED HR: 83.94 %
STRESS BASELINE BP: NORMAL MMHG
STRESS BASELINE HR: 76 BPM
STRESS PERCENT HR: 99 %
STRESS POST EXERCISE DUR SEC: 10 SEC
STRESS POST PEAK BP: NORMAL MMHG
STRESS POST PEAK HR: 115 BPM
STRESS TARGET HR: 116 BPM

## 2022-08-18 PROCEDURE — A9502 TC99M TETROFOSMIN: HCPCS | Performed by: INTERNAL MEDICINE

## 2022-08-18 PROCEDURE — 25010000002 REGADENOSON 0.4 MG/5ML SOLUTION: Performed by: INTERNAL MEDICINE

## 2022-08-18 PROCEDURE — 93017 CV STRESS TEST TRACING ONLY: CPT

## 2022-08-18 PROCEDURE — 93016 CV STRESS TEST SUPVJ ONLY: CPT | Performed by: INTERNAL MEDICINE

## 2022-08-18 PROCEDURE — 78452 HT MUSCLE IMAGE SPECT MULT: CPT

## 2022-08-18 PROCEDURE — 78452 HT MUSCLE IMAGE SPECT MULT: CPT | Performed by: INTERNAL MEDICINE

## 2022-08-18 PROCEDURE — 0 TECHNETIUM TETROFOSMIN KIT: Performed by: INTERNAL MEDICINE

## 2022-08-18 PROCEDURE — 93018 CV STRESS TEST I&R ONLY: CPT | Performed by: INTERNAL MEDICINE

## 2022-08-18 RX ADMIN — TETROFOSMIN 1 DOSE: 1.38 INJECTION, POWDER, LYOPHILIZED, FOR SOLUTION INTRAVENOUS at 12:05

## 2022-08-18 RX ADMIN — REGADENOSON 0.4 MG: 0.08 INJECTION, SOLUTION INTRAVENOUS at 12:05

## 2022-08-18 RX ADMIN — TETROFOSMIN 1 DOSE: 1.38 INJECTION, POWDER, LYOPHILIZED, FOR SOLUTION INTRAVENOUS at 11:00

## 2022-08-19 ENCOUNTER — TELEPHONE (OUTPATIENT)
Dept: CARDIOLOGY | Facility: CLINIC | Age: 82
End: 2022-08-19

## 2022-08-19 DIAGNOSIS — R94.39 ABNORMAL NUCLEAR STRESS TEST: Primary | ICD-10-CM

## 2022-08-19 NOTE — TELEPHONE ENCOUNTER
I spoke with patient after reviewing with Dr. Phipps.  Plan is for cardiac catheterization.  Patient is agreeable.      Hospital scheduling please call him to schedule cath within the next couple weeks.

## 2022-08-19 NOTE — TELEPHONE ENCOUNTER
Pt called and would like to discuss his stress test results.  He states he was told it was abnormal.  Thanks/[facility]    # [phone]

## 2022-08-19 NOTE — TELEPHONE ENCOUNTER
Please let him know Dr. Phipps is in the hospital seeing patients but the [email or web address] techs documented speaking to him after stress was done. I will have Dr. Phipps call him with plan

## 2022-08-23 ENCOUNTER — TRANSCRIBE ORDERS (OUTPATIENT)
Dept: CARDIOLOGY | Facility: CLINIC | Age: 82
End: 2022-08-23

## 2022-08-23 DIAGNOSIS — I10 BENIGN ESSENTIAL HTN: Primary | ICD-10-CM

## 2022-08-23 DIAGNOSIS — Z13.6 SCREENING FOR ISCHEMIC HEART DISEASE: ICD-10-CM

## 2022-08-23 DIAGNOSIS — Z01.818 OTHER SPECIFIED PRE-OPERATIVE EXAMINATION: Primary | ICD-10-CM

## 2022-08-23 DIAGNOSIS — Z01.810 PRE-OPERATIVE CARDIOVASCULAR EXAMINATION: ICD-10-CM

## 2022-08-23 PROBLEM — R94.39 ABNORMAL NUCLEAR STRESS TEST: Status: ACTIVE | Noted: 2022-08-23

## 2022-08-23 RX ORDER — FELODIPINE 10 MG/1
TABLET, EXTENDED RELEASE ORAL
Qty: 90 TABLET | Refills: 1 | Status: SHIPPED | OUTPATIENT
Start: 2022-08-23 | End: 2023-02-20

## 2022-08-30 ENCOUNTER — LAB (OUTPATIENT)
Dept: LAB | Facility: HOSPITAL | Age: 82
End: 2022-08-30

## 2022-08-30 DIAGNOSIS — Z01.818 OTHER SPECIFIED PRE-OPERATIVE EXAMINATION: ICD-10-CM

## 2022-08-30 DIAGNOSIS — Z13.6 SCREENING FOR ISCHEMIC HEART DISEASE: ICD-10-CM

## 2022-08-30 DIAGNOSIS — Z01.810 PRE-OPERATIVE CARDIOVASCULAR EXAMINATION: ICD-10-CM

## 2022-08-30 LAB
ANION GAP SERPL CALCULATED.3IONS-SCNC: 6.1 MMOL/L (ref 5–15)
BASOPHILS # BLD AUTO: 0.03 10*3/MM3 (ref 0–0.2)
BASOPHILS NFR BLD AUTO: 0.7 % (ref 0–1.5)
BUN SERPL-MCNC: 18 MG/DL (ref 8–23)
BUN/CREAT SERPL: 15.8 (ref 7–25)
CALCIUM SPEC-SCNC: 8.8 MG/DL (ref 8.6–10.5)
CHLORIDE SERPL-SCNC: 109 MMOL/L (ref 98–107)
CO2 SERPL-SCNC: 24.9 MMOL/L (ref 22–29)
CREAT SERPL-MCNC: 1.14 MG/DL (ref 0.76–1.27)
DEPRECATED RDW RBC AUTO: 42.7 FL (ref 37–54)
EGFRCR SERPLBLD CKD-EPI 2021: 63.8 ML/MIN/1.73
EOSINOPHIL # BLD AUTO: 0.04 10*3/MM3 (ref 0–0.4)
EOSINOPHIL NFR BLD AUTO: 1 % (ref 0.3–6.2)
ERYTHROCYTE [DISTWIDTH] IN BLOOD BY AUTOMATED COUNT: 13 % (ref 12.3–15.4)
GLUCOSE SERPL-MCNC: 98 MG/DL (ref 65–99)
HCT VFR BLD AUTO: 36.5 % (ref 37.5–51)
HGB BLD-MCNC: 12.6 G/DL (ref 13–17.7)
IMM GRANULOCYTES # BLD AUTO: 0.01 10*3/MM3 (ref 0–0.05)
IMM GRANULOCYTES NFR BLD AUTO: 0.2 % (ref 0–0.5)
LYMPHOCYTES # BLD AUTO: 2.16 10*3/MM3 (ref 0.7–3.1)
LYMPHOCYTES NFR BLD AUTO: 51.6 % (ref 19.6–45.3)
MCH RBC QN AUTO: 31.1 PG (ref 26.6–33)
MCHC RBC AUTO-ENTMCNC: 34.5 G/DL (ref 31.5–35.7)
MCV RBC AUTO: 90.1 FL (ref 79–97)
MONOCYTES # BLD AUTO: 0.4 10*3/MM3 (ref 0.1–0.9)
MONOCYTES NFR BLD AUTO: 9.5 % (ref 5–12)
NEUTROPHILS NFR BLD AUTO: 1.55 10*3/MM3 (ref 1.7–7)
NEUTROPHILS NFR BLD AUTO: 37 % (ref 42.7–76)
NRBC BLD AUTO-RTO: 0 /100 WBC (ref 0–0.2)
PLATELET # BLD AUTO: 186 10*3/MM3 (ref 140–450)
PMV BLD AUTO: 10.2 FL (ref 6–12)
POTASSIUM SERPL-SCNC: 4.4 MMOL/L (ref 3.5–5.2)
RBC # BLD AUTO: 4.05 10*6/MM3 (ref 4.14–5.8)
SARS-COV-2 RNA PNL SPEC NAA+PROBE: NOT DETECTED
SODIUM SERPL-SCNC: 140 MMOL/L (ref 136–145)
WBC NRBC COR # BLD: 4.19 10*3/MM3 (ref 3.4–10.8)

## 2022-08-30 PROCEDURE — 80048 BASIC METABOLIC PNL TOTAL CA: CPT

## 2022-08-30 PROCEDURE — C9803 HOPD COVID-19 SPEC COLLECT: HCPCS

## 2022-08-30 PROCEDURE — 87635 SARS-COV-2 COVID-19 AMP PRB: CPT

## 2022-08-30 PROCEDURE — 36415 COLL VENOUS BLD VENIPUNCTURE: CPT

## 2022-08-30 PROCEDURE — 85025 COMPLETE CBC W/AUTO DIFF WBC: CPT

## 2022-09-01 ENCOUNTER — HOSPITAL ENCOUNTER (OUTPATIENT)
Facility: HOSPITAL | Age: 82
Setting detail: HOSPITAL OUTPATIENT SURGERY
Discharge: HOME | End: 2022-09-01
Attending: INTERNAL MEDICINE | Admitting: INTERNAL MEDICINE

## 2022-09-01 VITALS
RESPIRATION RATE: 16 BRPM | OXYGEN SATURATION: 98 % | WEIGHT: 170 LBS | SYSTOLIC BLOOD PRESSURE: 127 MMHG | HEIGHT: 68 IN | DIASTOLIC BLOOD PRESSURE: 85 MMHG | BODY MASS INDEX: 25.76 KG/M2 | TEMPERATURE: 97.8 F | HEART RATE: 66 BPM

## 2022-09-01 DIAGNOSIS — R94.39 ABNORMAL NUCLEAR STRESS TEST: ICD-10-CM

## 2022-09-01 PROCEDURE — 93571 IV DOP VEL&/PRESS C FLO 1ST: CPT | Performed by: INTERNAL MEDICINE

## 2022-09-01 PROCEDURE — C1874 STENT, COATED/COV W/DEL SYS: HCPCS | Performed by: INTERNAL MEDICINE

## 2022-09-01 PROCEDURE — 0 IOPAMIDOL PER 1 ML: Performed by: INTERNAL MEDICINE

## 2022-09-01 PROCEDURE — C1894 INTRO/SHEATH, NON-LASER: HCPCS | Performed by: INTERNAL MEDICINE

## 2022-09-01 PROCEDURE — C1769 GUIDE WIRE: HCPCS | Performed by: INTERNAL MEDICINE

## 2022-09-01 PROCEDURE — C9600 PERC DRUG-EL COR STENT SING: HCPCS | Performed by: INTERNAL MEDICINE

## 2022-09-01 PROCEDURE — 99152 MOD SED SAME PHYS/QHP 5/>YRS: CPT | Performed by: INTERNAL MEDICINE

## 2022-09-01 PROCEDURE — 25010000002 HEPARIN (PORCINE) PER 1000 UNITS: Performed by: INTERNAL MEDICINE

## 2022-09-01 PROCEDURE — 99153 MOD SED SAME PHYS/QHP EA: CPT | Performed by: INTERNAL MEDICINE

## 2022-09-01 PROCEDURE — 93458 L HRT ARTERY/VENTRICLE ANGIO: CPT | Performed by: INTERNAL MEDICINE

## 2022-09-01 PROCEDURE — C1725 CATH, TRANSLUMIN NON-LASER: HCPCS | Performed by: INTERNAL MEDICINE

## 2022-09-01 PROCEDURE — 25010000002 MIDAZOLAM PER 1 MG: Performed by: INTERNAL MEDICINE

## 2022-09-01 PROCEDURE — C1887 CATHETER, GUIDING: HCPCS | Performed by: INTERNAL MEDICINE

## 2022-09-01 PROCEDURE — 92928 PRQ TCAT PLMT NTRAC ST 1 LES: CPT | Performed by: INTERNAL MEDICINE

## 2022-09-01 PROCEDURE — 85347 COAGULATION TIME ACTIVATED: CPT

## 2022-09-01 PROCEDURE — 25010000002 FENTANYL CITRATE (PF) 50 MCG/ML SOLUTION: Performed by: INTERNAL MEDICINE

## 2022-09-01 DEVICE — XIENCE SKYPOINT™ EVEROLIMUS ELUTING CORONARY STENT SYSTEM 2.75 MM X 08 MM / RAPID-EXCHANGE
Type: IMPLANTABLE DEVICE | Site: CORONARY | Status: FUNCTIONAL
Brand: XIENCE SKYPOINT™

## 2022-09-01 DEVICE — XIENCE SKYPOINT™ EVEROLIMUS ELUTING CORONARY STENT SYSTEM 3.00 MM X 15 MM / RAPID-EXCHANGE
Type: IMPLANTABLE DEVICE | Site: CORONARY | Status: FUNCTIONAL
Brand: XIENCE SKYPOINT™

## 2022-09-01 DEVICE — XIENCE SKYPOINT™ EVEROLIMUS ELUTING CORONARY STENT SYSTEM 2.75 MM X 38 MM / RAPID-EXCHANGE
Type: IMPLANTABLE DEVICE | Site: CORONARY | Status: FUNCTIONAL
Brand: XIENCE SKYPOINT™

## 2022-09-01 DEVICE — XIENCE SKYPOINT™ EVEROLIMUS ELUTING CORONARY STENT SYSTEM 3.00 MM X 08 MM / RAPID-EXCHANGE
Type: IMPLANTABLE DEVICE | Site: CORONARY | Status: FUNCTIONAL
Brand: XIENCE SKYPOINT™

## 2022-09-01 DEVICE — XIENCE SKYPOINT™ EVEROLIMUS ELUTING CORONARY STENT SYSTEM 3.50 MM X 38 MM / RAPID-EXCHANGE
Type: IMPLANTABLE DEVICE | Site: CORONARY | Status: FUNCTIONAL
Brand: XIENCE SKYPOINT™

## 2022-09-01 RX ORDER — ASPIRIN 81 MG/1
81 TABLET, CHEWABLE ORAL DAILY
Refills: 11
Start: 2022-09-01

## 2022-09-01 RX ORDER — SODIUM CHLORIDE 9 MG/ML
125 INJECTION, SOLUTION INTRAVENOUS CONTINUOUS
Status: DISCONTINUED | OUTPATIENT
Start: 2022-09-01 | End: 2022-09-01 | Stop reason: HOSPADM

## 2022-09-01 RX ORDER — HEPARIN SODIUM 1000 [USP'U]/ML
INJECTION, SOLUTION INTRAVENOUS; SUBCUTANEOUS AS NEEDED
Status: DISCONTINUED | OUTPATIENT
Start: 2022-09-01 | End: 2022-09-01 | Stop reason: HOSPADM

## 2022-09-01 RX ORDER — ASPIRIN 325 MG
TABLET ORAL AS NEEDED
Status: DISCONTINUED | OUTPATIENT
Start: 2022-09-01 | End: 2022-09-01 | Stop reason: HOSPADM

## 2022-09-01 RX ORDER — SODIUM CHLORIDE 0.9 % (FLUSH) 0.9 %
10 SYRINGE (ML) INJECTION AS NEEDED
Status: DISCONTINUED | OUTPATIENT
Start: 2022-09-01 | End: 2022-09-01 | Stop reason: HOSPADM

## 2022-09-01 RX ORDER — CLOPIDOGREL BISULFATE 75 MG/1
TABLET ORAL AS NEEDED
Status: DISCONTINUED | OUTPATIENT
Start: 2022-09-01 | End: 2022-09-01 | Stop reason: HOSPADM

## 2022-09-01 RX ORDER — ACETAMINOPHEN 325 MG/1
650 TABLET ORAL EVERY 4 HOURS PRN
Status: DISCONTINUED | OUTPATIENT
Start: 2022-09-01 | End: 2022-09-01 | Stop reason: HOSPADM

## 2022-09-01 RX ORDER — SODIUM CHLORIDE 0.9 % (FLUSH) 0.9 %
10 SYRINGE (ML) INJECTION EVERY 12 HOURS SCHEDULED
Status: DISCONTINUED | OUTPATIENT
Start: 2022-09-01 | End: 2022-09-01 | Stop reason: HOSPADM

## 2022-09-01 RX ORDER — SIMVASTATIN 40 MG
40 TABLET ORAL NIGHTLY
Qty: 90 TABLET | Refills: 3 | Status: SHIPPED | OUTPATIENT
Start: 2022-09-01 | End: 2022-11-21 | Stop reason: SDUPTHER

## 2022-09-01 RX ORDER — LIDOCAINE HYDROCHLORIDE 20 MG/ML
INJECTION, SOLUTION INFILTRATION; PERINEURAL AS NEEDED
Status: DISCONTINUED | OUTPATIENT
Start: 2022-09-01 | End: 2022-09-01 | Stop reason: HOSPADM

## 2022-09-01 RX ORDER — CLOPIDOGREL BISULFATE 75 MG/1
75 TABLET ORAL DAILY
Qty: 90 TABLET | Refills: 3 | Status: SHIPPED | OUTPATIENT
Start: 2022-09-01 | End: 2022-11-21 | Stop reason: SDUPTHER

## 2022-09-01 RX ORDER — FENTANYL CITRATE 50 UG/ML
INJECTION, SOLUTION INTRAMUSCULAR; INTRAVENOUS AS NEEDED
Status: DISCONTINUED | OUTPATIENT
Start: 2022-09-01 | End: 2022-09-01 | Stop reason: HOSPADM

## 2022-09-01 RX ORDER — MIDAZOLAM HYDROCHLORIDE 1 MG/ML
INJECTION INTRAMUSCULAR; INTRAVENOUS AS NEEDED
Status: DISCONTINUED | OUTPATIENT
Start: 2022-09-01 | End: 2022-09-01 | Stop reason: HOSPADM

## 2022-09-01 RX ADMIN — SODIUM CHLORIDE 125 ML/HR: 9 INJECTION, SOLUTION INTRAVENOUS at 07:41

## 2022-09-01 NOTE — DISCHARGE INSTRUCTIONS
[facility]  [address] [place] [address]    Coronary Angiogram (Radial/Ulnar Approach) After Care    Refer to this sheet in the next few weeks. These instructions provide you with information on caring for yourself after your procedure. Your caregiver may also give you more specific instructions. Your treatment has been planned according to current medical practices, but problems sometimes occur. Call your caregiver if you have any problems or questions after your procedure.    Home Care Instructions:  You may shower the day after the procedure. Remove the bandage (dressing) and gently wash the site with plain soap and water. Gently pat the site dry. You may apply a band aid daily for 2 days if desired.    Do not apply powder or lotion to the site.  Do not submerge the affected site in water for 3 to 5 days or until the site is completely healed.   Do not lift, push or pull anything over 5 pounds for 5 days after your procedure or as directed by your physician.  As a reference, a gallon of milk weighs 8 pounds.   Inspect the site at least twice daily. You may notice some bruising at the site and it may be tender for 1 to 2 weeks.     Increase your fluid intake for the next 2 days.    Keep arm elevated for 24 hours. For the remainder of the day, keep your arm in “Pledge of Allegiance” position when up and about.     You may drive 24 hours after the procedure unless otherwise instructed by your caregiver.  Do not operate machinery or power tools for 24 hours.  A responsible adult should be with you for the first 24 hours after you arrive home. Do not make any important legal decisions or sign legal papers for 24 hours.  Do not drink alcohol for 24 hours.    Metformin or any medications containing Metformin should not be taken for 48 hours after your procedure.      Call Your Doctor if:   You have unusual pain at the radial/ulnar (wrist) site.  You have redness, warmth, swelling, or pain at the  radial/ulnar (wrist) site.  You have drainage (other than a small amount of blood on the dressing).  `You have chills or a fever > 101.  Your arm becomes pale or dark, cool, tingly, or numb.  You develop chest pain, shortness of breath, feel faint or pass out.    You have heavy bleeding from the site, hold pressure on the site for 20 minutes.  If the bleeding stops, apply a fresh bandage and call your cardiologist.  However, if you        continue to have bleeding, call 911 and continue to apply pressure to the site.   You have any symptoms of a stroke.  Remember BE FAST  B-balance. Sudden trouble walking or loss of balance.  E-eyes.  Sudden changes in how you see or a sudden onset of a very bad headache.   F-face. Sudden weakness or loss of feeling of the face or facial droop on one side.   A-arms Sudden weakness or numbness in one arm.  One arm drifts down if they are both held out in front of you. This happens suddenly and usually on one side of the body.   S-speech.  Sudden trouble speaking, slurred speech or trouble understanding what are saying.   T-time  Time to call emergency services.  Write down the symptoms and the time they started.

## 2022-09-01 NOTE — CONSULTS
Provided phase II information along with the contact information for cardiac rehab  at [facility]. Will call patient after discharge to schedule.

## 2022-09-01 NOTE — INTERVAL H&P NOTE
This is a really great 82-year-old gentleman very active still had some chest discomfort a couple of weeks ago but really its resolved had a stress test which shows an inferior lateral infarct with small to moderate in size but some periinfarct ischemia was interpreted as a high risk study and he has been referred for cardiac cath.  He has a prior stent in and he had a statin medicine that he been on for years but it was stopped a couple years ago by a physician who lipids were low.  He is not had any bleeding difficulties but he does have lymphoma.  H&P reviewed. The patient was examined and there are no changes to the H&P. I have explained the risks and benefits of the procedure to the patient.  The patient understands and agrees to proceed

## 2022-09-02 LAB
ACT BLD: 271 SECONDS (ref 82–152)
ACT BLD: 271 SECONDS (ref 82–152)
ACT BLD: 289 SECONDS (ref 82–152)

## 2022-09-09 ENCOUNTER — TELEPHONE (OUTPATIENT)
Dept: CARDIAC REHAB | Facility: HOSPITAL | Age: 82
End: 2022-09-09

## 2022-09-13 ENCOUNTER — OFFICE VISIT (OUTPATIENT)
Dept: INTERNAL MEDICINE | Facility: CLINIC | Age: 82
End: 2022-09-13

## 2022-09-13 VITALS
HEART RATE: 83 BPM | BODY MASS INDEX: 25.54 KG/M2 | OXYGEN SATURATION: 98 % | WEIGHT: 168 LBS | DIASTOLIC BLOOD PRESSURE: 68 MMHG | TEMPERATURE: 97.8 F | SYSTOLIC BLOOD PRESSURE: 142 MMHG

## 2022-09-13 DIAGNOSIS — D50.0 IRON DEFICIENCY ANEMIA DUE TO CHRONIC BLOOD LOSS: ICD-10-CM

## 2022-09-13 DIAGNOSIS — E78.00 ELEVATED CHOLESTEROL: ICD-10-CM

## 2022-09-13 DIAGNOSIS — Z95.5 S/P PRIMARY ANGIOPLASTY WITH CORONARY STENT: Primary | ICD-10-CM

## 2022-09-13 DIAGNOSIS — C82.90 FOLLICULAR LYMPHOMA, UNSPECIFIED FOLLICULAR LYMPHOMA TYPE, UNSPECIFIED BODY REGION: ICD-10-CM

## 2022-09-13 DIAGNOSIS — N18.2 CHRONIC RENAL IMPAIRMENT, STAGE 2 (MILD): ICD-10-CM

## 2022-09-13 DIAGNOSIS — I10 BENIGN ESSENTIAL HTN: ICD-10-CM

## 2022-09-13 PROCEDURE — 1159F MED LIST DOCD IN RCRD: CPT | Performed by: FAMILY MEDICINE

## 2022-09-13 PROCEDURE — 1126F AMNT PAIN NOTED NONE PRSNT: CPT | Performed by: FAMILY MEDICINE

## 2022-09-13 PROCEDURE — 1160F RVW MEDS BY RX/DR IN RCRD: CPT | Performed by: FAMILY MEDICINE

## 2022-09-13 PROCEDURE — 1170F FXNL STATUS ASSESSED: CPT | Performed by: FAMILY MEDICINE

## 2022-09-13 PROCEDURE — G0439 PPPS, SUBSEQ VISIT: HCPCS | Performed by: FAMILY MEDICINE

## 2022-09-13 NOTE — PROGRESS NOTES
"The ABCs of the Annual Wellness Visit  Subsequent Medicare Wellness Visit    Chief Complaint   Patient presents with   • Medicare Wellness-subsequent      Subjective    History of Present Illness:  Louie German is a 82 y.o. male who presents for a Subsequent Medicare Wellness Visit.    The following portions of the patient's history were reviewed and   updated as appropriate: allergies, current medications, past family history, past medical history, past social history, past surgical history and problem list.    Compared to one year ago, the patient feels his physical   health is better.    Compared to one year ago, the patient feels his mental   health is the same.      Hypolipidemia  The patient reports having low cholesterol levels, recently.      Stent placement surgery  The patient reports having five stents inserted a week and half ago. He reports having 3 stent LADs on one side and 2 stent LADs on the other side. He denies knowing if there are any restrictions physically. He will see Dr. Nunes the cardiologist/surgeon on 09/15/2022. He reports handling the recovery well. He reports possibly taking a blood thinner. The patient is currently taking statin medication with effectiveness prescribed by Dr. Mcgrath the cardiologist due to his condition. The patient's incision has healed and is \"cleared.\" He wears a small bandage. He reports wearing a support wrist brace especially when driving and will discontinue use on 09/15/2022. His current incision went through the wrist. He reports possibly having an incision 23 years ago that went through the \"groin\" area. He reports having several EKGs completed over the 20 years and \"everything seemed kind of normal.\" He denied previously knowing about his heart condition. He reports feeling better when moving around due to better blood circulation. He did receive a booklet regarding his condition.     History of arm bruise  The patient reports having an arm bruise " "that is healing.     Diet and exercise  The patient reports eating a healthy diet. He does stay away from \"fast foods.\" He reports knowing where all his meats comes from and eats meats 1 to 2 times a week, \"whether is chicken or pizza.\" The patient report doing 20 push ups nightly.     Alcohol use  The patient reports having 1 drink nightly, a beer or a glass of wine.     Medical questions  The patient reports having normal bowel movements.     Health maintenance   He does see the Dr. Amaro the urologist 1 time yearly to check his urine. His kidney function was normal prior to 2 weeks ago. He recently had labs completed, and the cardiologist evaluated his creatine levels.     Recent Hospitalizations:  He was admitted within the past 365 days at [facility].       Current Medical Providers:  Patient Care Team:  Ryan Villarreal MD as PCP - General (Family Medicine)  Omkar Montalvo MD as Surgeon (Cardiothoracic Surgery)  Grisel Alvarez MA (Inactive) as Medical Assistant  Dav Stoner Jr., MD (Inactive) as Referring Physician (Internal Medicine)  Danny Roberto MD as Consulting Physician (Hematology and Oncology)    Outpatient Medications Prior to Visit   Medication Sig Dispense Refill   • aspirin 81 MG chewable tablet Chew 1 tablet Daily.  11   • CBD (cannabidiol) oral oil Take 1 mL by mouth Daily.     • clopidogrel (PLAVIX) 75 MG tablet Take 1 tablet by mouth Daily. 90 tablet 3   • felodipine (PLENDIL) 10 MG 24 hr tablet TAKE 1 TABLET DAILY 90 tablet 1   • Multiple Vitamin (MULTI VITAMIN PO) Take 1 tablet by mouth Daily.     • simvastatin (ZOCOR) 40 MG tablet Take 1 tablet by mouth Every Night. 90 tablet 3     Facility-Administered Medications Prior to Visit   Medication Dose Route Frequency Provider Last Rate Last Admin   • heparin flush (porcine) 100 UNIT/ML injection 500 Units  500 Units Intravenous PRN Danny Roberto MD   500 Units at 01/25/19 1030   • sodium chloride 0.9 % flush 10 mL " " 10 mL Intravenous PRN Danny Roberto MD   10 mL at 01/25/19 1030       No opioid medication identified on active medication list. I have reviewed chart for other potential  high risk medication/s and harmful drug interactions in the elderly.          Aspirin is on active medication list. Aspirin use is indicated based on review of current medical condition/s. Pros and cons of this therapy have been discussed today. Benefits of this medication outweigh potential harm.  Patient has been encouraged to continue taking this medication.  .      Patient Active Problem List   Diagnosis   • Benign essential HTN   • Acid reflux   • Elevated cholesterol   • Calculus of kidney   • Cervical spinal stenosis   • Status post total hip replacement, left   • Status post total replacement of right hip   • Degenerative disc disease, lumbar   • Hydronephrosis of left kidney   • Diffuse large B-cell lymphoma of extranodal site excluding spleen and other solid organs (HCC)   • Iron deficiency anemia due to chronic blood loss   • Chronic renal impairment, stage 2 (mild)   • Metastatic tumor of bone   • Closed compression fracture of third lumbar vertebra (HCC)   • Lymphoma (HCC)   • Fitting and adjustment of vascular catheter   • Hip pain   • S/P primary angioplasty with coronary stent   • Precordial chest pain   • Abnormal nuclear stress test     Advance Care Planning  Advance Directive is on file.  ACP discussion was held with the patient during this visit. Patient has an advance directive in EMR which is still valid.           Objective    Vitals:    09/13/22 0932   BP: 142/68   BP Location: Left arm   Patient Position: Sitting   Cuff Size: Adult   Pulse: 83   Temp: 97.8 °F (36.6 °C)   TempSrc: Tympanic   SpO2: 98%   Weight: 76.2 kg (168 lb)   PainSc: 0-No pain     Estimated body mass index is 25.54 kg/m² as calculated from the following:    Height as of 9/1/22: 172.7 cm (68\").    Weight as of this encounter: 76.2 kg (168 " lb).    BMI is >= 25 and <30. (Overweight) The following options were offered after discussion;: exercise counseling/recommendations      Does the patient have evidence of cognitive impairment? No    Physical Exam  HENT:      Ears:      Comments: The patient does have some ear cerumen.   Cardiovascular:      Comments: There is a band-aid over the insertion site for the arterial catheter that sounds great. His heart is in a regular rhythm without any murmurs or gallops, normal rate.    Musculoskeletal:      Comments: The patient does have a bruise in the ventral mid forearm.                     HEALTH RISK ASSESSMENT    Smoking Status:  Social History     Tobacco Use   Smoking Status Former Smoker   • Types: Pipe   Smokeless Tobacco Former User   • Types: Chew   • Quit date: 5/14/1998   Tobacco Comment    Caffeine use: 2 cups daily     Alcohol Consumption:  Social History     Substance and Sexual Activity   Alcohol Use Yes    Comment: 1-2 beers nightly, sometimes an occasional bourbon     Fall Risk Screen:    CANDY Fall Risk Assessment was completed, and patient is at LOW risk for falls.Assessment completed on:9/13/2022    Depression Screening:  PHQ-2/PHQ-9 Depression Screening 9/13/2022   Retired PHQ-9 Total Score -   Retired Total Score -   Little Interest or Pleasure in Doing Things 0-->not at all   Feeling Down, Depressed or Hopeless 0-->not at all   PHQ-9: Brief Depression Severity Measure Score 0       Health Habits and Functional and Cognitive Screening:  Functional & Cognitive Status 9/13/2022   Do you have difficulty preparing food and eating? No   Do you have difficulty bathing yourself, getting dressed or grooming yourself? No   Do you have difficulty using the toilet? No   Do you have difficulty moving around from place to place? No   Do you have trouble with steps or getting out of a bed or a chair? No   Current Diet Well Balanced Diet   Dental Exam Up to date   Eye Exam Up to date   Exercise (times per  week) 0 times per week   Current Exercises Include No Regular Exercise   Current Exercise Activities Include -   Do you need help using the phone?  No   Are you deaf or do you have serious difficulty hearing?  No   Do you need help with transportation? No   Do you need help shopping? No   Do you need help preparing meals?  No   Do you need help with housework?  No   Do you need help with laundry? No   Do you need help taking your medications? No   Do you need help managing money? No   Do you ever drive or ride in a car without wearing a seat belt? No   Have you felt unusual stress, anger or loneliness in the last month? No   Who do you live with? Spouse   If you need help, do you have trouble finding someone available to you? No   Have you been bothered in the last four weeks by sexual problems? No   Do you have difficulty concentrating, remembering or making decisions? No       Age-appropriate Screening Schedule:  Refer to the list below for future screening recommendations based on patient's age, sex and/or medical conditions. Orders for these recommended tests are listed in the plan section. The patient has been provided with a written plan.    Health Maintenance   Topic Date Due   • TDAP/TD VACCINES (1 - Tdap) Never done   • ZOSTER VACCINE (2 of 3) 05/05/2017   • INFLUENZA VACCINE  10/01/2022   • LIPID PANEL  03/02/2023              Assessment & Plan   CMS Preventative Services Quick Reference  Risk Factors Identified During Encounter  Cardiovascular Disease  Fall Risk-High or Moderate  The above risks/problems have been discussed with the patient.  Follow up actions/plans if indicated are seen below in the Assessment/Plan Section.  Pertinent information has been shared with the patient in the After Visit Summary.    Diagnoses and all orders for this visit:    1. S/P primary angioplasty with coronary stent (Primary)    2. Benign essential HTN    3. Elevated cholesterol    4. Follicular lymphoma, unspecified  follicular lymphoma type, unspecified body region (HCC)    5. Iron deficiency anemia due to chronic blood loss    6. Chronic renal impairment, stage 2 (mild)        Follow Up:   No follow-ups on file.     An After Visit Summary and PPPS were made available to the patient.

## 2022-09-13 NOTE — PATIENT INSTRUCTIONS
Medicare Wellness  Personal Prevention Plan of Service     Date of Office Visit:    Encounter Provider:  Ryan Villarreal MD  Place of Service:  [facility] PRIMARY [facility]  Patient Name: Louie German  :  1940    As part of the Medicare Wellness portion of your visit today, we are providing you with this personalized preventive plan of services (PPPS). This plan is based upon recommendations of the [place] Preventive Services Task Force (USPSTF) and the Advisory Committee on Immunization Practices (ACIP).    This lists the preventive care services that should be considered, and provides dates of when you are due. Items listed as completed are up-to-date and do not require any further intervention.    Health Maintenance   Topic Date Due    Pneumococcal Vaccine 65+ (1 - PCV) Never done    TDAP/TD VACCINES (1 - Tdap) Never done    ZOSTER VACCINE (2 of 3) 2017    COVID-19 Vaccine (3 - Moderna risk series) 2022    ANNUAL WELLNESS VISIT  2022    INFLUENZA VACCINE  10/01/2022    LIPID PANEL  2023       No orders of the defined types were placed in this encounter.      No follow-ups on file.

## 2022-09-13 NOTE — PROGRESS NOTES
"Chief Complaint  Medicare Wellness-subsequent    Subjective        Louie German presents to [facility]IMARY [facility]  History of Present Illness    Hypolipidemia  The patient reports having low cholesterol levels, recently.      Stent placement surgery  The patient reports having five stents inserted a week and half ago. He reports having 3 stent LADs on one side and 2 stent LADs on the other side. He denies knowing if there are any restrictions physically. He will see Dr. Nunes the cardiologist/surgeon on 09/15/2022. He reports handling the recovery well. He reports possibly taking a blood thinner. The patient is currently taking statin medication with effectiveness prescribed by Dr. Mcgrath the cardiologist due to his condition. The patient's incision has healed and is \"cleared.\" He wears a small bandage. He reports wearing a support wrist brace especially when driving and will discontinue use on 09/15/2022. His current incision went through the wrist. He reports possibly having an incision 23 years ago that went through the \"groin\" area. He reports having several EKGs completed over the 20 years and \"everything seemed kind of normal.\" He denied previously knowing about his heart condition. He reports feeling better when moving around due to better blood circulation. He did receive a booklet regarding his condition.     History of arm bruise  The patient reports having an arm bruise that is healing.     Diet and exercise  The patient reports eating a healthy diet. He does stay away from \"fast foods.\" He reports knowing where all his meats comes from and eats meats 1 to 2 times a week, \"whether is chicken or pizza.\" The patient report doing 20 push ups nightly.     Alcohol use  The patient reports having 1 drink nightly, a beer or a glass of wine.     Medical questions  The patient reports having normal bowel movements.     Health maintenance   He does see the Dr. Amaro the urologist 1 " "time yearly to check his urine. His kidney function was normal prior to 2 weeks ago. He recently had labs completed, and the cardiologist evaluated his creatine levels.       Objective   Vital Signs:  /68 (BP Location: Left arm, Patient Position: Sitting, Cuff Size: Adult)   Pulse 83   Temp 97.8 °F (36.6 °C) (Tympanic)   Wt 76.2 kg (168 lb)   SpO2 98%   BMI 25.54 kg/m²   Estimated body mass index is 25.54 kg/m² as calculated from the following:    Height as of 9/1/22: 172.7 cm (68\").    Weight as of this encounter: 76.2 kg (168 lb).    BMI is >= 25 and <30. (Overweight) The following options were offered after discussion;: none (medical contraindication)      Physical Exam  HENT:      Ears:      Comments: The patient does have some ear cerumen.   Cardiovascular:      Comments: There is a band-aid over the insertion site for the arterial catheter that sounds great. His heart is in a regular rhythm without any murmurs or gallops, normal rate.    Musculoskeletal:      Comments: The patient does have a bruise in the ventral mid forearm.           Result Review :  The following data was reviewed by: Ryan Villarreal MD on 09/13/2022:  Common labs    Common Labs 3/2/22 3/2/22 6/10/22 6/10/22 8/30/22 8/30/22    1109 1109 1301 1301 1035 1035   Glucose 88   100 (A)  98   BUN 19   22  18   Creatinine 1.32 (A)   1.33 (A)  1.14   Sodium 142   139  140   Potassium 4.5   4.8  4.4   Chloride 105   106  109 (A)   Calcium 9.0   9.0  8.8   Total Protein 6.5        Albumin 4.1   3.80     Total Bilirubin 0.6   0.5     Alkaline Phosphatase 67   60     AST (SGOT) 16   15     ALT (SGPT) 8   8     WBC   5.66  4.19    Hemoglobin   12.4 (A)  12.6 (A)    Hematocrit   37.8  36.5 (A)    Platelets   171  186    Total Cholesterol  178       Triglycerides  104       HDL Cholesterol  67       LDL Cholesterol   92       (A) Abnormal value            Data reviewed: Consultant notes cardiology          Assessment and Plan   Diagnoses and all " orders for this visit:    1. S/P primary angioplasty with coronary stent (Primary)    2. Benign essential HTN    3. Elevated cholesterol    4. Follicular lymphoma, unspecified follicular lymphoma type, unspecified body region (HCC)    5. Iron deficiency anemia due to chronic blood loss    6. Chronic renal impairment, stage 2 (mild)      1. Stent placement surgery  - The patient will follow up with Dr. Nunes the cardiologist possibly on 9/15/2022.   - The patient will continue to take simvastatin, felodipine, Plavix/clopidogrel, and aspirin 81 mg.   - The patient will continue to do 20 push ups nightly, and he may start walking again.     2. Hypolipidemia  -We will continue to monitor her symptoms.       3. History of arm bruise  - We will continue to monitor her symptoms.           Follow Up   Return in about 6 months (around 3/13/2023) for Recheck.  Patient was given instructions and counseling regarding his condition or for health maintenance advice. Please see specific information pulled into the AVS if appropriate.     Transcribed from ambient dictation for Ryna Villarreal MD by Lorraine HUMPHREYS.  09/13/22   13:30 EDT    Patient verbalized consent to the visit recording.

## 2022-09-15 ENCOUNTER — TELEPHONE (OUTPATIENT)
Dept: INTERNAL MEDICINE | Facility: CLINIC | Age: 82
End: 2022-09-15

## 2022-09-15 ENCOUNTER — OFFICE VISIT (OUTPATIENT)
Dept: CARDIOLOGY | Facility: CLINIC | Age: 82
End: 2022-09-15

## 2022-09-15 VITALS
WEIGHT: 170.2 LBS | HEIGHT: 69 IN | HEART RATE: 84 BPM | BODY MASS INDEX: 25.21 KG/M2 | SYSTOLIC BLOOD PRESSURE: 136 MMHG | OXYGEN SATURATION: 99 % | DIASTOLIC BLOOD PRESSURE: 70 MMHG

## 2022-09-15 DIAGNOSIS — E78.5 HYPERLIPIDEMIA LDL GOAL <70: ICD-10-CM

## 2022-09-15 DIAGNOSIS — I10 PRIMARY HYPERTENSION: ICD-10-CM

## 2022-09-15 DIAGNOSIS — I25.118 CORONARY ARTERY DISEASE OF NATIVE ARTERY OF NATIVE HEART WITH STABLE ANGINA PECTORIS: Primary | ICD-10-CM

## 2022-09-15 PROCEDURE — 99214 OFFICE O/P EST MOD 30 MIN: CPT | Performed by: NURSE PRACTITIONER

## 2022-09-15 NOTE — TELEPHONE ENCOUNTER
Pt says he is taking simvastatin for his heart not cholesterol. Patient says anemia and elevated cholesterol was not dicussed at visit and says  told him 2 years ago he doesn't have high cholesterol.    I tried to explain to him the reasoning behind simvastatin being taken for his heart but he was referencing labs from 2 years ago, thanks.    Please advise.

## 2022-09-15 NOTE — PROGRESS NOTES
Date of Office Visit: 09/15/22  Encounter Provider: NOLAN Pickett  Place of Service: [facility] CARDIOLOGY  Patient Name: Louie German  :1940    Chief Complaint   Patient presents with   • Chest Pain     BHE 2 wk f/u   :     HPI: Louie German is a 82 y.o. male  with hypertension, coronary artery disease, mitral regurgitation, follicular lymphoma, diffuse large B-cell lymphoma, and hyperlipidemia.    He had a PCI in  to the left circumflex.  He had preserved left ventricular systolic function on echo 2018 with mild to moderate mitral vegetation.  He had 2 isolated episodes of chest pain that woke him up from sleep.  He did not originally seek medical attention but went to see his PCP in summer 2022 and after sharing that information he was referred to our office.    He is followed by Dr. Shiv Phipps.  I will visit with him for the first time today and have reviewed his medical record.  Perfusion stress test 2022 showed small, moderate size inferior lateral infarct with moderate amount of john-infarct ischemia that was severe in intensity.  This was felt to be a high risk study.  He ultimately had cardiac catheterization 2022 where he had a total of 5 drug-eluting stents placed.  2 stents were placed in the mid right coronary artery and there were 3 in the mid LAD.  He now presents for hospital discharge follow-up.  He is some mild bruising in the right radial side but otherwise no numbness or tingling.  Not had any chest pain.  He reports increased energy.  He stays active and does split firewood and works in the garden.  He has no edema or palpitations.  He denies blood in the urine or stool.  He reportedly is tolerating statin therapy.  No Known Allergies        Family and social history reviewed.     ROS  All other systems were reviewed and are negative          Objective:     Vitals:    09/15/22 1403   BP: 136/70   BP Location: Left arm  "  Patient Position: Sitting   Cuff Size: Adult   Pulse: 84   SpO2: 99%   Weight: 77.2 kg (170 lb 3.2 oz)   Height: 174 cm (68.5\")     Body mass index is 25.5 kg/m².    PHYSICAL EXAM:  Pulmonary:      Effort: Pulmonary effort is normal.      Breath sounds: Normal breath sounds.   Cardiovascular:      Normal rate. Regular rhythm.         Procedures      Current Outpatient Medications   Medication Sig Dispense Refill   • aspirin 81 MG chewable tablet Chew 1 tablet Daily.  11   • CBD (cannabidiol) oral oil Take 1 mL by mouth Daily.     • clopidogrel (PLAVIX) 75 MG tablet Take 1 tablet by mouth Daily. 90 tablet 3   • felodipine (PLENDIL) 10 MG 24 hr tablet TAKE 1 TABLET DAILY 90 tablet 1   • Multiple Vitamin (MULTI VITAMIN PO) Take 1 tablet by mouth Daily.     • simvastatin (ZOCOR) 40 MG tablet Take 1 tablet by mouth Every Night. 90 tablet 3     No current facility-administered medications for this visit.     Facility-Administered Medications Ordered in Other Visits   Medication Dose Route Frequency Provider Last Rate Last Admin   • heparin flush (porcine) 100 UNIT/ML injection 500 Units  500 Units Intravenous PRN Danny Roberto MD   500 Units at 01/25/19 1030   • sodium chloride 0.9 % flush 10 mL  10 mL Intravenous PRN Danny Roberto MD   10 mL at 01/25/19 1030     Assessment:       Diagnosis Plan   1. Coronary artery disease of native artery of native heart with stable angina pectoris (HCC)     2. Primary hypertension     3. Hyperlipidemia LDL goal <70          No orders of the defined types were placed in this encounter.        Plan:   1.  82 -year-old gentleman with coronary artery disease, status post mid circumflex stent in 1999.  Then abnormal perfusion stress test August 2022 now status post a total of 5 stents involving the mid right coronary artery and mid LAD.  EF preserved with midinferior kidney disease on LV gram.  He is to continue aspirin, Plavix and statin therapy he plans to participate in cardiac " rehab in [place] which is closer to his home  2.  Hyperlipidemia.  Target LDL 70 or less.  His last LDL 6 months ago was 92..  He is reportedly tolerating simvastatin 40 mg at this time without myalgias and agrees to continue  3.  History of follicular lymphoma and diffuse B cell lymphoma  4.  Mitral regurgitation on echo 2018-he is high functioning clinically doing well    Follow-up in 2 months, questions or concerns          It has been a pleasure to participate in this patient's care.      Thank you,  NOLAN Pickett      **I used Dragon to dictate this note:**

## 2022-09-15 NOTE — TELEPHONE ENCOUNTER
Caller: Louie German    Relationship: Self    Best call back number: [phone]    What is the best time to reach you: ANY    Who are you requesting to speak with (clinical staff, provider,  specific staff member): CLINICAL STAFF       What was the call regarding: AFTER VISIT SUMMARY STATES PATIENT HAS HIGH CHOLESTEROL AND ANEMIA. THESE ITEMS WERE NOT DISCUSSED AT VISIT. AND HE WAS REMOVED FROM CHOLESTEROL MEDICATION. IS CONCERNED SINCE THIS WAS NOT DISCUSSED AT VISIT. WOULD LIKE TO KNOW MORE ABOUT THIS AND WHAT HE NEEDS TO DO.     Do you require a callback: YES

## 2022-09-19 PROBLEM — D50.0 IRON DEFICIENCY ANEMIA DUE TO CHRONIC BLOOD LOSS: Status: RESOLVED | Noted: 2017-12-05 | Resolved: 2022-09-19

## 2022-09-19 NOTE — TELEPHONE ENCOUNTER
I called the patient and discussed his diagnosis and after visit summary.  On future visits we will discontinue high cholesterol and anemia from the diagnosis list.

## 2022-10-31 ENCOUNTER — TREATMENT (OUTPATIENT)
Dept: CARDIAC REHAB | Facility: HOSPITAL | Age: 82
End: 2022-10-31

## 2022-10-31 DIAGNOSIS — Z95.820 S/P ANGIOPLASTY WITH STENT: Primary | ICD-10-CM

## 2022-10-31 PROCEDURE — 93797 PHYS/QHP OP CAR RHAB WO ECG: CPT

## 2022-10-31 PROCEDURE — 93798 PHYS/QHP OP CAR RHAB W/ECG: CPT

## 2022-11-02 ENCOUNTER — TREATMENT (OUTPATIENT)
Dept: CARDIAC REHAB | Facility: HOSPITAL | Age: 82
End: 2022-11-02

## 2022-11-02 DIAGNOSIS — Z95.820 S/P ANGIOPLASTY WITH STENT: Primary | ICD-10-CM

## 2022-11-02 PROCEDURE — 93798 PHYS/QHP OP CAR RHAB W/ECG: CPT

## 2022-11-04 ENCOUNTER — TREATMENT (OUTPATIENT)
Dept: CARDIAC REHAB | Facility: HOSPITAL | Age: 82
End: 2022-11-04

## 2022-11-04 DIAGNOSIS — Z95.820 S/P ANGIOPLASTY WITH STENT: Primary | ICD-10-CM

## 2022-11-04 PROCEDURE — 93798 PHYS/QHP OP CAR RHAB W/ECG: CPT

## 2022-11-07 ENCOUNTER — TREATMENT (OUTPATIENT)
Dept: CARDIAC REHAB | Facility: HOSPITAL | Age: 82
End: 2022-11-07

## 2022-11-07 DIAGNOSIS — Z95.820 S/P ANGIOPLASTY WITH STENT: Primary | ICD-10-CM

## 2022-11-07 PROCEDURE — 93798 PHYS/QHP OP CAR RHAB W/ECG: CPT

## 2022-11-09 ENCOUNTER — TREATMENT (OUTPATIENT)
Dept: CARDIAC REHAB | Facility: HOSPITAL | Age: 82
End: 2022-11-09

## 2022-11-09 ENCOUNTER — OFFICE VISIT (OUTPATIENT)
Dept: CARDIAC REHAB | Facility: HOSPITAL | Age: 82
End: 2022-11-09

## 2022-11-09 DIAGNOSIS — Z95.820 S/P ANGIOPLASTY WITH STENT: Primary | ICD-10-CM

## 2022-11-09 PROCEDURE — 93797 PHYS/QHP OP CAR RHAB WO ECG: CPT

## 2022-11-09 PROCEDURE — 93798 PHYS/QHP OP CAR RHAB W/ECG: CPT

## 2022-11-11 ENCOUNTER — APPOINTMENT (OUTPATIENT)
Dept: CARDIAC REHAB | Facility: HOSPITAL | Age: 82
End: 2022-11-11

## 2022-11-11 ENCOUNTER — OFFICE VISIT (OUTPATIENT)
Dept: CARDIOLOGY | Facility: CLINIC | Age: 82
End: 2022-11-11

## 2022-11-11 VITALS
HEIGHT: 69 IN | SYSTOLIC BLOOD PRESSURE: 126 MMHG | HEART RATE: 71 BPM | BODY MASS INDEX: 24.59 KG/M2 | WEIGHT: 166 LBS | DIASTOLIC BLOOD PRESSURE: 64 MMHG

## 2022-11-11 DIAGNOSIS — I10 PRIMARY HYPERTENSION: ICD-10-CM

## 2022-11-11 DIAGNOSIS — E78.5 HYPERLIPIDEMIA LDL GOAL <70: ICD-10-CM

## 2022-11-11 DIAGNOSIS — I25.118 CORONARY ARTERY DISEASE OF NATIVE ARTERY OF NATIVE HEART WITH STABLE ANGINA PECTORIS: Primary | ICD-10-CM

## 2022-11-11 PROCEDURE — 99214 OFFICE O/P EST MOD 30 MIN: CPT | Performed by: INTERNAL MEDICINE

## 2022-11-14 ENCOUNTER — TELEPHONE (OUTPATIENT)
Dept: CARDIOLOGY | Facility: CLINIC | Age: 82
End: 2022-11-14

## 2022-11-14 ENCOUNTER — TREATMENT (OUTPATIENT)
Dept: CARDIAC REHAB | Facility: HOSPITAL | Age: 82
End: 2022-11-14

## 2022-11-14 DIAGNOSIS — Z95.820 S/P ANGIOPLASTY WITH STENT: Primary | ICD-10-CM

## 2022-11-14 DIAGNOSIS — I25.118 CORONARY ARTERY DISEASE OF NATIVE ARTERY OF NATIVE HEART WITH STABLE ANGINA PECTORIS: Primary | ICD-10-CM

## 2022-11-14 PROCEDURE — 93798 PHYS/QHP OP CAR RHAB W/ECG: CPT

## 2022-11-16 ENCOUNTER — TREATMENT (OUTPATIENT)
Dept: CARDIAC REHAB | Facility: HOSPITAL | Age: 82
End: 2022-11-16

## 2022-11-16 DIAGNOSIS — Z95.820 S/P ANGIOPLASTY WITH STENT: Primary | ICD-10-CM

## 2022-11-16 PROCEDURE — 93798 PHYS/QHP OP CAR RHAB W/ECG: CPT

## 2022-11-18 ENCOUNTER — TREATMENT (OUTPATIENT)
Dept: CARDIAC REHAB | Facility: HOSPITAL | Age: 82
End: 2022-11-18

## 2022-11-18 DIAGNOSIS — Z95.820 S/P ANGIOPLASTY WITH STENT: Primary | ICD-10-CM

## 2022-11-18 PROCEDURE — 93798 PHYS/QHP OP CAR RHAB W/ECG: CPT

## 2022-11-21 ENCOUNTER — LAB (OUTPATIENT)
Dept: LAB | Facility: HOSPITAL | Age: 82
End: 2022-11-21

## 2022-11-21 ENCOUNTER — TREATMENT (OUTPATIENT)
Dept: CARDIAC REHAB | Facility: HOSPITAL | Age: 82
End: 2022-11-21

## 2022-11-21 DIAGNOSIS — I25.118 CORONARY ARTERY DISEASE OF NATIVE ARTERY OF NATIVE HEART WITH STABLE ANGINA PECTORIS: ICD-10-CM

## 2022-11-21 DIAGNOSIS — Z95.820 S/P ANGIOPLASTY WITH STENT: Primary | ICD-10-CM

## 2022-11-21 PROCEDURE — 93798 PHYS/QHP OP CAR RHAB W/ECG: CPT

## 2022-11-21 RX ORDER — CLOPIDOGREL BISULFATE 75 MG/1
75 TABLET ORAL DAILY
Qty: 90 TABLET | Refills: 0 | Status: SHIPPED | OUTPATIENT
Start: 2022-11-21 | End: 2023-01-27 | Stop reason: SDUPTHER

## 2022-11-21 RX ORDER — SIMVASTATIN 40 MG
40 TABLET ORAL NIGHTLY
Qty: 90 TABLET | Refills: 0 | Status: SHIPPED | OUTPATIENT
Start: 2022-11-21 | End: 2023-01-31 | Stop reason: SDUPTHER

## 2022-11-21 NOTE — PROGRESS NOTES
[facility]      Patient Name: Louie German  :1940  Age: 82 y.o.  Encounter Provider:  Shiv Phipps Jr, MD      Chief Complaint:   Chief Complaint   Patient presents with   • Coronary Artery Disease         Coronary Artery Disease  Symptoms include chest pain. Pertinent negatives include no dizziness, leg swelling or palpitations.     Louie German is a 82 y.o. male with past medical history of CAD status post PCI , hypertension who presents for follow-up evaluation of chest pain.     Last clinic visit note: Per the patient in  he had chest discomfort radiating down the left arm and was brought to the hospital.  Although he did not suffer myocardial infarction a stress study showed abnormal perfusion imaging and he was taken for cardiac catheterization where a stent was placed.  He does not have any further details than that.  He has not followed with a cardiologist since then.  Currently not on aspirin or statin.  In 2021 he woke up with 10 at 10 chest pain which she has trouble qualifying further.  He would presented to the ER and the work-up was benign with spontaneous resolution of the pain and no further medical investigation was undertaken.  In May 2022 he had a similar episode but this time it was substernal pain radiating around the right flank to the back.  He did not seek medical attention for this and has not had any episodes since then.  For his age he is very active with no chest pain or shortness of air during activity.  Although I stays active he really cannot do a lot in the way of aerobic activity due to gait and strength issues however he does a significant amount of walking around the property and gardening.  No orthopnea, PND or edema.  No palpitations, dizziness or syncope.  He is a lifelong non-smoker who drinks at least 1 alcoholic beverage daily and denies illicit drug use.  Family history was reviewed and is not pertinent to this  "clinic visit.    After our initial visit the patient had a stress test that showed a moderate amount of john-infarct ischemia and was sent for cardiac catheterization given his symptoms.  Patient ultimately had to stents placed in the mid RCA and 3 stents in the mid LAD.  He saw NOLAN Grissom in follow-up and was doing fairly well at that time.  He continues to do fairly well with increased activity and no limiting symptoms.  He denies chest pain or shortness of air.  Occasional palpitations but no dizziness or syncope.  No orthopnea, PND or edema.  He is compliant with dual antiplatelet therapy and statin.      The following portions of the patient's history were reviewed and updated as appropriate: allergies, current medications, past family history, past medical history, past social history, past surgical history and problem list.      Review of Systems   Constitutional: Negative for chills and fever.   HENT: Negative for hoarse voice and sore throat.    Eyes: Negative for double vision and photophobia.   Cardiovascular: Positive for chest pain. Negative for leg swelling, near-syncope, orthopnea, palpitations, paroxysmal nocturnal dyspnea and syncope.   Respiratory: Negative for cough and wheezing.    Skin: Negative for poor wound healing and rash.   Musculoskeletal: Negative for arthritis and joint swelling.   Gastrointestinal: Negative for bloating, abdominal pain, hematemesis and hematochezia.   Neurological: Negative for dizziness and focal weakness.   Psychiatric/Behavioral: Negative for depression and suicidal ideas.       OBJECTIVE:   Vital Signs  Vitals:    11/11/22 1328   BP: 126/64   Pulse: 71     Estimated body mass index is 24.87 kg/m² as calculated from the following:    Height as of this encounter: 174 cm (68.5\").    Weight as of this encounter: 75.3 kg (166 lb).    Vitals reviewed.   Constitutional:       Appearance: Healthy appearance. Not in distress.   Neck:      Vascular: No JVR. JVD " normal.   Pulmonary:      Effort: Pulmonary effort is normal.      Breath sounds: Normal breath sounds. No wheezing. No rhonchi. No rales.   Chest:      Chest wall: Not tender to palpatation.   Cardiovascular:      PMI at left midclavicular line. Normal rate. Regular rhythm. Normal S1. Normal S2.      Murmurs: There is no murmur.      No gallop. No click. No rub.   Pulses:     Intact distal pulses.   Edema:     Peripheral edema absent.   Abdominal:      General: Bowel sounds are normal.      Palpations: Abdomen is soft.      Tenderness: There is no abdominal tenderness.   Musculoskeletal: Normal range of motion.         General: No tenderness. Skin:     General: Skin is warm and dry.   Neurological:      General: No focal deficit present.      Mental Status: Alert and oriented to person, place and time.         Procedures          ASSESSMENT:     Coronary artery disease  Hypertension    PLAN OF CARE:     1. Coronary artery disease -status post recent multivessel intervention compliant with dual antiplatelet therapy and statin.  Check fasting lipid profile.  Continue to increase activity as tolerated.  2. B-cell lymphoma -he is undergone low cardiotoxicity regimens.  No heart failure symptoms.  We will monitor clinical progress.  3. Hypertension -seemingly well controlled at this time.  Continue felodipine.  4. Coronary artery disease -as discussed above.    Return to clinic 6 months           Discharge Medications          Accurate as of November 11, 2022 11:59 PM. If you have any questions, ask your nurse or doctor.            Continue These Medications      Instructions Start Date   aspirin 81 MG chewable tablet   81 mg, Oral, Daily      CBD oral oil  Commonly known as: cannabidiol   1 mL, Oral, Daily      clopidogrel 75 MG tablet  Commonly known as: PLAVIX   75 mg, Oral, Daily      felodipine 10 MG 24 hr tablet  Commonly known as: PLENDIL   TAKE 1 TABLET DAILY      multivitamin tablet tablet  Commonly known as:  THERAGRAN   1 tablet, Oral, Daily      simvastatin 40 MG tablet  Commonly known as: ZOCOR   40 mg, Oral, Nightly             Thank you for allowing me to participate in the care of your patient,      Sincerely,   Shiv Phipps Jr, MD  [facility]  11/21/22  07:49 EST

## 2022-11-22 ENCOUNTER — LAB (OUTPATIENT)
Dept: LAB | Facility: HOSPITAL | Age: 82
End: 2022-11-22

## 2022-11-22 LAB
CHOLEST SERPL-MCNC: 136 MG/DL (ref 0–200)
HDLC SERPL-MCNC: 62 MG/DL (ref 40–60)
LDLC SERPL CALC-MCNC: 56 MG/DL (ref 0–100)
LDLC/HDLC SERPL: 0.88 {RATIO}
TRIGL SERPL-MCNC: 98 MG/DL (ref 0–150)
VLDLC SERPL-MCNC: 18 MG/DL (ref 5–40)

## 2022-11-22 PROCEDURE — 80061 LIPID PANEL: CPT

## 2022-11-22 PROCEDURE — 36415 COLL VENOUS BLD VENIPUNCTURE: CPT

## 2022-11-23 ENCOUNTER — TREATMENT (OUTPATIENT)
Dept: CARDIAC REHAB | Facility: HOSPITAL | Age: 82
End: 2022-11-23

## 2022-11-23 DIAGNOSIS — Z95.820 S/P ANGIOPLASTY WITH STENT: Primary | ICD-10-CM

## 2022-11-23 PROCEDURE — 93798 PHYS/QHP OP CAR RHAB W/ECG: CPT

## 2022-11-25 ENCOUNTER — APPOINTMENT (OUTPATIENT)
Dept: CARDIAC REHAB | Facility: HOSPITAL | Age: 82
End: 2022-11-25

## 2022-11-28 ENCOUNTER — TREATMENT (OUTPATIENT)
Dept: CARDIAC REHAB | Facility: HOSPITAL | Age: 82
End: 2022-11-28

## 2022-11-28 DIAGNOSIS — Z95.820 S/P ANGIOPLASTY WITH STENT: Primary | ICD-10-CM

## 2022-11-28 PROCEDURE — 93798 PHYS/QHP OP CAR RHAB W/ECG: CPT

## 2022-11-30 ENCOUNTER — TREATMENT (OUTPATIENT)
Dept: CARDIAC REHAB | Facility: HOSPITAL | Age: 82
End: 2022-11-30

## 2022-11-30 DIAGNOSIS — Z95.820 S/P ANGIOPLASTY WITH STENT: Primary | ICD-10-CM

## 2022-11-30 PROCEDURE — 93798 PHYS/QHP OP CAR RHAB W/ECG: CPT

## 2022-12-02 ENCOUNTER — HOSPITAL ENCOUNTER (OUTPATIENT)
Dept: CT IMAGING | Facility: HOSPITAL | Age: 82
Discharge: HOME | End: 2022-12-02

## 2022-12-02 ENCOUNTER — LAB (OUTPATIENT)
Dept: LAB | Facility: HOSPITAL | Age: 82
End: 2022-12-02

## 2022-12-02 ENCOUNTER — APPOINTMENT (OUTPATIENT)
Dept: CARDIAC REHAB | Facility: HOSPITAL | Age: 82
End: 2022-12-02
Payer: MEDICARE

## 2022-12-02 DIAGNOSIS — C83.39 DIFFUSE LARGE B-CELL LYMPHOMA OF EXTRANODAL SITE EXCLUDING SPLEEN AND OTHER SOLID ORGANS: ICD-10-CM

## 2022-12-02 LAB
ALBUMIN SERPL-MCNC: 4.4 G/DL (ref 3.5–5.2)
ALBUMIN/GLOB SERPL: 1.6 G/DL
ALP SERPL-CCNC: 70 U/L (ref 39–117)
ALT SERPL W P-5'-P-CCNC: 12 U/L (ref 1–41)
ANION GAP SERPL CALCULATED.3IONS-SCNC: 8.5 MMOL/L (ref 5–15)
AST SERPL-CCNC: 22 U/L (ref 1–40)
BASOPHILS # BLD AUTO: 0.02 10*3/MM3 (ref 0–0.2)
BASOPHILS NFR BLD AUTO: 0.4 % (ref 0–1.5)
BILIRUB SERPL-MCNC: 0.6 MG/DL (ref 0–1.2)
BUN SERPL-MCNC: 19 MG/DL (ref 8–23)
BUN/CREAT SERPL: 14.6 (ref 7–25)
CALCIUM SPEC-SCNC: 9.7 MG/DL (ref 8.6–10.5)
CHLORIDE SERPL-SCNC: 105 MMOL/L (ref 98–107)
CO2 SERPL-SCNC: 28.5 MMOL/L (ref 22–29)
CREAT SERPL-MCNC: 1.3 MG/DL (ref 0.76–1.27)
DEPRECATED RDW RBC AUTO: 46.5 FL (ref 37–54)
EGFRCR SERPLBLD CKD-EPI 2021: 54.5 ML/MIN/1.73
EOSINOPHIL # BLD AUTO: 0.12 10*3/MM3 (ref 0–0.4)
EOSINOPHIL NFR BLD AUTO: 2.6 % (ref 0.3–6.2)
ERYTHROCYTE [DISTWIDTH] IN BLOOD BY AUTOMATED COUNT: 13.5 % (ref 12.3–15.4)
GLOBULIN UR ELPH-MCNC: 2.8 GM/DL
GLUCOSE SERPL-MCNC: 103 MG/DL (ref 65–99)
HCT VFR BLD AUTO: 40.4 % (ref 37.5–51)
HGB BLD-MCNC: 13.3 G/DL (ref 13–17.7)
IMM GRANULOCYTES # BLD AUTO: 0 10*3/MM3 (ref 0–0.05)
IMM GRANULOCYTES NFR BLD AUTO: 0 % (ref 0–0.5)
LYMPHOCYTES # BLD AUTO: 1.1 10*3/MM3 (ref 0.7–3.1)
LYMPHOCYTES NFR BLD AUTO: 24 % (ref 19.6–45.3)
MCH RBC QN AUTO: 30.7 PG (ref 26.6–33)
MCHC RBC AUTO-ENTMCNC: 32.9 G/DL (ref 31.5–35.7)
MCV RBC AUTO: 93.3 FL (ref 79–97)
MONOCYTES # BLD AUTO: 0.44 10*3/MM3 (ref 0.1–0.9)
MONOCYTES NFR BLD AUTO: 9.6 % (ref 5–12)
NEUTROPHILS NFR BLD AUTO: 2.91 10*3/MM3 (ref 1.7–7)
NEUTROPHILS NFR BLD AUTO: 63.4 % (ref 42.7–76)
NRBC BLD AUTO-RTO: 0 /100 WBC (ref 0–0.2)
PLATELET # BLD AUTO: 177 10*3/MM3 (ref 140–450)
PMV BLD AUTO: 10.1 FL (ref 6–12)
POTASSIUM SERPL-SCNC: 4.2 MMOL/L (ref 3.5–5.2)
PROT SERPL-MCNC: 7.2 G/DL (ref 6–8.5)
RBC # BLD AUTO: 4.33 10*6/MM3 (ref 4.14–5.8)
SODIUM SERPL-SCNC: 142 MMOL/L (ref 136–145)
WBC NRBC COR # BLD: 4.59 10*3/MM3 (ref 3.4–10.8)

## 2022-12-02 PROCEDURE — 0 DIATRIZOATE MEGLUMINE & SODIUM PER 1 ML: Performed by: INTERNAL MEDICINE

## 2022-12-02 PROCEDURE — 25010000002 IOPAMIDOL 61 % SOLUTION: Performed by: INTERNAL MEDICINE

## 2022-12-02 PROCEDURE — 74177 CT ABD & PELVIS W/CONTRAST: CPT

## 2022-12-02 PROCEDURE — 82565 ASSAY OF CREATININE: CPT

## 2022-12-02 PROCEDURE — 71260 CT THORAX DX C+: CPT

## 2022-12-02 PROCEDURE — 85025 COMPLETE CBC W/AUTO DIFF WBC: CPT | Performed by: INTERNAL MEDICINE

## 2022-12-02 PROCEDURE — 80053 COMPREHEN METABOLIC PANEL: CPT | Performed by: INTERNAL MEDICINE

## 2022-12-02 RX ADMIN — DIATRIZOATE MEGLUMINE AND DIATRIZOATE SODIUM 30 ML: 660; 100 LIQUID ORAL; RECTAL at 09:45

## 2022-12-02 RX ADMIN — IOPAMIDOL 100 ML: 612 INJECTION, SOLUTION INTRAVENOUS at 10:50

## 2022-12-05 ENCOUNTER — TREATMENT (OUTPATIENT)
Dept: CARDIAC REHAB | Facility: HOSPITAL | Age: 82
End: 2022-12-05
Payer: MEDICARE

## 2022-12-05 DIAGNOSIS — Z95.820 S/P ANGIOPLASTY WITH STENT: Primary | ICD-10-CM

## 2022-12-05 LAB — CREAT BLDA-MCNC: 1.4 MG/DL (ref 0.6–1.3)

## 2022-12-05 PROCEDURE — 93798 PHYS/QHP OP CAR RHAB W/ECG: CPT

## 2022-12-07 ENCOUNTER — TREATMENT (OUTPATIENT)
Dept: CARDIAC REHAB | Facility: HOSPITAL | Age: 82
End: 2022-12-07
Payer: MEDICARE

## 2022-12-07 DIAGNOSIS — Z95.820 S/P ANGIOPLASTY WITH STENT: Primary | ICD-10-CM

## 2022-12-07 PROCEDURE — 93798 PHYS/QHP OP CAR RHAB W/ECG: CPT

## 2022-12-09 ENCOUNTER — OFFICE VISIT (OUTPATIENT)
Dept: ONCOLOGY | Facility: CLINIC | Age: 82
End: 2022-12-09

## 2022-12-09 ENCOUNTER — TREATMENT (OUTPATIENT)
Dept: CARDIAC REHAB | Facility: HOSPITAL | Age: 82
End: 2022-12-09
Payer: MEDICARE

## 2022-12-09 VITALS
TEMPERATURE: 97.8 F | RESPIRATION RATE: 16 BRPM | HEIGHT: 69 IN | WEIGHT: 171.9 LBS | HEART RATE: 71 BPM | OXYGEN SATURATION: 99 % | BODY MASS INDEX: 25.46 KG/M2 | DIASTOLIC BLOOD PRESSURE: 72 MMHG | SYSTOLIC BLOOD PRESSURE: 124 MMHG

## 2022-12-09 DIAGNOSIS — C83.39 DIFFUSE LARGE B-CELL LYMPHOMA OF EXTRANODAL SITE EXCLUDING SPLEEN AND OTHER SOLID ORGANS: ICD-10-CM

## 2022-12-09 DIAGNOSIS — Z95.820 S/P ANGIOPLASTY WITH STENT: Primary | ICD-10-CM

## 2022-12-09 DIAGNOSIS — C82.90 FOLLICULAR LYMPHOMA, UNSPECIFIED FOLLICULAR LYMPHOMA TYPE, UNSPECIFIED BODY REGION: Primary | ICD-10-CM

## 2022-12-09 PROCEDURE — 99214 OFFICE O/P EST MOD 30 MIN: CPT | Performed by: INTERNAL MEDICINE

## 2022-12-09 PROCEDURE — 93798 PHYS/QHP OP CAR RHAB W/ECG: CPT

## 2022-12-09 NOTE — PROGRESS NOTES
Subjective     REASON FOR FOLLOW UP:   1.  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  2.  Maintenance Rituxan every 3 months for 2 years initiated 3/30/2018  3.  Painful pathologic compression fracture at L3 May 2018 due to involvement with diffuse large B-cell non-Hodgkin's lymphoma.   4.  CHOP and Rituxan x 6 cycles completed 9/25/2018.    HISTORY OF PRESENT ILLNESS:  The patient is a 82 y.o. year old male who was seen in consultation on 10/20/2017 and at that time we recommended laparoscopic lymph node sampling and upper and lower GI endoscopy.  He underwent these procedures with Dr. Castañeda on 10/26/2017.  There was no evidence of malignancy from his endoscopic procedures at the laparoscopic specimen returned as follicular center cell B-cell non-Hodgkin's lymphoma.    He received Rituxan treatment initially with 4 weekly doses of Rituxan with good response on follow-up scans.  Unfortunately, a few months after completion of his treatment he developed worsening back pain and has lymphoma involving the L3 vertebral body consistent with diffuse large B-cell non-Hodgkin's lymphoma.    He underwent port placement and received 6 cycles of CHOP and Rituxan which he completed in late September 2018 with what appear to be a complete response on PET scan.    He returns today for 6-month follow-up.  He underwent surveillance CT scans on 12/2/2022 with no evidence of recurrent lymphoma.    He was having some angina which ultimately led to a cardiac cath and stent placements in September.  He currently seems to be doing well having be doing cardiac rehab.  He remains on aspirin and Plavix.    Since his last visit here he also has become an [place] citizen and is planning to travel back to [place] this spring.    Pain  Pertinent negatives include no chest pain, coughing, fatigue, fever, headaches, nausea, neck pain, rash or  "vomiting.        Past Medical History:   Diagnosis Date   • Arm fracture     right side humerus - sling and swathe   • Cancer (HCC) 2017    Basal Cell on Nose and Under Right Eye   • Coronary artery disease    • DDD (degenerative disc disease), cervical    • Gastric mass    • GERD (gastroesophageal reflux disease)    • Hip pain started 5/29/18    onset of acute pain with weight bearing   • Hx of cardiac arrest     Happened after Induction of anesthesia prior  to SURG 10 YRS AGO  \" I FLATLINE BUT W/IN SECONDS HEART WAS OK AFTER TURNED ON BACK...I THINK THEY GAVE ME TO MUCH ANESTHESIA\"   • Hyperlipidemia    • Hypertension    • Kidney stones    • Non Hodgkin's lymphoma (HCC)     SPINE   • Osteoarthritis    • Pleural effusion     DRAINAGE RT PLEURAL FLUID FOR TESTING WITH EGD ON 1-30-17   NO MALIGNANCY WITH CLEARANCE FROM DR SIMMONS FOR SURGERY   • Rheumatoid arthritis (HCC)         Past Surgical History:   Procedure Laterality Date   • CARDIAC CATHETERIZATION N/A 9/1/2022    Procedure: Left Heart Cath;  Surgeon: Fletcher Nunes MD;  Location: [facility] CATH INVASIVE LOCATION;  Service: Cardiovascular;  Laterality: N/A;   • CARDIAC CATHETERIZATION N/A 9/1/2022    Procedure: Coronary angiography;  Surgeon: Fletcher Nunes MD;  Location: [facility] CATH INVASIVE LOC[facility];  Service: Cardiovascular;  Laterality: N/A;   • CARDIAC CATHETERIZATION N/A 9/1/2022    Procedure: Left ventriculography;  Surgeon: Fletcher Nuens MD;  [facility] LOC[facility];  Service: Cardiovascular;  Laterality: N/A;   • CARDIAC CATHETERIZATION N/A 9/1/2022    Procedure: Percutaneous Coronary Intervention;  Surgeon: Fletcher Nunes MD;  [facility];  Service: Cardiovascular;  Laterality: N/A;   • CARDIAC CATHETERIZATION N/A 9/1/2022    Procedure: Stent SUSANNA coronary;  Surgeon: Fletcher Nunes MD;  [facility];  Service: Cardiovascular;  Laterality: N/A;   • CARDIAC CATHETERIZATION " N/A 9/1/2022    Procedure: Resting Full Cycle Ratio;  Surgeon: Fletcher Nunes MD;  Location: [facility] CATH INVASIVE LOCATION;  Service: Cardiovascular;  Laterality: N/A;   • COLONOSCOPY N/A 10/26/2017    Procedure: COLONOSCOPY;  Surgeon: Louie Castañeda MD;  Location: [facility] MAIN OR;  Service:    • CORONARY ANGIOPLASTY WITH STENT PLACEMENT  1999    17 years ago   • CYSTOSCOPY W/ LITHOLAPAXY / EHL      6-7 years ago   • DIAGNOSTIC LAPAROSCOPY N/A 10/26/2017    Procedure: DIAGNOSTIC LAPAROSCOPY WITH RETROPERITONEAL BIOPSIES;  Surgeon: Louie Castañeda MD;  Location: [facility] MAIN OR;  Service:    • ENDOSCOPY      ON 1/30/17 WITH DRAINAGE OF RT PLEURAL FLUID FOR BIOPSY    NO MALIGNANCY  CLEARANCE FOR SURG PER DR SIMMONS   • ENDOSCOPY N/A 10/26/2017    Procedure: ESOPHAGOGASTRODUODENOSCOPY;  Surgeon: Louie Castañeda MD;  Location: [facility] OR;  Service:    • EYE SURGERY Right 2017    Moh's; reconstruction right lower lid   • KIDNEY STONE SURGERY     • PLEURAL BIOPSY     • POSTERIOR CERVICAL FUSION  2001   • SKIN CANCER EXCISION N/A 2017    Basal Cell Nose & under right eye   • TOTAL HIP ARTHROPLASTY Left 04/2010    9-10 years ago    • TOTAL HIP ARTHROPLASTY Right 2/14/2017    Procedure: TOTAL HIP ARTHROPLASTY;  Surgeon: Gerson Cardoza MD;  Location: [facility] OR;  Service:    • VENOUS ACCESS DEVICE (PORT) INSERTION N/A 6/5/2018    Procedure: INSERTION VENOUS ACCESS DEVICE- RIGHT;  Surgeon: Louie Castañeda MD;  Location: [facility] OR;  Service: General   • VENOUS ACCESS DEVICE (PORT) REMOVAL Right 08/31/2021    In-office Procedure-Dr. Louie Castañeda, [facility]         ALLERGIES:  No Known Allergies     Review of Systems   Constitutional: Positive for appetite change and unexpected weight change. Negative for activity change, fatigue and fever.   HENT: Negative for hearing loss, nosebleeds, trouble swallowing and voice change.    Eyes: Negative for visual disturbance.   Respiratory: Negative for cough,  "shortness of breath and wheezing.    Cardiovascular: Negative for chest pain and palpitations.   Gastrointestinal: Positive for constipation. Negative for diarrhea, nausea and vomiting.   Genitourinary: Negative for difficulty urinating, frequency, hematuria and urgency.   Musculoskeletal: Positive for back pain. Negative for neck pain.   Skin: Negative for rash.   Neurological: Negative for dizziness, seizures, syncope and headaches.   Hematological: Negative for adenopathy. Does not bruise/bleed easily.   Psychiatric/Behavioral: Negative for behavioral problems. The patient is not nervous/anxious.         Objective     Vitals:    12/09/22 1345   BP: 124/72   Pulse: 71   Resp: 16   Temp: 97.8 °F (36.6 °C)   TempSrc: Temporal   SpO2: 99%   Weight: 78 kg (171 lb 14.4 oz)   Height: 174 cm (68.5\")   PainSc: 0-No pain     Current Status 12/9/2022   ECOG score 0       Physical Exam   Constitutional: He is oriented to person, place, and time. He appears well-developed. No distress.   HENT:   Head: Normocephalic.   Eyes: Pupils are equal, round, and reactive to light. Conjunctivae are normal. No scleral icterus.   Neck: No JVD present. No thyromegaly present.   Cardiovascular: Normal rate and regular rhythm. Exam reveals no gallop and no friction rub.   No murmur heard.  Pulmonary/Chest: Effort normal and breath sounds normal. He has no wheezes. He has no rales.   Abdominal: Soft. He exhibits no distension and no mass. There is no abdominal tenderness.   Musculoskeletal: Normal range of motion. No deformity.   Lymphadenopathy:     He has no cervical adenopathy.   Neurological: He is alert and oriented to person, place, and time. He has normal reflexes. No cranial nerve deficit.   Skin: Skin is warm and dry. No rash noted. No erythema.   Psychiatric: His behavior is normal. Judgment normal.         RECENT LABS:  Hematology WBC   Date Value Ref Range Status   12/02/2022 4.59 3.40 - 10.80 10*3/mm3 Final   02/20/2020 5.36 3.40 " - 10.80 10*3/mm3 Final     RBC   Date Value Ref Range Status   12/02/2022 4.33 4.14 - 5.80 10*6/mm3 Final   02/20/2020 4.04 (L) 4.14 - 5.80 10*6/mm3 Final     Hemoglobin   Date Value Ref Range Status   12/02/2022 13.3 13.0 - 17.7 g/dL Final     Hematocrit   Date Value Ref Range Status   12/02/2022 40.4 37.5 - 51.0 % Final     Platelets   Date Value Ref Range Status   12/02/2022 177 140 - 450 10*3/mm3 Final          Lab Results   Component Value Date    GLUCOSE 103 (H) 12/02/2022    BUN 19 12/02/2022    CREATININE 1.40 (H) 12/02/2022    EGFRIFNONA 51 (L) 12/03/2021    EGFRIFAFRI 64 02/20/2020    BCR 14.6 12/02/2022    K 4.2 12/02/2022    CO2 28.5 12/02/2022    CALCIUM 9.7 12/02/2022    PROTENTOTREF 6.5 03/02/2022    ALBUMIN 4.40 12/02/2022    LABIL2 1.7 03/02/2022    AST 22 12/02/2022    ALT 12 12/02/2022       CT CHEST, ABDOMEN AND PELVIS WITH CONTRAST  12/2/2022  IMPRESSION:   No significant change. Continued follow-up recommended as indicated.    Images personally reviewed    Assessment & Plan     1.  Diffuse large B-cell non-Hodgkin's lymphoma involving the L3 vertebral body.  PET scan after 4 cycles of CHOP and Rituxan showed essentially a complete metabolic response.  He completed a total of 6 cycles of chemotherapy by 9/25/2018 and remains in remission now over 4 years out from completion of his treatment.  2.  Previous treatment with single agent Rituxan for follicular B-cell lymphoma.  3.  Back pain related to lymphoma involving the spine.  Improved  4.  Right IJ Mediport removed in August 2021.    5.  Coronary artery disease with recent cardiac cath with drug-eluting stents in September 2022      Plan  1.  We will schedule MD follow-up in the office in 6 months.   2.  We may consider repeating scans again in 12 months and most likely from there scans will be performed on an as-needed basis only has he will be over 5 years out from his treatment for lymphoma at that point.

## 2022-12-12 ENCOUNTER — TREATMENT (OUTPATIENT)
Dept: CARDIAC REHAB | Facility: HOSPITAL | Age: 82
End: 2022-12-12
Payer: MEDICARE

## 2022-12-12 DIAGNOSIS — Z95.820 S/P ANGIOPLASTY WITH STENT: Primary | ICD-10-CM

## 2022-12-12 PROCEDURE — 93798 PHYS/QHP OP CAR RHAB W/ECG: CPT

## 2022-12-14 ENCOUNTER — TREATMENT (OUTPATIENT)
Dept: CARDIAC REHAB | Facility: HOSPITAL | Age: 82
End: 2022-12-14
Payer: MEDICARE

## 2022-12-14 DIAGNOSIS — Z95.820 S/P ANGIOPLASTY WITH STENT: Primary | ICD-10-CM

## 2022-12-14 PROCEDURE — 93798 PHYS/QHP OP CAR RHAB W/ECG: CPT

## 2022-12-16 ENCOUNTER — TREATMENT (OUTPATIENT)
Dept: CARDIAC REHAB | Facility: HOSPITAL | Age: 82
End: 2022-12-16
Payer: MEDICARE

## 2022-12-16 DIAGNOSIS — Z95.820 S/P ANGIOPLASTY WITH STENT: Primary | ICD-10-CM

## 2022-12-16 PROCEDURE — 93798 PHYS/QHP OP CAR RHAB W/ECG: CPT

## 2022-12-19 ENCOUNTER — TREATMENT (OUTPATIENT)
Dept: CARDIAC REHAB | Facility: HOSPITAL | Age: 82
End: 2022-12-19
Payer: MEDICARE

## 2022-12-19 DIAGNOSIS — Z95.820 S/P ANGIOPLASTY WITH STENT: Primary | ICD-10-CM

## 2022-12-19 PROCEDURE — 93798 PHYS/QHP OP CAR RHAB W/ECG: CPT

## 2022-12-21 ENCOUNTER — TREATMENT (OUTPATIENT)
Dept: CARDIAC REHAB | Facility: HOSPITAL | Age: 82
End: 2022-12-21
Payer: MEDICARE

## 2022-12-21 DIAGNOSIS — Z95.820 S/P ANGIOPLASTY WITH STENT: Primary | ICD-10-CM

## 2022-12-21 PROCEDURE — 93798 PHYS/QHP OP CAR RHAB W/ECG: CPT

## 2022-12-23 ENCOUNTER — APPOINTMENT (OUTPATIENT)
Dept: CARDIAC REHAB | Facility: HOSPITAL | Age: 82
End: 2022-12-23
Payer: MEDICARE

## 2022-12-28 ENCOUNTER — TREATMENT (OUTPATIENT)
Dept: CARDIAC REHAB | Facility: HOSPITAL | Age: 82
End: 2022-12-28
Payer: MEDICARE

## 2022-12-28 DIAGNOSIS — Z95.820 S/P ANGIOPLASTY WITH STENT: Primary | ICD-10-CM

## 2022-12-28 PROCEDURE — 93798 PHYS/QHP OP CAR RHAB W/ECG: CPT

## 2022-12-30 ENCOUNTER — TREATMENT (OUTPATIENT)
Dept: CARDIAC REHAB | Facility: HOSPITAL | Age: 82
End: 2022-12-30
Payer: MEDICARE

## 2022-12-30 DIAGNOSIS — Z95.820 S/P ANGIOPLASTY WITH STENT: Primary | ICD-10-CM

## 2022-12-30 PROCEDURE — 93798 PHYS/QHP OP CAR RHAB W/ECG: CPT

## 2023-01-04 ENCOUNTER — TREATMENT (OUTPATIENT)
Dept: CARDIAC REHAB | Facility: HOSPITAL | Age: 83
End: 2023-01-04
Payer: MEDICARE

## 2023-01-04 DIAGNOSIS — Z95.820 S/P ANGIOPLASTY WITH STENT: Primary | ICD-10-CM

## 2023-01-04 PROCEDURE — 93798 PHYS/QHP OP CAR RHAB W/ECG: CPT

## 2023-01-06 ENCOUNTER — TREATMENT (OUTPATIENT)
Dept: CARDIAC REHAB | Facility: HOSPITAL | Age: 83
End: 2023-01-06
Payer: MEDICARE

## 2023-01-06 DIAGNOSIS — Z95.820 S/P ANGIOPLASTY WITH STENT: Primary | ICD-10-CM

## 2023-01-06 PROCEDURE — 93798 PHYS/QHP OP CAR RHAB W/ECG: CPT

## 2023-01-09 ENCOUNTER — APPOINTMENT (OUTPATIENT)
Dept: CARDIAC REHAB | Facility: HOSPITAL | Age: 83
End: 2023-01-09
Payer: MEDICARE

## 2023-01-11 ENCOUNTER — APPOINTMENT (OUTPATIENT)
Dept: CARDIAC REHAB | Facility: HOSPITAL | Age: 83
End: 2023-01-11
Payer: MEDICARE

## 2023-01-13 ENCOUNTER — APPOINTMENT (OUTPATIENT)
Dept: CARDIAC REHAB | Facility: HOSPITAL | Age: 83
End: 2023-01-13
Payer: MEDICARE

## 2023-01-16 ENCOUNTER — APPOINTMENT (OUTPATIENT)
Dept: CARDIAC REHAB | Facility: HOSPITAL | Age: 83
End: 2023-01-16
Payer: MEDICARE

## 2023-01-18 ENCOUNTER — APPOINTMENT (OUTPATIENT)
Dept: CARDIAC REHAB | Facility: HOSPITAL | Age: 83
End: 2023-01-18
Payer: MEDICARE

## 2023-01-20 ENCOUNTER — APPOINTMENT (OUTPATIENT)
Dept: CARDIAC REHAB | Facility: HOSPITAL | Age: 83
End: 2023-01-20
Payer: MEDICARE

## 2023-01-23 ENCOUNTER — APPOINTMENT (OUTPATIENT)
Dept: CARDIAC REHAB | Facility: HOSPITAL | Age: 83
End: 2023-01-23
Payer: MEDICARE

## 2023-01-25 ENCOUNTER — APPOINTMENT (OUTPATIENT)
Dept: CARDIAC REHAB | Facility: HOSPITAL | Age: 83
End: 2023-01-25
Payer: MEDICARE

## 2023-01-27 RX ORDER — CLOPIDOGREL BISULFATE 75 MG/1
75 TABLET ORAL DAILY
Qty: 90 TABLET | Refills: 0 | Status: SHIPPED | OUTPATIENT
Start: 2023-01-27

## 2023-01-31 RX ORDER — SIMVASTATIN 40 MG
40 TABLET ORAL NIGHTLY
Qty: 90 TABLET | Refills: 0 | Status: SHIPPED | OUTPATIENT
Start: 2023-01-31

## 2023-02-02 RX ORDER — CLOPIDOGREL BISULFATE 75 MG/1
75 TABLET ORAL DAILY
Qty: 90 TABLET | Refills: 0 | OUTPATIENT
Start: 2023-02-02

## 2023-02-20 DIAGNOSIS — I10 BENIGN ESSENTIAL HTN: ICD-10-CM

## 2023-02-20 RX ORDER — FELODIPINE 10 MG/1
TABLET, EXTENDED RELEASE ORAL
Qty: 90 TABLET | Refills: 3 | Status: SHIPPED | OUTPATIENT
Start: 2023-02-20

## 2023-03-15 ENCOUNTER — OFFICE VISIT (OUTPATIENT)
Dept: INTERNAL MEDICINE | Facility: CLINIC | Age: 83
End: 2023-03-15
Payer: MEDICARE

## 2023-03-15 VITALS
WEIGHT: 172 LBS | BODY MASS INDEX: 25.77 KG/M2 | HEART RATE: 96 BPM | DIASTOLIC BLOOD PRESSURE: 78 MMHG | OXYGEN SATURATION: 99 % | SYSTOLIC BLOOD PRESSURE: 164 MMHG | TEMPERATURE: 98.2 F

## 2023-03-15 DIAGNOSIS — M65.9 TENOSYNOVITIS OF BOTH HANDS: ICD-10-CM

## 2023-03-15 DIAGNOSIS — I10 BENIGN ESSENTIAL HTN: Primary | ICD-10-CM

## 2023-03-15 DIAGNOSIS — Z95.5 S/P PRIMARY ANGIOPLASTY WITH CORONARY STENT: ICD-10-CM

## 2023-03-15 DIAGNOSIS — K59.00 CONSTIPATION, UNSPECIFIED CONSTIPATION TYPE: ICD-10-CM

## 2023-03-15 PROCEDURE — 1160F RVW MEDS BY RX/DR IN RCRD: CPT | Performed by: FAMILY MEDICINE

## 2023-03-15 PROCEDURE — 3078F DIAST BP <80 MM HG: CPT | Performed by: FAMILY MEDICINE

## 2023-03-15 PROCEDURE — 1159F MED LIST DOCD IN RCRD: CPT | Performed by: FAMILY MEDICINE

## 2023-03-15 PROCEDURE — 99214 OFFICE O/P EST MOD 30 MIN: CPT | Performed by: FAMILY MEDICINE

## 2023-03-15 PROCEDURE — 3077F SYST BP >= 140 MM HG: CPT | Performed by: FAMILY MEDICINE

## 2023-03-15 NOTE — PROGRESS NOTES
"Chief Complaint  Hypertension, Heartburn, and Hyperlipidemia    Subjective        Louie German presents to [facility] PRIMARY [facility]ARE  History of Present Illness  David is a delightful gentleman who is getting ready to go to [place] in 3 weeks.  He has had multiple stents placed by Dr. Nunes successfully.  He is stable on current dose of simvastatin 40 mg daily plus felodipine 10 mg daily for blood pressure.  His blood pressure is a little high today we will continue monitoring it.    Otherwise he has some tenosynovitis involving the flexor tendons in the right and left hand and seems to involve more of the right hand.  Occasionally has left hand going to sleep when he is driving.  He has no reduction in strength and continues to exercise at the [facility].  If his tendinitis progresses to trigger finger we will consider hand specialist referral.    Has had some recent constipation for uncertain reasons and will try Colace over-the-counter and/or MiraLAX or Citrucel.      Hypertension  This is a chronic problem. The problem has been gradually improving since onset.   Heartburn    Hyperlipidemia        Objective   Vital Signs:  /78 (BP Location: Left arm, Patient Position: Sitting, Cuff Size: Adult)   Pulse 96   Temp 98.2 °F (36.8 °C) (Tympanic)   Wt 78 kg (172 lb)   SpO2 99%   BMI 25.77 kg/m²   Estimated body mass index is 25.77 kg/m² as calculated from the following:    Height as of 12/9/22: 174 cm (68.5\").    Weight as of this encounter: 78 kg (172 lb).             Physical Exam  Vitals reviewed.   Constitutional:       Appearance: He is well-developed.   HENT:      Head: Normocephalic and atraumatic.      Right Ear: Tympanic membrane and external ear normal.      Left Ear: Tympanic membrane and external ear normal.      Nose: Nose normal.   Eyes:      Conjunctiva/sclera: Conjunctivae normal.      Pupils: Pupils are equal, round, and reactive to light.   Neck:      Thyroid: No thyromegaly. "      Vascular: No JVD.   Cardiovascular:      Rate and Rhythm: Normal rate and regular rhythm.      Heart sounds: Normal heart sounds.   Pulmonary:      Effort: Pulmonary effort is normal.      Breath sounds: Normal breath sounds.   Abdominal:      General: Bowel sounds are normal.      Palpations: Abdomen is soft.   Musculoskeletal:         General: Normal range of motion.        Hands:       Cervical back: Normal range of motion and neck supple.   Lymphadenopathy:      Cervical: No cervical adenopathy.   Skin:     General: Skin is warm and dry.      Findings: No rash.   Neurological:      Mental Status: He is alert and oriented to person, place, and time.      Cranial Nerves: No cranial nerve deficit.      Coordination: Coordination normal.   Psychiatric:         Behavior: Behavior normal.         Thought Content: Thought content normal.         Judgment: Judgment normal.        Result Review :                   Assessment and Plan   Diagnoses and all orders for this visit:    1. Benign essential HTN (Primary)  Comments:  Systolic blood pressure little high today continue felodipine 10 mg daily and monitor.    2. S/P primary angioplasty with coronary stent  Comments:  6 coronary stents present continuing on generic Plavix and aspirin plus simvastatin 40 mg which is effective in keeping LDL less than 70.    3. Tenosynovitis of both hands  Comments:  Referral to hand specialist for consideration if progression to full-blown trigger finger.    4. Constipation, unspecified constipation type  Comments:  trial of colace and or miralax             Follow Up   Return in about 6 months (around 9/15/2023) for Medicare Wellness.  Patient was given instructions and counseling regarding his condition or for health maintenance advice. Please see specific information pulled into the AVS if appropriate.

## 2023-04-27 RX ORDER — CLOPIDOGREL BISULFATE 75 MG/1
TABLET ORAL
Qty: 90 TABLET | Refills: 3 | Status: SHIPPED | OUTPATIENT
Start: 2023-04-27

## 2023-05-02 RX ORDER — SIMVASTATIN 40 MG
TABLET ORAL
Qty: 90 TABLET | Refills: 3 | Status: SHIPPED | OUTPATIENT
Start: 2023-05-02

## 2023-05-15 ENCOUNTER — OFFICE VISIT (OUTPATIENT)
Dept: CARDIOLOGY | Facility: CLINIC | Age: 83
End: 2023-05-15
Payer: MEDICARE

## 2023-05-15 VITALS
OXYGEN SATURATION: 99 % | WEIGHT: 171.8 LBS | DIASTOLIC BLOOD PRESSURE: 82 MMHG | BODY MASS INDEX: 25.45 KG/M2 | HEIGHT: 69 IN | HEART RATE: 72 BPM | SYSTOLIC BLOOD PRESSURE: 160 MMHG

## 2023-05-15 DIAGNOSIS — E78.5 HYPERLIPIDEMIA LDL GOAL <70: ICD-10-CM

## 2023-05-15 DIAGNOSIS — I10 PRIMARY HYPERTENSION: ICD-10-CM

## 2023-05-15 DIAGNOSIS — I25.118 CORONARY ARTERY DISEASE OF NATIVE ARTERY OF NATIVE HEART WITH STABLE ANGINA PECTORIS: Primary | ICD-10-CM

## 2023-05-15 PROCEDURE — 3077F SYST BP >= 140 MM HG: CPT | Performed by: INTERNAL MEDICINE

## 2023-05-15 PROCEDURE — 99214 OFFICE O/P EST MOD 30 MIN: CPT | Performed by: INTERNAL MEDICINE

## 2023-05-15 PROCEDURE — 3079F DIAST BP 80-89 MM HG: CPT | Performed by: INTERNAL MEDICINE

## 2023-05-15 PROCEDURE — 1159F MED LIST DOCD IN RCRD: CPT | Performed by: INTERNAL MEDICINE

## 2023-05-15 PROCEDURE — 1160F RVW MEDS BY RX/DR IN RCRD: CPT | Performed by: INTERNAL MEDICINE

## 2023-05-15 NOTE — PROGRESS NOTES
[facility]      Patient Name: Louie German  :1940  Age: 83 y.o.  Encounter Provider:  Shiv Phipps Jr, MD      Chief Complaint: Follow-up coronary artery disease      Coronary Artery Disease  Symptoms include chest pain. Pertinent negatives include no dizziness, leg swelling or palpitations.     Louie German is a 83 y.o. male with past medical history of CAD status post PCI , hypertension who presents for follow-up evaluation of chest pain.     Last clinic visit note: Per the patient in  he had chest discomfort radiating down the left arm and was brought to the hospital.  Although he did not suffer myocardial infarction a stress study showed abnormal perfusion imaging and he was taken for cardiac catheterization where a stent was placed.  He does not have any further details than that.  He has not followed with a cardiologist since then.  Currently not on aspirin or statin.  In 2021 he woke up with 10 at 10 chest pain which she has trouble qualifying further.  He would presented to the ER and the work-up was benign with spontaneous resolution of the pain and no further medical investigation was undertaken.  In May 2022 he had a similar episode but this time it was substernal pain radiating around the right flank to the back.  He did not seek medical attention for this and has not had any episodes since then.  For his age he is very active with no chest pain or shortness of air during activity.  Although I stays active he really cannot do a lot in the way of aerobic activity due to gait and strength issues however he does a significant amount of walking around the property and gardening.  No orthopnea, PND or edema.  No palpitations, dizziness or syncope.  He is a lifelong non-smoker who drinks at least 1 alcoholic beverage daily and denies illicit drug use.  Family history was reviewed and is not pertinent to this clinic visit.    After our initial visit the  patient had a stress test that showed a moderate amount of john-infarct ischemia and was sent for cardiac catheterization given his symptoms.  Patient ultimately had to stents placed in the mid RCA and 3 stents in the mid LAD.  He saw NOLAN Grissom in follow-up and was doing fairly well at that time.  He continues to do fairly well with increased activity and no limiting symptoms.  He denies chest pain or shortness of air.  Occasional palpitations but no dizziness or syncope.  No orthopnea, PND or edema.  He is compliant with dual antiplatelet therapy and statin.    Doing well since last visit.  He had an extended trip to [place] where he did a lot of walking in January and had no troubles.  He does note increased fatigue and leg cramps.  He discussed this with Dr. Villarreal and ABIs have been ordered.  Overall he has no chest pain or shortness of air with activity.  Social and family history was reviewed and is not pertinent to this clinic visit.    The following portions of the patient's history were reviewed and updated as appropriate: allergies, current medications, past family history, past medical history, past social history, past surgical history and problem list.      Review of Systems   Constitutional: Negative for chills and fever.   HENT: Negative for hoarse voice and sore throat.    Eyes: Negative for double vision and photophobia.   Cardiovascular: Positive for chest pain. Negative for leg swelling, near-syncope, orthopnea, palpitations, paroxysmal nocturnal dyspnea and syncope.   Respiratory: Negative for cough and wheezing.    Skin: Negative for poor wound healing and rash.   Musculoskeletal: Negative for arthritis and joint swelling.   Gastrointestinal: Negative for bloating, abdominal pain, hematemesis and hematochezia.   Neurological: Negative for dizziness and focal weakness.   Psychiatric/Behavioral: Negative for depression and suicidal ideas.       OBJECTIVE:   Vital Signs  Vitals:    05/15/23 0901  "  BP: 160/82   Pulse: 72   SpO2: 99%     Estimated body mass index is 25.74 kg/m² as calculated from the following:    Height as of this encounter: 174 cm (68.5\").    Weight as of this encounter: 77.9 kg (171 lb 12.8 oz).    Vitals reviewed.   Constitutional:       Appearance: Healthy appearance. Not in distress.   Neck:      Vascular: No JVR. JVD normal.   Pulmonary:      Effort: Pulmonary effort is normal.      Breath sounds: Normal breath sounds. No wheezing. No rhonchi. No rales.   Chest:      Chest wall: Not tender to palpatation.   Cardiovascular:      PMI at left midclavicular line. Normal rate. Regular rhythm. Normal S1. Normal S2.      Murmurs: There is no murmur.      No gallop. No click. No rub.   Pulses:     Intact distal pulses.   Edema:     Peripheral edema absent.   Abdominal:      General: Bowel sounds are normal.      Palpations: Abdomen is soft.      Tenderness: There is no abdominal tenderness.   Musculoskeletal: Normal range of motion.         General: No tenderness. Skin:     General: Skin is warm and dry.   Neurological:      General: No focal deficit present.      Mental Status: Alert and oriented to person, place and time.         Procedures          ASSESSMENT:     Coronary artery disease  Hypertension    PLAN OF CARE:     1. Coronary artery disease -status post recent multivessel intervention compliant with dual antiplatelet therapy and statin.  Lipids are well controlled but I cannot rule out myalgias secondary to statin.  We will follow ASHU and monitor clinical progress.  Continue to increase activity as tolerated.  2. B-cell lymphoma -he is undergone low cardiotoxicity regimens.  No heart failure symptoms.  We will monitor clinical progress.  3. Hypertension -seemingly well controlled at this time.  Continue felodipine.      Return to clinic 6 months           Discharge Medications          Accurate as of May 15, 2023  9:04 AM. If you have any questions, ask your nurse or doctor.       "      Continue These Medications      Instructions Start Date   aspirin 81 MG chewable tablet   81 mg, Oral, Daily      CBD oral oil  Commonly known as: cannabidiol   1 mL, Oral, As Needed      clopidogrel 75 MG tablet  Commonly known as: PLAVIX   TAKE 1 TABLET DAILY      felodipine 10 MG 24 hr tablet  Commonly known as: PLENDIL   TAKE 1 TABLET DAILY      multivitamin tablet tablet  Commonly known as: THERAGRAN   1 tablet, Oral, Daily      simvastatin 40 MG tablet  Commonly known as: ZOCOR   TAKE 1 TABLET EVERY NIGHT             Thank you for allowing me to participate in the care of your patient,      Sincerely,   Shiv Phipps MD  [facility]  05/15/23  09:04 EDT

## 2023-06-02 ENCOUNTER — LAB (OUTPATIENT)
Dept: OTHER | Facility: HOSPITAL | Age: 83
End: 2023-06-02
Payer: MEDICARE

## 2023-06-02 ENCOUNTER — OFFICE VISIT (OUTPATIENT)
Dept: ONCOLOGY | Facility: CLINIC | Age: 83
End: 2023-06-02

## 2023-06-02 VITALS
SYSTOLIC BLOOD PRESSURE: 148 MMHG | DIASTOLIC BLOOD PRESSURE: 78 MMHG | RESPIRATION RATE: 18 BRPM | WEIGHT: 170.8 LBS | OXYGEN SATURATION: 98 % | HEART RATE: 67 BPM | TEMPERATURE: 98 F | HEIGHT: 69 IN | BODY MASS INDEX: 25.3 KG/M2

## 2023-06-02 DIAGNOSIS — C83.39 DIFFUSE LARGE B-CELL LYMPHOMA OF EXTRANODAL SITE EXCLUDING SPLEEN AND OTHER SOLID ORGANS: Primary | ICD-10-CM

## 2023-06-02 DIAGNOSIS — D50.0 IRON DEFICIENCY ANEMIA DUE TO CHRONIC BLOOD LOSS: ICD-10-CM

## 2023-06-02 DIAGNOSIS — C83.39 DIFFUSE LARGE B-CELL LYMPHOMA OF EXTRANODAL SITE EXCLUDING SPLEEN AND OTHER SOLID ORGANS: ICD-10-CM

## 2023-06-02 DIAGNOSIS — C82.90 FOLLICULAR LYMPHOMA, UNSPECIFIED FOLLICULAR LYMPHOMA TYPE, UNSPECIFIED BODY REGION: ICD-10-CM

## 2023-06-02 LAB
ALBUMIN SERPL-MCNC: 3.8 G/DL (ref 3.5–5.2)
ALBUMIN/GLOB SERPL: 1.3 G/DL
ALP SERPL-CCNC: 59 U/L (ref 39–117)
ALT SERPL W P-5'-P-CCNC: 7 U/L (ref 1–41)
ANION GAP SERPL CALCULATED.3IONS-SCNC: 10.4 MMOL/L (ref 5–15)
AST SERPL-CCNC: 15 U/L (ref 1–40)
BASOPHILS # BLD AUTO: 0.03 10*3/MM3 (ref 0–0.2)
BASOPHILS NFR BLD AUTO: 0.6 % (ref 0–1.5)
BILIRUB SERPL-MCNC: 0.6 MG/DL (ref 0–1.2)
BUN SERPL-MCNC: 19 MG/DL (ref 8–23)
BUN/CREAT SERPL: 13.2 (ref 7–25)
CALCIUM SPEC-SCNC: 9 MG/DL (ref 8.6–10.5)
CHLORIDE SERPL-SCNC: 107 MMOL/L (ref 98–107)
CO2 SERPL-SCNC: 24.6 MMOL/L (ref 22–29)
CREAT SERPL-MCNC: 1.44 MG/DL (ref 0.76–1.27)
DEPRECATED RDW RBC AUTO: 45.2 FL (ref 37–54)
EGFRCR SERPLBLD CKD-EPI 2021: 47.9 ML/MIN/1.73
EOSINOPHIL # BLD AUTO: 0.25 10*3/MM3 (ref 0–0.4)
EOSINOPHIL NFR BLD AUTO: 4.9 % (ref 0.3–6.2)
ERYTHROCYTE [DISTWIDTH] IN BLOOD BY AUTOMATED COUNT: 13.3 % (ref 12.3–15.4)
GLOBULIN UR ELPH-MCNC: 3 GM/DL
GLUCOSE SERPL-MCNC: 95 MG/DL (ref 65–99)
HCT VFR BLD AUTO: 39 % (ref 37.5–51)
HGB BLD-MCNC: 13.2 G/DL (ref 13–17.7)
IMM GRANULOCYTES # BLD AUTO: 0.02 10*3/MM3 (ref 0–0.05)
IMM GRANULOCYTES NFR BLD AUTO: 0.4 % (ref 0–0.5)
LYMPHOCYTES # BLD AUTO: 1.32 10*3/MM3 (ref 0.7–3.1)
LYMPHOCYTES NFR BLD AUTO: 26 % (ref 19.6–45.3)
MCH RBC QN AUTO: 31.9 PG (ref 26.6–33)
MCHC RBC AUTO-ENTMCNC: 33.8 G/DL (ref 31.5–35.7)
MCV RBC AUTO: 94.2 FL (ref 79–97)
MONOCYTES # BLD AUTO: 0.44 10*3/MM3 (ref 0.1–0.9)
MONOCYTES NFR BLD AUTO: 8.7 % (ref 5–12)
NEUTROPHILS NFR BLD AUTO: 3.01 10*3/MM3 (ref 1.7–7)
NEUTROPHILS NFR BLD AUTO: 59.4 % (ref 42.7–76)
NRBC BLD AUTO-RTO: 0 /100 WBC (ref 0–0.2)
PLATELET # BLD AUTO: 167 10*3/MM3 (ref 140–450)
PMV BLD AUTO: 10.8 FL (ref 6–12)
POTASSIUM SERPL-SCNC: 4.6 MMOL/L (ref 3.5–5.2)
PROT SERPL-MCNC: 6.8 G/DL (ref 6–8.5)
RBC # BLD AUTO: 4.14 10*6/MM3 (ref 4.14–5.8)
SODIUM SERPL-SCNC: 142 MMOL/L (ref 136–145)
WBC NRBC COR # BLD: 5.07 10*3/MM3 (ref 3.4–10.8)

## 2023-06-02 PROCEDURE — 80053 COMPREHEN METABOLIC PANEL: CPT | Performed by: INTERNAL MEDICINE

## 2023-06-02 PROCEDURE — 36415 COLL VENOUS BLD VENIPUNCTURE: CPT

## 2023-06-02 PROCEDURE — 85025 COMPLETE CBC W/AUTO DIFF WBC: CPT | Performed by: INTERNAL MEDICINE

## 2023-06-02 NOTE — PROGRESS NOTES
Subjective     REASON FOR FOLLOW UP:   1.  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  2.  Maintenance Rituxan every 3 months for 2 years initiated 3/30/2018  3.  Painful pathologic compression fracture at L3 May 2018 due to involvement with diffuse large B-cell non-Hodgkin's lymphoma.   4.  CHOP and Rituxan x 6 cycles completed 9/25/2018.    HISTORY OF PRESENT ILLNESS:  The patient is a 83 y.o. year old male who was seen in consultation on 10/20/2017 and at that time we recommended laparoscopic lymph node sampling and upper and lower GI endoscopy.  He underwent these procedures with Dr. Castañeda on 10/26/2017.  There was no evidence of malignancy from his endoscopic procedures at the laparoscopic specimen returned as follicular center cell B-cell non-Hodgkin's lymphoma.    He received Rituxan treatment initially with 4 weekly doses of Rituxan with good response on follow-up scans.  Unfortunately, a few months after completion of his treatment he developed worsening back pain and has lymphoma involving the L3 vertebral body consistent with diffuse large B-cell non-Hodgkin's lymphoma.    He underwent port placement and received 6 cycles of CHOP and Rituxan which he completed in late September 2018 with what appear to be a complete response on PET scan.    He returns today for 6-month follow-up.  He continues to do amazingly well for his age.  He and his wife are still traveling to [place] whenever they can.    His blood count in the office today is completely normal.  He is now approaching 5 years out from completion of his chemotherapy and still seems to be in remission.    Pain  Pertinent negatives include no chest pain, coughing, fatigue, fever, headaches, nausea, neck pain, rash or vomiting.        Past Medical History:   Diagnosis Date   • Arm fracture     right side humerus - sling and swathe   • Cancer 2017     "Basal Cell on Nose and Under Right Eye   • Coronary artery disease    • DDD (degenerative disc disease), cervical    • Gastric mass    • GERD (gastroesophageal reflux disease)    • Hip pain started 5/29/18    onset of acute pain with weight bearing   • Hx of cardiac arrest     Happened after Induction of anesthesia prior  to SURG 10 YRS AGO  \" I FLATLINE BUT W/IN SECONDS HEART WAS OK AFTER TURNED ON BACK...I THINK THEY GAVE ME TO MUCH ANESTHESIA\"   • Hyperlipidemia    • Hypertension    • Kidney stones    • Non Hodgkin's lymphoma     SPINE   • Osteoarthritis    • Pleural effusion     DRAINAGE RT PLEURAL FLUID FOR TESTING WITH EGD ON 1-30-17   NO MALIGNANCY WITH CLEARANCE FROM DR SIMMONS FOR SURGERY   • Rheumatoid arthritis         Past Surgical History:   Procedure Laterality Date   • CARDIAC CATHETERIZATION N/A 9/1/2022    Procedure: Left Heart Cath;  Surgeon: Fletcher Nunes MD;  Location: [facility] CATH INVASIVE LOCATION;  Service: Cardiovascular;  Laterality: N/A;   • CARDIAC CATHETERIZATION N/A 9/1/2022    Procedure: Coronary angiography;  Surgeon: Fletcher Nunes MD;  Location: [facility] CATH INVASIVE LOCATION;  Service: Cardiovascular;  Laterality: N/A;   • CARDIAC CATHETERIZATION N/A 9/1/2022    Procedure: Left ventriculography;  Surgeon: Fletcher Nunes MD;  Location: [facility] CATH INVASIVE LOCATION;  Service: Cardiovascular;  Laterality: N/A;   • CARDIAC CATHETERIZATION N/A 9/1/2022    Procedure: Percutaneous Coronary Intervention;  Surgeon: Fletcher Nunes MD;  Location: [facility] CATH INVASIVE LOCATION;  Service: Cardiovascular;  Laterality: N/A;   • CARDIAC CATHETERIZATION N/A 9/1/2022    Procedure: Stent SUSANNA coronary;  Surgeon: Fletcher Nunes MD;  Location: [facility] CATH INVASIVE LOCATION;  Service: Cardiovascular;  Laterality: N/A;   • CARDIAC CATHETERIZATION N/A 9/1/2022    Procedure: Resting Full Cycle Ratio;  Surgeon: Fletcher Nunes MD;  Location: [facility] CATH INVASIVE LOCATION;  Service: Cardiovascular; "  Laterality: N/A;   • COLONOSCOPY N/A 10/26/2017    Procedure: COLONOSCOPY;  Surgeon: Louie Castañeda MD;  Location: [facility] OR;  Service:    • CORONARY ANGIOPLASTY WITH STENT PLACEMENT  1999    17 years ago   • CYSTOSCOPY W/ LITHOLAPAXY / EHL      6-7 years ago   • DIAGNOSTIC LAPAROSCOPY N/A 10/26/2017    Procedure: DIAGNOSTIC LAPAROSCOPY WITH RETROPERITONEAL BIOPSIES;  Surgeon: Louie Castañeda MD;  Location: [facility] OR;  Service:    • ENDOSCOPY      ON 1/30/17 WITH DRAINAGE OF RT PLEURAL FLUID FOR BIOPSY    NO MALIGNANCY  CLEARANCE FOR SURG PER DR SIMMONS   • ENDOSCOPY N/A 10/26/2017    Procedure: ESOPHAGOGASTRODUODENOSCOPY;  Surgeon: Louie Castañeda MD;  Location: [facility] OR;  Service:    • EYE SURGERY Right 2017    Moh's; reconstruction right lower lid   • KIDNEY STONE SURGERY     • PLEURAL BIOPSY     • POSTERIOR CERVICAL FUSION  2001   • SKIN CANCER EXCISION N/A 2017    Basal Cell Nose & under right eye   • TOTAL HIP ARTHROPLASTY Left 04/2010    9-10 years ago    • TOTAL HIP ARTHROPLASTY Right 2/14/2017    Procedure: TOTAL HIP ARTHROPLASTY;  Surgeon: Gerson Cardoza MD;  Location: [facility] OR;  Service:    • VENOUS ACCESS DEVICE (PORT) INSERTION N/A 6/5/2018    Procedure: INSERTION VENOUS ACCESS DEVICE- RIGHT;  Surgeon: Louie Castañeda MD;  Location: [facility] OR;  Service: General   • VENOUS ACCESS DEVICE (PORT) REMOVAL Right 08/31/2021    In-office Procedure-Dr. Louie Castañeda, [facility]         ALLERGIES:  No Known Allergies     Review of Systems   Constitutional: Positive for appetite change and unexpected weight change. Negative for activity change, fatigue and fever.   HENT: Negative for hearing loss, nosebleeds, trouble swallowing and voice change.    Eyes: Negative for visual disturbance.   Respiratory: Negative for cough, shortness of breath and wheezing.    Cardiovascular: Negative for chest pain and palpitations.   Gastrointestinal: Positive for constipation. Negative  "for diarrhea, nausea and vomiting.   Genitourinary: Negative for difficulty urinating, frequency, hematuria and urgency.   Musculoskeletal: Positive for back pain. Negative for neck pain.   Skin: Negative for rash.   Neurological: Negative for dizziness, seizures, syncope and headaches.   Hematological: Negative for adenopathy. Does not bruise/bleed easily.   Psychiatric/Behavioral: Negative for behavioral problems. The patient is not nervous/anxious.         Objective     Vitals:    06/02/23 1242   BP: 148/78   Pulse: 67   Resp: 18   Temp: 98 °F (36.7 °C)   TempSrc: Temporal   SpO2: 98%   Weight: 77.5 kg (170 lb 12.8 oz)   Height: 174 cm (68.5\")   PainSc: 0-No pain         6/2/2023    12:45 PM   Current Status   ECOG score 0       Physical Exam   Constitutional: He is oriented to person, place, and time. He appears well-developed. No distress.   HENT:   Head: Normocephalic.   Eyes: Pupils are equal, round, and reactive to light. Conjunctivae are normal. No scleral icterus.   Neck: No JVD present. No thyromegaly present.   Cardiovascular: Normal rate and regular rhythm. Exam reveals no gallop and no friction rub.   No murmur heard.  Pulmonary/Chest: Effort normal and breath sounds normal. He has no wheezes. He has no rales.   Abdominal: Soft. He exhibits no distension and no mass. There is no abdominal tenderness.   Musculoskeletal: Normal range of motion. No deformity.   Lymphadenopathy:     He has no cervical adenopathy.   Neurological: He is alert and oriented to person, place, and time. He has normal reflexes. No cranial nerve deficit.   Skin: Skin is warm and dry. No rash noted. No erythema.   Psychiatric: His behavior is normal. Judgment normal.         RECENT LABS:  Hematology WBC   Date Value Ref Range Status   06/02/2023 5.07 3.40 - 10.80 10*3/mm3 Final   02/20/2020 5.36 3.40 - 10.80 10*3/mm3 Final     RBC   Date Value Ref Range Status   06/02/2023 4.14 4.14 - 5.80 10*6/mm3 Final   02/20/2020 4.04 (L) 4.14 " - 5.80 10*6/mm3 Final     Hemoglobin   Date Value Ref Range Status   06/02/2023 13.2 13.0 - 17.7 g/dL Final     Hematocrit   Date Value Ref Range Status   06/02/2023 39.0 37.5 - 51.0 % Final     Platelets   Date Value Ref Range Status   06/02/2023 167 140 - 450 10*3/mm3 Final          Lab Results   Component Value Date    GLUCOSE 95 06/02/2023    BUN 19 06/02/2023    CREATININE 1.44 (H) 06/02/2023    EGFRIFNONA 51 (L) 12/03/2021    EGFRIFAFRI 64 02/20/2020    BCR 13.2 06/02/2023    K 4.6 06/02/2023    CO2 24.6 06/02/2023    CALCIUM 9.0 06/02/2023    PROTENTOTREF 6.5 03/02/2022    ALBUMIN 3.8 06/02/2023    LABIL2 1.7 03/02/2022    AST 15 06/02/2023    ALT 7 06/02/2023       CT CHEST, ABDOMEN AND PELVIS WITH CONTRAST  12/2/2022  IMPRESSION:   No significant change. Continued follow-up recommended as indicated.    Images personally reviewed    Assessment & Plan     1.  Diffuse large B-cell non-Hodgkin's lymphoma involving the L3 vertebral body.  PET scan after 4 cycles of CHOP and Rituxan showed essentially a complete metabolic response.  He completed a total of 6 cycles of chemotherapy by 9/25/2018 and remains in remission now nearly 5 years out from completion of his treatment.  2.  Previous treatment with single agent Rituxan for follicular B-cell lymphoma.  3.  Back pain related to lymphoma involving the spine.  Improved  4.  Right IJ Mediport removed in August 2021.    5.  Coronary artery disease with cardiac cath with drug-eluting stents in September 2022      Plan  1.  We will schedule MD follow-up in the office in 6 months.   2.  We discussed having 1 more set of surveillance scans performed 1 week prior to that visit and beyond that we likely will plan to see him once a year with scans on a PRN basis only.

## 2023-08-17 NOTE — TELEPHONE ENCOUNTER
-- [facility] - [place], [place] - [address] (Pharmacy) [phone]    Caller: Louie German    Relationship: Self    Best call back number: [phone]    Who are you requesting to speak with (clinical staff, provider,  specific staff member):CLINICAL STAFF    What was the call regarding: LOUIE CALLED TO SAY THAT HE IS HAVING HIS PORT REMOVED ON 08/31. HE IS SCHEDULED FOR A PORT FLUSH ON 08/19. HE WANTS TO KNOW IF HE STILL NEEDS TO COME IN FOR HIS PORT FLUSH OR NOT.    Do you require a callback: YES

## 2023-09-22 ENCOUNTER — OFFICE VISIT (OUTPATIENT)
Dept: INTERNAL MEDICINE | Facility: CLINIC | Age: 83
End: 2023-09-22
Payer: MEDICARE

## 2023-09-22 VITALS
WEIGHT: 173 LBS | BODY MASS INDEX: 25.92 KG/M2 | DIASTOLIC BLOOD PRESSURE: 70 MMHG | SYSTOLIC BLOOD PRESSURE: 128 MMHG | OXYGEN SATURATION: 98 % | HEART RATE: 88 BPM

## 2023-09-22 DIAGNOSIS — E78.2 MIXED HYPERLIPIDEMIA: ICD-10-CM

## 2023-09-22 DIAGNOSIS — N18.2 CHRONIC RENAL IMPAIRMENT, STAGE 2 (MILD): ICD-10-CM

## 2023-09-22 DIAGNOSIS — Z00.00 MEDICARE ANNUAL WELLNESS VISIT, SUBSEQUENT: Primary | ICD-10-CM

## 2023-09-22 DIAGNOSIS — I10 BENIGN ESSENTIAL HTN: ICD-10-CM

## 2023-09-22 DIAGNOSIS — C83.39 DIFFUSE LARGE B-CELL LYMPHOMA OF EXTRANODAL SITE EXCLUDING SPLEEN AND OTHER SOLID ORGANS: ICD-10-CM

## 2023-09-22 DIAGNOSIS — I25.10 CORONARY ARTERY DISEASE INVOLVING NATIVE CORONARY ARTERY OF NATIVE HEART WITHOUT ANGINA PECTORIS: ICD-10-CM

## 2023-09-22 NOTE — PROGRESS NOTES
"Chief Complaint  Medicare Wellness-subsequent    Subjective        Louie German presents to [facility]  History of Present Illness  David is a delightful marco who is very active.  Most recent intervention was 5 stents placed for CAD and currently appears very stable.    He is had prior treatment of diffuse large B-cell lymphoma with now remission and close follow-up.    In the morning he gets some lower lumbar back pain transiently which seems to resolve itself pretty quick    Distant history includes about 20 years ago with cervical spine fusion in the 10 screws by neurosurgery without further problems.    Cholesterol is treated with simvastatin 40 mg daily other medications new Plavix 75 mg daily aspirin 81 mg daily and felodipine 10 mg daily.    Objective   Vital Signs:  /70 (BP Location: Left arm, Patient Position: Sitting, Cuff Size: Adult)   Pulse 88   Wt 78.5 kg (173 lb)   SpO2 98%   BMI 25.92 kg/m²   Estimated body mass index is 25.92 kg/m² as calculated from the following:    Height as of 6/2/23: 174 cm (68.5\").    Weight as of this encounter: 78.5 kg (173 lb).               Physical Exam  Vitals reviewed.   Constitutional:       Appearance: He is well-developed.   HENT:      Head: Normocephalic and atraumatic.      Right Ear: Tympanic membrane and external ear normal.      Left Ear: Tympanic membrane and external ear normal.      Nose: Nose normal.   Eyes:      Conjunctiva/sclera: Conjunctivae normal.      Pupils: Pupils are equal, round, and reactive to light.   Neck:      Thyroid: No thyromegaly.      Vascular: No JVD.   Cardiovascular:      Rate and Rhythm: Normal rate and regular rhythm.      Heart sounds: Normal heart sounds.   Pulmonary:      Effort: Pulmonary effort is normal.      Breath sounds: Normal breath sounds.   Abdominal:      General: Bowel sounds are normal.      Palpations: Abdomen is soft.   Musculoskeletal:         General: Normal range of " motion.      Cervical back: Normal range of motion and neck supple.   Lymphadenopathy:      Cervical: No cervical adenopathy.   Skin:     General: Skin is warm and dry.      Findings: No rash.      Comments: Multiple basil cell carcinomas removed face   Neurological:      Mental Status: He is alert and oriented to person, place, and time.      Cranial Nerves: No cranial nerve deficit.      Coordination: Coordination normal.   Psychiatric:         Behavior: Behavior normal.         Thought Content: Thought content normal.         Judgment: Judgment normal.      Result Review :                   Assessment and Plan   Diagnoses and all orders for this visit:    1. Medicare annual wellness visit, subsequent (Primary)    2. Benign essential HTN  Comments:  Felodipine 10 mg daily    3. Coronary artery disease involving native coronary artery of native heart without angina pectoris  Comments:  Most recent stents x5 stable    4. Chronic renal impairment, stage 2 (mild)    5. Diffuse large B-cell lymphoma of extranodal site excluding spleen and other solid organs  Comments:  Stable    6. Mixed hyperlipidemia  Comments:  Treated successfully with simvastatin 40 mg at bedtime             Follow Up   Return in about 6 months (around 3/22/2024) for Recheck.  Patient was given instructions and counseling regarding his condition or for health maintenance advice. Please see specific information pulled into the AVS if appropriate.

## 2023-09-22 NOTE — PROGRESS NOTES
The ABCs of the Annual Wellness Visit  Subsequent Medicare Wellness Visit    Subjective      Louie German is a 83 y.o. male who presents for a Subsequent Medicare Wellness Visit.    The following portions of the patient's history were reviewed and   updated as appropriate: allergies, current medications, past family history, past medical history, past social history, past surgical history, and problem list.    Compared to one year ago, the patient feels his physical   health is the same.    Compared to one year ago, the patient feels his mental   health is the same.    Recent Hospitalizations:  He was not admitted to the hospital during the last year.       Current Medical Providers:  Patient Care Team:  Ryan Villarreal MD as PCP - General (Family Medicine)  Danny Roberto MD as Consulting Physician (Hematology and Oncology)  Ryan Villarreal MD as Referring Physician (Family Medicine)    Outpatient Medications Prior to Visit   Medication Sig Dispense Refill    aspirin 81 MG chewable tablet Chew 1 tablet Daily.  11    CBD (cannabidiol) oral oil Take 1 mL by mouth As Needed.      clopidogrel (PLAVIX) 75 MG tablet TAKE 1 TABLET DAILY 90 tablet 3    felodipine (PLENDIL) 10 MG 24 hr tablet TAKE 1 TABLET DAILY 90 tablet 3    Multiple Vitamin (MULTI VITAMIN PO) Take 1 tablet by mouth Daily.      simvastatin (ZOCOR) 40 MG tablet TAKE 1 TABLET EVERY NIGHT 90 tablet 3     Facility-Administered Medications Prior to Visit   Medication Dose Route Frequency Provider Last Rate Last Admin    heparin flush (porcine) 100 UNIT/ML injection 500 Units  500 Units Intravenous PRN Danny Roberto MD   500 Units at 01/25/19 1030    sodium chloride 0.9 % flush 10 mL  10 mL Intravenous PRN Danny Roberto MD   10 mL at 01/25/19 1030       No opioid medication identified on active medication list. I have reviewed chart for other potential  high risk medication/s and harmful drug interactions in the elderly.        Aspirin is on active  "medication list. Aspirin use is indicated based on review of current medical condition/s. Pros and cons of this therapy have been discussed today. Benefits of this medication outweigh potential harm.  Patient has been encouraged to continue taking this medication.  .      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  ACP discussion was held with the patient during this visit. Patient has an advance directive in EMR which is still valid.      Objective    Vitals:    09/22/23 1140   BP: 128/70   BP Location: Left arm   Patient Position: Sitting   Cuff Size: Adult   Pulse: 88   SpO2: 98%   Weight: 78.5 kg (173 lb)   PainSc: 0-No pain     Estimated body mass index is 25.92 kg/m² as calculated from the following:    Height as of 6/2/23: 174 cm (68.5\").    Weight as of this encounter: 78.5 kg (173 lb).           Does the patient have evidence of cognitive impairment?   No            HEALTH RISK ASSESSMENT    Smoking Status:  Social History     Tobacco Use   Smoking Status Former    Types: Pipe   Smokeless Tobacco Former    Types: Chew    Quit date: 5/14/1998   Tobacco Comments    Caffeine use: 2 cups daily     Alcohol Consumption:  Social History     Substance and Sexual Activity   Alcohol Use Yes    Comment: 1-2 " beers nightly, sometimes an occasional bourbon     Fall Risk Screen:    STEADI Fall Risk Assessment was completed, and patient is at LOW risk for falls.Assessment completed on:2023    Depression Screenin/22/2023    11:45 AM   PHQ-2/PHQ-9 Depression Screening   Little Interest or Pleasure in Doing Things 0-->not at all   Feeling Down, Depressed or Hopeless 0-->not at all   PHQ-9: Brief Depression Severity Measure Score 0       Health Habits and Functional and Cognitive Screenin/22/2023    11:42 AM   Functional & Cognitive Status   Do you have difficulty preparing food and eating? No   Do you have difficulty bathing yourself, getting dressed or grooming yourself? No   Do you have difficulty using the toilet? No   Do you have difficulty moving around from place to place? No   Do you have trouble with steps or getting out of a bed or a chair? No   Current Diet Well Balanced Diet   Dental Exam Up to date   Eye Exam Up to date   Exercise (times per week) 2 times per week   Current Exercises Include Treadmill;Light Weights   Do you need help using the phone?  No   Are you deaf or do you have serious difficulty hearing?  No   Do you need help to go to places out of walking distance? No   Do you need help shopping? No   Do you need help preparing meals?  No   Do you need help with housework?  No   Do you need help with laundry? No   Do you need help taking your medications? No   Do you need help managing money? No   Do you ever drive or ride in a car without wearing a seat belt? No   Have you felt unusual stress, anger or loneliness in the last month? Yes   Who do you live with? Spouse   If you need help, do you have trouble finding someone available to you? No   Have you been bothered in the last four weeks by sexual problems? No   Do you have difficulty concentrating, remembering or making decisions? No       Age-appropriate Screening Schedule:  Refer to the list below for future screening  recommendations based on patient's age, sex and/or medical conditions. Orders for these recommended tests are listed in the plan section. The patient has been provided with a written plan.    Health Maintenance   Topic Date Due    Pneumococcal Vaccine 65+ (1 - PCV) Never done    TDAP/TD VACCINES (1 - Tdap) Never done    ZOSTER VACCINE (1 of 2) 05/05/2017    COVID-19 Vaccine (4 - Moderna risk series) 12/08/2022    ANNUAL WELLNESS VISIT  09/13/2023    BMI FOLLOWUP  09/13/2023    INFLUENZA VACCINE  10/01/2023    LIPID PANEL  11/22/2023                  CMS Preventative Services Quick Reference  Risk Factors Identified During Encounter:    None Identified    The above risks/problems have been discussed with the patient.  Pertinent information has been shared with the patient in the After Visit Summary.    There are no diagnoses linked to this encounter.    Follow Up:   Next Medicare Wellness visit to be scheduled in 1 year.      An After Visit Summary and PPPS were made available to the patient.

## 2023-11-07 ENCOUNTER — HOSPITAL ENCOUNTER (OUTPATIENT)
Dept: CT IMAGING | Facility: HOSPITAL | Age: 83
Discharge: HOME | End: 2023-11-07
Admitting: INTERNAL MEDICINE
Payer: MEDICARE

## 2023-11-07 ENCOUNTER — LAB (OUTPATIENT)
Dept: LAB | Facility: HOSPITAL | Age: 83
End: 2023-11-07
Payer: MEDICARE

## 2023-11-07 DIAGNOSIS — C83.39 DIFFUSE LARGE B-CELL LYMPHOMA OF EXTRANODAL SITE EXCLUDING SPLEEN AND OTHER SOLID ORGANS: ICD-10-CM

## 2023-11-07 DIAGNOSIS — D50.0 IRON DEFICIENCY ANEMIA DUE TO CHRONIC BLOOD LOSS: ICD-10-CM

## 2023-11-07 LAB
ALBUMIN SERPL-MCNC: 4.2 G/DL (ref 3.5–5.2)
ALBUMIN/GLOB SERPL: 1.8 G/DL
ALP SERPL-CCNC: 55 U/L (ref 39–117)
ALT SERPL W P-5'-P-CCNC: 11 U/L (ref 1–41)
ANION GAP SERPL CALCULATED.3IONS-SCNC: 10.7 MMOL/L (ref 5–15)
AST SERPL-CCNC: 19 U/L (ref 1–40)
BASOPHILS # BLD AUTO: 0.03 10*3/MM3 (ref 0–0.2)
BASOPHILS NFR BLD AUTO: 0.5 % (ref 0–1.5)
BILIRUB SERPL-MCNC: 0.7 MG/DL (ref 0–1.2)
BUN SERPL-MCNC: 19 MG/DL (ref 8–23)
BUN/CREAT SERPL: 14.8 (ref 7–25)
CALCIUM SPEC-SCNC: 9.2 MG/DL (ref 8.6–10.5)
CHLORIDE SERPL-SCNC: 104 MMOL/L (ref 98–107)
CO2 SERPL-SCNC: 26.3 MMOL/L (ref 22–29)
CREAT SERPL-MCNC: 1.28 MG/DL (ref 0.76–1.27)
DEPRECATED RDW RBC AUTO: 46.5 FL (ref 37–54)
EGFRCR SERPLBLD CKD-EPI 2021: 55.2 ML/MIN/1.73
EOSINOPHIL # BLD AUTO: 0.17 10*3/MM3 (ref 0–0.4)
EOSINOPHIL NFR BLD AUTO: 3 % (ref 0.3–6.2)
ERYTHROCYTE [DISTWIDTH] IN BLOOD BY AUTOMATED COUNT: 13.3 % (ref 12.3–15.4)
GLOBULIN UR ELPH-MCNC: 2.4 GM/DL
GLUCOSE SERPL-MCNC: 102 MG/DL (ref 65–99)
HCT VFR BLD AUTO: 40.3 % (ref 37.5–51)
HGB BLD-MCNC: 13.6 G/DL (ref 13–17.7)
IMM GRANULOCYTES # BLD AUTO: 0.01 10*3/MM3 (ref 0–0.05)
IMM GRANULOCYTES NFR BLD AUTO: 0.2 % (ref 0–0.5)
LYMPHOCYTES # BLD AUTO: 1.47 10*3/MM3 (ref 0.7–3.1)
LYMPHOCYTES NFR BLD AUTO: 25.9 % (ref 19.6–45.3)
MCH RBC QN AUTO: 32.1 PG (ref 26.6–33)
MCHC RBC AUTO-ENTMCNC: 33.7 G/DL (ref 31.5–35.7)
MCV RBC AUTO: 95 FL (ref 79–97)
MONOCYTES # BLD AUTO: 0.5 10*3/MM3 (ref 0.1–0.9)
MONOCYTES NFR BLD AUTO: 8.8 % (ref 5–12)
NEUTROPHILS NFR BLD AUTO: 3.49 10*3/MM3 (ref 1.7–7)
NEUTROPHILS NFR BLD AUTO: 61.6 % (ref 42.7–76)
NRBC BLD AUTO-RTO: 0 /100 WBC (ref 0–0.2)
PLATELET # BLD AUTO: 187 10*3/MM3 (ref 140–450)
PMV BLD AUTO: 10.8 FL (ref 6–12)
POTASSIUM SERPL-SCNC: 4.1 MMOL/L (ref 3.5–5.2)
PROT SERPL-MCNC: 6.6 G/DL (ref 6–8.5)
RBC # BLD AUTO: 4.24 10*6/MM3 (ref 4.14–5.8)
SODIUM SERPL-SCNC: 141 MMOL/L (ref 136–145)
WBC NRBC COR # BLD: 5.67 10*3/MM3 (ref 3.4–10.8)

## 2023-11-07 PROCEDURE — 0 DIATRIZOATE MEGLUMINE & SODIUM PER 1 ML: Performed by: INTERNAL MEDICINE

## 2023-11-07 PROCEDURE — 80053 COMPREHEN METABOLIC PANEL: CPT

## 2023-11-07 PROCEDURE — 74177 CT ABD & PELVIS W/CONTRAST: CPT

## 2023-11-07 PROCEDURE — 71260 CT THORAX DX C+: CPT

## 2023-11-07 PROCEDURE — 25510000001 IOPAMIDOL 61 % SOLUTION: Performed by: INTERNAL MEDICINE

## 2023-11-07 PROCEDURE — 82565 ASSAY OF CREATININE: CPT

## 2023-11-07 PROCEDURE — 85025 COMPLETE CBC W/AUTO DIFF WBC: CPT

## 2023-11-07 RX ADMIN — IOPAMIDOL 100 ML: 612 INJECTION, SOLUTION INTRAVENOUS at 10:07

## 2023-11-07 RX ADMIN — DIATRIZOATE MEGLUMINE AND DIATRIZOATE SODIUM 30 ML: 660; 100 LIQUID ORAL; RECTAL at 09:05

## 2023-11-08 LAB — CREAT BLDA-MCNC: 1.4 MG/DL (ref 0.6–1.3)

## 2023-11-14 ENCOUNTER — OFFICE VISIT (OUTPATIENT)
Dept: ONCOLOGY | Facility: CLINIC | Age: 83
End: 2023-11-14
Payer: MEDICARE

## 2023-11-14 VITALS
BODY MASS INDEX: 26.01 KG/M2 | OXYGEN SATURATION: 98 % | HEART RATE: 77 BPM | TEMPERATURE: 97.7 F | DIASTOLIC BLOOD PRESSURE: 78 MMHG | RESPIRATION RATE: 18 BRPM | SYSTOLIC BLOOD PRESSURE: 153 MMHG | HEIGHT: 69 IN | WEIGHT: 175.6 LBS

## 2023-11-14 DIAGNOSIS — C83.39 DIFFUSE LARGE B-CELL LYMPHOMA OF EXTRANODAL SITE EXCLUDING SPLEEN AND OTHER SOLID ORGANS: Primary | ICD-10-CM

## 2023-11-14 PROBLEM — Z45.2 FITTING AND ADJUSTMENT OF VASCULAR CATHETER: Status: RESOLVED | Noted: 2018-06-06 | Resolved: 2023-11-14

## 2023-11-14 NOTE — PROGRESS NOTES
Subjective     REASON FOR FOLLOW UP:   1.  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  2.  Maintenance Rituxan every 3 months for 2 years initiated 3/30/2018  3.  Painful pathologic compression fracture at L3 May 2018 due to involvement with diffuse large B-cell non-Hodgkin's lymphoma.   4.  CHOP and Rituxan x 6 cycles completed 9/25/2018.    HISTORY OF PRESENT ILLNESS:  The patient is a 83 y.o. year old male who was seen in consultation on 10/20/2017 and at that time we recommended laparoscopic lymph node sampling and upper and lower GI endoscopy.  He underwent these procedures with Dr. Castañeda on 10/26/2017.  There was no evidence of malignancy from his endoscopic procedures at the laparoscopic specimen returned as follicular center cell B-cell non-Hodgkin's lymphoma.    He received Rituxan treatment initially with 4 weekly doses of Rituxan with good response on follow-up scans.  Unfortunately, a few months after completion of his treatment he developed worsening back pain and has lymphoma involving the L3 vertebral body consistent with diffuse large B-cell non-Hodgkin's lymphoma.    He underwent port placement and received 6 cycles of CHOP and Rituxan which he completed in late September 2018 with what appear to be a complete response on PET scan.    INTERVAL HISTORY:  He returns today for 6-month follow-up now 5 years out from completion of his CHOP and Rituxan treatment.  He continues to do amazingly well for his age.  He and his wife are still traveling to [place] whenever they can.    He has surveillance CT scans of the chest abdomen pelvis performed 11/7/2023 with no evidence of recurrent lymphoma now 5 years out from completing his therapy.    His CBC is completely normal.  His chemistries look good with exception of mild elevation of his creatinine at 1.28    Pain  Pertinent negatives include no chest pain,  "coughing, fatigue, fever, headaches, nausea, neck pain, rash or vomiting.        Past Medical History:   Diagnosis Date    Arm fracture     right side humerus - sling and swathe    Cancer 2017    Basal Cell on Nose and Under Right Eye    Coronary artery disease     DDD (degenerative disc disease), cervical     Gastric mass     GERD (gastroesophageal reflux disease)     Hip pain started 5/29/18    onset of acute pain with weight bearing    Hx of cardiac arrest     Happened after Induction of anesthesia prior  to SURG 10 YRS AGO  \" I FLATLINE BUT W/IN SECONDS HEART WAS OK AFTER TURNED ON BACK...I THINK THEY GAVE ME TO MUCH ANESTHESIA\"    Hyperlipidemia     Hypertension     Kidney stones     Non Hodgkin's lymphoma     SPINE    Osteoarthritis     Pleural effusion     DRAINAGE RT PLEURAL FLUID FOR TESTING WITH EGD ON 1-30-17   NO MALIGNANCY WITH CLEARANCE FROM DR SIMMONS FOR SURGERY    Rheumatoid arthritis         Past Surgical History:   Procedure Laterality Date    CARDIAC CATHETERIZATION N/A 9/1/2022    Procedure: Left Heart Cath;  Surgeon: Fletcher Nunes MD;  Location: [facility] CATH INVASIVE LOCATION;  Service: Cardiovascular;  Laterality: N/A;    CARDIAC CATHETERIZATION N/A 9/1/2022    Procedure: Coronary angiography;  Surgeon: Fletcher Nunes MD;  [facility];  Service: Cardiovascular;  Laterality: N/A;    CARDIAC CATHETERIZATION N/A 9/1/2022    Procedure: Left ventriculography;  Surgeon: Fletcher Nunes MD;  [facility];  Service: Cardiovascular;  Laterality: N/A;    CARDIAC CATHETERIZATION N/A 9/1/2022    Procedure: Percutaneous Coronary Intervention;  Surgeon: Fletcher Nunes MD;  [facility];  Service: Cardiovascular;  Laterality: N/A;    CARDIAC CATHETERIZATION N/A 9/1/2022    Procedure: Stent SUSANNA coronary;  Surgeon: Fletcher Nunes MD;  Location: [facility] CATH INVASIVE LOCATION;  Service: Cardiovascular;  Laterality: N/A; "    CARDIAC CATHETERIZATION N/A 9/1/2022    Procedure: Resting Full Cycle Ratio;  Surgeon: Fletcher Nunes MD;  Location: [facility] CATH INVASIVE LOCATION;  Service: Cardiovascular;  Laterality: N/A;    COLONOSCOPY N/A 10/26/2017    Procedure: COLONOSCOPY;  Surgeon: Louie Castañeda MD;  Location: [facility] MAIN OR;  Service:     CORONARY ANGIOPLASTY WITH STENT PLACEMENT  1999    17 years ago    CYSTOSCOPY W/ LITHOLAPAXY / EHL      6-7 years ago    DIAGNOSTIC LAPAROSCOPY N/A 10/26/2017    Procedure: DIAGNOSTIC LAPAROSCOPY WITH RETROPERITONEAL BIOPSIES;  Surgeon: Louie Castañeda MD;  Location: [facility] MAIN OR;  Service:     ENDOSCOPY      ON 1/30/17 WITH DRAINAGE OF RT PLEURAL FLUID FOR BIOPSY    NO MALIGNANCY  CLEARANCE FOR SURG PER DR SIMMONS    ENDOSCOPY N/A 10/26/2017    Procedure: ESOPHAGOGASTRODUODENOSCOPY;  Surgeon: Louie Castañeda MD;  Location: [facility] MAIN OR;  Service:     EYE SURGERY Right 2017    Moh's; reconstruction right lower lid    KIDNEY STONE SURGERY      PLEURAL BIOPSY      POSTERIOR CERVICAL FUSION  2001    SKIN CANCER EXCISION N/A 2017    Basal Cell Nose & under right eye    TOTAL HIP ARTHROPLASTY Left 04/2010    9-10 years ago     TOTAL HIP ARTHROPLASTY Right 2/14/2017    Procedure: TOTAL HIP ARTHROPLASTY;  Surgeon: Gerson Cardoza MD;  Location: [facility] MAIN OR;  Service:     VENOUS ACCESS DEVICE (PORT) INSERTION N/A 6/5/2018    Procedure: INSERTION VENOUS ACCESS DEVICE- RIGHT;  Surgeon: Louie Castañeda MD;  Location: [facility] MAIN OR;  Service: General    VENOUS ACCESS DEVICE (PORT) REMOVAL Right 08/31/2021    In-office Procedure-Dr. Louie Castañeda, [facility]         ALLERGIES:  No Known Allergies     Review of Systems   Constitutional:  Positive for appetite change and unexpected weight change. Negative for activity change, fatigue and fever.   HENT:  Negative for hearing loss, nosebleeds, trouble swallowing and voice change.    Eyes:  Negative for visual disturbance.   Respiratory:   "Negative for cough, shortness of breath and wheezing.    Cardiovascular:  Negative for chest pain and palpitations.   Gastrointestinal:  Positive for constipation. Negative for diarrhea, nausea and vomiting.   Genitourinary:  Negative for difficulty urinating, frequency, hematuria and urgency.   Musculoskeletal:  Positive for back pain. Negative for neck pain.   Skin:  Negative for rash.   Neurological:  Negative for dizziness, seizures, syncope and headaches.   Hematological:  Negative for adenopathy. Does not bruise/bleed easily.   Psychiatric/Behavioral:  Negative for behavioral problems. The patient is not nervous/anxious.         Objective     Vitals:    11/14/23 0941   BP: 153/78   Pulse: 77   Resp: 18   Temp: 97.7 °F (36.5 °C)   TempSrc: Temporal   SpO2: 98%   Weight: 79.7 kg (175 lb 9.6 oz)   Height: 174 cm (68.5\")   PainSc: 0-No pain         11/14/2023     9:41 AM   Current Status   ECOG score 0       Physical Exam   Constitutional: He is oriented to person, place, and time. He appears well-developed. No distress.   HENT:   Head: Normocephalic.   Eyes: Pupils are equal, round, and reactive to light. Conjunctivae are normal. No scleral icterus.   Neck: No JVD present. No thyromegaly present.   Cardiovascular: Normal rate and regular rhythm. Exam reveals no gallop and no friction rub.   No murmur heard.  Pulmonary/Chest: Effort normal and breath sounds normal. He has no wheezes. He has no rales.   Abdominal: Soft. He exhibits no distension and no mass. There is no abdominal tenderness.   Musculoskeletal: Normal range of motion. No deformity.   Lymphadenopathy:     He has no cervical adenopathy.   Neurological: He is alert and oriented to person, place, and time. He has normal reflexes. No cranial nerve deficit.   Skin: Skin is warm and dry. No rash noted. No erythema.   Psychiatric: His behavior is normal. Judgment normal.         RECENT LABS:  Hematology WBC   Date Value Ref Range Status   11/07/2023 5.67 " 3.40 - 10.80 10*3/mm3 Final   02/20/2020 5.36 3.40 - 10.80 10*3/mm3 Final     RBC   Date Value Ref Range Status   11/07/2023 4.24 4.14 - 5.80 10*6/mm3 Final   02/20/2020 4.04 (L) 4.14 - 5.80 10*6/mm3 Final     Hemoglobin   Date Value Ref Range Status   11/07/2023 13.6 13.0 - 17.7 g/dL Final     Hematocrit   Date Value Ref Range Status   11/07/2023 40.3 37.5 - 51.0 % Final     Platelets   Date Value Ref Range Status   11/07/2023 187 140 - 450 10*3/mm3 Final          Lab Results   Component Value Date    GLUCOSE 102 (H) 11/07/2023    BUN 19 11/07/2023    CREATININE 1.28 (H) 11/07/2023    EGFRIFNONA 51 (L) 12/03/2021    EGFRIFAFRI 64 02/20/2020    BCR 14.8 11/07/2023    K 4.1 11/07/2023    CO2 26.3 11/07/2023    CALCIUM 9.2 11/07/2023    PROTENTOTREF 6.5 03/02/2022    ALBUMIN 4.2 11/07/2023    LABIL2 1.7 03/02/2022    AST 19 11/07/2023    ALT 11 11/07/2023       CT CHEST, ABDOMEN AND PELVIS WITH CONTRAST  11/7/2023  IMPRESSION:  1. Stable soft tissue to the left and anterior aspect of the aorta     2. No evidence of worsening disease. Incidental findings as above.  Images personally reviewed    Assessment & Plan     1.  Diffuse large B-cell non-Hodgkin's lymphoma involving the L3 vertebral body.  PET scan after 4 cycles of CHOP and Rituxan showed essentially a complete metabolic response.  He completed a total of 6 cycles of chemotherapy by 9/25/2018 and remains in remission now over 5 years out from completion of his treatment.  2.  Previous treatment with single agent Rituxan for follicular B-cell lymphoma.  3.  Back pain related to lymphoma involving the spine.  Improved  4.  Right IJ Mediport removed in August 2021.    5.  Coronary artery disease with cardiac cath with drug-eluting stents in September 2022  6.  Mild renal insufficiency.  History of kidney stones. Patient follows up with Dr. Renny Amaro of urology.      Plan  1.  We reviewed the results of the CT scans and labs from 11/7/2023 with David in the  office today provided him printed copies of the reports  2.  At this point we will be following up with him in the office on an annual schedule and further scans will be ordered on an as needed basis only.

## 2024-01-12 NOTE — TELEPHONE ENCOUNTER
----- Message from Henrietta Giron sent at 1/12/2024  3:36 PM CST -----  Type: Needs Medical Advice  Who Called:  pt  Best Call Back Number: [phone]  Additional Information: pt is requesting an appt for iron infusion, pl call bk to advise thanks         S/w pt. Wife and pt.  Wife states she has a cold for several days.  Pt. Reports a dry cough.  Denies fever, chills, soa or chest discomfort.  Pt. States he did take a claritin this morning although he denies a runny nose.  Pt. Instructed to increase fld intake.  Instructed to try OTC robitussin for the cough.  Pt. To monitor his temp.  To call for any worsening sxs or any s/s of infections.  Pt. Advised to use good handwashing tech.  It would be a good idea to spray the house with lysol.  understanding noted.

## 2024-02-14 DIAGNOSIS — I10 BENIGN ESSENTIAL HTN: ICD-10-CM

## 2024-02-14 RX ORDER — FELODIPINE 10 MG/1
TABLET, EXTENDED RELEASE ORAL
Qty: 90 TABLET | Refills: 3 | Status: SHIPPED | OUTPATIENT
Start: 2024-02-14

## 2024-03-22 ENCOUNTER — OFFICE VISIT (OUTPATIENT)
Dept: INTERNAL MEDICINE | Facility: CLINIC | Age: 84
End: 2024-03-22
Payer: MEDICARE

## 2024-03-22 VITALS
HEART RATE: 82 BPM | WEIGHT: 172 LBS | BODY MASS INDEX: 25.77 KG/M2 | SYSTOLIC BLOOD PRESSURE: 134 MMHG | DIASTOLIC BLOOD PRESSURE: 68 MMHG | OXYGEN SATURATION: 98 %

## 2024-03-22 DIAGNOSIS — I10 BENIGN ESSENTIAL HTN: ICD-10-CM

## 2024-03-22 DIAGNOSIS — C83.39 DIFFUSE LARGE B-CELL LYMPHOMA OF EXTRANODAL SITE EXCLUDING SPLEEN AND OTHER SOLID ORGANS: ICD-10-CM

## 2024-03-22 DIAGNOSIS — I25.10 CORONARY ARTERY DISEASE INVOLVING NATIVE CORONARY ARTERY OF NATIVE HEART WITHOUT ANGINA PECTORIS: ICD-10-CM

## 2024-03-22 DIAGNOSIS — E78.00 ELEVATED CHOLESTEROL: Primary | ICD-10-CM

## 2024-03-22 DIAGNOSIS — Z95.5 S/P PRIMARY ANGIOPLASTY WITH CORONARY STENT: ICD-10-CM

## 2024-03-22 DIAGNOSIS — G56.00 MEDIAN NERVE COMPRESSION: ICD-10-CM

## 2024-03-22 NOTE — PROGRESS NOTES
"Chief Complaint  Hypertension and Heartburn    Subjective        Louie German presents to [facility]  History of Present Illness  David is seen here for recheck of history of CAD with stent placement x 5 which has been stable on aspirin Plavix plus simvastatin 40 mg at bedtime with good response on lipid panel.  Otherwise taking felodipine 10 mg daily for blood pressure which works well.  He is a very active gentleman appears younger than his age.    He has evidence of a very mild intermittent median nerve impingement at the wrist on the left side he does have some evidence of carpal tunnel as well.      Objective   Vital Signs:  /68 (BP Location: Left arm, Patient Position: Sitting, Cuff Size: Adult)   Pulse 82   Wt 78 kg (172 lb)   SpO2 98%   BMI 25.77 kg/m²   Estimated body mass index is 25.77 kg/m² as calculated from the following:    Height as of 11/14/23: 174 cm (68.5\").    Weight as of this encounter: 78 kg (172 lb).             Physical Exam  Vitals reviewed.   Constitutional:       Appearance: He is well-developed.   HENT:      Head: Normocephalic and atraumatic.      Right Ear: Tympanic membrane and external ear normal.      Left Ear: Tympanic membrane and external ear normal.      Nose: Nose normal.   Eyes:      Conjunctiva/sclera: Conjunctivae normal.      Pupils: Pupils are equal, round, and reactive to light.   Neck:      Thyroid: No thyromegaly.      Vascular: No JVD.   Cardiovascular:      Rate and Rhythm: Normal rate and regular rhythm.      Heart sounds: Normal heart sounds.   Pulmonary:      Effort: Pulmonary effort is normal.      Breath sounds: Normal breath sounds.   Abdominal:      General: Bowel sounds are normal.      Palpations: Abdomen is soft.   Musculoskeletal:         General: Normal range of motion.      Cervical back: Normal range of motion and neck supple.      Comments: Left hand with negative Tinel's sign perhaps a slight degree of " trigger finger and a couple fingers   Lymphadenopathy:      Cervical: No cervical adenopathy.   Skin:     General: Skin is warm and dry.      Findings: No rash.   Neurological:      Mental Status: He is alert and oriented to person, place, and time.      Cranial Nerves: No cranial nerve deficit.      Coordination: Coordination normal.   Psychiatric:         Behavior: Behavior normal.         Thought Content: Thought content normal.         Judgment: Judgment normal.        Result Review :    The following data was reviewed by: Ryan Villarreal MD on 03/22/2024:  Common labs          6/2/2023    12:23 11/7/2023    09:53 11/7/2023    09:57 11/7/2023    10:14   Common Labs   Glucose 95    102    BUN 19    19    Creatinine 1.44  1.40   1.28    Sodium 142    141    Potassium 4.6    4.1    Chloride 107    104    Calcium 9.0    9.2    Albumin 3.8    4.2    Total Bilirubin 0.6    0.7    Alkaline Phosphatase 59    55    AST (SGOT) 15    19    ALT (SGPT) 7    11    WBC 5.07   5.67     Hemoglobin 13.2   13.6     Hematocrit 39.0   40.3     Platelets 167   187       Data reviewed : Consultant notes urology             Assessment and Plan     Diagnoses and all orders for this visit:    1. Elevated cholesterol (Primary)  Comments:  Simvastatin 40 mg nightly    2. Coronary artery disease involving native coronary artery of native heart without angina pectoris  Comments:  History of multiple stent placement appears clinically stable on clopidogrel 75 mg daily plus aspirin 81 mg daily plus simvastatin 40 mg at bedtime    3. Benign essential HTN  Comments:  Felodipine 10 mg daily    4. S/P primary angioplasty with coronary stent    5. 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 Median nerve compression/LEFT HAND  Comments:  intermittant and appears self-limited without need for surgery             Follow Up     Return in about 6 months (around 9/22/2024) for  Medicare Wellness.  Patient was given instructions and counseling regarding his condition or for health maintenance advice. Please see specific information pulled into the AVS if appropriate.

## 2024-04-25 RX ORDER — CLOPIDOGREL BISULFATE 75 MG/1
TABLET ORAL
Qty: 90 TABLET | Refills: 3 | Status: SHIPPED | OUTPATIENT
Start: 2024-04-25

## 2024-04-25 RX ORDER — SIMVASTATIN 40 MG
TABLET ORAL
Qty: 90 TABLET | Refills: 1 | Status: SHIPPED | OUTPATIENT
Start: 2024-04-25

## 2024-05-09 ENCOUNTER — OFFICE VISIT (OUTPATIENT)
Dept: CARDIOLOGY | Facility: CLINIC | Age: 84
End: 2024-05-09
Payer: MEDICARE

## 2024-05-09 VITALS
SYSTOLIC BLOOD PRESSURE: 130 MMHG | WEIGHT: 174 LBS | HEIGHT: 60 IN | HEART RATE: 64 BPM | DIASTOLIC BLOOD PRESSURE: 80 MMHG | BODY MASS INDEX: 34.16 KG/M2

## 2024-05-09 DIAGNOSIS — I45.10 RBBB: ICD-10-CM

## 2024-05-09 DIAGNOSIS — I25.10 CORONARY ARTERY DISEASE INVOLVING NATIVE CORONARY ARTERY OF NATIVE HEART WITHOUT ANGINA PECTORIS: Primary | ICD-10-CM

## 2024-05-09 PROCEDURE — 1160F RVW MEDS BY RX/DR IN RCRD: CPT | Performed by: NURSE PRACTITIONER

## 2024-05-09 PROCEDURE — 3079F DIAST BP 80-89 MM HG: CPT | Performed by: NURSE PRACTITIONER

## 2024-05-09 PROCEDURE — 3075F SYST BP GE 130 - 139MM HG: CPT | Performed by: NURSE PRACTITIONER

## 2024-05-09 PROCEDURE — 99214 OFFICE O/P EST MOD 30 MIN: CPT | Performed by: NURSE PRACTITIONER

## 2024-05-09 PROCEDURE — 1159F MED LIST DOCD IN RCRD: CPT | Performed by: NURSE PRACTITIONER

## 2024-05-09 PROCEDURE — 93000 ELECTROCARDIOGRAM COMPLETE: CPT | Performed by: NURSE PRACTITIONER

## 2024-05-27 NOTE — PATIENT INSTRUCTIONS
Check with pharmacy for tdap, tetanus, and check records for pneumonia vaccines.    Medicare Wellness  Personal Prevention Plan of Service     Date of Office Visit:  2020  Encounter Provider:  NOLAN Yang  Place of Service:  [facility]  Patient Name: Louie German  :  1940    As part of the Medicare Wellness portion of your visit today, we are providing you with this personalized preventive plan of services (PPPS). This plan is based upon recommendations of the [place] Preventive Services Task Force (USPSTF) and the Advisory Committee on Immunization Practices (ACIP).    This lists the preventive care services that should be considered, and provides dates of when you are due. Items listed as completed are up-to-date and do not require any further intervention.    Health Maintenance   Topic Date Due   • TDAP/TD VACCINES (1 - Tdap) 1950   • Pneumococcal Vaccine Once at 65 Years Old  2004   • INFLUENZA VACCINE  2020   • ZOSTER VACCINE (2 of 3) 2021 (Originally 2017)   • LIPID PANEL  2021   • MEDICARE ANNUAL WELLNESS  2021       No orders of the defined types were placed in this encounter.      Return in about 6 months (around 2021) for Next scheduled follow up.         Yes

## 2024-10-21 ENCOUNTER — TELEPHONE (OUTPATIENT)
Dept: INTERNAL MEDICINE | Facility: CLINIC | Age: 84
End: 2024-10-21
Payer: MEDICARE

## 2024-10-21 DIAGNOSIS — E55.9 HYPOVITAMINOSIS D: ICD-10-CM

## 2024-10-21 DIAGNOSIS — E53.8 B12 DEFICIENCY: ICD-10-CM

## 2024-10-21 DIAGNOSIS — E78.2 MIXED HYPERLIPIDEMIA: ICD-10-CM

## 2024-10-21 DIAGNOSIS — E78.00 ELEVATED CHOLESTEROL: Primary | ICD-10-CM

## 2024-10-21 DIAGNOSIS — I10 BENIGN ESSENTIAL HTN: ICD-10-CM

## 2024-10-21 DIAGNOSIS — R73.09 ELEVATED GLUCOSE: ICD-10-CM

## 2024-10-21 DIAGNOSIS — N18.2 CHRONIC RENAL IMPAIRMENT, STAGE 2 (MILD): ICD-10-CM

## 2024-10-21 DIAGNOSIS — C83.398 DIFFUSE LARGE B-CELL LYMPHOMA OF EXTRANODAL SITE EXCLUDING SPLEEN AND OTHER SOLID ORGANS: ICD-10-CM

## 2024-10-21 NOTE — TELEPHONE ENCOUNTER
"  Caller: Louie German \"APOLLO\"    Relationship: Self    Best call back number: [phone]      What was the call regarding: MISSED APPOINTMENT FOR MEDICARE WELLNESS APPOINTMENT ON 10/8/24 CALLED TODAY WANTING TO RESCHEDULED DIDN'T HAVE AN AVAILABLE SLOT OPEN TILL JAN 2025    PATIENT WOULD LIKE TO BE SEEN SOMETIME IN 2024 FOR HIS MEDICARE WELLNESS     PLEASE GIVE CALLBACK     "

## 2024-10-21 NOTE — TELEPHONE ENCOUNTER
PATIENT WOULD LIKE TO KNOW IF THESE LABS, INCLUDING CHECKING CHOLESTEROL, CAN BE DONE IN [place].

## 2024-10-21 NOTE — TELEPHONE ENCOUNTER
I spoke with the pt and got him rescheduled for 12/5/24  Please put lab orders in for him to have fasting labs downstairs a few days before his appt and send this msg back to me so I can let him know the orders are in his chart.

## 2024-10-22 RX ORDER — SIMVASTATIN 40 MG
TABLET ORAL
Qty: 90 TABLET | Refills: 3 | Status: SHIPPED | OUTPATIENT
Start: 2024-10-22

## 2024-10-22 NOTE — TELEPHONE ENCOUNTER
Read/relay    Labs are ordered for a [facility] so he should be able to get them @ [facility].    Tried calling pt and got his voicemail which was not set up.

## 2024-10-22 NOTE — TELEPHONE ENCOUNTER
----- Message from Danny Roberto MD sent at 1/22/2018  9:11 AM EST -----  They look great; Excellent response to treatment    Thanks  ----- Message -----     From: Guadalupe Jackson RN     Sent: 1/22/2018   9:02 AM       To: MD Dr. Pardeep Abraham,  Pt is calling about his CT results. Please review and advise on findings. Thank you!    Guadalupe HERNANDEZ Pt gave OK to leave message about results. Called pt, no answer. LVM stating that scans looked great.   
1

## 2024-10-30 ENCOUNTER — LAB (OUTPATIENT)
Dept: LAB | Facility: HOSPITAL | Age: 84
End: 2024-10-30
Payer: MEDICARE

## 2024-10-30 DIAGNOSIS — C83.398 DIFFUSE LARGE B-CELL LYMPHOMA OF EXTRANODAL SITE EXCLUDING SPLEEN AND OTHER SOLID ORGANS: ICD-10-CM

## 2024-10-30 DIAGNOSIS — E78.2 MIXED HYPERLIPIDEMIA: ICD-10-CM

## 2024-10-30 DIAGNOSIS — I10 BENIGN ESSENTIAL HTN: ICD-10-CM

## 2024-10-30 DIAGNOSIS — N18.2 CHRONIC RENAL IMPAIRMENT, STAGE 2 (MILD): ICD-10-CM

## 2024-10-30 DIAGNOSIS — E78.00 ELEVATED CHOLESTEROL: ICD-10-CM

## 2024-11-08 NOTE — PROGRESS NOTES
"    .     REASONS FOR FOLLOWUP: lymphoma    HISTORY OF PRESENT ILLNESS:  The patient is a 84 y.o. year old male  who is here for follow-up with the above-mentioned history.    No new problems.  Feeling fine.  No complaints of fever, chills, weight loss, night sweats    Past Medical History:   Diagnosis Date    Arm fracture     right side humerus - sling and swathe    Cancer 2017    Basal Cell on Nose and Under Right Eye    Coronary artery disease     DDD (degenerative disc disease), cervical     Gastric mass     GERD (gastroesophageal reflux disease)     Hip pain started 5/29/18    onset of acute pain with weight bearing    Hx of cardiac arrest     Happened after Induction of anesthesia prior  to SURG 10 YRS AGO  \" I FLATLINE BUT W/IN SECONDS HEART WAS OK AFTER TURNED ON BACK...I THINK THEY GAVE ME TO MUCH ANESTHESIA\"    Hyperlipidemia     Hypertension     Kidney stones     Non Hodgkin's lymphoma     SPINE    Osteoarthritis     Pleural effusion     DRAINAGE RT PLEURAL FLUID FOR TESTING WITH EGD ON 1-30-17   NO MALIGNANCY WITH CLEARANCE FROM DR SIMMONS FOR SURGERY    RBBB     Rheumatoid arthritis      Past Surgical History:   Procedure Laterality Date    CARDIAC CATHETERIZATION N/A 9/1/2022    Procedure: Left Heart Cath;  Surgeon: Fletcher Nunes MD;  Location: [facility] CATH INVASIVE LOCATION;  Service: Cardiovascular;  Laterality: N/A;    CARDIAC CATHETERIZATION N/A 9/1/2022    Procedure: Coronary angiography;  Surgeon: Fletcher Nunes MD;  Location: [facility] CATH INVASIVE LOCATION;  Service: Cardiovascular;  Laterality: N/A;    CARDIAC CATHETERIZATION N/A 9/1/2022    Procedure: Left ventriculography;  Surgeon: Fletcher Nunes MD;  Location: [facility] CATH INVASIVE LOCATION;  Service: Cardiovascular;  Laterality: N/A;    CARDIAC CATHETERIZATION N/A 9/1/2022    Procedure: Percutaneous Coronary Intervention;  Surgeon: Fletcher Nunes MD;  Location: [facility] CATH INVASIVE LOCATION;  Service: Cardiovascular;  Laterality: " N/A;    CARDIAC CATHETERIZATION N/A 9/1/2022    Procedure: Stent SUSANNA coronary;  Surgeon: Fletcher Nunes MD;  [facility];  Service: Cardiovascular;  Laterality: N/A;    CARDIAC CATHETERIZATION N/A 9/1/2022    Procedure: Resting Full Cycle Ratio;  Surgeon: Fletcher Nunes MD;  [facility];  Service: Cardiovascular;  Laterality: N/A;    COLONOSCOPY N/A 10/26/2017    Procedure: COLONOSCOPY;  Surgeon: Louie Castañeda MD;  Location: [facility] MAIN OR;  Service:     CORONARY ANGIOPLASTY WITH STENT PLACEMENT  1999    17 years ago    CYSTOSCOPY W/ LITHOLAPAXY / EHL      6-7 years ago    DIAGNOSTIC LAPAROSCOPY N/A 10/26/2017    Procedure: DIAGNOSTIC LAPAROSCOPY WITH RETROPERITONEAL BIOPSIES;  Surgeon: Louie Castañeda MD;  Location: [facility] MAIN OR;  Service:     ENDOSCOPY      ON 1/30/17 WITH DRAINAGE OF RT PLEURAL FLUID FOR BIOPSY    NO MALIGNANCY  CLEARANCE FOR SURG PER DR SIMMONS    ENDOSCOPY N/A 10/26/2017    Procedure: ESOPHAGOGASTRODUODENOSCOPY;  Surgeon: Louie Castañeda MD;  Location: [facility] MAIN OR;  Service:     EYE SURGERY Right 2017    Moh's; reconstruction right lower lid    KIDNEY STONE SURGERY      PLEURAL BIOPSY      POSTERIOR CERVICAL FUSION  2001    SKIN CANCER EXCISION N/A 2017    Basal Cell Nose & under right eye    TOTAL HIP ARTHROPLASTY Left 04/2010    9-10 years ago     TOTAL HIP ARTHROPLASTY Right 2/14/2017    Procedure: TOTAL HIP ARTHROPLASTY;  Surgeon: Gerson Cardoza MD;  Location: [facility] MAIN OR;  Service:     VENOUS ACCESS DEVICE (PORT) INSERTION N/A 6/5/2018    Procedure: INSERTION VENOUS ACCESS DEVICE- RIGHT;  Surgeon: Louie Castañeda MD;  Location: [facility] MAIN OR;  Service: General    VENOUS ACCESS DEVICE (PORT) REMOVAL Right 08/31/2021    In-office Procedure-Dr. Louie Castañeda, [facility]       MEDICATIONS    Current Outpatient Medications:     aspirin 81 MG chewable tablet, Chew 1 tablet Daily., Disp: , Rfl: 11    clopidogrel  (PLAVIX) 75 MG tablet, TAKE 1 TABLET DAILY, Disp: 90 tablet, Rfl: 3    felodipine (PLENDIL) 10 MG 24 hr tablet, TAKE 1 TABLET DAILY, Disp: 90 tablet, Rfl: 3    Multiple Vitamin (MULTI VITAMIN PO), Take 1 tablet by mouth Daily., Disp: , Rfl:     simvastatin (ZOCOR) 40 MG tablet, TAKE 1 TABLET EVERY NIGHT, Disp: 90 tablet, Rfl: 3    tamsulosin (FLOMAX) 0.4 MG capsule 24 hr capsule, Take 1 capsule by mouth Daily., Disp: , Rfl:   No current facility-administered medications for this visit.    Facility-Administered Medications Ordered in Other Visits:     heparin flush (porcine) 100 UNIT/ML injection 500 Units, 500 Units, Intravenous, PRN, Danny Roberto MD, 500 Units at 19 1030    sodium chloride 0.9 % flush 10 mL, 10 mL, Intravenous, PRN, Danny Roberto MD, 10 mL at 19 1030    ALLERGIES:   No Known Allergies    SOCIAL HISTORY:       Social History     Socioeconomic History    Marital status:      Spouse name: Farrah    Years of education: college-Masters degree   Tobacco Use    Smoking status: Former     Types: Pipe     Quit date:      Years since quittin.9    Smokeless tobacco: Former     Types: Chew     Quit date: 1998    Tobacco comments:     Caffeine use: 2 cups daily   Vaping Use    Vaping status: Never Used   Substance and Sexual Activity    Alcohol use: Yes     Comment: 1-2 beers nightly, sometimes an occasional bourbon    Drug use: No    Sexual activity: Defer     Partners: Female     Comment:          FAMILY HISTORY:  Family History   Problem Relation Age of Onset    Hypertension Mother         Lived to 95 years old - Had numerous medical problems & medications    Hypertension Father         Lived to 98 years old - Numerous medical problems and Medications    Cancer Brother         History of Agent Orange    Heart disease Maternal Grandmother     Osteoarthritis Paternal Grandfather         possible RA    Malig Hyperthermia Neg Hx        REVIEW OF SYSTEMS:  Review of  "Systems   Constitutional:  Negative for activity change.   HENT:  Negative for nosebleeds and trouble swallowing.    Respiratory:  Negative for shortness of breath and wheezing.    Cardiovascular:  Negative for chest pain and palpitations.   Gastrointestinal:  Negative for constipation, diarrhea and nausea.   Genitourinary:  Negative for dysuria and hematuria.   Musculoskeletal:  Negative for arthralgias and myalgias.   Neurological:  Negative for seizures and syncope.   Hematological:  Negative for adenopathy. Does not bruise/bleed easily.   Psychiatric/Behavioral:  Negative for confusion.             Vitals:    11/19/24 0943   BP: 137/74   Pulse: 88   Temp: 97.6 °F (36.4 °C)   TempSrc: Oral   SpO2: 97%   Weight: 80.7 kg (178 lb)   Height: 148.6 cm (58.5\")         11/19/2024     9:47 AM   Current Status   ECOG score 0        PHYSICAL EXAM:        CONSTITUTIONAL:  Vital signs reviewed.  No distress, looks comfortable.  EYES:  Conjunctiva and lids unremarkable.  PERRLA  EARS,NOSE,MOUTH,THROAT:  Ears and nose appear unremarkable.  Lips, teeth, gums appear unremarkable.  RESPIRATORY:  Normal respiratory effort.  Lungs clear to auscultation bilaterally.  CARDIOVASCULAR:  Normal S1, S2.  No murmurs rubs or gallops.  No significant lower extremity edema.  GASTROINTESTINAL: Abdomen appears unremarkable.  Nontender.  No hepatomegaly.  No splenomegaly.  LYMPHATIC:  No cervical, supraclavicular, axillary lymphadenopathy.  SKIN:  Warm.  No rashes.  PSYCHIATRIC:  Normal judgment and insight.  Normal mood and affect.        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 Roberto.    10/26/2017: Laparoscopic node sampling and upper and lower GI endoscopy, Dr. Castañeda: Laparoscopic node sampling: Follicular lymphoma.  Rituxan weekly x 4  Subsequent good response on scans  Few months after completing scans: Back pain led to finding DLBCL in L3 vertebrae  CHOP R x 6 completed September 2018,   (CR after C4.  Then completed the other 2 cycles)  11/19/2024 (initial visit with me).  Former patient of Dr. Roberto's.  I reminded him underlying follicular lymphoma is not curable.  DLBCL is potentially curable and is hopefully cured.  At his age, I doubt he will need treatment for the underlying indolent lymphoma again but it is possible.    *Anemia, intermittent  11/19/2024: Hb 12.5, within his fluctuating baseline    *Port removed August 2021    *Coronary disease with cardiac cath and drug-eluting stents, September 2022    Plan  MD CBC CMP annually.  Will also check ferritin, iron panel, reticulate hemoglobin due to the intermittent anemia  Planning scans as needed only    I am following him longitudinally

## 2024-11-19 ENCOUNTER — OFFICE VISIT (OUTPATIENT)
Dept: ONCOLOGY | Facility: CLINIC | Age: 84
End: 2024-11-19
Payer: MEDICARE

## 2024-11-19 ENCOUNTER — LAB (OUTPATIENT)
Dept: OTHER | Facility: HOSPITAL | Age: 84
End: 2024-11-19
Payer: MEDICARE

## 2024-11-19 VITALS
WEIGHT: 178 LBS | SYSTOLIC BLOOD PRESSURE: 137 MMHG | BODY MASS INDEX: 34.95 KG/M2 | HEART RATE: 88 BPM | OXYGEN SATURATION: 97 % | HEIGHT: 60 IN | DIASTOLIC BLOOD PRESSURE: 74 MMHG | TEMPERATURE: 97.6 F

## 2024-11-19 DIAGNOSIS — C83.398 DIFFUSE LARGE B-CELL LYMPHOMA OF EXTRANODAL SITE EXCLUDING SPLEEN AND OTHER SOLID ORGANS: Primary | ICD-10-CM

## 2024-11-19 DIAGNOSIS — C83.398 DIFFUSE LARGE B-CELL LYMPHOMA OF EXTRANODAL SITE EXCLUDING SPLEEN AND OTHER SOLID ORGANS: ICD-10-CM

## 2024-11-19 LAB
ALBUMIN SERPL-MCNC: 4.1 G/DL (ref 3.5–5.2)
ALBUMIN/GLOB SERPL: 1.5 G/DL
ALP SERPL-CCNC: 64 U/L (ref 39–117)
ALT SERPL W P-5'-P-CCNC: 13 U/L (ref 1–41)
ANION GAP SERPL CALCULATED.3IONS-SCNC: 9.3 MMOL/L (ref 5–15)
AST SERPL-CCNC: 22 U/L (ref 1–40)
BASOPHILS # BLD AUTO: 0.04 10*3/MM3 (ref 0–0.2)
BASOPHILS NFR BLD AUTO: 0.6 % (ref 0–1.5)
BILIRUB SERPL-MCNC: 0.6 MG/DL (ref 0–1.2)
BUN SERPL-MCNC: 21 MG/DL (ref 8–23)
BUN/CREAT SERPL: 15.8 (ref 7–25)
CALCIUM SPEC-SCNC: 9 MG/DL (ref 8.6–10.5)
CHLORIDE SERPL-SCNC: 106 MMOL/L (ref 98–107)
CO2 SERPL-SCNC: 27.7 MMOL/L (ref 22–29)
CREAT SERPL-MCNC: 1.33 MG/DL (ref 0.76–1.27)
DEPRECATED RDW RBC AUTO: 48.3 FL (ref 37–54)
EGFRCR SERPLBLD CKD-EPI 2021: 52.4 ML/MIN/1.73
EOSINOPHIL # BLD AUTO: 0.18 10*3/MM3 (ref 0–0.4)
EOSINOPHIL NFR BLD AUTO: 2.9 % (ref 0.3–6.2)
ERYTHROCYTE [DISTWIDTH] IN BLOOD BY AUTOMATED COUNT: 13.6 % (ref 12.3–15.4)
GLOBULIN UR ELPH-MCNC: 2.8 GM/DL
GLUCOSE SERPL-MCNC: 130 MG/DL (ref 65–99)
HCT VFR BLD AUTO: 38.4 % (ref 37.5–51)
HGB BLD-MCNC: 12.5 G/DL (ref 13–17.7)
IMM GRANULOCYTES # BLD AUTO: 0.02 10*3/MM3 (ref 0–0.05)
IMM GRANULOCYTES NFR BLD AUTO: 0.3 % (ref 0–0.5)
LYMPHOCYTES # BLD AUTO: 1.6 10*3/MM3 (ref 0.7–3.1)
LYMPHOCYTES NFR BLD AUTO: 25.5 % (ref 19.6–45.3)
MCH RBC QN AUTO: 31.1 PG (ref 26.6–33)
MCHC RBC AUTO-ENTMCNC: 32.6 G/DL (ref 31.5–35.7)
MCV RBC AUTO: 95.5 FL (ref 79–97)
MONOCYTES # BLD AUTO: 0.51 10*3/MM3 (ref 0.1–0.9)
MONOCYTES NFR BLD AUTO: 8.1 % (ref 5–12)
NEUTROPHILS NFR BLD AUTO: 3.92 10*3/MM3 (ref 1.7–7)
NEUTROPHILS NFR BLD AUTO: 62.6 % (ref 42.7–76)
NRBC BLD AUTO-RTO: 0 /100 WBC (ref 0–0.2)
PLATELET # BLD AUTO: 166 10*3/MM3 (ref 140–450)
PMV BLD AUTO: 10.5 FL (ref 6–12)
POTASSIUM SERPL-SCNC: 4.2 MMOL/L (ref 3.5–5.2)
PROT SERPL-MCNC: 6.9 G/DL (ref 6–8.5)
RBC # BLD AUTO: 4.02 10*6/MM3 (ref 4.14–5.8)
SODIUM SERPL-SCNC: 143 MMOL/L (ref 136–145)
WBC NRBC COR # BLD AUTO: 6.27 10*3/MM3 (ref 3.4–10.8)

## 2024-11-19 PROCEDURE — 3078F DIAST BP <80 MM HG: CPT | Performed by: INTERNAL MEDICINE

## 2024-11-19 PROCEDURE — 99214 OFFICE O/P EST MOD 30 MIN: CPT | Performed by: INTERNAL MEDICINE

## 2024-11-19 PROCEDURE — 1126F AMNT PAIN NOTED NONE PRSNT: CPT | Performed by: INTERNAL MEDICINE

## 2024-11-19 PROCEDURE — 36415 COLL VENOUS BLD VENIPUNCTURE: CPT

## 2024-11-19 PROCEDURE — G2211 COMPLEX E/M VISIT ADD ON: HCPCS | Performed by: INTERNAL MEDICINE

## 2024-11-19 PROCEDURE — 3075F SYST BP GE 130 - 139MM HG: CPT | Performed by: INTERNAL MEDICINE

## 2024-11-19 PROCEDURE — 80053 COMPREHEN METABOLIC PANEL: CPT | Performed by: INTERNAL MEDICINE

## 2024-11-19 PROCEDURE — 85025 COMPLETE CBC W/AUTO DIFF WBC: CPT | Performed by: INTERNAL MEDICINE

## 2024-11-19 RX ORDER — TAMSULOSIN HYDROCHLORIDE 0.4 MG/1
1 CAPSULE ORAL DAILY
COMMUNITY

## 2024-11-21 ENCOUNTER — TELEPHONE (OUTPATIENT)
Dept: ONCOLOGY | Facility: CLINIC | Age: 84
End: 2024-11-21
Payer: MEDICARE

## 2024-11-21 DIAGNOSIS — M54.50 ACUTE LOW BACK PAIN, UNSPECIFIED BACK PAIN LATERALITY, UNSPECIFIED WHETHER SCIATICA PRESENT: ICD-10-CM

## 2024-11-21 DIAGNOSIS — C83.398 DIFFUSE LARGE B-CELL LYMPHOMA OF EXTRANODAL SITE EXCLUDING SPLEEN AND OTHER SOLID ORGANS: Primary | ICD-10-CM

## 2024-11-21 NOTE — TELEPHONE ENCOUNTER
"  Caller: Louie German \"APOLLO\"    Relationship: Self    Best call back number: [phone]    What is the best time to reach you: ANYTIME    Who are you requesting to speak with (clinical staff, provider,  specific staff member): CLINICAL        What was the call regarding:     HAD QUESTION AFTER MEETING WITH DR PARIS    LUMBAR REGION COMPRESSED DISK, ASKING IF IS CANCEROUS OR IF JUST A COMPRESSED DISK AND IS THIS TREATABLE?       "

## 2024-11-22 ENCOUNTER — DOCUMENTATION (OUTPATIENT)
Dept: ONCOLOGY | Facility: CLINIC | Age: 84
End: 2024-11-22
Payer: MEDICARE

## 2024-11-22 NOTE — TELEPHONE ENCOUNTER
Sent to Dr. Gamez:   Dr. Gamez,    Patient is calling in and he states that he has been experiencing lower back pain for quite some time and that with movement it is significantly worse and cannot move around more than 20 minutes. He rates his pain on a scale of 1-10 at 7.5 and he wanted to see if this was something you would be worried about. He was a paredes patient (DLBCL) and you just seen him on 11/19. He has not had scans since 2023 and he wanted to see if you thought this might be a good idea with the new intensity of pain and with no trauma or causative factor known?    Thank you

## 2024-11-22 NOTE — TELEPHONE ENCOUNTER
From Dr. Gamez:  Please arrange for him to have an MRI of his lumbar spine.  Please request this be compared to his prior MRI lumbar spine from 2018 and his most recent PET scan which I believe was 2021  Patient can call in for results.     Order placed for MRI Lumbar Spine. Called patient to let him know what Dr. Gamez stated above and made sure he had no additional questions at this time and his wife answered and took the information and v/u.

## 2024-11-22 NOTE — PROGRESS NOTES
Please arrange for him to have an MRI of his lumbar spine.  Please request this be compared to his prior MRI lumbar spine from 2018 and his most recent PET scan which I believe was 2021  Patient can call in for results.    ===View-only below this line===  ----- Message -----  From: Parul Simon RN  Sent: 11/22/2024  10:50 AM EST  To: MD Dr. Vera Yap II,     Patient is calling in and he states that he has been experiencing lower back pain for quite some time and that with movement it is significantly worse and cannot move around more than 20 minutes. He rates his pain on a scale of 1-10 at 7.5 and he wanted to see if this was something you would be worried about. He was a paredes patient (DLBCL) and you just seen him on 11/19. He has not had scans since 2023 and he wanted to see if you thought this might be a good idea with the new intensity of pain and with no trauma or causative factor known?    Thank you  ----- Message -----  From: Patric Pimentel RN  Sent: 11/21/2024   1:16 PM EST  To: Parul Simon RN      ----- Message -----  From: Adis Serna RegSched Rep  Sent: 11/21/2024  12:48 PM EST  [facility]

## 2024-11-25 ENCOUNTER — TELEPHONE (OUTPATIENT)
Dept: ONCOLOGY | Facility: CLINIC | Age: 84
End: 2024-11-25
Payer: MEDICARE

## 2024-11-25 NOTE — TELEPHONE ENCOUNTER
----- Message from Yung Gamez sent at 11/25/2024 10:21 AM EST -----  Regarding: RE: CHRISSY  Depending on how much he is hurting, we could go to another hospital system such as [facility] or [facility] [place] or [facility] [place]  If he does not think the pain is that bad, then I suppose we could wait until we could get him in here  [place], can you call and discussed with him and see how significant this seems to be to see if we need to go outside of this hospital system to get it done in a more timely fashion?  ----- Message -----  From: Alexa Silverio  Sent: 11/22/2024   4:44 PM EST  To: Yung Gamez II, MD  Subject: FYI                                              I could not get MRI of LS spine in until 12/12

## 2024-11-27 ENCOUNTER — HOSPITAL ENCOUNTER (OUTPATIENT)
Dept: MRI IMAGING | Facility: HOSPITAL | Age: 84
Discharge: HOME | End: 2024-11-27
Admitting: INTERNAL MEDICINE
Payer: MEDICARE

## 2024-11-27 DIAGNOSIS — C83.398 DIFFUSE LARGE B-CELL LYMPHOMA OF EXTRANODAL SITE EXCLUDING SPLEEN AND OTHER SOLID ORGANS: ICD-10-CM

## 2024-11-27 DIAGNOSIS — M54.50 ACUTE LOW BACK PAIN, UNSPECIFIED BACK PAIN LATERALITY, UNSPECIFIED WHETHER SCIATICA PRESENT: ICD-10-CM

## 2024-11-27 PROCEDURE — 72158 MRI LUMBAR SPINE W/O & W/DYE: CPT

## 2024-11-27 PROCEDURE — A9577 INJ MULTIHANCE: HCPCS | Performed by: INTERNAL MEDICINE

## 2024-11-27 PROCEDURE — 25510000002 GADOBENATE DIMEGLUMINE 529 MG/ML SOLUTION: Performed by: INTERNAL MEDICINE

## 2024-11-27 RX ADMIN — GADOBENATE DIMEGLUMINE 15 ML: 529 INJECTION, SOLUTION INTRAVENOUS at 16:59

## 2024-12-02 ENCOUNTER — DOCUMENTATION (OUTPATIENT)
Dept: ONCOLOGY | Facility: CLINIC | Age: 84
End: 2024-12-02
Payer: MEDICARE

## 2024-12-02 ENCOUNTER — TELEPHONE (OUTPATIENT)
Dept: ONCOLOGY | Facility: CLINIC | Age: 84
End: 2024-12-02
Payer: MEDICARE

## 2024-12-02 NOTE — PROGRESS NOTES
Luther Teran,    Please call him and tell him MRI L-spine, 11/27/2024 shows no evidence of cancer.  He does have degenerative disc disease and some narrowing of the canals in which his nerve roots pass through which may be contributing to his back pain.  This is really outside of our field of medicine and he should discuss this with his PCP.  Again though no evidence of cancer as the cause of the back pain.    Thanks!

## 2024-12-02 NOTE — TELEPHONE ENCOUNTER
----- Message from Yung Gamez sent at 12/2/2024  3:18 PM EST -----  Luther Teran,    Please call him and tell him MRI L-spine, 11/27/2024 shows no evidence of cancer.  He does have degenerative disc disease and some narrowing of the canals in which his nerve roots pass through which may be contributing to his back pain.  This is really outside of our field of medicine and he should discuss this with his PCP.  Again though no evidence of cancer as the cause of the back pain.    Thanks!

## 2024-12-02 NOTE — TELEPHONE ENCOUNTER
Called the patient to let him know the information below from Dr. Gamez and that the L Spine MRI showed no evidence of malignancy and that it did show degenerative disc disease and that Dr. Gamez recommended discussing this with the patients PCP and the patient v/u.

## 2024-12-05 ENCOUNTER — OFFICE VISIT (OUTPATIENT)
Dept: INTERNAL MEDICINE | Facility: CLINIC | Age: 84
End: 2024-12-05
Payer: MEDICARE

## 2024-12-05 VITALS
SYSTOLIC BLOOD PRESSURE: 120 MMHG | WEIGHT: 188.2 LBS | OXYGEN SATURATION: 99 % | DIASTOLIC BLOOD PRESSURE: 82 MMHG | BODY MASS INDEX: 36.95 KG/M2 | HEIGHT: 60 IN | HEART RATE: 80 BPM

## 2024-12-05 DIAGNOSIS — E78.00 ELEVATED CHOLESTEROL: ICD-10-CM

## 2024-12-05 DIAGNOSIS — I25.85 CHRONIC CORONARY MICROVASCULAR DYSFUNCTION: ICD-10-CM

## 2024-12-05 DIAGNOSIS — C83.398 DIFFUSE LARGE B-CELL LYMPHOMA OF EXTRANODAL SITE EXCLUDING SPLEEN AND OTHER SOLID ORGANS: Primary | ICD-10-CM

## 2024-12-05 DIAGNOSIS — S32.030S CLOSED COMPRESSION FRACTURE OF L3 VERTEBRA, SEQUELA: ICD-10-CM

## 2024-12-05 DIAGNOSIS — I10 BENIGN ESSENTIAL HTN: ICD-10-CM

## 2024-12-05 NOTE — PROGRESS NOTES
Subjective   The ABCs of the Annual Wellness Visit  Medicare Wellness Visit      Louie German is a 84 y.o. patient who presents for a Medicare Wellness Visit.    The following portions of the patient's history were reviewed and   updated as appropriate: allergies, current medications, past family history, past medical history, past social history, past surgical history, and problem list.    Compared to one year ago, the patient's physical   health is the same.  Compared to one year ago, the patient's mental   health is the same.    Recent Hospitalizations:  He was not admitted to the hospital during the last year.     Current Medical Providers:  Patient Care Team:  Ryan Villarreal MD as PCP - General (Family Medicine)  Danny Roberto MD as Consulting Physician (Hematology and Oncology)  Ryan Villarreal MD as Referring Physician (Family Medicine)  Yung Gamez II, MD as Consulting Physician (Hematology and Oncology)    Outpatient Medications Prior to Visit   Medication Sig Dispense Refill    aspirin 81 MG chewable tablet Chew 1 tablet Daily.  11    clopidogrel (PLAVIX) 75 MG tablet TAKE 1 TABLET DAILY 90 tablet 3    felodipine (PLENDIL) 10 MG 24 hr tablet TAKE 1 TABLET DAILY 90 tablet 3    Multiple Vitamin (MULTI VITAMIN PO) Take 1 tablet by mouth Daily.      simvastatin (ZOCOR) 40 MG tablet TAKE 1 TABLET EVERY NIGHT 90 tablet 3    tamsulosin (FLOMAX) 0.4 MG capsule 24 hr capsule Take 1 capsule by mouth Daily.       Facility-Administered Medications Prior to Visit   Medication Dose Route Frequency Provider Last Rate Last Admin    heparin flush (porcine) 100 UNIT/ML injection 500 Units  500 Units Intravenous PRN Danny Roberto MD   500 Units at 01/25/19 1030    sodium chloride 0.9 % flush 10 mL  10 mL Intravenous PRN Danny Roberto MD   10 mL at 01/25/19 1030     No opioid medication identified on active medication list. I have reviewed chart for other potential  high risk medication/s and harmful drug  "interactions in the elderly.      Aspirin is on active medication list. Aspirin use is indicated based on review of current medical condition/s. Pros and cons of this therapy have been discussed today. Benefits of this medication outweigh potential harm.  Patient has been encouraged to continue taking this medication.  .      Patient Active Problem List   Diagnosis    Benign essential HTN    Acid reflux    Calculus of kidney    Cervical spinal stenosis    Status post total hip replacement, left    Status post total replacement of right hip    Degenerative disc disease, lumbar    Hydronephrosis of left kidney    Diffuse large B-cell lymphoma of extranodal site excluding spleen and other solid organs    Chronic renal impairment, stage 2 (mild)    Metastatic tumor of bone    Closed compression fracture of third lumbar vertebra    Lymphoma    Hip pain    S/P primary angioplasty with coronary stent    Precordial chest pain    Abnormal nuclear stress test    Coronary artery disease     Advance Care Planning Advance Directive is on file.  ACP discussion was held with the patient during this visit. Patient has an advance directive in EMR which is still valid.             Objective   Vitals:    12/05/24 1642   BP: 120/82   BP Location: Left arm   Patient Position: Sitting   Cuff Size: Adult   Pulse: 80   SpO2: 99%   Weight: 85.4 kg (188 lb 3.2 oz)   Height: 147.3 cm (58\")   PainSc: 0-No pain       Estimated body mass index is 39.33 kg/m² as calculated from the following:    Height as of this encounter: 147.3 cm (58\").    Weight as of this encounter: 85.4 kg (188 lb 3.2 oz).    Class 2 Severe Obesity (BMI >=35 and <=39.9). Obesity-related health conditions include the following: coronary heart disease. Obesity is improving with lifestyle modifications. BMI is is above average; no BMI management plan is appropriate. We discussed portion control and increasing exercise.       Does the patient have evidence of cognitive " impairment? No                                                                                                Health  Risk Assessment    Smoking Status:  Social History     Tobacco Use   Smoking Status Former    Types: Pipe    Quit date: 1965    Years since quittin.9   Smokeless Tobacco Former    Types: Chew    Quit date: 1998   Tobacco Comments    Caffeine use: 2 cups daily     Alcohol Consumption:  Social History     Substance and Sexual Activity   Alcohol Use Yes    Comment: 1-2 beers nightly, sometimes an occasional bourbon       Fall Risk Screen  STEADI Fall Risk Assessment was completed, and patient is at LOW risk for falls.Assessment completed on:2024    Depression Screening   Little interest or pleasure in doing things? Not at all   Feeling down, depressed, or hopeless? Not at all   PHQ-2 Total Score 0      Health Habits and Functional and Cognitive Screenin/5/2024     4:44 PM   Functional & Cognitive Status   Do you have difficulty preparing food and eating? No   Do you have difficulty bathing yourself, getting dressed or grooming yourself? No   Do you have difficulty using the toilet? No   Do you have difficulty moving around from place to place? No   Do you have trouble with steps or getting out of a bed or a chair? No   Current Diet Well Balanced Diet   Dental Exam Up to date   Eye Exam Up to date   Exercise (times per week) 4 times per week   Current Exercises Include Other;Walking   Do you need help using the phone?  No   Are you deaf or do you have serious difficulty hearing?  No   Do you need help to go to places out of walking distance? No   Do you need help shopping? No   Do you need help preparing meals?  No   Do you need help with housework?  No   Do you need help with laundry? No   Do you need help taking your medications? No   Do you need help managing money? No   Do you ever drive or ride in a car without wearing a seat belt? No   Have you felt unusual stress, anger or  loneliness in the last month? Yes   Who do you live with? Spouse   If you need help, do you have trouble finding someone available to you? No   Have you been bothered in the last four weeks by sexual problems? No   Do you have difficulty concentrating, remembering or making decisions? No           Age-appropriate Screening Schedule:  Refer to the list below for future screening recommendations based on patient's age, sex and/or medical conditions. Orders for these recommended tests are listed in the plan section. The patient has been provided with a written plan.    Health Maintenance List  Health Maintenance   Topic Date Due    Pneumococcal Vaccine 65+ (1 of 2 - PCV) Never done    TDAP/TD VACCINES (1 - Tdap) 07/17/1993    ZOSTER VACCINE (1 of 2) 05/05/2017    BMI FOLLOWUP  09/13/2023    LIPID PANEL  11/22/2023    COVID-19 Vaccine (5 - 2024-25 season) 09/01/2024    ANNUAL WELLNESS VISIT  12/05/2025    RSV Vaccine - Adults  Completed    INFLUENZA VACCINE  Completed                                                                                                                                                CMS Preventative Services Quick Reference  Risk Factors Identified During Encounter  Fall Risk-High or Moderate: Discussed Fall Prevention in the home    The above risks/problems have been discussed with the patient.  Pertinent information has been shared with the patient in the After Visit Summary.  An After Visit Summary and PPPS were made available to the patient.    Follow Up:   Next Medicare Wellness visit to be scheduled in 1 year.         Additional E&M Note during same encounter follows:  Patient has additional, significant, and separately identifiable condition(s)/problem(s) that require work above and beyond the Medicare Wellness Visit     Chief Complaint  Coronary Artery Disease    Subjective   David is here for Medicare wellness exam.  He is overall quite robust and doing well.  Previous heart  "catheterization notes placement of multiple stents by Dr. Nunes.  He is done well since.  Medical treatment is continued and is lymphoma has been stable as noted by oncology.    He had some back pain and MRI of the back is reviewed noting no evidence of metastatic disease.  He does have evidence of a old lumbar compression fracture and L3.  No new findings are noted.  He does not wish to have any significant intervention for his back issues and will take some Tylenol as needed.      APOLLO is also being seen today for an annual adult preventative physical exam.  and APOLLO is also being seen today for additional medical problem/s.    Review of Systems   Musculoskeletal:  Positive for back pain.   All other systems reviewed and are negative.             Objective   Vital Signs:  /82 (BP Location: Left arm, Patient Position: Sitting, Cuff Size: Adult)   Pulse 80   Ht 147.3 cm (58\")   Wt 85.4 kg (188 lb 3.2 oz)   SpO2 99%   BMI 39.33 kg/m²   Physical Exam  Vitals reviewed.   Constitutional:       Appearance: He is well-developed.   HENT:      Head: Normocephalic and atraumatic.      Right Ear: Tympanic membrane and external ear normal.      Left Ear: Tympanic membrane and external ear normal.      Nose: Nose normal.   Eyes:      Conjunctiva/sclera: Conjunctivae normal.      Pupils: Pupils are equal, round, and reactive to light.   Neck:      Thyroid: No thyromegaly.      Vascular: No JVD.   Cardiovascular:      Rate and Rhythm: Normal rate and regular rhythm.      Heart sounds: Normal heart sounds.   Pulmonary:      Effort: Pulmonary effort is normal.      Breath sounds: Normal breath sounds.   Abdominal:      General: Bowel sounds are normal.      Palpations: Abdomen is soft.   Musculoskeletal:         General: Normal range of motion.      Cervical back: Normal range of motion and neck supple.      Lumbar back: Decreased range of motion.   Lymphadenopathy:      Cervical: No cervical adenopathy.   Skin:     " General: Skin is warm and dry.      Findings: No rash.   Neurological:      Mental Status: He is alert and oriented to person, place, and time.      Cranial Nerves: No cranial nerve deficit.      Coordination: Coordination normal.   Psychiatric:         Behavior: Behavior normal.         Thought Content: Thought content normal.         Judgment: Judgment normal.         The following data was reviewed by: Ryan Villarreal MD on 12/05/2024:  Data reviewed : Cardiology studies prior history of heart catheterization reviewed  Common labs          11/19/2024    09:38   Common Labs   Glucose 130    BUN 21    Creatinine 1.33    Sodium 143    Potassium 4.2    Chloride 106    Calcium 9.0    Albumin 4.1    Total Bilirubin 0.6    Alkaline Phosphatase 64    AST (SGOT) 22    ALT (SGPT) 13    WBC 6.27    Hemoglobin 12.5    Hematocrit 38.4    Platelets 166              Assessment and Plan            Diffuse large B-cell lymphoma of extranodal site excluding spleen and other solid organs  Clinically stable upon review of oncology records       Closed compression fracture of L3 vertebra, sequela  He has some chronic back pain is going to try taking Tylenol 1 or 2 daily       Benign essential HTN   felodipine 10 mg daily         Chronic coronary microvascular dysfunction   multiple stents placed on heart catheterization as reviewed with the patient.  He continues on aspirin 81 mg daily plus Plavix 75 mg daily.         Elevated cholesterol    current dose of simvastatin 40 mg daily is working well with LDL cholesterol quite low on most recent check.               Follow Up   Return in about 6 months (around 6/5/2025) for Recheck.  Patient was given instructions and counseling regarding his condition or for health maintenance advice. Please see specific information pulled into the AVS if appropriate.

## 2024-12-05 NOTE — PATIENT INSTRUCTIONS
Medicare Wellness  Personal Prevention Plan of Service     Date of Office Visit:    Encounter Provider:  Ryan Villarreal MD  Place of Service:  [facility]  Patient Name: Louie German  :  1940    As part of the Medicare Wellness portion of your visit today, we are providing you with this personalized preventive plan of services (PPPS). This plan is based upon recommendations of the [place] Preventive Services Task Force (USPSTF) and the Advisory Committee on Immunization Practices (ACIP).    This lists the preventive care services that should be considered, and provides dates of when you are due. Items listed as completed are up-to-date and do not require any further intervention.    Health Maintenance   Topic Date Due    Pneumococcal Vaccine 65+ (1 of 2 - PCV) Never done    TDAP/TD VACCINES (1 - Tdap) 1993    ZOSTER VACCINE (1 of 2) 2017    BMI FOLLOWUP  2023    LIPID PANEL  2023    COVID-19 Vaccine (2024- season) 2024    ANNUAL WELLNESS VISIT  2025    RSV Vaccine - Adults  Completed    INFLUENZA VACCINE  Completed       No orders of the defined types were placed in this encounter.      Return in about 6 months (around 2025) for Recheck.

## 2024-12-05 NOTE — ASSESSMENT & PLAN NOTE
multiple stents placed on heart catheterization as reviewed with the patient.  He continues on aspirin 81 mg daily plus Plavix 75 mg daily.

## 2025-02-10 DIAGNOSIS — I10 BENIGN ESSENTIAL HTN: ICD-10-CM

## 2025-02-10 RX ORDER — FELODIPINE 10 MG/1
TABLET, EXTENDED RELEASE ORAL
Qty: 90 TABLET | Refills: 3 | Status: SHIPPED | OUTPATIENT
Start: 2025-02-10

## 2025-04-18 RX ORDER — CLOPIDOGREL BISULFATE 75 MG/1
75 TABLET ORAL DAILY
Qty: 90 TABLET | Refills: 3 | Status: SHIPPED | OUTPATIENT
Start: 2025-04-18

## 2025-05-08 NOTE — PROGRESS NOTES
RM:__________                    PCP:Hay, Ryan G, MD                                                    Last EKG:    :_2/1940                                                                    AGE:85 y.o.      REASON FOR VISIT:___________________________________________________________        WT:____________    HT:____________   BP:____________  HR:____________       SMOKER: CURRENT/PPD:___________________ FORMER:______________ NEVER:______________          RECENT HOSPITALIZATIONS OR   ER VISITS:________________________________________________________

## 2025-05-12 ENCOUNTER — OFFICE VISIT (OUTPATIENT)
Dept: CARDIOLOGY | Age: 85
End: 2025-05-12
Payer: MEDICARE

## 2025-05-12 VITALS
BODY MASS INDEX: 26.67 KG/M2 | SYSTOLIC BLOOD PRESSURE: 150 MMHG | WEIGHT: 176 LBS | DIASTOLIC BLOOD PRESSURE: 72 MMHG | HEART RATE: 81 BPM | HEIGHT: 68 IN

## 2025-05-12 DIAGNOSIS — I25.10 CORONARY ARTERY DISEASE INVOLVING NATIVE CORONARY ARTERY OF NATIVE HEART WITHOUT ANGINA PECTORIS: Primary | ICD-10-CM

## 2025-05-12 DIAGNOSIS — I73.9 CLAUDICATION: ICD-10-CM

## 2025-05-12 DIAGNOSIS — I10 PRIMARY HYPERTENSION: ICD-10-CM

## 2025-05-12 DIAGNOSIS — E78.5 HYPERLIPIDEMIA LDL GOAL <70: ICD-10-CM

## 2025-05-12 DIAGNOSIS — I45.10 RBBB: ICD-10-CM

## 2025-05-12 PROCEDURE — 1159F MED LIST DOCD IN RCRD: CPT | Performed by: INTERNAL MEDICINE

## 2025-05-12 PROCEDURE — 93000 ELECTROCARDIOGRAM COMPLETE: CPT | Performed by: INTERNAL MEDICINE

## 2025-05-12 PROCEDURE — 1160F RVW MEDS BY RX/DR IN RCRD: CPT | Performed by: INTERNAL MEDICINE

## 2025-05-12 PROCEDURE — 99214 OFFICE O/P EST MOD 30 MIN: CPT | Performed by: INTERNAL MEDICINE

## 2025-05-12 NOTE — PROGRESS NOTES
[facility]      Patient Name: Louie German  :1940  Age: 85 y.o.  Encounter Provider:  Shiv Phipps Jr, MD      Chief Complaint: Follow-up coronary artery disease      Coronary Artery Disease  Symptoms include chest pain. Pertinent negatives include no dizziness, leg swelling or palpitations.     Louie German is a 85 y.o. male with past medical history of CAD status post PCI , hypertension who presents for follow-up evaluation of chest pain.     Last clinic visit note: Per the patient in  he had chest discomfort radiating down the left arm and was brought to the hospital.  Although he did not suffer myocardial infarction a stress study showed abnormal perfusion imaging and he was taken for cardiac catheterization where a stent was placed.  He does not have any further details than that.  He has not followed with a cardiologist since then.  Currently not on aspirin or statin.  In 2021 he woke up with 10 at 10 chest pain which she has trouble qualifying further.  He would presented to the ER and the work-up was benign with spontaneous resolution of the pain and no further medical investigation was undertaken.  In May 2022 he had a similar episode but this time it was substernal pain radiating around the right flank to the back.  He did not seek medical attention for this and has not had any episodes since then.  For his age he is very active with no chest pain or shortness of air during activity.  Although I stays active he really cannot do a lot in the way of aerobic activity due to gait and strength issues however he does a significant amount of walking around the property and gardening.  No orthopnea, PND or edema.  No palpitations, dizziness or syncope.  He is a lifelong non-smoker who drinks at least 1 alcoholic beverage daily and denies illicit drug use.  Family history was reviewed and is not pertinent to this clinic visit.    After our initial visit the  patient had a stress test that showed a moderate amount of john-infarct ischemia and was sent for cardiac catheterization given his symptoms.  Patient ultimately had to stents placed in the mid RCA and 3 stents in the mid LAD.  He saw NOLAN Grissom in follow-up and was doing fairly well at that time.  He continues to do fairly well with increased activity and no limiting symptoms.  He denies chest pain or shortness of air.  Occasional palpitations but no dizziness or syncope.  No orthopnea, PND or edema.  He is compliant with dual antiplatelet therapy and statin.    Doing well since last visit.  He does notice increased leg discomfort with long walks.  He was on a river cruise in [place] last year and as long as he can rest for a few minutes he gets to get right back to physical activity but will have to use frequent rests.  I see no recent lipid panel in the system.  He denies angina.  No orthopnea, PND or edema.  No palpitations, dizziness or syncope.    The following portions of the patient's history were reviewed and updated as appropriate: allergies, current medications, past family history, past medical history, past social history, past surgical history and problem list.      Review of Systems   Constitutional: Negative for chills and fever.   HENT:  Negative for hoarse voice and sore throat.    Eyes:  Negative for double vision and photophobia.   Cardiovascular:  Positive for chest pain. Negative for leg swelling, near-syncope, orthopnea, palpitations, paroxysmal nocturnal dyspnea and syncope.   Respiratory:  Negative for cough and wheezing.    Skin:  Negative for poor wound healing and rash.   Musculoskeletal:  Negative for arthritis and joint swelling.   Gastrointestinal:  Negative for bloating, abdominal pain, hematemesis and hematochezia.   Neurological:  Negative for dizziness and focal weakness.   Psychiatric/Behavioral:  Negative for depression and suicidal ideas.        OBJECTIVE:   Vital  "Signs  Vitals:    05/12/25 1035   BP: 150/72   Pulse: 81     Estimated body mass index is 26.76 kg/m² as calculated from the following:    Height as of this encounter: 172.7 cm (68\").    Weight as of this encounter: 79.8 kg (176 lb).    Vitals reviewed.   Constitutional:       Appearance: Healthy appearance. Not in distress.   Neck:      Vascular: No JVR. JVD normal.   Pulmonary:      Effort: Pulmonary effort is normal.      Breath sounds: Normal breath sounds. No wheezing. No rhonchi. No rales.   Chest:      Chest wall: Not tender to palpatation.   Cardiovascular:      PMI at left midclavicular line. Normal rate. Regular rhythm. Normal S1. Normal S2.       Murmurs: There is no murmur.      No gallop.  No click. No rub.   Pulses:     Intact distal pulses.   Edema:     Peripheral edema absent.   Abdominal:      General: Bowel sounds are normal.      Palpations: Abdomen is soft.      Tenderness: There is no abdominal tenderness.   Musculoskeletal: Normal range of motion.         General: No tenderness. Skin:     General: Skin is warm and dry.   Neurological:      General: No focal deficit present.      Mental Status: Alert and oriented to person, place and time.         ECG 12 Lead    Date/Time: 5/12/2025 10:37 AM  Performed by: Shiv Phipps Jr., MD    Authorized by: Shiv Phipps Jr., MD  Comparison: compared with previous ECG from 5/9/2024  Similar to previous ECG  Rhythm: sinus rhythm  Conduction: right bundle branch block    Clinical impression: abnormal EKG              ASSESSMENT:     Coronary artery disease  Hypertension    PLAN OF CARE:     Coronary artery disease -status post recent multivessel intervention compliant with dual antiplatelet therapy and statin.  No angina.  Leg pain  -concern for atypical equivalent of claudication.  Check ASHU.  Check total CK, FLP and CMP.  Mixed hyperlipidemia  B-cell lymphoma -he is undergone low cardiotoxicity regimens.  No heart failure symptoms.  We will monitor " clinical progress.  Hypertension -seemingly well controlled at this time.  Continue felodipine.      Return to clinic 6 months           Discharge Medications            Accurate as of May 12, 2025 10:37 AM. If you have any questions, ask your nurse or doctor.                Continue These Medications        Instructions Start Date   aspirin 81 MG chewable tablet   81 mg, Oral, Daily      clopidogrel 75 MG tablet  Commonly known as: PLAVIX   75 mg, Oral, Daily      felodipine 10 MG 24 hr tablet  Commonly known as: PLENDIL   TAKE 1 TABLET DAILY      multivitamin tablet tablet  Commonly known as: THERAGRAN   1 tablet, Daily      simvastatin 40 MG tablet  Commonly known as: ZOCOR   TAKE 1 TABLET EVERY NIGHT      tamsulosin 0.4 MG capsule 24 hr capsule  Commonly known as: FLOMAX   1 capsule, Daily               Thank you for allowing me to participate in the care of your patient,      Sincerely,   Shiv Phipps MD  [facility]  05/12/25  10:37 EDT

## 2025-05-17 ENCOUNTER — LAB (OUTPATIENT)
Dept: LAB | Facility: HOSPITAL | Age: 85
End: 2025-05-17
Payer: MEDICARE

## 2025-05-17 DIAGNOSIS — E55.9 HYPOVITAMINOSIS D: ICD-10-CM

## 2025-05-17 DIAGNOSIS — E78.2 MIXED HYPERLIPIDEMIA: ICD-10-CM

## 2025-05-17 DIAGNOSIS — E78.5 HYPERLIPIDEMIA LDL GOAL <70: ICD-10-CM

## 2025-05-17 DIAGNOSIS — I10 BENIGN ESSENTIAL HTN: ICD-10-CM

## 2025-05-17 DIAGNOSIS — E53.8 B12 DEFICIENCY: ICD-10-CM

## 2025-05-17 DIAGNOSIS — R73.09 ELEVATED GLUCOSE: ICD-10-CM

## 2025-05-17 DIAGNOSIS — C83.398 DIFFUSE LARGE B-CELL LYMPHOMA OF EXTRANODAL SITE EXCLUDING SPLEEN AND OTHER SOLID ORGANS: ICD-10-CM

## 2025-05-17 DIAGNOSIS — E78.00 ELEVATED CHOLESTEROL: ICD-10-CM

## 2025-05-17 DIAGNOSIS — N18.2 CHRONIC RENAL IMPAIRMENT, STAGE 2 (MILD): ICD-10-CM

## 2025-05-17 LAB
25(OH)D3 SERPL-MCNC: 35.5 NG/ML (ref 30–100)
ALBUMIN SERPL-MCNC: 4 G/DL (ref 3.5–5.2)
ALBUMIN/GLOB SERPL: 1.5 G/DL
ALP SERPL-CCNC: 69 U/L (ref 39–117)
ALT SERPL W P-5'-P-CCNC: 9 U/L (ref 1–41)
ANION GAP SERPL CALCULATED.3IONS-SCNC: 9.1 MMOL/L (ref 5–15)
AST SERPL-CCNC: 19 U/L (ref 1–40)
BASOPHILS # BLD AUTO: 0.04 10*3/MM3 (ref 0–0.2)
BASOPHILS NFR BLD AUTO: 0.8 % (ref 0–1.5)
BILIRUB SERPL-MCNC: 0.6 MG/DL (ref 0–1.2)
BUN SERPL-MCNC: 18 MG/DL (ref 8–23)
BUN/CREAT SERPL: 13.1 (ref 7–25)
CALCIUM SPEC-SCNC: 9.1 MG/DL (ref 8.6–10.5)
CHLORIDE SERPL-SCNC: 107 MMOL/L (ref 98–107)
CHOLEST SERPL-MCNC: 151 MG/DL (ref 0–200)
CK MB SERPL-CCNC: 2.93 NG/ML
CK SERPL-CCNC: 71 U/L (ref 20–200)
CO2 SERPL-SCNC: 23.9 MMOL/L (ref 22–29)
CREAT SERPL-MCNC: 1.37 MG/DL (ref 0.76–1.27)
DEPRECATED RDW RBC AUTO: 49.3 FL (ref 37–54)
EGFRCR SERPLBLD CKD-EPI 2021: 50.2 ML/MIN/1.73
EOSINOPHIL # BLD AUTO: 0.54 10*3/MM3 (ref 0–0.4)
EOSINOPHIL NFR BLD AUTO: 10.3 % (ref 0.3–6.2)
ERYTHROCYTE [DISTWIDTH] IN BLOOD BY AUTOMATED COUNT: 13.2 % (ref 12.3–15.4)
GLOBULIN UR ELPH-MCNC: 2.7 GM/DL
GLUCOSE SERPL-MCNC: 97 MG/DL (ref 65–99)
HBA1C MFR BLD: 6 % (ref 4.8–5.6)
HCT VFR BLD AUTO: 40 % (ref 37.5–51)
HDLC SERPL QL: 2.6
HDLC SERPL-MCNC: 58 MG/DL (ref 40–60)
HGB BLD-MCNC: 12.8 G/DL (ref 13–17.7)
IMM GRANULOCYTES # BLD AUTO: 0.01 10*3/MM3 (ref 0–0.05)
IMM GRANULOCYTES NFR BLD AUTO: 0.2 % (ref 0–0.5)
LDLC SERPL CALC-MCNC: 75 MG/DL (ref 0–100)
LYMPHOCYTES # BLD AUTO: 1.27 10*3/MM3 (ref 0.7–3.1)
LYMPHOCYTES NFR BLD AUTO: 24.3 % (ref 19.6–45.3)
MCH RBC QN AUTO: 31.8 PG (ref 26.6–33)
MCHC RBC AUTO-ENTMCNC: 32 G/DL (ref 31.5–35.7)
MCV RBC AUTO: 99.3 FL (ref 79–97)
MONOCYTES # BLD AUTO: 0.49 10*3/MM3 (ref 0.1–0.9)
MONOCYTES NFR BLD AUTO: 9.4 % (ref 5–12)
NEUTROPHILS NFR BLD AUTO: 2.87 10*3/MM3 (ref 1.7–7)
NEUTROPHILS NFR BLD AUTO: 55 % (ref 42.7–76)
NRBC BLD AUTO-RTO: 0 /100 WBC (ref 0–0.2)
PLATELET # BLD AUTO: 180 10*3/MM3 (ref 140–450)
PMV BLD AUTO: 11.3 FL (ref 6–12)
POTASSIUM SERPL-SCNC: 3.8 MMOL/L (ref 3.5–5.2)
PROT SERPL-MCNC: 6.7 G/DL (ref 6–8.5)
RBC # BLD AUTO: 4.03 10*6/MM3 (ref 4.14–5.8)
SODIUM SERPL-SCNC: 140 MMOL/L (ref 136–145)
T3FREE SERPL-MCNC: 2.91 PG/ML (ref 2–4.4)
T4 FREE SERPL-MCNC: 1.43 NG/DL (ref 0.92–1.68)
TRIGL SERPL-MCNC: 97 MG/DL (ref 0–150)
TSH SERPL DL<=0.05 MIU/L-ACNC: 2.58 UIU/ML (ref 0.27–4.2)
VIT B12 BLD-MCNC: 473 PG/ML (ref 211–946)
VLDLC SERPL-MCNC: 18 MG/DL (ref 5–40)
WBC NRBC COR # BLD AUTO: 5.22 10*3/MM3 (ref 3.4–10.8)

## 2025-05-17 PROCEDURE — 85025 COMPLETE CBC W/AUTO DIFF WBC: CPT

## 2025-05-17 PROCEDURE — 84439 ASSAY OF FREE THYROXINE: CPT

## 2025-05-17 PROCEDURE — 82607 VITAMIN B-12: CPT

## 2025-05-17 PROCEDURE — 82553 CREATINE MB FRACTION: CPT

## 2025-05-17 PROCEDURE — 84443 ASSAY THYROID STIM HORMONE: CPT

## 2025-05-17 PROCEDURE — 82550 ASSAY OF CK (CPK): CPT

## 2025-05-17 PROCEDURE — 36415 COLL VENOUS BLD VENIPUNCTURE: CPT

## 2025-05-17 PROCEDURE — 80053 COMPREHEN METABOLIC PANEL: CPT

## 2025-05-17 PROCEDURE — 83036 HEMOGLOBIN GLYCOSYLATED A1C: CPT

## 2025-05-17 PROCEDURE — 82306 VITAMIN D 25 HYDROXY: CPT

## 2025-05-17 PROCEDURE — 84481 FREE ASSAY (FT-3): CPT

## 2025-05-17 PROCEDURE — 80061 LIPID PANEL: CPT

## 2025-05-22 ENCOUNTER — HOSPITAL ENCOUNTER (OUTPATIENT)
Dept: ULTRASOUND IMAGING | Facility: HOSPITAL | Age: 85
Discharge: HOME | End: 2025-05-22
Admitting: INTERNAL MEDICINE
Payer: MEDICARE

## 2025-05-22 DIAGNOSIS — I73.9 CLAUDICATION: ICD-10-CM

## 2025-05-22 PROCEDURE — 93923 UPR/LXTR ART STDY 3+ LVLS: CPT

## 2025-06-05 ENCOUNTER — OFFICE VISIT (OUTPATIENT)
Dept: INTERNAL MEDICINE | Facility: CLINIC | Age: 85
End: 2025-06-05
Payer: MEDICARE

## 2025-06-05 VITALS
WEIGHT: 176 LBS | DIASTOLIC BLOOD PRESSURE: 62 MMHG | HEART RATE: 66 BPM | SYSTOLIC BLOOD PRESSURE: 108 MMHG | BODY MASS INDEX: 26.76 KG/M2 | OXYGEN SATURATION: 99 %

## 2025-06-05 DIAGNOSIS — Z95.5 S/P PRIMARY ANGIOPLASTY WITH CORONARY STENT: ICD-10-CM

## 2025-06-05 DIAGNOSIS — M19.90 GENERALIZED ARTHRITIS: ICD-10-CM
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
## 2025-06-05 DIAGNOSIS — H61.23 BILATERAL IMPACTED CERUMEN: ICD-10-CM

## 2025-06-05 DIAGNOSIS — I25.85 CHRONIC CORONARY MICROVASCULAR DYSFUNCTION: ICD-10-CM

## 2025-06-05 DIAGNOSIS — E78.00 ELEVATED CHOLESTEROL: ICD-10-CM

## 2025-06-05 DIAGNOSIS — C83.398 DIFFUSE LARGE B-CELL LYMPHOMA OF EXTRANODAL SITE EXCLUDING SPLEEN AND OTHER SOLID ORGANS: ICD-10-CM

## 2025-06-05 DIAGNOSIS — I10 BENIGN ESSENTIAL HTN: Primary | ICD-10-CM

## 2025-06-05 NOTE — PROGRESS NOTES
Chief Complaint  Hypertension, Hyperlipidemia, and Benign Prostatic Hypertrophy    Subjective        Louie German presents to [facility]  History of Present Illness  History of Present Illness  The patient presents for evaluation of eosinophilia, arthritis, and tick bites.    He reports a recent episode of influenza or allergies, characterized by rhinorrhea and a persistent cough. These symptoms have been present for approximately 4 weeks, with residual nasal congestion persisting. His cough has shown improvement, but he occasionally experiences a transient decrease in vocal volume. He also mentions a history of pertussis during his childhood.    He has a history of B-cell lymphoma, which is currently in remission. He was previously under the care of Dr. Roberto, who has since retired. He has expressed concerns about his stamina to Dr. Mayen, particularly during walks in [place], where he experiences fatigue after 10 minutes of walking, necessitating a brief rest before resuming. He describes a sensation of energy draining down his legs. A leg circulation study was conducted, which yielded normal results. He was informed that his age could be a contributing factor to his symptoms.    He maintains a healthy diet, avoiding fast food and limiting meat consumption to once a week. He is currently on simvastatin for cholesterol management.    He experiences joint stiffness with prolonged sitting or standing, and notes a delay in rising from a seated position. He suspects he may have arthritis and is seeking advice on potential interventions.    He has a long-standing relationship with Dr. Amaro, urologist, spanning approximately 50 years. He has a past medical history of kidney stones, occurring every 8 years, which were either surgically removed or managed with pain medication. However, he has been free of kidney stones for the past 25 years.    He reports no need for medication  "refills at this time.    He has a history of playing softball for 60 years and frequently engages in outdoor activities such as tractor riding. He recently had six ticks removed from his body while clearing weeds in [place]. He now applies insect repellent to his ankles and pants legs. He has a history of surviving tick bites and is aware of a friend's diagnosis of Lyme disease. He is currently avoiding areas known for tick presence.    He had a basal cell carcinoma removed by Dr. Subhash Vergara, dermatologist.    SOCIAL HISTORY  He is an [place] citizen.    Objective   Vital Signs:  /62 (BP Location: Left arm, Patient Position: Sitting, Cuff Size: Adult)   Pulse 66   Wt 79.8 kg (176 lb)   SpO2 99%   BMI 26.76 kg/m²   Estimated body mass index is 26.76 kg/m² as calculated from the following:    Height as of 5/12/25: 172.7 cm (68\").    Weight as of this encounter: 79.8 kg (176 lb).            Physical Exam  Vitals reviewed.   Constitutional:       Appearance: He is well-developed.   HENT:      Head: Normocephalic and atraumatic.      Right Ear: Tympanic membrane and external ear normal.      Left Ear: Tympanic membrane and external ear normal.      Nose: Nose normal.   Eyes:      Conjunctiva/sclera: Conjunctivae normal.      Pupils: Pupils are equal, round, and reactive to light.   Neck:      Thyroid: No thyromegaly.      Vascular: No JVD.   Cardiovascular:      Rate and Rhythm: Normal rate and regular rhythm.      Heart sounds: Normal heart sounds.   Pulmonary:      Effort: Pulmonary effort is normal.      Breath sounds: Normal breath sounds.   Abdominal:      General: Bowel sounds are normal.      Palpations: Abdomen is soft.   Musculoskeletal:         General: Normal range of motion.      Cervical back: Normal range of motion and neck supple.   Lymphadenopathy:      Cervical: No cervical adenopathy.   Skin:     General: Skin is warm and dry.      Findings: No rash.   Neurological:      Mental Status: He is " alert and oriented to person, place, and time.      Cranial Nerves: No cranial nerve deficit.      Coordination: Coordination normal.   Psychiatric:         Behavior: Behavior normal.         Thought Content: Thought content normal.         Judgment: Judgment normal.        Physical Exam  Ears: Accumulation of skin and wax removed from the right ear canal.    Result Review :  The following data was reviewed by: Ryan Villarreal MD on 06/05/2025:  Common labs          11/19/2024    09:38 5/17/2025    08:41   Common Labs   Glucose 130  97    BUN 21  18    Creatinine 1.33  1.37    Sodium 143  140    Potassium 4.2  3.8    Chloride 106  107    Calcium 9.0  9.1    Albumin 4.1  4.0    Total Bilirubin 0.6  0.6    Alkaline Phosphatase 64  69    AST (SGOT) 22  19    ALT (SGPT) 13  9    WBC 6.27  5.22    Hemoglobin 12.5  12.8    Hematocrit 38.4  40.0    Platelets 166  180    Total Cholesterol  151    Triglycerides  97    HDL Cholesterol  58    LDL Cholesterol   75    Hemoglobin A1C  6.00      Data reviewed : Cardiology studies reviewed urology consultation and also lower extremity arterial study.   Results  Labs   - Cholesterol Panel: Well controlled   - B12: Good   - Vitamin D: Good   - A1c: 6.0   - Glucose: 97   - Liver Function Test: Normal   - Eosinophils: Elevated             Assessment and Plan   Diagnoses and all orders for this visit:    1. Benign essential HTN (Primary)    2. Chronic coronary microvascular dysfunction    3. 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 Elevated cholesterol    5. S/P primary angioplasty with coronary stent    6. Generalized arthritis    7. Bilateral impacted cerumen  Comments:  Relieved with curette without trauma    8. Tick bite, unspecified site, initial encounter      Assessment & Plan  1. Eosinophilia.  - Elevated eosinophil count likely due to significant seasonal allergic reaction.  - Blood work shows eosinophils are elevated; other  lab values including B12, Vitamin D, A1c, glucose, and liver function are within normal limits.  - Discussed the common causes of eosinophilia, primarily allergic responses, and the possibility of respiratory viruses or whooping cough if symptoms persist.  - Monitoring symptoms; no immediate treatment required.    2. Arthritis.  - Experiences joint stiffness when sitting or standing for extended periods.  - Physical examination reveals stiffness in joints; advised to maintain physical activity to manage symptoms.  - Discussed the use of glucosamine and chondroitin sulfate as potential supplements, although efficacy is uncertain.  - Continue current activity level; no new medications prescribed.    3. Tick bites.  - Reported removing six ticks from his body a few weeks ago.  - No current symptoms of Lyme disease or other tick-borne illnesses present; physical examination does not show signs of infection.  - Reviewed the importance of monitoring for new symptoms and using insect repellent to prevent future bites.  - No antibiotics prescribed at this time; advised to monitor and report any new symptoms.    4. Health maintenance.  - Cholesterol levels are well-managed with simvastatin; blood pressure is within normal range.  - Recent cardiology consultation and lower extremity arterial study reviewed and found satisfactory.  - Encouraged to maintain healthy diet and physical activity.  - No new medications or changes to current regimen; follow-up in 6 months.    PROCEDURE  Procedure: Right ear cleaning    All questions were answered and agreement to proceed was given after the following Pre-Procedure details were reviewed:  - Consent: Verbal consent obtained    Intra-Procedure:    Post-Procedure:  - Tolerance Level: Tolerated well  - Complications: None  - Home Care Instructions: Continue using ear drops and warm water rinses         Follow Up   No follow-ups on file.    Patient or patient representative verbalized  consent for the use of Ambient Listening during the visit with  Ryan Villarreal MD for chart documentation. 6/5/2025  10:48 EDT    Patient was given instructions and counseling regarding his condition or for health maintenance advice. Please see specific information pulled into the AVS if appropriate.

## (undated) DEVICE — 3M™ STERI-STRIP™ REINFORCED ADHESIVE SKIN CLOSURES, R1547, 1/2 IN X 4 IN (12 MM X 100 MM), 6 STRIPS/ENVELOPE: Brand: 3M™ STERI-STRIP™

## (undated) DEVICE — ANTIBACTERIAL UNDYED BRAIDED (POLYGLACTIN 910), SYNTHETIC ABSORBABLE SUTURE: Brand: COATED VICRYL

## (undated) DEVICE — NC TREK CORONARY DILATATION CATHETER 3.0 MM X 20 MM / RAPID-EXCHANGE: Brand: NC TREK

## (undated) DEVICE — LOU LAP CHOLE: Brand: MEDLINE INDUSTRIES, INC.

## (undated) DEVICE — GLV SURG BIOGEL LTX PF 8

## (undated) DEVICE — GLIDESHEATH BASIC HYDROPHILIC COATED INTRODUCER SHEATH: Brand: GLIDESHEATH

## (undated) DEVICE — TBG 02 CRUSH RESIST LF CLR 7FT

## (undated) DEVICE — GW EMR FIX EXCHG J STD .035 3MM 260CM

## (undated) DEVICE — PK CATH CARD 40

## (undated) DEVICE — GUIDE CATHETER: Brand: MACH1™

## (undated) DEVICE — CATH4F INF PIG 145Â° MOD 110CM: Brand: INFINITI

## (undated) DEVICE — SOL NACL 0.9PCT 100ML SGL

## (undated) DEVICE — ENDOPATH XCEL UNIVERSAL TROCAR STABLILITY SLEEVES: Brand: ENDOPATH XCEL

## (undated) DEVICE — NDL HYPO PRECISIONGLIDE REG 25G 1 1/2

## (undated) DEVICE — NC TREK CORONARY DILATATION CATHETER 3.0 MM X 8 MM / RAPID-EXCHANGE: Brand: NC TREK

## (undated) DEVICE — INTENDED FOR TISSUE SEPARATION, AND OTHER PROCEDURES THAT REQUIRE A SHARP SURGICAL BLADE TO PUNCTURE OR CUT.: Brand: BARD-PARKER ® CARBON RIB-BACK BLADES

## (undated) DEVICE — SUT VIC 3/0 SH 27IN J416H

## (undated) DEVICE — GW HITORQUE/BAL MID/WT J W/HCOAT .014 3X190CM

## (undated) DEVICE — LOU MINOR PROCEDURE: Brand: MEDLINE INDUSTRIES, INC.

## (undated) DEVICE — 3M™ STERI-STRIP™ COMPOUND BENZOIN TINCTURE 40 BAGS/CARTON 4 CARTONS/CASE C1544: Brand: 3M™ STERI-STRIP™

## (undated) DEVICE — SUT ETHLN 2/0 PS 18IN 585H

## (undated) DEVICE — CATH DIAG IMPULSE FR4 5F 100CM

## (undated) DEVICE — TREK CORONARY DILATATION CATHETER 2.50 MM X 20 MM / RAPID-EXCHANGE: Brand: TREK

## (undated) DEVICE — SUT VIC 0 TN 27IN DYED JTN0G

## (undated) DEVICE — SPNG GZ WOVN 4X4IN 12PLY 10/BX STRL

## (undated) DEVICE — TOTAL TRAY, 16FR 10ML SIL FOLEY, URN: Brand: MEDLINE

## (undated) DEVICE — ENDOPATH XCEL BLADELESS TROCARS WITH STABILITY SLEEVES: Brand: ENDOPATH XCEL

## (undated) DEVICE — 6F .070 JR 4 100CM: Brand: CORDIS

## (undated) DEVICE — CATH DIAG IMPULSE FL3.5 5F 100CM

## (undated) DEVICE — LAPAROSCOPIC GAS CONDITIONING DEVICE.: Brand: INSUFLOW

## (undated) DEVICE — APPL CHLORAPREP W/TINT 26ML ORNG

## (undated) DEVICE — GW PRESSUREWIRE AERIS W/ AGILE TP 175CM

## (undated) DEVICE — Device: Brand: DEFENDO AIR/WATER/SUCTION AND BIOPSY VALVE

## (undated) DEVICE — VISUALIZATION SYSTEM: Brand: CLEARIFY

## (undated) DEVICE — NC TREK CORONARY DILATATION CATHETER 3.5 MM X 20 MM / RAPID-EXCHANGE: Brand: NC TREK

## (undated) DEVICE — APPL CHLORAPREP W/TINT 10.5ML PERC STRL

## (undated) DEVICE — TUBING, SUCTION, 1/4" X 10', STRAIGHT: Brand: MEDLINE

## (undated) DEVICE — ENDOCUT SCISSOR TIP, DISPOSABLE: Brand: RENEW

## (undated) DEVICE — BITEBLOCK OMNI BLOC

## (undated) DEVICE — THE TORRENT IRRIGATION SCOPE CONNECTOR IS USED WITH THE TORRENT IRRIGATION TUBING TO PROVIDE IRRIGATION FLUIDS SUCH AS STERILE WATER DURING GASTROINTESTINAL ENDOSCOPIC PROCEDURES WHEN USED IN CONJUNCTION WITH AN IRRIGATION PUMP (OR ELECTROSURGICAL UNIT).: Brand: TORRENT

## (undated) DEVICE — SOL NACL 0.9PCT 1000ML

## (undated) DEVICE — HI-TORQUE WHISPER ES GUIDE WIRE .014 STRAIGHTTIP 3.0 CM X 190 CM: Brand: HI-TORQUE WHISPER

## (undated) DEVICE — KT MANIFLD CARDIAC

## (undated) DEVICE — NC TREK CORONARY DILATATION CATHETER 2.75 MM X 20 MM / RAPID-EXCHANGE: Brand: NC TREK

## (undated) DEVICE — CANN NASL CO2 TRULINK W/O2 A/

## (undated) DEVICE — DRP C/ARM 41X74IN

## (undated) DEVICE — DEV INDEFLATOR P/N 580289

## (undated) DEVICE — SUT MNCRYL PLS ANTIB UD 4/0 PS2 18IN